# Patient Record
Sex: MALE | Race: BLACK OR AFRICAN AMERICAN | Employment: OTHER | URBAN - METROPOLITAN AREA
[De-identification: names, ages, dates, MRNs, and addresses within clinical notes are randomized per-mention and may not be internally consistent; named-entity substitution may affect disease eponyms.]

---

## 2017-01-23 ENCOUNTER — HOSPITAL ENCOUNTER (OUTPATIENT)
Dept: LAB | Age: 67
Discharge: HOME | End: 2017-01-23
Payer: SELF-PAY

## 2017-01-23 DIAGNOSIS — N18.4 CHRONIC KIDNEY DISEASE, STAGE IV (SEVERE) (HCC): ICD-10-CM

## 2017-01-23 DIAGNOSIS — I10 ESSENTIAL HYPERTENSION, MALIGNANT: ICD-10-CM

## 2017-01-23 DIAGNOSIS — N13.9 URINARY OBSTRUCTION, UNSPECIFIED: ICD-10-CM

## 2017-01-23 DIAGNOSIS — N18.30 CHRONIC KIDNEY DISEASE, STAGE III (MODERATE) (HCC): ICD-10-CM

## 2017-01-23 DIAGNOSIS — N31.9 NEUROGENIC DYSFUNCTION OF THE URINARY BLADDER: ICD-10-CM

## 2017-01-23 DIAGNOSIS — N17.9 ACUTE KIDNEY FAILURE, UNSPECIFIED (HCC): ICD-10-CM

## 2017-01-23 DIAGNOSIS — E21.1 HYPERPARATHYROIDISM DUE TO 1,25(0H)2D3 (HCC): ICD-10-CM

## 2017-01-23 LAB
25(OH)D3 SERPL-MCNC: 35.1 NG/ML (ref 30–100)
ALBUMIN SERPL BCP-MCNC: 3.6 G/DL (ref 3.4–5)
ANION GAP BLD CALC-SCNC: 8 MMOL/L (ref 3–18)
APPEARANCE UR: ABNORMAL
BACTERIA URNS QL MICRO: ABNORMAL /HPF
BILIRUB UR QL: NEGATIVE
BUN SERPL-MCNC: 27 MG/DL (ref 7–18)
BUN/CREAT SERPL: 15 (ref 12–20)
CALCIUM SERPL-MCNC: 8.7 MG/DL (ref 8.5–10.1)
CALCIUM SERPL-MCNC: 9 MG/DL (ref 8.5–10.1)
CHLORIDE SERPL-SCNC: 106 MMOL/L (ref 100–108)
CO2 SERPL-SCNC: 31 MMOL/L (ref 21–32)
COLOR UR: YELLOW
CREAT SERPL-MCNC: 1.81 MG/DL (ref 0.6–1.3)
CREAT UR-MCNC: 107 MG/DL (ref 30–125)
EPITH CASTS URNS QL MICRO: ABNORMAL /LPF (ref 0–5)
GLUCOSE SERPL-MCNC: 135 MG/DL (ref 74–99)
GLUCOSE UR STRIP.AUTO-MCNC: NEGATIVE MG/DL
HGB UR QL STRIP: NEGATIVE
KETONES UR QL STRIP.AUTO: NEGATIVE MG/DL
LEUKOCYTE ESTERASE UR QL STRIP.AUTO: ABNORMAL
MICROALBUMIN UR-MCNC: 148 MG/DL (ref 0–3)
MICROALBUMIN/CREAT UR-RTO: 1383 MG/G (ref 0–30)
MUCOUS THREADS URNS QL MICRO: ABNORMAL /LPF
NITRITE UR QL STRIP.AUTO: POSITIVE
PH UR STRIP: 6.5 [PH] (ref 5–8)
PHOSPHATE SERPL-MCNC: 3.6 MG/DL (ref 2.5–4.9)
POTASSIUM SERPL-SCNC: 3.8 MMOL/L (ref 3.5–5.5)
PROT UR STRIP-MCNC: 300 MG/DL
PTH-INTACT SERPL-MCNC: 99.2 PG/ML (ref 14–72)
RBC #/AREA URNS HPF: ABNORMAL /HPF (ref 0–5)
SODIUM SERPL-SCNC: 145 MMOL/L (ref 136–145)
SP GR UR REFRACTOMETRY: 1.01 (ref 1–1.03)
UROBILINOGEN UR QL STRIP.AUTO: 1 EU/DL (ref 0.2–1)
WBC URNS QL MICRO: ABNORMAL /HPF (ref 0–5)

## 2017-01-23 PROCEDURE — 36415 COLL VENOUS BLD VENIPUNCTURE: CPT | Performed by: INTERNAL MEDICINE

## 2017-01-23 PROCEDURE — 82043 UR ALBUMIN QUANTITATIVE: CPT | Performed by: INTERNAL MEDICINE

## 2017-01-23 PROCEDURE — 82306 VITAMIN D 25 HYDROXY: CPT | Performed by: INTERNAL MEDICINE

## 2017-01-23 PROCEDURE — 80069 RENAL FUNCTION PANEL: CPT | Performed by: INTERNAL MEDICINE

## 2017-01-23 PROCEDURE — 83970 ASSAY OF PARATHORMONE: CPT | Performed by: INTERNAL MEDICINE

## 2017-01-23 PROCEDURE — 81001 URINALYSIS AUTO W/SCOPE: CPT | Performed by: INTERNAL MEDICINE

## 2017-03-14 ENCOUNTER — HOSPITAL ENCOUNTER (OUTPATIENT)
Dept: LAB | Age: 67
Discharge: HOME | End: 2017-03-14
Payer: MEDICARE

## 2017-03-14 LAB
ANION GAP BLD CALC-SCNC: 5 MMOL/L (ref 3–18)
BUN SERPL-MCNC: 27 MG/DL (ref 7–18)
BUN/CREAT SERPL: 14 (ref 12–20)
CALCIUM SERPL-MCNC: 9.2 MG/DL (ref 8.5–10.1)
CHLORIDE SERPL-SCNC: 106 MMOL/L (ref 100–108)
CO2 SERPL-SCNC: 33 MMOL/L (ref 21–32)
CREAT SERPL-MCNC: 1.9 MG/DL (ref 0.6–1.3)
GLUCOSE SERPL-MCNC: 111 MG/DL (ref 74–99)
POTASSIUM SERPL-SCNC: 3.9 MMOL/L (ref 3.5–5.5)
SODIUM SERPL-SCNC: 144 MMOL/L (ref 136–145)

## 2017-03-14 PROCEDURE — 80048 BASIC METABOLIC PNL TOTAL CA: CPT | Performed by: INTERNAL MEDICINE

## 2017-03-14 PROCEDURE — 36415 COLL VENOUS BLD VENIPUNCTURE: CPT | Performed by: INTERNAL MEDICINE

## 2017-04-21 ENCOUNTER — HOSPITAL ENCOUNTER (OUTPATIENT)
Dept: LAB | Age: 67
Discharge: HOME | End: 2017-04-21
Payer: MEDICARE

## 2017-04-21 LAB
25(OH)D3 SERPL-MCNC: 40.9 NG/ML (ref 30–100)
ALBUMIN SERPL BCP-MCNC: 3.5 G/DL (ref 3.4–5)
ANION GAP BLD CALC-SCNC: 8 MMOL/L (ref 3–18)
BUN SERPL-MCNC: 25 MG/DL (ref 7–18)
BUN/CREAT SERPL: 14 (ref 12–20)
CALCIUM SERPL-MCNC: 9.1 MG/DL (ref 8.5–10.1)
CALCIUM SERPL-MCNC: 9.3 MG/DL (ref 8.5–10.1)
CHLORIDE SERPL-SCNC: 105 MMOL/L (ref 100–108)
CO2 SERPL-SCNC: 30 MMOL/L (ref 21–32)
CREAT SERPL-MCNC: 1.82 MG/DL (ref 0.6–1.3)
CREAT UR-MCNC: 41 MG/DL (ref 30–125)
GLUCOSE SERPL-MCNC: 106 MG/DL (ref 74–99)
MICROALBUMIN UR-MCNC: 214 MG/DL (ref 0–3)
MICROALBUMIN/CREAT UR-RTO: 5220 MG/G (ref 0–30)
PHOSPHATE SERPL-MCNC: 3.8 MG/DL (ref 2.5–4.9)
POTASSIUM SERPL-SCNC: 3.8 MMOL/L (ref 3.5–5.5)
PTH-INTACT SERPL-MCNC: 119.9 PG/ML (ref 14–72)
SODIUM SERPL-SCNC: 143 MMOL/L (ref 136–145)

## 2017-04-21 PROCEDURE — 36415 COLL VENOUS BLD VENIPUNCTURE: CPT | Performed by: INTERNAL MEDICINE

## 2017-04-21 PROCEDURE — 83970 ASSAY OF PARATHORMONE: CPT | Performed by: INTERNAL MEDICINE

## 2017-04-21 PROCEDURE — 82043 UR ALBUMIN QUANTITATIVE: CPT | Performed by: INTERNAL MEDICINE

## 2017-04-21 PROCEDURE — 80069 RENAL FUNCTION PANEL: CPT | Performed by: INTERNAL MEDICINE

## 2017-04-21 PROCEDURE — 82306 VITAMIN D 25 HYDROXY: CPT | Performed by: INTERNAL MEDICINE

## 2017-07-27 ENCOUNTER — HOSPITAL ENCOUNTER (OUTPATIENT)
Dept: LAB | Age: 67
Discharge: HOME | End: 2017-07-27
Payer: MEDICARE

## 2017-07-27 LAB
CALCIUM SERPL-MCNC: 9 MG/DL (ref 8.5–10.1)
PTH-INTACT SERPL-MCNC: 75.9 PG/ML (ref 14–72)

## 2017-07-27 PROCEDURE — 36415 COLL VENOUS BLD VENIPUNCTURE: CPT | Performed by: INTERNAL MEDICINE

## 2017-07-27 PROCEDURE — 83970 ASSAY OF PARATHORMONE: CPT | Performed by: INTERNAL MEDICINE

## 2017-08-02 ENCOUNTER — HOSPITAL ENCOUNTER (OUTPATIENT)
Dept: LAB | Age: 67
Discharge: HOME | End: 2017-08-02
Payer: MEDICARE

## 2017-08-02 LAB
ANION GAP BLD CALC-SCNC: 7 MMOL/L (ref 3–18)
BUN SERPL-MCNC: 22 MG/DL (ref 7–18)
BUN/CREAT SERPL: 12 (ref 12–20)
CALCIUM SERPL-MCNC: 8.7 MG/DL (ref 8.5–10.1)
CHLORIDE SERPL-SCNC: 107 MMOL/L (ref 100–108)
CO2 SERPL-SCNC: 30 MMOL/L (ref 21–32)
CREAT SERPL-MCNC: 1.91 MG/DL (ref 0.6–1.3)
GLUCOSE SERPL-MCNC: 118 MG/DL (ref 74–99)
POTASSIUM SERPL-SCNC: 3.8 MMOL/L (ref 3.5–5.5)
SODIUM SERPL-SCNC: 144 MMOL/L (ref 136–145)

## 2017-08-02 PROCEDURE — 36415 COLL VENOUS BLD VENIPUNCTURE: CPT | Performed by: INTERNAL MEDICINE

## 2017-08-02 PROCEDURE — 80048 BASIC METABOLIC PNL TOTAL CA: CPT | Performed by: INTERNAL MEDICINE

## 2017-11-01 ENCOUNTER — HOSPITAL ENCOUNTER (OUTPATIENT)
Dept: LAB | Age: 67
Discharge: HOME | End: 2017-11-01
Payer: MEDICARE

## 2017-11-01 LAB
ALBUMIN SERPL-MCNC: 3.3 G/DL (ref 3.4–5)
ANION GAP SERPL CALC-SCNC: 6 MMOL/L (ref 3–18)
BUN SERPL-MCNC: 25 MG/DL (ref 7–18)
BUN/CREAT SERPL: 13 (ref 12–20)
CALCIUM SERPL-MCNC: 8.8 MG/DL (ref 8.5–10.1)
CHLORIDE SERPL-SCNC: 104 MMOL/L (ref 100–108)
CO2 SERPL-SCNC: 31 MMOL/L (ref 21–32)
CREAT SERPL-MCNC: 1.97 MG/DL (ref 0.6–1.3)
CREAT UR-MCNC: 69.7 MG/DL (ref 30–125)
GLUCOSE SERPL-MCNC: 105 MG/DL (ref 74–99)
HCT VFR BLD AUTO: 43.6 % (ref 36–48)
HGB BLD-MCNC: 13.9 G/DL (ref 13–16)
IRON SATN MFR SERPL: 19 %
IRON SERPL-MCNC: 48 UG/DL (ref 50–175)
MICROALBUMIN UR-MCNC: 220 MG/DL (ref 0–3)
MICROALBUMIN/CREAT UR-RTO: 3156 MG/G (ref 0–30)
PHOSPHATE SERPL-MCNC: 3.5 MG/DL (ref 2.5–4.9)
POTASSIUM SERPL-SCNC: 4.1 MMOL/L (ref 3.5–5.5)
SODIUM SERPL-SCNC: 141 MMOL/L (ref 136–145)
TIBC SERPL-MCNC: 259 UG/DL (ref 250–450)

## 2017-11-01 PROCEDURE — 36415 COLL VENOUS BLD VENIPUNCTURE: CPT | Performed by: INTERNAL MEDICINE

## 2017-11-01 PROCEDURE — 85018 HEMOGLOBIN: CPT | Performed by: INTERNAL MEDICINE

## 2017-11-01 PROCEDURE — 80069 RENAL FUNCTION PANEL: CPT | Performed by: INTERNAL MEDICINE

## 2017-11-01 PROCEDURE — 82043 UR ALBUMIN QUANTITATIVE: CPT | Performed by: INTERNAL MEDICINE

## 2017-11-01 PROCEDURE — 83540 ASSAY OF IRON: CPT | Performed by: INTERNAL MEDICINE

## 2018-04-13 ENCOUNTER — HOSPITAL ENCOUNTER (OUTPATIENT)
Dept: LAB | Age: 68
Discharge: HOME | End: 2018-04-13
Payer: MEDICARE

## 2018-04-13 LAB
25(OH)D3 SERPL-MCNC: 44.7 NG/ML (ref 30–100)
ALBUMIN SERPL-MCNC: 3.5 G/DL (ref 3.4–5)
ANION GAP SERPL CALC-SCNC: 8 MMOL/L (ref 3–18)
BUN SERPL-MCNC: 25 MG/DL (ref 7–18)
BUN/CREAT SERPL: 12 (ref 12–20)
CALCIUM SERPL-MCNC: 9 MG/DL (ref 8.5–10.1)
CALCIUM SERPL-MCNC: 9 MG/DL (ref 8.5–10.1)
CHLORIDE SERPL-SCNC: 109 MMOL/L (ref 100–108)
CO2 SERPL-SCNC: 30 MMOL/L (ref 21–32)
CREAT SERPL-MCNC: 2.03 MG/DL (ref 0.6–1.3)
GLUCOSE SERPL-MCNC: 85 MG/DL (ref 74–99)
PHOSPHATE SERPL-MCNC: 3.6 MG/DL (ref 2.5–4.9)
POTASSIUM SERPL-SCNC: 4 MMOL/L (ref 3.5–5.5)
PTH-INTACT SERPL-MCNC: 96.1 PG/ML (ref 18.4–88)
SODIUM SERPL-SCNC: 147 MMOL/L (ref 136–145)

## 2018-04-13 PROCEDURE — 82306 VITAMIN D 25 HYDROXY: CPT | Performed by: INTERNAL MEDICINE

## 2018-04-13 PROCEDURE — 83970 ASSAY OF PARATHORMONE: CPT | Performed by: INTERNAL MEDICINE

## 2018-04-13 PROCEDURE — 36415 COLL VENOUS BLD VENIPUNCTURE: CPT | Performed by: INTERNAL MEDICINE

## 2018-04-13 PROCEDURE — 80069 RENAL FUNCTION PANEL: CPT | Performed by: INTERNAL MEDICINE

## 2018-05-07 RX ORDER — HYDRALAZINE HYDROCHLORIDE 100 MG/1
TABLET, FILM COATED ORAL
Qty: 270 TAB | Refills: 6 | Status: CANCELLED | OUTPATIENT
Start: 2018-05-07

## 2018-08-14 ENCOUNTER — HOSPITAL ENCOUNTER (OUTPATIENT)
Dept: LAB | Age: 68
Discharge: HOME | End: 2018-08-14

## 2018-08-14 PROCEDURE — 99001 SPECIMEN HANDLING PT-LAB: CPT | Performed by: INTERNAL MEDICINE

## 2018-09-18 ENCOUNTER — HOSPITAL ENCOUNTER (OUTPATIENT)
Dept: VASCULAR SURGERY | Age: 68
Discharge: HOME | End: 2018-09-18
Attending: FAMILY MEDICINE
Payer: MEDICARE

## 2018-09-18 DIAGNOSIS — I73.9 PAD (PERIPHERAL ARTERY DISEASE) (HCC): ICD-10-CM

## 2018-09-18 LAB
LEFT ABI: 1.47
LEFT ANTERIOR TIBIAL: 240 MMHG
LEFT POSTERIOR TIBIAL: 240 MMHG
LEFT TBI: 0.87
LEFT TOE PRESSURE: 142 MMHG
RIGHT ABI: 1.47
RIGHT ANTERIOR TIBIAL: 240 MMHG
RIGHT ARM BP: 163 MMHG
RIGHT POSTERIOR TIBIAL: 240 MMHG
RIGHT TBI: 1.47
RIGHT TOE PRESSURE: 240 MMHG

## 2018-09-18 PROCEDURE — 93923 UPR/LXTR ART STDY 3+ LVLS: CPT

## 2018-11-09 ENCOUNTER — HOSPITAL ENCOUNTER (OUTPATIENT)
Dept: LAB | Age: 68
Discharge: HOME | End: 2018-11-09

## 2018-11-09 LAB — XX-LABCORP SPECIMEN COL,LCBCF: NORMAL

## 2018-11-09 PROCEDURE — 99001 SPECIMEN HANDLING PT-LAB: CPT

## 2019-03-21 ENCOUNTER — HOSPITAL ENCOUNTER (OUTPATIENT)
Dept: LAB | Age: 69
Discharge: HOME | End: 2019-03-21

## 2019-03-21 LAB — XX-LABCORP SPECIMEN COL,LCBCF: NORMAL

## 2019-03-21 PROCEDURE — 99001 SPECIMEN HANDLING PT-LAB: CPT

## 2019-07-26 ENCOUNTER — HOSPITAL ENCOUNTER (OUTPATIENT)
Dept: LAB | Age: 69
Discharge: HOME | End: 2019-07-26

## 2019-07-26 LAB — XX-LABCORP SPECIMEN COL,LCBCF: NORMAL

## 2019-07-26 PROCEDURE — 99001 SPECIMEN HANDLING PT-LAB: CPT

## 2019-12-19 ENCOUNTER — HOSPITAL ENCOUNTER (OUTPATIENT)
Dept: LAB | Age: 69
Discharge: HOME | End: 2019-12-19
Payer: MEDICARE

## 2019-12-19 LAB
ALBUMIN SERPL-MCNC: 3.8 G/DL (ref 3.4–5)
ANION GAP SERPL CALC-SCNC: 7 MMOL/L (ref 3–18)
BASOPHILS # BLD: 0 K/UL (ref 0–0.1)
BASOPHILS NFR BLD: 1 % (ref 0–2)
BUN SERPL-MCNC: 34 MG/DL (ref 7–18)
BUN/CREAT SERPL: 15 (ref 12–20)
CALCIUM SERPL-MCNC: 9.1 MG/DL (ref 8.5–10.1)
CHLORIDE SERPL-SCNC: 109 MMOL/L (ref 100–111)
CO2 SERPL-SCNC: 30 MMOL/L (ref 21–32)
CREAT SERPL-MCNC: 2.26 MG/DL (ref 0.6–1.3)
DIFFERENTIAL METHOD BLD: ABNORMAL
EOSINOPHIL # BLD: 0.2 K/UL (ref 0–0.4)
EOSINOPHIL NFR BLD: 4 % (ref 0–5)
ERYTHROCYTE [DISTWIDTH] IN BLOOD BY AUTOMATED COUNT: 15.2 % (ref 11.6–14.5)
GLUCOSE SERPL-MCNC: 88 MG/DL (ref 74–99)
HCT VFR BLD AUTO: 39.6 % (ref 36–48)
HGB BLD-MCNC: 12.8 G/DL (ref 13–16)
LYMPHOCYTES # BLD: 0.7 K/UL (ref 0.9–3.6)
LYMPHOCYTES NFR BLD: 16 % (ref 21–52)
MCH RBC QN AUTO: 29.2 PG (ref 24–34)
MCHC RBC AUTO-ENTMCNC: 32.3 G/DL (ref 31–37)
MCV RBC AUTO: 90.2 FL (ref 74–97)
MONOCYTES # BLD: 0.6 K/UL (ref 0.05–1.2)
MONOCYTES NFR BLD: 13 % (ref 3–10)
NEUTS SEG # BLD: 2.8 K/UL (ref 1.8–8)
NEUTS SEG NFR BLD: 66 % (ref 40–73)
PHOSPHATE SERPL-MCNC: 3.6 MG/DL (ref 2.5–4.9)
PLATELET # BLD AUTO: 107 K/UL (ref 135–420)
PMV BLD AUTO: 11 FL (ref 9.2–11.8)
POTASSIUM SERPL-SCNC: 4 MMOL/L (ref 3.5–5.5)
RBC # BLD AUTO: 4.39 M/UL (ref 4.7–5.5)
SODIUM SERPL-SCNC: 146 MMOL/L (ref 136–145)
WBC # BLD AUTO: 4.3 K/UL (ref 4.6–13.2)

## 2019-12-19 PROCEDURE — 80069 RENAL FUNCTION PANEL: CPT

## 2019-12-19 PROCEDURE — 85025 COMPLETE CBC W/AUTO DIFF WBC: CPT

## 2019-12-19 PROCEDURE — 36415 COLL VENOUS BLD VENIPUNCTURE: CPT

## 2020-09-04 ENCOUNTER — HOSPITAL ENCOUNTER (OUTPATIENT)
Dept: LAB | Age: 70
Discharge: HOME | End: 2020-09-04
Payer: MEDICARE

## 2020-09-04 LAB
ALBUMIN SERPL-MCNC: 3.9 G/DL (ref 3.4–5)
ANION GAP SERPL CALC-SCNC: 2 MMOL/L (ref 3–18)
BUN SERPL-MCNC: 27 MG/DL (ref 7–18)
BUN/CREAT SERPL: 12 (ref 12–20)
CALCIUM SERPL-MCNC: 9.6 MG/DL (ref 8.5–10.1)
CALCIUM SERPL-MCNC: 9.7 MG/DL (ref 8.5–10.1)
CHLORIDE SERPL-SCNC: 109 MMOL/L (ref 100–111)
CO2 SERPL-SCNC: 32 MMOL/L (ref 21–32)
CREAT SERPL-MCNC: 2.23 MG/DL (ref 0.6–1.3)
CREAT UR-MCNC: 23 MG/DL (ref 30–125)
GLUCOSE SERPL-MCNC: 81 MG/DL (ref 74–99)
MICROALBUMIN UR-MCNC: 44.3 MG/DL (ref 0–3)
MICROALBUMIN/CREAT UR-RTO: 1926 MG/G (ref 0–30)
PHOSPHATE SERPL-MCNC: 3.6 MG/DL (ref 2.5–4.9)
POTASSIUM SERPL-SCNC: 4.1 MMOL/L (ref 3.5–5.5)
PTH-INTACT SERPL-MCNC: 58.4 PG/ML (ref 18.4–88)
SODIUM SERPL-SCNC: 143 MMOL/L (ref 136–145)

## 2020-09-04 PROCEDURE — 36415 COLL VENOUS BLD VENIPUNCTURE: CPT

## 2020-09-04 PROCEDURE — 80069 RENAL FUNCTION PANEL: CPT

## 2020-09-04 PROCEDURE — 83970 ASSAY OF PARATHORMONE: CPT

## 2020-09-04 PROCEDURE — 82043 UR ALBUMIN QUANTITATIVE: CPT

## 2021-03-10 ENCOUNTER — HOSPITAL ENCOUNTER (OUTPATIENT)
Dept: LAB | Age: 71
Discharge: HOME | End: 2021-03-10
Payer: MEDICARE

## 2021-03-10 LAB
25(OH)D3 SERPL-MCNC: 45 NG/ML (ref 30–100)
ALBUMIN SERPL-MCNC: 3.7 G/DL (ref 3.4–5)
ANION GAP SERPL CALC-SCNC: 7 MMOL/L (ref 3–18)
BUN SERPL-MCNC: 36 MG/DL (ref 7–18)
BUN/CREAT SERPL: 15 (ref 12–20)
CALCIUM SERPL-MCNC: 9.3 MG/DL (ref 8.5–10.1)
CALCIUM SERPL-MCNC: 9.4 MG/DL (ref 8.5–10.1)
CHLORIDE SERPL-SCNC: 111 MMOL/L (ref 100–111)
CO2 SERPL-SCNC: 29 MMOL/L (ref 21–32)
CREAT SERPL-MCNC: 2.47 MG/DL (ref 0.6–1.3)
CREAT UR-MCNC: 63 MG/DL (ref 30–125)
ERYTHROCYTE [DISTWIDTH] IN BLOOD BY AUTOMATED COUNT: 15.5 % (ref 11.6–14.5)
FERRITIN SERPL-MCNC: 174 NG/ML (ref 8–388)
GLUCOSE SERPL-MCNC: 86 MG/DL (ref 74–99)
HCT VFR BLD AUTO: 34.6 % (ref 36–48)
HGB BLD-MCNC: 11.2 G/DL (ref 13–16)
IRON SATN MFR SERPL: 17 % (ref 20–50)
IRON SERPL-MCNC: 43 UG/DL (ref 50–175)
MCH RBC QN AUTO: 29.4 PG (ref 24–34)
MCHC RBC AUTO-ENTMCNC: 32.4 G/DL (ref 31–37)
MCV RBC AUTO: 90.8 FL (ref 74–97)
MICROALBUMIN UR-MCNC: 68.1 MG/DL (ref 0–3)
MICROALBUMIN/CREAT UR-RTO: 1081 MG/G (ref 0–30)
PHOSPHATE SERPL-MCNC: 4 MG/DL (ref 2.5–4.9)
PLATELET # BLD AUTO: 108 K/UL (ref 135–420)
PMV BLD AUTO: 11 FL (ref 9.2–11.8)
POTASSIUM SERPL-SCNC: 4.3 MMOL/L (ref 3.5–5.5)
PTH-INTACT SERPL-MCNC: 46.5 PG/ML (ref 18.4–88)
RBC # BLD AUTO: 3.81 M/UL (ref 4.7–5.5)
SODIUM SERPL-SCNC: 147 MMOL/L (ref 136–145)
TIBC SERPL-MCNC: 254 UG/DL (ref 250–450)
WBC # BLD AUTO: 3.8 K/UL (ref 4.6–13.2)

## 2021-03-10 PROCEDURE — 36415 COLL VENOUS BLD VENIPUNCTURE: CPT

## 2021-03-10 PROCEDURE — 83970 ASSAY OF PARATHORMONE: CPT

## 2021-03-10 PROCEDURE — 83550 IRON BINDING TEST: CPT

## 2021-03-10 PROCEDURE — 82728 ASSAY OF FERRITIN: CPT

## 2021-03-10 PROCEDURE — 80069 RENAL FUNCTION PANEL: CPT

## 2021-03-10 PROCEDURE — 82306 VITAMIN D 25 HYDROXY: CPT

## 2021-03-10 PROCEDURE — 85027 COMPLETE CBC AUTOMATED: CPT

## 2021-03-10 PROCEDURE — 82043 UR ALBUMIN QUANTITATIVE: CPT

## 2021-07-20 ENCOUNTER — HOSPITAL ENCOUNTER (OUTPATIENT)
Dept: LAB | Age: 71
Discharge: HOME | End: 2021-07-20
Payer: MEDICARE

## 2021-07-20 LAB
ALBUMIN SERPL-MCNC: 3.8 G/DL (ref 3.4–5)
ANION GAP SERPL CALC-SCNC: 2 MMOL/L (ref 3–18)
BUN SERPL-MCNC: 56 MG/DL (ref 7–18)
BUN/CREAT SERPL: 18 (ref 12–20)
CALCIUM SERPL-MCNC: 9.5 MG/DL (ref 8.5–10.1)
CALCIUM SERPL-MCNC: 9.7 MG/DL (ref 8.5–10.1)
CHLORIDE SERPL-SCNC: 104 MMOL/L (ref 100–111)
CO2 SERPL-SCNC: 34 MMOL/L (ref 21–32)
CREAT SERPL-MCNC: 3.17 MG/DL (ref 0.6–1.3)
CREAT UR-MCNC: 47 MG/DL (ref 30–125)
ERYTHROCYTE [DISTWIDTH] IN BLOOD BY AUTOMATED COUNT: 16 % (ref 11.6–14.5)
FERRITIN SERPL-MCNC: 139 NG/ML (ref 8–388)
GLUCOSE SERPL-MCNC: 84 MG/DL (ref 74–99)
HCT VFR BLD AUTO: 30.3 % (ref 36–48)
HGB BLD-MCNC: 9.5 G/DL (ref 13–16)
IRON SATN MFR SERPL: 9 % (ref 20–50)
IRON SERPL-MCNC: 30 UG/DL (ref 50–175)
MCH RBC QN AUTO: 27.6 PG (ref 24–34)
MCHC RBC AUTO-ENTMCNC: 31.4 G/DL (ref 31–37)
MCV RBC AUTO: 88.1 FL (ref 74–97)
MICROALBUMIN UR-MCNC: 33.5 MG/DL (ref 0–3)
MICROALBUMIN/CREAT UR-RTO: 713 MG/G (ref 0–30)
PHOSPHATE SERPL-MCNC: 3.7 MG/DL (ref 2.5–4.9)
PLATELET # BLD AUTO: 135 K/UL (ref 135–420)
PMV BLD AUTO: 10.1 FL (ref 9.2–11.8)
POTASSIUM SERPL-SCNC: 3.9 MMOL/L (ref 3.5–5.5)
PTH-INTACT SERPL-MCNC: 63.9 PG/ML (ref 18.4–88)
RBC # BLD AUTO: 3.44 M/UL (ref 4.35–5.65)
SODIUM SERPL-SCNC: 140 MMOL/L (ref 136–145)
TIBC SERPL-MCNC: 320 UG/DL (ref 250–450)
WBC # BLD AUTO: 4.1 K/UL (ref 4.6–13.2)

## 2021-07-20 PROCEDURE — 82043 UR ALBUMIN QUANTITATIVE: CPT

## 2021-07-20 PROCEDURE — 83970 ASSAY OF PARATHORMONE: CPT

## 2021-07-20 PROCEDURE — 82728 ASSAY OF FERRITIN: CPT

## 2021-07-20 PROCEDURE — 36415 COLL VENOUS BLD VENIPUNCTURE: CPT

## 2021-07-20 PROCEDURE — 83540 ASSAY OF IRON: CPT

## 2021-07-20 PROCEDURE — 80069 RENAL FUNCTION PANEL: CPT

## 2021-07-20 PROCEDURE — 85027 COMPLETE CBC AUTOMATED: CPT

## 2021-08-15 ENCOUNTER — APPOINTMENT (OUTPATIENT)
Dept: GENERAL RADIOLOGY | Age: 71
DRG: 673 | End: 2021-08-15
Attending: SURGERY
Payer: MEDICARE

## 2021-08-15 ENCOUNTER — HOSPITAL ENCOUNTER (INPATIENT)
Age: 71
LOS: 12 days | Discharge: NURSING, REHAB OR LONG-TERM CARE | DRG: 673 | End: 2021-08-27
Attending: STUDENT IN AN ORGANIZED HEALTH CARE EDUCATION/TRAINING PROGRAM | Admitting: INTERNAL MEDICINE
Payer: MEDICARE

## 2021-08-15 ENCOUNTER — APPOINTMENT (OUTPATIENT)
Dept: VASCULAR SURGERY | Age: 71
DRG: 673 | End: 2021-08-15
Attending: NURSE PRACTITIONER
Payer: MEDICARE

## 2021-08-15 ENCOUNTER — APPOINTMENT (OUTPATIENT)
Dept: GENERAL RADIOLOGY | Age: 71
DRG: 673 | End: 2021-08-15
Attending: STUDENT IN AN ORGANIZED HEALTH CARE EDUCATION/TRAINING PROGRAM
Payer: MEDICARE

## 2021-08-15 ENCOUNTER — APPOINTMENT (OUTPATIENT)
Dept: CT IMAGING | Age: 71
DRG: 673 | End: 2021-08-15
Attending: NURSE PRACTITIONER
Payer: MEDICARE

## 2021-08-15 DIAGNOSIS — N18.6 ESRD (END STAGE RENAL DISEASE) (HCC): ICD-10-CM

## 2021-08-15 DIAGNOSIS — R53.81 DEBILITY: ICD-10-CM

## 2021-08-15 DIAGNOSIS — R00.1 BRADYCARDIA: ICD-10-CM

## 2021-08-15 DIAGNOSIS — I50.32 CHRONIC DIASTOLIC HEART FAILURE (HCC): ICD-10-CM

## 2021-08-15 DIAGNOSIS — Z71.89 GOALS OF CARE, COUNSELING/DISCUSSION: ICD-10-CM

## 2021-08-15 DIAGNOSIS — E87.5 HYPERKALEMIA: ICD-10-CM

## 2021-08-15 DIAGNOSIS — E66.01 MORBID OBESITY (HCC): ICD-10-CM

## 2021-08-15 DIAGNOSIS — N18.9 CHRONIC KIDNEY DISEASE, UNSPECIFIED CKD STAGE: ICD-10-CM

## 2021-08-15 DIAGNOSIS — I48.0 PAROXYSMAL ATRIAL FIBRILLATION (HCC): ICD-10-CM

## 2021-08-15 DIAGNOSIS — N17.9 ACUTE RENAL FAILURE, UNSPECIFIED ACUTE RENAL FAILURE TYPE (HCC): ICD-10-CM

## 2021-08-15 PROBLEM — N19 RENAL FAILURE: Status: ACTIVE | Noted: 2021-08-15

## 2021-08-15 LAB
ABO + RH BLD: NORMAL
ANION GAP SERPL CALC-SCNC: 14 MMOL/L (ref 3–18)
ANION GAP SERPL CALC-SCNC: 16 MMOL/L (ref 3–18)
ANION GAP SERPL CALC-SCNC: 16 MMOL/L (ref 3–18)
APPEARANCE UR: ABNORMAL
ATRIAL RATE: 41 BPM
B PERT DNA SPEC QL NAA+PROBE: NOT DETECTED
BACTERIA URNS QL MICRO: ABNORMAL /HPF
BASOPHILS # BLD: 0 K/UL (ref 0–0.1)
BASOPHILS NFR BLD: 0 % (ref 0–2)
BILIRUB UR QL: NEGATIVE
BLOOD BANK CMNT PATIENT-IMP: NORMAL
BLOOD GROUP ANTIBODIES SERPL: NORMAL
BNP SERPL-MCNC: ABNORMAL PG/ML (ref 0–900)
BORDETELLA PARAPERTUSSIS PCR, BORPAR: NOT DETECTED
BUN SERPL-MCNC: 218 MG/DL (ref 7–18)
BUN SERPL-MCNC: 223 MG/DL (ref 7–18)
BUN SERPL-MCNC: 228 MG/DL (ref 7–18)
BUN/CREAT SERPL: 15 (ref 12–20)
C PNEUM DNA SPEC QL NAA+PROBE: NOT DETECTED
CALCIUM SERPL-MCNC: 8.6 MG/DL (ref 8.5–10.1)
CALCIUM SERPL-MCNC: 8.9 MG/DL (ref 8.5–10.1)
CALCIUM SERPL-MCNC: 8.9 MG/DL (ref 8.5–10.1)
CALCULATED R AXIS, ECG10: -51 DEGREES
CALCULATED T AXIS, ECG11: 63 DEGREES
CHLORIDE SERPL-SCNC: 98 MMOL/L (ref 100–111)
CK SERPL-CCNC: 194 U/L (ref 39–308)
CO2 SERPL-SCNC: 16 MMOL/L (ref 21–32)
CO2 SERPL-SCNC: 17 MMOL/L (ref 21–32)
CO2 SERPL-SCNC: 18 MMOL/L (ref 21–32)
COLOR UR: ABNORMAL
CREAT SERPL-MCNC: 14.6 MG/DL (ref 0.6–1.3)
CREAT SERPL-MCNC: 14.7 MG/DL (ref 0.6–1.3)
CREAT SERPL-MCNC: 14.9 MG/DL (ref 0.6–1.3)
DIAGNOSIS, 93000: NORMAL
DIFFERENTIAL METHOD BLD: ABNORMAL
EOSINOPHIL # BLD: 0 K/UL (ref 0–0.4)
EOSINOPHIL NFR BLD: 0 % (ref 0–5)
ERYTHROCYTE [DISTWIDTH] IN BLOOD BY AUTOMATED COUNT: 16.2 % (ref 11.6–14.5)
EST. AVERAGE GLUCOSE BLD GHB EST-MCNC: 105 MG/DL
FLUAV H1 2009 PAND RNA SPEC QL NAA+PROBE: NOT DETECTED
FLUAV H1 RNA SPEC QL NAA+PROBE: NOT DETECTED
FLUAV H3 RNA SPEC QL NAA+PROBE: NOT DETECTED
FLUAV SUBTYP SPEC NAA+PROBE: NOT DETECTED
FLUBV RNA SPEC QL NAA+PROBE: NOT DETECTED
GLUCOSE BLD STRIP.AUTO-MCNC: 91 MG/DL (ref 70–110)
GLUCOSE SERPL-MCNC: 79 MG/DL (ref 74–99)
GLUCOSE SERPL-MCNC: 82 MG/DL (ref 74–99)
GLUCOSE SERPL-MCNC: 98 MG/DL (ref 74–99)
GLUCOSE UR STRIP.AUTO-MCNC: NEGATIVE MG/DL
HADV DNA SPEC QL NAA+PROBE: NOT DETECTED
HBA1C MFR BLD: 5.3 % (ref 4.2–5.6)
HBV SURFACE AG SER QL: <0.1 INDEX
HBV SURFACE AG SER QL: NEGATIVE
HCOV 229E RNA SPEC QL NAA+PROBE: NOT DETECTED
HCOV HKU1 RNA SPEC QL NAA+PROBE: NOT DETECTED
HCOV NL63 RNA SPEC QL NAA+PROBE: NOT DETECTED
HCOV OC43 RNA SPEC QL NAA+PROBE: NOT DETECTED
HCT VFR BLD AUTO: 26.6 % (ref 36–48)
HGB BLD-MCNC: 9.1 G/DL (ref 13–16)
HGB UR QL STRIP: ABNORMAL
HMPV RNA SPEC QL NAA+PROBE: NOT DETECTED
HPIV1 RNA SPEC QL NAA+PROBE: NOT DETECTED
HPIV2 RNA SPEC QL NAA+PROBE: NOT DETECTED
HPIV3 RNA SPEC QL NAA+PROBE: NOT DETECTED
HPIV4 RNA SPEC QL NAA+PROBE: NOT DETECTED
INR PPP: 1.5 (ref 0.8–1.2)
KETONES UR QL STRIP.AUTO: NEGATIVE MG/DL
LEUKOCYTE ESTERASE UR QL STRIP.AUTO: ABNORMAL
LYMPHOCYTES # BLD: 0.5 K/UL (ref 0.9–3.6)
LYMPHOCYTES NFR BLD: 9 % (ref 21–52)
M PNEUMO DNA SPEC QL NAA+PROBE: NOT DETECTED
MAGNESIUM SERPL-MCNC: 3.1 MG/DL (ref 1.6–2.6)
MAGNESIUM SERPL-MCNC: 3.2 MG/DL (ref 1.6–2.6)
MCH RBC QN AUTO: 26.8 PG (ref 24–34)
MCHC RBC AUTO-ENTMCNC: 34.2 G/DL (ref 31–37)
MCV RBC AUTO: 78.2 FL (ref 74–97)
MONOCYTES # BLD: 0.3 K/UL (ref 0.05–1.2)
MONOCYTES NFR BLD: 5 % (ref 3–10)
NEUTS BAND NFR BLD MANUAL: 4 % (ref 0–5)
NEUTS SEG # BLD: 4.6 K/UL (ref 1.8–8)
NEUTS SEG NFR BLD: 82 % (ref 40–73)
NITRITE UR QL STRIP.AUTO: NEGATIVE
PH UR STRIP: 8.5 [PH] (ref 5–8)
PHOSPHATE SERPL-MCNC: 6.7 MG/DL (ref 2.5–4.9)
PLATELET # BLD AUTO: 179 K/UL (ref 135–420)
PLATELET COMMENTS,PCOM: ABNORMAL
PMV BLD AUTO: 10.1 FL (ref 9.2–11.8)
POTASSIUM SERPL-SCNC: 5.9 MMOL/L (ref 3.5–5.5)
POTASSIUM SERPL-SCNC: 6 MMOL/L (ref 3.5–5.5)
POTASSIUM SERPL-SCNC: 6.2 MMOL/L (ref 3.5–5.5)
PROT UR STRIP-MCNC: 300 MG/DL
PROTHROMBIN TIME: 17.7 SEC (ref 11.5–15.2)
Q-T INTERVAL, ECG07: 576 MS
QRS DURATION, ECG06: 168 MS
QTC CALCULATION (BEZET), ECG08: 475 MS
RBC # BLD AUTO: 3.4 M/UL (ref 4.35–5.65)
RBC #/AREA URNS HPF: ABNORMAL /HPF (ref 0–5)
RBC MORPH BLD: ABNORMAL
RSV RNA SPEC QL NAA+PROBE: NOT DETECTED
RV+EV RNA SPEC QL NAA+PROBE: NOT DETECTED
SARS-COV-2 PCR, COVPCR: NOT DETECTED
SODIUM SERPL-SCNC: 130 MMOL/L (ref 136–145)
SODIUM SERPL-SCNC: 130 MMOL/L (ref 136–145)
SODIUM SERPL-SCNC: 131 MMOL/L (ref 136–145)
SODIUM UR-SCNC: 103 MMOL/L (ref 20–110)
SP GR UR REFRACTOMETRY: 1.01 (ref 1–1.03)
SPECIMEN EXP DATE BLD: NORMAL
T4 FREE SERPL-MCNC: 0.8 NG/DL (ref 0.7–1.5)
TROPONIN I SERPL-MCNC: <0.02 NG/ML (ref 0–0.04)
TSH SERPL DL<=0.05 MIU/L-ACNC: 2.42 UIU/ML (ref 0.36–3.74)
URATE SERPL-MCNC: 13 MG/DL (ref 2.6–7.2)
UROBILINOGEN UR QL STRIP.AUTO: 0.2 EU/DL (ref 0.2–1)
UUN UR-MCNC: 216 MG/DL
VENTRICULAR RATE, ECG03: 41 BPM
WBC # BLD AUTO: 5.4 K/UL (ref 4.6–13.2)
WBC URNS QL MICRO: ABNORMAL /HPF (ref 0–4)

## 2021-08-15 PROCEDURE — 84300 ASSAY OF URINE SODIUM: CPT

## 2021-08-15 PROCEDURE — 80048 BASIC METABOLIC PNL TOTAL CA: CPT

## 2021-08-15 PROCEDURE — 2709999900 HC NON-CHARGEABLE SUPPLY

## 2021-08-15 PROCEDURE — 87186 SC STD MICRODIL/AGAR DIL: CPT

## 2021-08-15 PROCEDURE — 99285 EMERGENCY DEPT VISIT HI MDM: CPT

## 2021-08-15 PROCEDURE — 74011250636 HC RX REV CODE- 250/636: Performed by: INTERNAL MEDICINE

## 2021-08-15 PROCEDURE — 86901 BLOOD TYPING SEROLOGIC RH(D): CPT

## 2021-08-15 PROCEDURE — 84100 ASSAY OF PHOSPHORUS: CPT

## 2021-08-15 PROCEDURE — 71045 X-RAY EXAM CHEST 1 VIEW: CPT

## 2021-08-15 PROCEDURE — 81001 URINALYSIS AUTO W/SCOPE: CPT

## 2021-08-15 PROCEDURE — 83735 ASSAY OF MAGNESIUM: CPT

## 2021-08-15 PROCEDURE — 84443 ASSAY THYROID STIM HORMONE: CPT

## 2021-08-15 PROCEDURE — 84484 ASSAY OF TROPONIN QUANT: CPT

## 2021-08-15 PROCEDURE — 65610000006 HC RM INTENSIVE CARE

## 2021-08-15 PROCEDURE — 93970 EXTREMITY STUDY: CPT

## 2021-08-15 PROCEDURE — 99291 CRITICAL CARE FIRST HOUR: CPT | Performed by: INTERNAL MEDICINE

## 2021-08-15 PROCEDURE — 74011250636 HC RX REV CODE- 250/636: Performed by: NURSE PRACTITIONER

## 2021-08-15 PROCEDURE — 02HV33Z INSERTION OF INFUSION DEVICE INTO SUPERIOR VENA CAVA, PERCUTANEOUS APPROACH: ICD-10-PCS | Performed by: SURGERY

## 2021-08-15 PROCEDURE — 84439 ASSAY OF FREE THYROXINE: CPT

## 2021-08-15 PROCEDURE — 5A1D70Z PERFORMANCE OF URINARY FILTRATION, INTERMITTENT, LESS THAN 6 HOURS PER DAY: ICD-10-PCS | Performed by: INTERNAL MEDICINE

## 2021-08-15 PROCEDURE — 70450 CT HEAD/BRAIN W/O DYE: CPT

## 2021-08-15 PROCEDURE — 87086 URINE CULTURE/COLONY COUNT: CPT

## 2021-08-15 PROCEDURE — 82962 GLUCOSE BLOOD TEST: CPT

## 2021-08-15 PROCEDURE — 84480 ASSAY TRIIODOTHYRONINE (T3): CPT

## 2021-08-15 PROCEDURE — 84540 ASSAY OF URINE/UREA-N: CPT

## 2021-08-15 PROCEDURE — 74011000250 HC RX REV CODE- 250: Performed by: NURSE PRACTITIONER

## 2021-08-15 PROCEDURE — 96375 TX/PRO/DX INJ NEW DRUG ADDON: CPT

## 2021-08-15 PROCEDURE — 96374 THER/PROPH/DIAG INJ IV PUSH: CPT

## 2021-08-15 PROCEDURE — 99223 1ST HOSP IP/OBS HIGH 75: CPT | Performed by: INTERNAL MEDICINE

## 2021-08-15 PROCEDURE — 83880 ASSAY OF NATRIURETIC PEPTIDE: CPT

## 2021-08-15 PROCEDURE — 74011000258 HC RX REV CODE- 258: Performed by: STUDENT IN AN ORGANIZED HEALTH CARE EDUCATION/TRAINING PROGRAM

## 2021-08-15 PROCEDURE — 74011250636 HC RX REV CODE- 250/636: Performed by: STUDENT IN AN ORGANIZED HEALTH CARE EDUCATION/TRAINING PROGRAM

## 2021-08-15 PROCEDURE — 93005 ELECTROCARDIOGRAM TRACING: CPT

## 2021-08-15 PROCEDURE — 85025 COMPLETE CBC W/AUTO DIFF WBC: CPT

## 2021-08-15 PROCEDURE — 74011000250 HC RX REV CODE- 250: Performed by: STUDENT IN AN ORGANIZED HEALTH CARE EDUCATION/TRAINING PROGRAM

## 2021-08-15 PROCEDURE — 87040 BLOOD CULTURE FOR BACTERIA: CPT

## 2021-08-15 PROCEDURE — 0202U NFCT DS 22 TRGT SARS-COV-2: CPT

## 2021-08-15 PROCEDURE — 83036 HEMOGLOBIN GLYCOSYLATED A1C: CPT

## 2021-08-15 PROCEDURE — 87340 HEPATITIS B SURFACE AG IA: CPT

## 2021-08-15 PROCEDURE — 83930 ASSAY OF BLOOD OSMOLALITY: CPT

## 2021-08-15 PROCEDURE — 90935 HEMODIALYSIS ONE EVALUATION: CPT

## 2021-08-15 PROCEDURE — 74011636637 HC RX REV CODE- 636/637: Performed by: STUDENT IN AN ORGANIZED HEALTH CARE EDUCATION/TRAINING PROGRAM

## 2021-08-15 PROCEDURE — 87077 CULTURE AEROBIC IDENTIFY: CPT

## 2021-08-15 PROCEDURE — 85610 PROTHROMBIN TIME: CPT

## 2021-08-15 PROCEDURE — 84550 ASSAY OF BLOOD/URIC ACID: CPT

## 2021-08-15 PROCEDURE — 83935 ASSAY OF URINE OSMOLALITY: CPT

## 2021-08-15 PROCEDURE — 82550 ASSAY OF CK (CPK): CPT

## 2021-08-15 PROCEDURE — 86706 HEP B SURFACE ANTIBODY: CPT

## 2021-08-15 RX ORDER — SODIUM BICARBONATE 1 MEQ/ML
50 SYRINGE (ML) INTRAVENOUS ONCE
Status: COMPLETED | OUTPATIENT
Start: 2021-08-15 | End: 2021-08-15

## 2021-08-15 RX ORDER — HEPARIN SODIUM 5000 [USP'U]/ML
5000 INJECTION, SOLUTION INTRAVENOUS; SUBCUTANEOUS EVERY 8 HOURS
Status: DISCONTINUED | OUTPATIENT
Start: 2021-08-15 | End: 2021-08-16

## 2021-08-15 RX ORDER — SODIUM CHLORIDE 0.9 % (FLUSH) 0.9 %
5-40 SYRINGE (ML) INJECTION AS NEEDED
Status: DISCONTINUED | OUTPATIENT
Start: 2021-08-15 | End: 2021-08-27 | Stop reason: HOSPADM

## 2021-08-15 RX ORDER — MAGNESIUM SULFATE 100 %
4 CRYSTALS MISCELLANEOUS AS NEEDED
Status: DISCONTINUED | OUTPATIENT
Start: 2021-08-15 | End: 2021-08-27 | Stop reason: HOSPADM

## 2021-08-15 RX ORDER — INSULIN LISPRO 100 [IU]/ML
INJECTION, SOLUTION INTRAVENOUS; SUBCUTANEOUS
Status: DISCONTINUED | OUTPATIENT
Start: 2021-08-15 | End: 2021-08-15

## 2021-08-15 RX ORDER — SODIUM CHLORIDE 0.9 % (FLUSH) 0.9 %
5-40 SYRINGE (ML) INJECTION AS NEEDED
Status: DISCONTINUED | OUTPATIENT
Start: 2021-08-15 | End: 2021-08-23

## 2021-08-15 RX ORDER — DEXTROSE 50 % IN WATER (D50W) INTRAVENOUS SYRINGE
25-50 AS NEEDED
Status: DISCONTINUED | OUTPATIENT
Start: 2021-08-15 | End: 2021-08-27 | Stop reason: HOSPADM

## 2021-08-15 RX ORDER — SODIUM CHLORIDE 0.9 % (FLUSH) 0.9 %
5-40 SYRINGE (ML) INJECTION EVERY 8 HOURS
Status: DISCONTINUED | OUTPATIENT
Start: 2021-08-15 | End: 2021-08-27 | Stop reason: HOSPADM

## 2021-08-15 RX ORDER — DEXTROSE 50 % IN WATER (D50W) INTRAVENOUS SYRINGE
25 ONCE
Status: COMPLETED | OUTPATIENT
Start: 2021-08-15 | End: 2021-08-15

## 2021-08-15 RX ORDER — DOCUSATE SODIUM 100 MG/1
100 CAPSULE, LIQUID FILLED ORAL DAILY
Status: DISCONTINUED | OUTPATIENT
Start: 2021-08-16 | End: 2021-08-27 | Stop reason: HOSPADM

## 2021-08-15 RX ORDER — ATROPINE SULFATE 1 MG/ML
0.5 INJECTION, SOLUTION INTRAVENOUS ONCE
Status: COMPLETED | OUTPATIENT
Start: 2021-08-15 | End: 2021-08-15

## 2021-08-15 RX ORDER — ATROPINE SULFATE 1 MG/ML
0.5 INJECTION, SOLUTION INTRAVENOUS AS NEEDED
Status: DISCONTINUED | OUTPATIENT
Start: 2021-08-15 | End: 2021-08-27 | Stop reason: HOSPADM

## 2021-08-15 RX ORDER — SODIUM CHLORIDE 0.9 % (FLUSH) 0.9 %
5-40 SYRINGE (ML) INJECTION EVERY 8 HOURS
Status: DISCONTINUED | OUTPATIENT
Start: 2021-08-15 | End: 2021-08-23

## 2021-08-15 RX ORDER — ACETAMINOPHEN 650 MG/1
650 SUPPOSITORY RECTAL
Status: DISCONTINUED | OUTPATIENT
Start: 2021-08-15 | End: 2021-08-27 | Stop reason: HOSPADM

## 2021-08-15 RX ORDER — FUROSEMIDE 10 MG/ML
80 INJECTION INTRAMUSCULAR; INTRAVENOUS ONCE
Status: COMPLETED | OUTPATIENT
Start: 2021-08-15 | End: 2021-08-15

## 2021-08-15 RX ORDER — SENNOSIDES 8.6 MG/1
1 TABLET ORAL DAILY PRN
Status: DISCONTINUED | OUTPATIENT
Start: 2021-08-15 | End: 2021-08-27 | Stop reason: HOSPADM

## 2021-08-15 RX ORDER — ACETAMINOPHEN 325 MG/1
650 TABLET ORAL
Status: DISCONTINUED | OUTPATIENT
Start: 2021-08-15 | End: 2021-08-27 | Stop reason: HOSPADM

## 2021-08-15 RX ORDER — INSULIN LISPRO 100 [IU]/ML
INJECTION, SOLUTION INTRAVENOUS; SUBCUTANEOUS EVERY 6 HOURS
Status: DISCONTINUED | OUTPATIENT
Start: 2021-08-16 | End: 2021-08-16

## 2021-08-15 RX ADMIN — FUROSEMIDE 80 MG: 10 INJECTION, SOLUTION INTRAMUSCULAR; INTRAVENOUS at 12:20

## 2021-08-15 RX ADMIN — SODIUM CHLORIDE 10 ML: 9 INJECTION, SOLUTION INTRAMUSCULAR; INTRAVENOUS; SUBCUTANEOUS at 21:45

## 2021-08-15 RX ADMIN — CALCIUM GLUCONATE 1 G: 98 INJECTION, SOLUTION INTRAVENOUS at 12:01

## 2021-08-15 RX ADMIN — Medication 10 ML: at 21:47

## 2021-08-15 RX ADMIN — SODIUM BICARBONATE 50 MEQ: 84 INJECTION, SOLUTION INTRAVENOUS at 12:12

## 2021-08-15 RX ADMIN — HUMAN INSULIN 5 UNITS: 100 INJECTION, SOLUTION SUBCUTANEOUS at 12:13

## 2021-08-15 RX ADMIN — HEPARIN SODIUM 5000 UNITS: 5000 INJECTION INTRAVENOUS; SUBCUTANEOUS at 21:43

## 2021-08-15 RX ADMIN — ATROPINE SULFATE 0.5 MG: 1 INJECTION, SOLUTION INTRAMUSCULAR; INTRAVENOUS; SUBCUTANEOUS at 10:15

## 2021-08-15 RX ADMIN — DEXTROSE MONOHYDRATE 25 G: 25 INJECTION, SOLUTION INTRAVENOUS at 12:17

## 2021-08-15 RX ADMIN — CEFTRIAXONE SODIUM 2 G: 2 INJECTION, POWDER, FOR SOLUTION INTRAMUSCULAR; INTRAVENOUS at 15:02

## 2021-08-15 RX ADMIN — SODIUM CHLORIDE 20 ML: 9 INJECTION, SOLUTION INTRAMUSCULAR; INTRAVENOUS; SUBCUTANEOUS at 21:46

## 2021-08-15 RX ADMIN — Medication 10 ML: at 21:00

## 2021-08-15 NOTE — Clinical Note
Right chest and right neck prepped with ChloraPrep and draped. Wet prep elapsed drying time: 3 mins.

## 2021-08-15 NOTE — Clinical Note
TRANSFER - OUT REPORT:     Verbal report given to: Chau Ye RN. Report consisted of patient's Situation, Background, Assessment and   Recommendations(SBAR). Opportunity for questions and clarification was provided. Patient transported with a Cardiac Cath Tech / Patient Care Tech. Patient transported to: holding area.

## 2021-08-15 NOTE — Clinical Note
TRANSFER - IN REPORT:     Verbal report received from: Bryanna Travis RN. Report consisted of patient's Situation, Background, Assessment and   Recommendations(SBAR). Opportunity for questions and clarification was provided. Assessment completed upon patient's arrival to unit and care assumed. Patient transported with a Cardiac Cath Tech / Patient Care Tech.

## 2021-08-15 NOTE — DIALYSIS
Arrived to dialyze patient in ED room 2, LUE AVF non functional no thrill nor bruit noted past area of left antecubital anastomosis. Ultrasonic doppler machine at bedside during vascular studies use to assess the area shows no sign of blood flow. Dr. Brenda Hodgson notified.

## 2021-08-15 NOTE — ED PROVIDER NOTES
HPI   Patient presents by EMS after he was found down on the ground by family. Patient denies any this happening. He states that he just felt weak. He denies any chest pain or palpitations. He states he does feel mildly short of breath but feels like this is more at his baseline and not acutely changed. He denies any fullness or distention in his abdomen. He states that he feels like he has been urinating appropriately although he has not urinated this morning. He denies any headache or head injury. Denies any nausea or vomiting. He denies any lower back pain, trouble holding onto his stool or numbness in his groin. He does not think that the weakness is focal to his lower extremities and is just diffuse throughout. He denies any dizziness or lightheadedness.   Past Medical History:   Diagnosis Date    [facility]rial fibrillation ([facility]     Cerebrovascular accident ([facility]     , - general weakness    Chronic diastolic heart failure (HCC)     Chronic kidney disease     dialysis    Diabetes ([facility]     Heart failure ([facility]     History of cardioversion     Hypercholesterolemia     Hypertension     Morbid obesity ([facility]        Past Surgical History:   Procedure Laterality Date    HX VASCULAR ACCESS Right     right neck         Family History:   Problem Relation Age of Onset    Heart Attack Mother 52    Diabetes Other     Hypertension Other        Social History     Socioeconomic History    Marital status:      Spouse name: Not on file    Number of children: Not on file    Years of education: Not on file    Highest education level: Not on file   Occupational History    Not on file   Tobacco Use    Smoking status: Former Smoker     Years: 8.00     Quit date: 1971     Years since quittin.1    Smokeless tobacco: Never Used    Tobacco comment: last smoked in my late 19's   Substance and Sexual Activity    Alcohol use: No     Alcohol/week: 0.0 standard drinks    Drug use: No    Sexual activity: Never   Other Topics Concern    Not on file   Social History Narrative    Not on file     Social Determinants of Health     Financial Resource Strain:     Difficulty of Paying Living Expenses:    Food Insecurity:     Worried About Running Out of Food in the Last Year:     [address] in the Last Year:    Transportation Needs:     Lack of Transportation (Medical):  Lack of Transportation (Non-Medical):    Physical Activity:     Days of Exercise per Week:     Minutes of Exercise per Session:    Stress:     Feeling of Stress :    Social Connections:     Frequency of Communication with Friends and Family:     Frequency of Social Gatherings with Friends and Family:     Attends [employer] Services:     Active Member of Clubs or Organizations:     Attends Club or Organization Meetings:     Marital Status:    Intimate Partner Violence:     Fear of Current or Ex-Partner:     Emotionally Abused:     Physically Abused:     Sexually Abused: ALLERGIES: Patient has no known allergies.     Review of Systems  Constitutional: No fever  HENT: No ear pain  Eyes: No change in vision  Respiratory: Positive shortness of breath  Cardio: No chest pain  GI: No blood in stool  : No hematuria  MSK: No back pain  Skin: No rashes  Neuro: No headache    Vitals:    08/15/21 1245 08/15/21 1300 08/15/21 1315 08/15/21 1330   BP: (!) 132/52 (!) 120/52 (!) 113/54 116/60   Pulse: (!) 45 (!) 41 (!) 38 (!) 39   Resp: 27 22 23 21   Temp:       SpO2: 98% 97% 97% 96%   Weight:       Height:                Physical Exam   General: No acute distress  Head: Normocephalic, atraumatic  Psych: Cooperative and alert  Eyes: No scleral icterus, normal conjunctiva  ENT: Moist oral mucosa  Neck: Supple  CV: Regular rate and rhythm, no pitting edema, palpable radial pulses  Pulm: Clear breath sounds bilaterally without any wheezing or rhonchi, normal respiratory rate  GI: Normal bowel sounds, soft, non-tender, no significant abdominal fullness  MSK: Moves all four extremities, 5-5 strength bilateral upper and lower extremities  Skin: No rashes  Neuro: Alert and conversive    [facility]  ED Course as of Aug 15 1446   Sun Aug 15, 2021   1238 NT pro-BNP(!): 16,360 [DV]      ED Course User Index  [DV] Agustin Jang DO     Patient is a 61-year-old male who presents after being found down at home. History is difficult as patient denies everything despite the fact that family confirms that he was found down on the ground unable to get up. Patient is noted to be significantly bradycardic but denies any concurrent symptoms. We will do a work-up for syncope. EKG performed at 1001 shows a bradycardic irregular rhythm at 41 bpm.  QRS is wide. There is     ST elevation in V1 and V2 with T wave inversions. There is artifact throughout which makes interpretation difficult. No significant ST elevation or depressions. Overall EKG concerning for bradycardic atrial fibrillation and will require significant monitoring. Because of this we did contact cardiology to discuss a pacemaker being placed emergently. Patient's blood pressure is stable to be not need to pace the patient at this time. CBC is within normal limits. BMP shows an acute kidney injury with a significant elevation his BUN of 218 and elevated creatinine of 14.60 this is associated with some acidosis and hyperkalemia with a potassium of 6.2. The hyperkalemia will be treated medically with Lasix, insulin and glucose, sodium bicarb and calcium. We will call nephrology for emergent dialysis. Patient does have a palpable fistula to his left upper extremity so hopefully will have access. CK is within normal limits. Troponin is undetectable. BNP is significantly elevated at 16,000. With the patient's acute kidney failure and recent urinary obstruction we will place a Elizabeth despite the fact that patient denies any current symptoms.   Elizabeth was placed and put out almost 2 L of fluid concerning for significant retention. UA was sent which does show concerning findings of a urinary tract infection. Patient will be started on ceftriaxone. ICU was contacted for acute renal failure. Nephrology will come and dialyze the patient. Cardiology is on board and recommends no intervention but holding blood pressure medications at this time. Patient is admitted to the ICU service. Patient is in agreement with the plan to be admitted at this time.   All orders moving forward replacement the admitting team.    Critical care time: 40 minutes    Procedures

## 2021-08-15 NOTE — H&P
[facility]  Pulmonary, Critical Care, and Sleep Medicine    Name: Eleni Felty MRN: [id number]   : 1950 Hospital: [address] [place] [address] Dr   Date: 8/15/2021        Critical Care History and Physical      IMPRESSION:   · Hyperkalemia, symptomatic in the setting of TONY/CKD. K+ 6.2  · Bradycardiaappears to be slow atrial fibrillation, HR on previous EKG < 60  · TONY/CKD stage IV w/ obstructive uropathy contributing   · Bladder neck obstruction w/ enlarged prostate leading to obstructive uropathy   · UTI, Large LE, WBC TNTC and 4+ bacteria   · Hyponatremia, hypervolemic   · Afib  · HLD  · T2DM     Patient Active Problem List   Diagnosis Code    Hypertension I10    Diabetes ([facility] E11.9    Atrial fibrillation ([facility] I48.91    Cerebrovascular accident ([facility] I63.9    Hypercholesterolemia E78.00    Chronic diastolic heart failure (HCC) I50.32    Morbid obesity ([facility] E66.01    BPH (benign prostatic hyperplasia) N40.0    Bladder outlet obstruction N32.0    Acute renal failure (ARF) (HCC) N17.9    Hyperkalemia E87.5    CKD (chronic kidney disease) N18.9    Renal failure N19    Bradycardia R00.1        RECOMMENDATIONS:   Resp:   -Titrate FiO2/ supp O2 for SpO2 >90%; CXR with likely pulmonary edema, on RA  I/D:   -Ceftriaxone for UTI  -F/u blood and urine cultures   -Check respiratory biofire   Hem/Onc:   -Daily CBC; H/H, and plts are stable  CVS:   -Appreciate cardiology recs  -leave pacing pads on patient   -Hold coreg and norvasc  -can hold statin for now  -hold lasix and zaroxoyln   -Hold QT prolonging agents   -Lower ext doppler given chronic venous stasis and lymphedema to r/o and DVT   Metabolic:   - V9X BMP  -monitor e-lytes; replace PRN  -Appreciate nephrology assistance with hyperK+. Plan for IHD now  -Hyponatremia noted, overall patient appears hypervolemic. Check urine and serum osm, blood urea, urine sodium and urea.  Also check uric acid   -Patient received calcium gluconate, insulin, and d50, 1 amp bicarb, and 80 mg IV lasix   Renal:   -Trend Renal indices  - Vines   Endocrine:   -Patient is historically bradycardic, will check thyroid function panel   -ssi for glycemic control   GI:   -SUP  -Can start renal diet after IHD  Musc/Skin:   No acute issues, wound care  Neuro:   -CT head after IHD  DVT ppx  -SQ heparin after CT head  Code Status:  -Full code      Best Practices/Safety Bundles:  · Glycemic control; avoid Hypoglycemia  · IHI ICU Bundles:  ·  Vines Bundle Followed    · Stress Ulcer prophylaxis: start after head CT  · DVT prophylaxis: H2 blocker  · Need for Lines, vines assessed. Subjective/History: This patient has been seen and evaluated at the request of Dr. Ector William for ICU managemet. 08/15/21  Patient is a 70 y.o. male pmhx of afib, bradycardia, CKD stage 4, bladder neck obstruction, chronic indwelling vines. Notably patient had vines removed by urology on 8/5/21. Patient present today w/ c/o 2 weeks of progressive weakness and inability to walk. Notes frequent falls w/o syncope. On presentation to ED patient was bradycardic (underlying rhythm afib) with HR in the 40's and hyperkalemic w/ K+ of 6.2.  w/ srCr 14.6. Patient was given  calcium gluconate, insulin, and d50, 1 amp bicarb, and 80 mg IV lasix. A vines catheter was placed w/ > 2L UOP. Nephrology was consulted for emergent IHD. Past Medical History:   Diagnosis Date    Atrial fibrillation ([facility]     Cerebrovascular accident ([address]     1991, 1997- general weakness    Chronic diastolic heart failure (HCC)     Chronic kidney disease     dialysis    Diabetes ([address]     Heart failure ([address]     History of cardioversion     Hypercholesterolemia     Hypertension     Morbid obesity ([address]         Past Surgical History:   Procedure Laterality Date    HX VASCULAR ACCESS Right     right neck        Prior to Admission medications    Medication Sig Start Date End Date Taking?  Authorizing Provider finasteride (PROSCAR) 5 mg tablet TAKE 1 TABLET BY MOUTH DAILY 6/16/21   Provider, Historical   losartan (COZAAR) 50 mg tablet Take 50 mg by mouth daily. 8/4/21 8/4/22  Provider, Historical   metOLazone (ZAROXOLYN) 2.5 mg tablet Take 2.5 mg by mouth. 6/21/21 6/21/22  Provider, Historical   pravastatin (PRAVACHOL) 20 mg tablet TAKE 1 TABLET BY MOUTH DAILY 6/16/21   Provider, Historical   prazosin (MINIPRESS) 5 mg capsule Take 5 mg by mouth At bedtime. 9/9/20 7/29/22  Provider, Historical   simvastatin (ZOCOR) 40 mg tablet Take 40 mg by mouth At bedtime. Provider, Historical   vitamin E, dl,tocopheryl acet, (vitamin E acetate) 45 mg (100 unit) capsule Take 100 Units by mouth daily. Provider, Historical   hydrALAZINE (APRESOLINE) 100 mg tablet TAKE 1 TABLET BY MOUTH THREE TIMES DAILY 12/27/16   Dee Kay MD   hydrALAZINE (APRESOLINE) 100 mg tablet TAKE 1 TABLET BY MOUTH THREE TIMES DAILY 8/3/16   Dee Kay MD   ferrous sulfate 325 mg (65 mg iron) tablet Take  by mouth Daily (before breakfast). Provider, Historical   furosemide (LASIX) 40 mg tablet Take 20 mg by mouth two (2) times a day. Provider, Historical   docusate sodium (COLACE) 100 mg capsule Take 100 mg by mouth daily as needed for Constipation. Provider, Historical   carvedilol (COREG) 12.5 mg tablet Take 1 Tab by mouth two (2) times daily (with meals). Patient taking differently: Take 25 mg by mouth two (2) times daily (with meals). 12/11/15   Aniket Joshua MD   cholecalciferol (VITAMIN D3) 1,000 unit tablet Take 1 Tab by mouth daily. Indications: VITAMIN D DEFICIENCY 12/11/15   Aniket Joshua MD   polyethylene glycol (MIRALAX) 17 gram packet Take 1 Packet by mouth daily. 12/11/15   Aniket Joshua MD   insulin lispro (HUMALOG) 100 unit/mL injection SubCUTAneous route Before breakfast, lunch, dinner and at bedtime.    For Blood Sugar (mg/dL) of:     Less than 150 =   0 units           150 -199 =   2 units  200 -249 =   4 units  250 -299 =   6 units  300 -349 =   8 units  350 and above =  10 units 11/9/15   Omar Yee MD   Blood-Glucose Meter monitoring kit As directed 11/9/15   Omar Yee MD   Insulin Syringe-Needle U-100 (INSULIN SYRINGE) 1 mL 28 x 1/2\" syrg As directed 11/9/15   Omar Yee MD   Lancets (ONETOUCH ULTRASOFT LANCETS) misc As directed 11/9/15   Omar Yee MD   aspirin (ASPIRIN) 325 mg tablet Take 325 mg by mouth daily. Gregorio Caldwell MD   multivitamin (ONE A DAY) tablet Take 1 Tab by mouth daily. Gregorio Caldwell MD   atorvastatin (LIPITOR) 10 mg tablet Take 10 mg by mouth daily. Gregorio Caldwell MD   tamsulosin (FLOMAX) 0.4 mg capsule Take 0.4 mg by mouth daily. Gregorio Caldwell MD   amLODIPine (NORVASC) 10 mg tablet Take 10 mg by mouth daily.     Gregorio Caldwell MD       Current Facility-Administered Medications   Medication Dose Route Frequency    sodium chloride (NS) flush 5-40 mL  5-40 mL IntraVENous Q8H    sodium chloride (NS) flush 5-40 mL  5-40 mL IntraVENous Q8H    [START ON 2021] famotidine (PF) (PEPCID) 20 mg in 0.9% sodium chloride 10 mL injection  20 mg IntraVENous DAILY    [START ON 2021] docusate sodium (COLACE) capsule 100 mg  100 mg Oral DAILY    cefTRIAXone (ROCEPHIN) 2 g in sterile water (preservative free) 20 mL IV syringe  2 g IntraVENous Q24H       No Known Allergies     Social History     Tobacco Use    Smoking status: Former Smoker     Years: 8.00     Quit date: 1971     Years since quittin.1    Smokeless tobacco: Never Used    Tobacco comment: last smoked in my late 19's   Substance Use Topics    Alcohol use: No     Alcohol/week: 0.0 standard drinks        Family History   Problem Relation Age of Onset    Heart Attack Mother 52    Diabetes Other     Hypertension Other           Review of Systems:  Constitutional: positive for fatigue  Eyes: negative  Ears, nose, mouth, throat, and face: negative  Respiratory: negative  Cardiovascular: positive for dyspnea, orthopnea, paroxysmal nocturnal dyspnea, lower extremity edema, dyspnea on exertion  Gastrointestinal: negative  Genitourinary:positive for dysuria and urinary incontinence  Integument/breast: negative  Hematologic/lymphatic: negative  Musculoskeletal:negative  Neurological: negative  Behavioral/Psych: negative  Endocrine: negative    Objective:   Vital Signs:    Visit Vitals  BP (!) 119/53   Pulse (!) 40   Temp 97.4 °F (36.3 °C)   Resp 26   Ht 6' 1\" (1.854 m)   Wt 128.9 kg (284 lb 3.2 oz)   SpO2 96%   BMI 37.50 kg/m²       O2 Device: None (Room air)       Temp (24hrs), Av.4 °F (36.3 °C), Min:97.4 °F (36.3 °C), Max:97.4 °F (36.3 °C)       Intake/Output:   Last shift:      08/15 0701 - 08/15 1900  In: -   Out: 1850 [Urine:1850]  Last 3 shifts: No intake/output data recorded. Intake/Output Summary (Last 24 hours) at 8/15/2021 1335  Last data filed at 8/15/2021 1308  Gross per 24 hour   Intake    Output 1850 ml   Net -1850 ml       Physical Exam:     General:  or Alert, cooperative, NAD, GCS 15   Head:  Normocephalic, without obvious abnormality, atraumatic. Eyes:  Conjunctivae/corneas clear. PERRL,   Nose: Nares normal. Septum midline. Mucosa normal. No drainage or sinus tenderness. Throat: Lips, mucosa, and tongue normal. Teeth and gums normal.   Neck: Supple, symmetrical, trachea midline, no adenopathy, no carotid bruit. + JVD   Lungs:   Symmetrical chest rise; good AE bilat; BBS CTA   Heart:   S1, S2 normal, systolic murmur, afib,    Abdomen:   Soft, non-tender. Bowel sounds normal. No masses,  truncal edema . Extremities: Extremities normal, atraumatic,+ lymphedema w/ stasis dermatitis    Pulses: 2+ and symmetric all extremities. Skin: Skin color, texture, turgor normal. No rashes or lesions   Neurologic: Grossly nonfocal   Devices:  Elizabeth, PIV        Data:     Recent Results (from the past 24 hour(s))   EKG, 12 LEAD, INITIAL    Collection Time: 08/15/21 10:01 AM   Result Value Ref Range    Ventricular Rate 41 BPM    Atrial Rate 41 BPM    QRS Duration 168 ms    Q-T Interval 576 ms    QTC Calculation (Bezet) 475 ms    Calculated R Axis -51 degrees    Calculated T Axis 63 degrees    Diagnosis       Atrial fibrillation with slow ventricular response  Right bundle branch block  Left anterior fascicular block  Bifascicular block  Abnormal ECG  When compared with ECG of 28-NOV-2015 09:35,  Vent. rate has decreased BY  21 BPM  (RBBB and left anterior fascicular block) is now present  Minimal criteria for Anterior infarct are no longer present  Confirmed by Pranav Olson MD, --- ([id number]) on 8/15/2021 12:25:57 PM     CBC WITH AUTOMATED DIFF    Collection Time: 08/15/21 10:05 AM   Result Value Ref Range    WBC 5.4 4.6 - 13.2 K/uL    RBC 3.40 (L) 4.35 - 5.65 M/uL    HGB 9.1 (L) 13.0 - 16.0 g/dL    HCT 26.6 (L) 36.0 - 48.0 %    MCV 78.2 74.0 - 97.0 FL    MCH 26.8 24.0 - 34.0 PG    MCHC 34.2 31.0 - 37.0 g/dL    RDW 16.2 (H) 11.6 - 14.5 %    PLATELET 770 278 - 093 K/uL    MPV 10.1 9.2 - 11.8 FL    NEUTROPHILS 82 (H) 40 - 73 %    BAND NEUTROPHILS 4 0 - 5 %    LYMPHOCYTES 9 (L) 21 - 52 %    MONOCYTES 5 3 - 10 %    EOSINOPHILS 0 0 - 5 %    BASOPHILS 0 0 - 2 %    ABS. NEUTROPHILS 4.6 1.8 - 8.0 K/UL    ABS. LYMPHOCYTES 0.5 (L) 0.9 - 3.6 K/UL    ABS. MONOCYTES 0.3 0.05 - 1.2 K/UL    ABS. EOSINOPHILS 0.0 0.0 - 0.4 K/UL    ABS.  BASOPHILS 0.0 0.0 - 0.1 K/UL    DF MANUAL      PLATELET COMMENTS ADEQUATE PLATELETS      RBC COMMENTS NORMOCYTIC, NORMOCHROMIC     PROTHROMBIN TIME + INR    Collection Time: 08/15/21 10:05 AM   Result Value Ref Range    Prothrombin time 17.7 (H) 11.5 - 15.2 sec    INR 1.5 (H) 0.8 - 1.2     TYPE & SCREEN    Collection Time: 08/15/21 10:09 AM   Result Value Ref Range    Crossmatch Expiration 08/18/2021,2359     ABO/Rh(D) O NEGATIVE     Antibody screen NEG     Comment        PATIENT TESTED RH POS IN GRIFOLS GEL, RETESTED IN TUBE, RESULTS RH NEGATIVE, 1120, 01/73/5275, NJE   METABOLIC PANEL, BASIC    Collection Time: 08/15/21 11:00 AM   Result Value Ref Range    Sodium 130 (L) 136 - 145 mmol/L    Potassium 6.2 (HH) 3.5 - 5.5 mmol/L    Chloride 98 (L) 100 - 111 mmol/L    CO2 18 (L) 21 - 32 mmol/L    Anion gap 14 3.0 - 18 mmol/L    Glucose 98 74 - 99 mg/dL     (H) 7.0 - 18 MG/DL    Creatinine 14.60 (H) 0.6 - 1.3 MG/DL    BUN/Creatinine ratio 15 12 - 20      GFR est AA 4 (L) >60 ml/min/1.73m2    GFR est non-AA 3 (L) >60 ml/min/1.73m2    Calcium 8.9 8.5 - 10.1 MG/DL   CK    Collection Time: 08/15/21 11:00 AM   Result Value Ref Range     39 - 308 U/L   MAGNESIUM    Collection Time: 08/15/21 11:00 AM   Result Value Ref Range    Magnesium 3.2 (H) 1.6 - 2.6 mg/dL   NT-PRO BNP    Collection Time: 08/15/21 11:00 AM   Result Value Ref Range    NT pro-BNP 16,360 (H) 0 - 900 PG/ML   TROPONIN I    Collection Time: 08/15/21 11:00 AM   Result Value Ref Range    Troponin-I, QT <0.02 0.0 - 0.045 NG/ML   URINALYSIS W/ RFLX MICROSCOPIC    Collection Time: 08/15/21 12:50 PM   Result Value Ref Range    Color DARK YELLOW      Appearance TURBID      Specific gravity 1.012 1.005 - 1.030      pH (UA) 8.5 (H) 5.0 - 8.0      Protein 300 (A) NEG mg/dL    Glucose Negative NEG mg/dL    Ketone Negative NEG mg/dL    Bilirubin Negative NEG      Blood LARGE (A) NEG      Urobilinogen 0.2 0.2 - 1.0 EU/dL    Nitrites Negative NEG      Leukocyte Esterase LARGE (A) NEG     URINE MICROSCOPIC ONLY    Collection Time: 08/15/21 12:50 PM   Result Value Ref Range    WBC TOO NUMEROUS TO COUNT 0 - 4 /hpf    RBC  0 - 5 /hpf     UNABLE TO QUANTITATE MICROSCOPIC PARAMETERS DUE TO EXCESSIVE WBCS    Bacteria 4+ (A) NEG /hpf           No results for input(s): FIO2I, IFO2, HCO3I, IHCO3, HCOPOC, PCO2I, PCOPOC, IPHI, PHI, PHPOC, PO2I, PO2POC in the last 72 hours.     No lab exists for component: IPOC2    Telemetry: Afib, bradycardic     Imaging:  CXR Results  (Last 48 hours) 08/15/21 1039  XR CHEST PORT Final result    Impression:      Similar findings to prior exam. Enlarged cardiac silhouette with multifocal   airspace opacities. Differential includes CHF and pneumonia. Correlate   clinically. Recommend follow-up until resolution. Narrative:  EXAM: XR CHEST PORT       INDICATION: sob       COMPARISON: November 28, 2015       FINDINGS: A portable AP radiograph of the chest was obtained at 10:30 hours. The   patient is on a cardiac monitor. Extensive multifocal bilateral airspace   opacities. . Enlarged cardiac silhouette, similar to prior. .  The bones and soft   tissues are grossly within normal limits.                               Natasha Dickey NP  08/15/21      Pulmonary Critical Care Medicine  [facility]

## 2021-08-15 NOTE — Clinical Note
Status[de-identified] INPATIENT [101]   Type of Bed: Intensive Care [6]   Cardiac Monitoring Required?: Yes   Inpatient Hospitalization Certified Necessary for the Following Reasons: 4.  Patient requires ICU level of care interventions (further clarification in H&P documentation)   Admitting Diagnosis: Renal failure [644453]   Admitting Physician: Alex Schaffer [[id number]]   Attending Physician: Alex Schaffer [[id number]]   Estimated Length of Stay: 3-4 Midnights   Discharge Plan[de-identified] Home with Office Follow-up

## 2021-08-15 NOTE — OP NOTES
Preoperative diagnosis: Acute renal failure with hyperkalemia and bradycardia, COVID-19 respiratory rule out, super elevated BUN and creatinine    Postoperative diagnosis: Same    Procedures performed:  #1  Ultrasound of right internal jugular vein with interpretation  #2  Placement of right IJ nontunneled dialysis catheter      Cultures: None    Specimens: None    Drains: None    Estimated blood loss: Less than 50 mL    Assistants: None    Implants: Please see above    Complications: None    Anesthesia: Monitored local    Indications for the procedure:  Rina Meléndez is a 70 y.o. came to the emergency department was seen by ER personnel and nephrology and found to need emergent dialysis. Discussed with nephrologist he feels of bradycardia hyperkalemia or dangerous combination and wants to dialyze the patient this evening. Had a lengthy discussion with the patient he was a COVID-19 rule out he said he understands was agreeable to the catheter placement. Patient was given the appropriate risk and benefits of the procedure including but not limited to bleeding, infection, damage to adjacent structures, MI, stroke, death, loss of lower extremity, need for further surgery. Patient was understanding of all the risks and underwent a procedure. Operative findings:   #1  Right internal jugular vein patent without thrombus. #2  Right IJ triple-lumen none tunneled dialysis catheter placed    Procedure:  Patient was correctly identified in the emergency department in stable condition. Patient had pre-incision timeout prior to any incision. Patient was prepped and draped in the normal sterile fashion according to CDC guidelines aseptic technique. Again patient was agreeable to the catheter it was deemed an emergency by the nephrologist as well. I cannot identify or find a paper consent again but patient again understood clearly. ER staff seems very overwhelmed as well.   Patient was on the monitor was commended given stable to me placed on Trendelenburg position and prepped the right neck and chest wall. Using ultrasound I did easily identify the right internal jugular vein it was dilated and compressible. Localized and punctured the vein and the single stick and placed a wire into the central system. At this point using a Seldinger technique placed a right IJ triple-lumen 16 cm hemodialysis catheter. It flushes and aspirates easily flushed saline and it. Sutured in 2 sutures and applied a sterile dressing. He tolerated this very nicely took him out of Trendelenburg position. We will get a chest x-ray he can use a catheter right away for dialysis. He tolerated this well no complications were encountered.   Full COVID-19 precautions were taken throughout the procedure

## 2021-08-15 NOTE — CONSULTS
Consult Note  Consult requested by: dr Nikki Knox is a 70 y.o. male BLACK/ who is being seen on consult for renal failure,hyperkalemia  Chief Complaint   Patient presents with    Shortness of Breath     Admission diagnosis: <principal problem not specified>     HPI: 70 y o  [place] male admitted with worsening renal failure,hyperkalemia,bradycardia. pt has hx of crf stage 4,followed by dr Tahira leo.has avf placed in left arm several years ago,never used. was dialyzed in the past through a cath,stopped due to improvement in renal function. hx of large prostate,neurogenic bladder,vines cath was removed by his urologist last week. in er ,vines cath placed ,more than 2 liters dark urine obtained. hx of cva,dm,diastolic heart failure,htn  Past Medical History:   Diagnosis Date    Atrial fibrillation ([address]     Cerebrovascular accident ([address]     , - general weakness    Chronic diastolic heart failure (HCC)     Chronic kidney disease     dialysis    Diabetes ([address]     Heart failure ([address]     History of cardioversion     Hypercholesterolemia     Hypertension     Morbid obesity (HCC)       Past Surgical History:   Procedure Laterality Date    HX VASCULAR ACCESS Right     right neck       Social History     Socioeconomic History    Marital status:      Spouse name: Not on file    Number of children: Not on file    Years of education: Not on file    Highest education level: Not on file   Occupational History    Not on file   Tobacco Use    Smoking status: Former Smoker     Years: 8.00     Quit date: 1971     Years since quittin.1    Smokeless tobacco: Never Used    Tobacco comment: last smoked in my late 19's   Substance and Sexual Activity    Alcohol use: No     Alcohol/week: 0.0 standard drinks    Drug use: No    Sexual activity: Never   Other Topics Concern    Not on file   Social History Narrative    Not on file     Social Determinants of Health Financial Resource Strain:     Difficulty of Paying Living Expenses:    Food Insecurity:     Worried About Running Out of Food in the Last Year:     [address] in the Last Year:    Transportation Needs:     Lack of Transportation (Medical):  Lack of Transportation (Non-Medical):    Physical Activity:     Days of Exercise per Week:     Minutes of Exercise per Session:    Stress:     Feeling of Stress :    Social Connections:     Frequency of Communication with Friends and Family:     Frequency of Social Gatherings with Friends and Family:     Attends [employer] Services:     Active Member of Clubs or Organizations:     Attends Club or Organization Meetings:     Marital Status:    Intimate Partner Violence:     Fear of Current or Ex-Partner:     Emotionally Abused:     Physically Abused:     Sexually Abused:        Family History   Problem Relation Age of Onset    Heart Attack Mother 52    Diabetes Other     Hypertension Other      No Known Allergies     Home Medications:     Prior to Admission Medications   Prescriptions Last Dose Informant Patient Reported? Taking? Blood-Glucose Meter monitoring kit   No No   Sig: As directed   Insulin Syringe-Needle U-100 (INSULIN SYRINGE) 1 mL 28 x 1/2\" syrg   No No   Sig: As directed   Lancets (ONETOUCH ULTRASOFT LANCETS) misc   No No   Sig: As directed   amLODIPine (NORVASC) 10 mg tablet   Yes No   Sig: Take 10 mg by mouth daily. aspirin (ASPIRIN) 325 mg tablet   Yes No   Sig: Take 325 mg by mouth daily. atorvastatin (LIPITOR) 10 mg tablet   Yes No   Sig: Take 10 mg by mouth daily. carvedilol (COREG) 12.5 mg tablet   No No   Sig: Take 1 Tab by mouth two (2) times daily (with meals). Patient taking differently: Take 25 mg by mouth two (2) times daily (with meals). cholecalciferol (VITAMIN D3) 1,000 unit tablet   No No   Sig: Take 1 Tab by mouth daily.  Indications: VITAMIN D DEFICIENCY   docusate sodium (COLACE) 100 mg capsule   Yes No Sig: Take 100 mg by mouth daily as needed for Constipation. ferrous sulfate 325 mg (65 mg iron) tablet   Yes No   Sig: Take  by mouth Daily (before breakfast). finasteride (PROSCAR) 5 mg tablet   Yes No   Sig: TAKE 1 TABLET BY MOUTH DAILY   furosemide (LASIX) 40 mg tablet   Yes No   Sig: Take 20 mg by mouth two (2) times a day. hydrALAZINE (APRESOLINE) 100 mg tablet   No No   Sig: TAKE 1 TABLET BY MOUTH THREE TIMES DAILY   hydrALAZINE (APRESOLINE) 100 mg tablet   No No   Sig: TAKE 1 TABLET BY MOUTH THREE TIMES DAILY   insulin lispro (HUMALOG) 100 unit/mL injection   No No   Sig: SubCUTAneous route Before breakfast, lunch, dinner and at bedtime. For Blood Sugar (mg/dL) of:     Less than 150 =   0 units           150 -199 =   2 units  200 -249 =   4 units  250 -299 =   6 units  300 -349 =   8 units  350 and above =  10 units   losartan (COZAAR) 50 mg tablet   Yes No   Sig: Take 50 mg by mouth daily. metOLazone (ZAROXOLYN) 2.5 mg tablet   Yes No   Sig: Take 2.5 mg by mouth.   multivitamin (ONE A DAY) tablet   Yes No   Sig: Take 1 Tab by mouth daily. polyethylene glycol (MIRALAX) 17 gram packet   No No   Sig: Take 1 Packet by mouth daily. pravastatin (PRAVACHOL) 20 mg tablet   Yes No   Sig: TAKE 1 TABLET BY MOUTH DAILY   prazosin (MINIPRESS) 5 mg capsule   Yes No   Sig: Take 5 mg by mouth At bedtime. simvastatin (ZOCOR) 40 mg tablet   Yes No   Sig: Take 40 mg by mouth At bedtime. tamsulosin (FLOMAX) 0.4 mg capsule   Yes No   Sig: Take 0.4 mg by mouth daily. vitamin E, dl,tocopheryl acet, (vitamin E acetate) 45 mg (100 unit) capsule   Yes No   Sig: Take 100 Units by mouth daily.       Facility-Administered Medications: None       Current Facility-Administered Medications   Medication Dose Route Frequency    atropine 1 mg/mL injection 0.5 mg  0.5 mg IntraVENous PRN    sodium chloride (NS) flush 5-40 mL  5-40 mL IntraVENous Q8H    sodium chloride (NS) flush 5-40 mL  5-40 mL IntraVENous PRN    sodium chloride (NS) flush 5-40 mL  5-40 mL IntraVENous Q8H    sodium chloride (NS) flush 5-40 mL  5-40 mL IntraVENous PRN    acetaminophen (TYLENOL) tablet 650 mg  650 mg Oral Q6H PRN    Or    acetaminophen (TYLENOL) suppository 650 mg  650 mg Rectal Q6H PRN    senna (SENOKOT) tablet 8.6 mg  1 Tablet Oral DAILY PRN    [START ON 8/16/2021] famotidine (PF) (PEPCID) 20 mg in 0.9% sodium chloride 10 mL injection  20 mg IntraVENous DAILY    [START ON 8/16/2021] docusate sodium (COLACE) capsule 100 mg  100 mg Oral DAILY    cefTRIAXone (ROCEPHIN) 2 g in sterile water (preservative free) 20 mL IV syringe  2 g IntraVENous Q24H    insulin lispro (HUMALOG) injection   SubCUTAneous AC&HS    glucose chewable tablet 16 g  4 Tablet Oral PRN    glucagon (GLUCAGEN) injection 1 mg  1 mg IntraMUSCular PRN    dextrose (D50W) injection syrg 12.5-25 g  25-50 mL IntraVENous PRN     Current Outpatient Medications   Medication Sig    finasteride (PROSCAR) 5 mg tablet TAKE 1 TABLET BY MOUTH DAILY    losartan (COZAAR) 50 mg tablet Take 50 mg by mouth daily.  metOLazone (ZAROXOLYN) 2.5 mg tablet Take 2.5 mg by mouth.  pravastatin (PRAVACHOL) 20 mg tablet TAKE 1 TABLET BY MOUTH DAILY    prazosin (MINIPRESS) 5 mg capsule Take 5 mg by mouth At bedtime.  simvastatin (ZOCOR) 40 mg tablet Take 40 mg by mouth At bedtime.  vitamin E, dl,tocopheryl acet, (vitamin E acetate) 45 mg (100 unit) capsule Take 100 Units by mouth daily.  hydrALAZINE (APRESOLINE) 100 mg tablet TAKE 1 TABLET BY MOUTH THREE TIMES DAILY    hydrALAZINE (APRESOLINE) 100 mg tablet TAKE 1 TABLET BY MOUTH THREE TIMES DAILY    ferrous sulfate 325 mg (65 mg iron) tablet Take  by mouth Daily (before breakfast).  furosemide (LASIX) 40 mg tablet Take 20 mg by mouth two (2) times a day.  docusate sodium (COLACE) 100 mg capsule Take 100 mg by mouth daily as needed for Constipation.     carvedilol (COREG) 12.5 mg tablet Take 1 Tab by mouth two (2) times daily (with meals). (Patient taking differently: Take 25 mg by mouth two (2) times daily (with meals). )    cholecalciferol (VITAMIN D3) 1,000 unit tablet Take 1 Tab by mouth daily. Indications: VITAMIN D DEFICIENCY    polyethylene glycol (MIRALAX) 17 gram packet Take 1 Packet by mouth daily.  insulin lispro (HUMALOG) 100 unit/mL injection SubCUTAneous route Before breakfast, lunch, dinner and at bedtime. For Blood Sugar (mg/dL) of:     Less than 150 =   0 units           150 -199 =   2 units  200 -249 =   4 units  250 -299 =   6 units  300 -349 =   8 units  350 and above =  10 units    Blood-Glucose Meter monitoring kit As directed    Insulin Syringe-Needle U-100 (INSULIN SYRINGE) 1 mL 28 x 1/2\" syrg As directed    Lancets (ONETOUCH ULTRASOFT LANCETS) misc As directed    aspirin (ASPIRIN) 325 mg tablet Take 325 mg by mouth daily.  multivitamin (ONE A DAY) tablet Take 1 Tab by mouth daily.  atorvastatin (LIPITOR) 10 mg tablet Take 10 mg by mouth daily.  tamsulosin (FLOMAX) 0.4 mg capsule Take 0.4 mg by mouth daily.  amLODIPine (NORVASC) 10 mg tablet Take 10 mg by mouth daily. Review of Systems:   A comprehensive review of systems was negative except for that written in the HPI. Data Review:    Labs: Results:       Chemistry Recent Labs     08/15/21  1100   GLU 98   *   K 6.2*   CL 98*   CO2 18*   *   CREA 14.60*   CA 8.9   AGAP 14   BUCR 15      PTH  Lab Results   Component Value Date/Time    Calcium 8.9 08/15/2021 11:00 AM    Phosphorus 3.7 07/20/2021 11:13 AM    PTH, Intact 63.9 07/20/2021 11:14 AM      CBC w/Diff Recent Labs     08/15/21  1005   WBC 5.4   RBC 3.40*   HGB 9.1*   HCT 26.6*      GRANS 82*   LYMPH 9*   EOS 0      Coagulation Recent Labs     08/15/21  1005   PTP 17.7*   INR 1.5*       Iron/Ferritin No results for input(s): IRON in the last 72 hours. No lab exists for component: TIBCCALC   BNP No results for input(s): BNPP in the last 72 hours. Cardiac Enzymes Recent Labs     08/15/21  1100         Liver Enzymes No results for input(s): TP, ALB, TBIL, AP in the last 72 hours. No lab exists for component: SGOT, GPT, DBIL   Thyroid Studies Lab Results   Component Value Date/Time    TSH 2.42 08/15/2021 11:00 AM        Urinalysis Lab Results   Component Value Date/Time    Color DARK YELLOW 08/15/2021 12:50 PM    Appearance TURBID 08/15/2021 12:50 PM    Specific gravity 1.012 08/15/2021 12:50 PM    pH (UA) 8.5 (H) 08/15/2021 12:50 PM    Protein 300 (A) 08/15/2021 12:50 PM    Glucose Negative 08/15/2021 12:50 PM    Ketone Negative 08/15/2021 12:50 PM    Bilirubin Negative 08/15/2021 12:50 PM    Urobilinogen 0.2 08/15/2021 12:50 PM    Nitrites Negative 08/15/2021 12:50 PM    Leukocyte Esterase LARGE (A) 08/15/2021 12:50 PM    Epithelial cells FEW 01/23/2017 11:20 AM    Bacteria 4+ (A) 08/15/2021 12:50 PM    WBC TOO NUMEROUS TO COUNT 08/15/2021 12:50 PM    RBC  08/15/2021 12:50 PM     UNABLE TO QUANTITATE MICROSCOPIC PARAMETERS DUE TO EXCESSIVE WBCS         IMAGES:   XR Results (maximum last 3): Results from [place] encounter on 08/15/21    XR CHEST PORT    Narrative  EXAM: XR CHEST PORT    INDICATION: sob    COMPARISON: November 28, 2015    FINDINGS: A portable AP radiograph of the chest was obtained at 10:30 hours. The  patient is on a cardiac monitor. Extensive multifocal bilateral airspace  opacities. . Enlarged cardiac silhouette, similar to prior. .  The bones and soft  tissues are grossly within normal limits. Impression  Similar findings to prior exam. Enlarged cardiac silhouette with multifocal  airspace opacities. Differential includes CHF and pneumonia. Correlate  clinically. Recommend follow-up until resolution.       Results from [place] encounter on 11/28/15    XR CHEST PORT    Narrative  INDICATION: Shortness of breath    COMPARISON:  11/4/15    FINDINGS: A portable AP radiograph of the chest was obtained at 1025 hours. The  patient is on a cardiac monitor. Cardiac silhouette remains moderately enlarged. Lungs are hypoinflated with bilateral diffuse haziness. Small left-sided pleural  effusion may also be considered. Pulmonary vascularity is indistinct. No acute  osseous abnormality. Impression  : Cardiac silhouette with bilateral diffuse haziness. Differential  includes but not limited to edema or multifocal pneumonia. Results from Hospital Encounter encounter on 11/04/15    XR CHEST PA LAT    Narrative  CHEST PA AND LATERAL    CPT CODE: 64344    HISTORY: Low energy, hypertension, diabetes, congestive heart failure, diabetes. COMPARISON: None. FINDINGS:    PA and lateral views obtained. The cardiac silhouette is moderately enlarged. There is tortuosity of the aorta with calcified plaque. The lungs are markedly  hypoinflated. Pulmonary vascularity is cephalized. The costophrenic angles are  sharply defined. No bony abnormalities are seen. Impression  :    Marked hypoinflation. Cephalization of pulmonary vascular flow but no overt congestive heart failure. Moderate cardiomegaly. Atherosclerosis. CT Results (maximum last 3): Results from [facility] encounter on 07/22/21    CT ABD PELV WO CONT    Impression  Interface, Rad Results In - 05/24/2021  4:58 PM EDT  Formatting of this note might be different from the original.    CT abdomen pelvis without IV contrast, renal stone protocol. Comparison: No prior study. Clinical Information: Gross hematuria, renal failure, dysuria. History of renal failure, dialysis. CT abdomen: Study was performed without IV contrast. The absence of intravenous contrast lowers the sensitivity for  pathologies other than nephrolithiasis. Mild bilateral hydronephrosis with hydroureters is identified down to the level  of the bladder consistent with bladder outlet obstruction. A Elizabeth catheter is identified within urinary bladder.  There  is no evidence of bladder wall thickening. Heart size is moderately enlarged. A pericardial effusion measuring 11 mm is  identified along the right heart margin. The prostate is enlarged measuring 6.5 cm in diameter. Ill-defined pulmonary opacities consistent with atelectasis versus pneumonia are present within the lung bases. Heart  size is enlarged. An 11 mm pericardial effusion is identified along the right heart margin. Perihepatic and perisplenic free fluid is identified consistent with ascites extending into the pelvis. The liver and  spleen are within normal limits for size, shape and density. Biliary system and pancreas are normal. There is no  evidence of adrenal enlargement demonstrated. Abdominal aorta is normal in course and caliber. There is no evidence of  retroperitoneal adenopathy identified. No free fluid collections are identified within the abdomen. Bowel is  unremarkable. CT pelvis: Study was performed without IV or oral contrast. The bladder demonstrates a smooth contour without evidence  of bladder wall thickening. No free fluid is identified in the pelvis. A moderate amount of stool is identified in an  otherwise unremarkable bowel. There is no evidence of pelvic wall or inguinal adenopathy identified. No blastic or lytic  lesions are identified within the bony pelvis. Impression: Mild bilateral hydronephrosis and hydroureter suggesting bladder outlet obstruction. Elizabeth catheter. Enlarged prostate. Cardiomegaly. Pericardial effusion. Bibasilar pulmonary opacities suggesting atelectasis versus early  pneumonia. Ascites. Final report electronically signed by: Cherie Palomino MD on 5/24/2021 4:54 PM    Date: 05/24/21  Received From: [address]    ~~This report was copied and pasted from the servicing EHR system. ~~      CT ABD PELV WO CONT    Impression  Interface, Rad Results In - 06/03/2021  2:06 PM EDT  Formatting of this note might be different from the original.    CT abdomen pelvis without IV contrast, renal stone protocol. Comparison: 5/24/2021. Clinical Information: Hematuria, rising creatinine. CT abdomen: Study was performed without IV contrast. The absence of intravenous contrast lowers the sensitivity for  pathologies other than nephrolithiasis. The kidneys appear anatomically normal without evidence of urinary calculi or  evidence of obstruction involving urinary collecting systems. Bilateral hydronephrosis and hydroureters are identified  down to the level of the bladder consistent with bladder outlet obstruction. The bladder demonstrates a smooth contour  without evidence of bladder wall thickening. The prostate is enlarged. Elizabeth catheter balloon is inflated within the  prostate. Heart size is within normal limits. Pericardial effusion is identified, measuring 1.1 cm along the right heart margin. A  small left pleural effusion is identified. Perihepatic and perisplenic fluid collections consistent with ascites are  identified. The liver and spleen are within normal limits for size, shape and density. Biliary system and pancreas are  normal. There is no evidence of adrenal enlargement demonstrated. Abdominal aorta is normal in course and caliber. There  is no evidence of retroperitoneal adenopathy identified. No free fluid collections are identified within the abdomen. Bowel is unremarkable. CT pelvis: Study was performed without IV or oral contrast. The bladder demonstrates a smooth contour without evidence  of bladder wall thickening. No free fluid is identified in the pelvis. A moderate amount of stool is identified in an  otherwise unremarkable bowel. There is no evidence of pelvic wall or inguinal adenopathy identified. No blastic or lytic  lesions are identified within the bony pelvis. Impression: Bilateral hydronephrosis consistent with bladder outlet obstruction. Blood products within the bladder. Enlarged prostate. Elizabeth catheter balloon is inflated within the prostate. Pericardial effusion. Small left pleural  effusion. Ascites. Final report electronically signed by: Donnell Cedillo MD on 6/3/2021 2:02 PM    Date: 06/03/21  Received From: [address]      ~~This report was copied and pasted from the servicing EHR system. ~~      Results from Hospital Encounter encounter on 11/28/15    CT ABD PELV WO CONT    Narrative  CT abdomen and pelvis without IV or oral contrast    Comparison November 4, 2015    Developing patchy pneumonia in lung bases but no pleural effusion. No  cardiomegaly or pericardial effusion. Unenhanced liver, spleen, pancreas and  gallbladder unremarkable. No adrenal mass. Kidneys remain edematous, with mild  stranding and mild hydronephrosis. Mild hydroureter. No definite obstructing  stones. Aorta is normal in caliber. Small bowel and colon unremarkable. No  ascites, free air or focal inflammation. Bladder is distended. Balloon of the Elizabeth catheter in the inferior bladder. Prostate is enlarged, indenting bladder as before. Rectosigmoid colon is  nondistended. No pelvic inflammation. Impression: No obstructing renal or ureteral stones. Distended urinary bladder  with back flow to ureters causing bilateral hydronephrosis and  edema. Pyelonephritis possible. Similar appearance to previous study  Prostatic  enlargement with chronic obstruction of the bladder. Elizabeth catheter in place,  functioning? Patchy pneumonia in lower lobes. MRI Results (maximum last 3): No results found for this or any previous visit. Nuclear Medicine Results (maximum last 3): Results from [place] encounter on 02/01/11    TRANSCRIBED Keila Jaime 1159 Results (maximum last 3): Results from [facility] encounter on 07/22/21    [facility] [address] Rad Results In - 05/24/2021  4:11 PM EDT  Formatting of this note might be different from the original.    Renal bladder ultrasound.     COMPARISON: No prior study. CLINICAL INFORMATION: Declining renal function. FINDINGS: The kidneys are anatomically normal without evidence of abnormal masses, obstruction or perinephric fluid  collections. Right kidney measures 13.4 x 6.4 x 5.5 cm. Left kidney measures 11.5 x 6.2 x 5.7 cm. A Elizabeth catheter is identified within the bladder. The prostate is not visualized. IMPRESSION:  No evidence of hydronephrosis. Final report electronically signed by: Errol Becker MD on 5/24/2021 4:07 PM    Date: 05/24/21  Received From: [facility] [address]      ~~This report was copied and pasted from the servicing EHR system. ~~      Results from Hospital Encounter encounter on 11/28/15    US RETROPERITONEUM COMP    Narrative  RENAL ULTRASOUND    CPT CODE: 35890    HISTORY: Hydronephrosis    FINDINGS: Real time sonography of the kidneys demonstrates normal size and  architecture. There is no hydronephrosis or nephrolithiasis. Corticomedullary  echogenicity is normal.  Overall renal length is 13.6 cm on the right and 12.5  cm on the left. The bladder is markedly distended. No Elizabeth catheter is seen  within the bladder. No significant free fluid is seen. The spleen is  unremarkable with a maximum dimension of 10.2 cm. Impression  :    Normal renal ultrasound. There is marked distention of the bladder. A Elizabeth catheter is not seen within  the bladder. DEXA Results (maximum last 3): No results found for this or any previous visit. CASEY Results (maximum last 3): No results found for this or any previous visit. IR Results (maximum last 3): Results from [place] encounter on 11/28/15    IR INSERT TUNL CVC W/O PORT OVER 5 YR    Narrative  DUAL LUMEN right internal jugular 14.5 Greek 23 cm cuff to tip tunneled  hemodialysis catheter. : Dr. Liam Dumont M.D. INDICATION: End-stage renal disease requiring hemodialysis.     Assistant: None    Complications: None    Estimated Blood loss: Minimal.    Anesthesia: 1% lidocaine as well as moderate intravenous sedation with Versed  and fentanyl given and monitored per independently trained interventional  radiology nurse under my direct supervision for 30 minutes. Please see detailed  nursing records for medication dosing . Specimens: None. Implants: Right internal jugular, 14.5 Bangladeshi, double-lumen, 23 cm cuff to tip  Bard HemoStar hemodialysis catheter. Fluoroscopy time: 0.2 minutes. TECHNIQUE: After informed consent was obtained, the right lower neck was prepped  and draped in usual sterile fashion. Maximum sterile barrier technique was  used. Under ultrasound guidance, access into the right internal jugular vein was  achieved using a micropuncture set and 21-gauge needle. Single grayscale static ultrasonographic image of the patent right internal  jugular vein with antegrade flow as well as proper position of the access needle  were obtained and recorded for documentation purposes. Via wire exchange technique a short Amplatz was introduced into inferior vena  cava. Serial dilatations were made. 12 Bangladeshi peel-away was placed over wire. Right chest tunnel was made. Subsequently, the 14.5 Bangladeshi dual-lumen tunneled  23 cm cuff to tip Hemostar catheter was brought through the right chest tunnel  and inserted via the peel away sheath, and manipulated under fluoroscopic  guidance, to the level of the right atrium. The catheter was flushed,  heparinized and secured and is ready for use. Neck and subclavian incisions  were closed with 4-0 Vicryl suture. Dermabond and sterile dressing were  applied. There were no immediate complications. Patient tolerated procedure fairly well. Patient was transferred to recovery in stable condition. A spot radiograph of catheter tip position and US image of access vein were  obtained for documentation purposes.     Impression  :    Successful placement of the right internal jugular tunneled double-lumen 23 cm  cuff to tip 14.5 [facility] hemodialysis catheter. Catheter is ready for immediate  use. VAS/US Results (maximum last 3): No results found for this or any previous visit. PET Results (maximum last 3): No results found for this or any previous visit. No results found for this or any previous visit. @LASTPROCAMB([id number])    CULTURE:   )No results for input(s): SDES, CULT in the last 72 hours. No results for input(s): CULT in the last 72 hours. Physical Assessment:     Visit Vitals  /60   Pulse (!) 39   Temp 97.4 °F (36.3 °C)   Resp 21   Ht 6' 1\" (1.854 m)   Wt 128.9 kg (284 lb 3.2 oz)   SpO2 96%   BMI 37.50 kg/m²     Last 3 Recorded Weights in this Encounter    08/15/21 1006   Weight: 128.9 kg (284 lb 3.2 oz)       Intake/Output Summary (Last 24 hours) at 8/15/2021 1503  Last data filed at 8/15/2021 1308  Gross per 24 hour   Intake    Output 1850 ml   Net -1850 ml       Physial Exam:  General appearance: alert  Skin: normal coloration and turgor, no rashes, no suspicious skin lesions noted. HEENT: Head; normocephalic, atraumatic. JAVON. ENT- ENT exam normal, no neck nodes or sinus tenderness. Lungs: diminished breath sounds lavonne  Heart: bradycardic,irregular  Abdomen: soft, non-tender. Bowel sounds normal. 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 I called nurse to do urgent dialysis for hyperkalemia ,acidosis ,however avf is clotted distally,repeat stat bmp,if no improvement,I  will call dr Jeni Prieto  for dialysis cath  .hold cozaar  Anemia,will need epo     Thank you for the consultation to participate in patient's care. I have personally discussed my plan with the referring physician.      Bakari Negrete MD  August 15, 2021

## 2021-08-15 NOTE — ED TRIAGE NOTES
Per EMS, Patient wife states he has been acting different for awhile. He has a hx of CVA and MI. Patient has a hx of being on dialysis a long time ago. He has been hypotensive.

## 2021-08-15 NOTE — CONSULTS
[facility] - Consult Note    Date of  Admission: 8/15/2021  9:53 AM     Primary Care Physician:  Omar Yee MD     Plan:     1. Bradycardiaappears to be slow atrial fibrillation. No long pauses are noted. Blood pressure is stable continue to monitor. Agree with external transcutaneous pacemaker patch. Likely driven by hyperkalemia  2. Weakness unclear etiology  3. History of falls to reported by family not sure if he had syncope needs close monitoring for bradycardia arrhythmia  4. History of paroxysmal A. fib in past.  Possible sick sinus syndrome  5. Hypertension and hypertensive heart disease  6. Acute on chronic diastolic heart failure with moderate leg edema  7. History of CVA  8. Hyperkalemia  9. acute renal failure with significant worsening of BUN/creatinine likely precipitating his weakness and other etiology. XR Results (most recent):  Results from Hospital Encounter encounter on 11/28/15    XR CHEST PORT    Narrative  INDICATION: Shortness of breath    COMPARISON:  11/4/15    FINDINGS: A portable AP radiograph of the chest was obtained at 1025 hours. The  patient is on a cardiac monitor. Cardiac silhouette remains moderately enlarged. Lungs are hypoinflated with bilateral diffuse haziness. Small left-sided pleural  effusion may also be considered. Pulmonary vascularity is indistinct. No acute  osseous abnormality. Impression  : Cardiac silhouette with bilateral diffuse haziness. Differential  includes but not limited to edema or multifocal pneumonia. Assessment:     Hospital Problems  Date Reviewed: 1/25/2016    None               History of Present Illness: This is a 70 y.o. male admitted for No admission diagnoses are documented for this encounter. .    Patient complains of:   Weakness  Patient was brought in by family after he has been complaining of severe weakness for past 2-week very has been not able to walk.   He was noted to be falling and was on the floor. In emergency room patient was noted to have bradycardia in view of I am asked to evaluate. Patient denies any chest pain, shortness of breath, orthopnea PND. Patient has peripheral edema. Past Medical History:     Past Medical History:   Diagnosis Date    Atrial fibrillation ([facility]     Cerebrovascular accident ([facility]     , 0- general weakness    Chronic diastolic heart failure (HCC)     Chronic kidney disease     dialysis    Diabetes ([facility]     Heart failure ([facility]     History of cardioversion     Hypercholesterolemia     Hypertension     Morbid obesity ([facility]          Social History:     Social History     Socioeconomic History    Marital status:      Spouse name: Not on file    Number of children: Not on file    Years of education: Not on file    Highest education level: Not on file   Tobacco Use    Smoking status: Former Smoker     Years: 8.00     Quit date: 1971     Years since quittin.1    Smokeless tobacco: Never Used    Tobacco comment: last smoked in my late 19's   Substance and Sexual Activity    Alcohol use: No     Alcohol/week: 0.0 standard drinks    Drug use: No    Sexual activity: Never     Social Determinants of Health     Financial Resource Strain:     Difficulty of Paying Living Expenses:    Food Insecurity:     Worried About Running Out of Food in the Last Year:     Ran Out of Food in the Last Year:    Transportation Needs:     Lack of Transportation (Medical):      Lack of Transportation (Non-Medical):    Physical Activity:     Days of Exercise per Week:     Minutes of Exercise per Session:    Stress:     Feeling of Stress :    Social Connections:     Frequency of Communication with Friends and Family:     Frequency of Social Gatherings with Friends and Family:     Attends [employer] Services:     Active Member of Clubs or Organizations:     Attends Club or Organization Meetings:     Marital Status: Family History:     Family History   Problem Relation Age of Onset    Heart Attack Mother 52    Diabetes Other     Hypertension Other         Medications:   No Known Allergies     Current Facility-Administered Medications   Medication Dose Route Frequency    atropine 1 mg/mL injection 0.5 mg  0.5 mg IntraVENous PRN     Current Outpatient Medications   Medication Sig    finasteride (PROSCAR) 5 mg tablet TAKE 1 TABLET BY MOUTH DAILY    losartan (COZAAR) 50 mg tablet Take 50 mg by mouth daily.  metOLazone (ZAROXOLYN) 2.5 mg tablet Take 2.5 mg by mouth.  pravastatin (PRAVACHOL) 20 mg tablet TAKE 1 TABLET BY MOUTH DAILY    prazosin (MINIPRESS) 5 mg capsule Take 5 mg by mouth At bedtime.  simvastatin (ZOCOR) 40 mg tablet Take 40 mg by mouth At bedtime.  vitamin E, dl,tocopheryl acet, (vitamin E acetate) 45 mg (100 unit) capsule Take 100 Units by mouth daily.  hydrALAZINE (APRESOLINE) 100 mg tablet TAKE 1 TABLET BY MOUTH THREE TIMES DAILY    hydrALAZINE (APRESOLINE) 100 mg tablet TAKE 1 TABLET BY MOUTH THREE TIMES DAILY    ferrous sulfate 325 mg (65 mg iron) tablet Take  by mouth Daily (before breakfast).  furosemide (LASIX) 40 mg tablet Take 20 mg by mouth two (2) times a day.  docusate sodium (COLACE) 100 mg capsule Take 100 mg by mouth daily as needed for Constipation.  carvedilol (COREG) 12.5 mg tablet Take 1 Tab by mouth two (2) times daily (with meals). (Patient taking differently: Take 25 mg by mouth two (2) times daily (with meals). )    cholecalciferol (VITAMIN D3) 1,000 unit tablet Take 1 Tab by mouth daily. Indications: VITAMIN D DEFICIENCY    polyethylene glycol (MIRALAX) 17 gram packet Take 1 Packet by mouth daily.  insulin lispro (HUMALOG) 100 unit/mL injection SubCUTAneous route Before breakfast, lunch, dinner and at bedtime.    For Blood Sugar (mg/dL) of:     Less than 150 =   0 units           150 -199 =   2 units  200 -249 =   4 units  250 -299 =   6 units  300 -349 =   8 units  350 and above =  10 units    Blood-Glucose Meter monitoring kit As directed    Insulin Syringe-Needle U-100 (INSULIN SYRINGE) 1 mL 28 x 1/2\" syrg As directed    Lancets (ONETOUCH ULTRASOFT LANCETS) misc As directed    aspirin (ASPIRIN) 325 mg tablet Take 325 mg by mouth daily.  multivitamin (ONE A DAY) tablet Take 1 Tab by mouth daily.  atorvastatin (LIPITOR) 10 mg tablet Take 10 mg by mouth daily.  tamsulosin (FLOMAX) 0.4 mg capsule Take 0.4 mg by mouth daily.  amLODIPine (NORVASC) 10 mg tablet Take 10 mg by mouth daily. Review Of Systems:       A comprehensive review of systems was negative except for that written in the HPI.     Constitutional: No fever, no chills, no weight loss, no night sweats   HEENT: No epistaxis, no nasal drainage, no difficulty in swallowing, no redness in eyes  Respiratory: Respiratory: positive for cough, sputum, hemoptysis, pleurisy/chest pain, asthma or dyspnea on exertion, negative for cough, sputum, hemoptysis, pleurisy/chest pain, wheezing, dyspnea on exertion or emphysema  Cardiovascular: no chest pain, no chest pressure, no dyspnea, no pnd, no claudication no palpitations, no chronic leg edema, no syncope  Gastrointestinal: no abd pain, no vomiting, no diarrhea, no bleeding symptoms  Genitourinary: No urinary symptoms or hematuria  Integument/breast: No ulcers or rashes  Musculoskeletal: no muscle pain, no weakness  Neurological: No focal weakness, no seizures, no headaches  Behvioral/Psych: No anxiety, no depression             Physical Exam:     Visit Vitals  BP (!) 127/46   Pulse (!) 55   Temp 97.4 °F (36.3 °C)   Resp 26   Ht 6' 1\" (1.854 m)   Wt 128.9 kg (284 lb 3.2 oz)   SpO2 96%   BMI 37.50 kg/m²     BP Readings from Last 3 Encounters:   08/15/21 (!) 127/46   07/05/16 (!) 196/102   04/19/16 (!) 205/105     Pulse Readings from Last 3 Encounters:   08/15/21 (!) 55   07/05/16 (!) 59   04/19/16 66     Wt Readings from Last 3 Encounters: 08/15/21 128.9 kg (284 lb 3.2 oz)   08/05/21 122.5 kg (270 lb)   07/05/16 147.4 kg (325 lb)       General:  alert, cooperative, no distress, appears stated age  Skin: Warm and dry, acyanotic, normal color. Head: Normocephalic, atraumatic. Eyes: Sclerae anicteric, conjunctivae without injection. Neck:  nontender, no nuchal rigidity, no masses, no stridor, no carotid bruit, no JVD  Lungs:  clear to auscultation bilaterally, rhonchi , wheezes none,   Heart:  irregularly irregular rhythm, S1, S2 normal, no S3 or S4  Abdomen:  abdomen is soft without significant tenderness, masses, organomegaly or guarding  Extremities: Edema on extremity. Week  Neurological: grossly intact. No focal abnormalities, moves all extremities well. Complaining of weakness  Psychiatric Affect: The patient is awake, alert and oriented x3. Denzel Mixer is interactive and appropriate. Data Review:     Recent Results (from the past 48 hour(s))   CBC WITH AUTOMATED DIFF    Collection Time: 08/15/21 10:05 AM   Result Value Ref Range    WBC 5.4 4.6 - 13.2 K/uL    RBC 3.40 (L) 4.35 - 5.65 M/uL    HGB 9.1 (L) 13.0 - 16.0 g/dL    HCT 26.6 (L) 36.0 - 48.0 %    MCV 78.2 74.0 - 97.0 FL    MCH 26.8 24.0 - 34.0 PG    MCHC 34.2 31.0 - 37.0 g/dL    RDW 16.2 (H) 11.6 - 14.5 %    PLATELET 542 537 - 095 K/uL    MPV 10.1 9.2 - 11.8 FL    NEUTROPHILS 82 (H) 40 - 73 %    BAND NEUTROPHILS 4 0 - 5 %    LYMPHOCYTES 9 (L) 21 - 52 %    MONOCYTES 5 3 - 10 %    EOSINOPHILS 0 0 - 5 %    BASOPHILS 0 0 - 2 %    ABS. NEUTROPHILS 4.6 1.8 - 8.0 K/UL    ABS. LYMPHOCYTES 0.5 (L) 0.9 - 3.6 K/UL    ABS. MONOCYTES 0.3 0.05 - 1.2 K/UL    ABS. EOSINOPHILS 0.0 0.0 - 0.4 K/UL    ABS.  BASOPHILS 0.0 0.0 - 0.1 K/UL    DF MANUAL      PLATELET COMMENTS ADEQUATE PLATELETS      RBC COMMENTS NORMOCYTIC, NORMOCHROMIC     PROTHROMBIN TIME + INR    Collection Time: 08/15/21 10:05 AM   Result Value Ref Range    Prothrombin time 17.7 (H) 11.5 - 15.2 sec    INR 1.5 (H) 0.8 - 1.2     TYPE & SCREEN    Collection Time: 08/15/21 10:09 AM   Result Value Ref Range    Crossmatch Expiration 08/18/2021,2359     ABO/Rh(D) O NEGATIVE     Antibody screen NEG     Comment        PATIENT TESTED RH POS IN [facility]S GEL, RETESTED IN TUBE, RESULTS RH NEGATIVE, 1120, 19/15/8262, NJE   METABOLIC PANEL, BASIC    Collection Time: 08/15/21 11:00 AM   Result Value Ref Range    Sodium 130 (L) 136 - 145 mmol/L    Potassium 6.2 (HH) 3.5 - 5.5 mmol/L    Chloride 98 (L) 100 - 111 mmol/L    CO2 18 (L) 21 - 32 mmol/L    Anion gap 14 3.0 - 18 mmol/L    Glucose 98 74 - 99 mg/dL     (H) 7.0 - 18 MG/DL    Creatinine 14.60 (H) 0.6 - 1.3 MG/DL    BUN/Creatinine ratio 15 12 - 20      GFR est AA 4 (L) >60 ml/min/1.73m2    GFR est non-AA 3 (L) >60 ml/min/1.73m2    Calcium 8.9 8.5 - 10.1 MG/DL   CK    Collection Time: 08/15/21 11:00 AM   Result Value Ref Range     39 - 308 U/L   MAGNESIUM    Collection Time: 08/15/21 11:00 AM   Result Value Ref Range    Magnesium 3.2 (H) 1.6 - 2.6 mg/dL   NT-PRO BNP    Collection Time: 08/15/21 11:00 AM   Result Value Ref Range    NT pro-BNP 16,360 (H) 0 - 900 PG/ML   TROPONIN I    Collection Time: 08/15/21 11:00 AM   Result Value Ref Range    Troponin-I, QT <0.02 0.0 - 0.045 NG/ML       No intake or output data in the 24 hours ending 08/15/21 1151    Cardiographics:       EKG Results     Procedure 720 Value Units Date/Time    EKG, 12 LEAD, INITIAL [[id number]]     Order Status: Sent               Signed By: Jenny Gonzales MD    August 15, 2021

## 2021-08-16 ENCOUNTER — APPOINTMENT (OUTPATIENT)
Dept: GENERAL RADIOLOGY | Age: 71
DRG: 673 | End: 2021-08-16
Attending: INTERNAL MEDICINE
Payer: MEDICARE

## 2021-08-16 ENCOUNTER — APPOINTMENT (OUTPATIENT)
Dept: NON INVASIVE DIAGNOSTICS | Age: 71
DRG: 673 | End: 2021-08-16
Attending: NURSE PRACTITIONER
Payer: MEDICARE

## 2021-08-16 PROBLEM — E43 SEVERE PROTEIN-CALORIE MALNUTRITION (HCC): Chronic | Status: ACTIVE | Noted: 2021-08-16

## 2021-08-16 LAB
ANION GAP SERPL CALC-SCNC: 10 MMOL/L (ref 3–18)
ANION GAP SERPL CALC-SCNC: 13 MMOL/L (ref 3–18)
ANION GAP SERPL CALC-SCNC: 15 MMOL/L (ref 3–18)
BASOPHILS # BLD: 0 K/UL (ref 0–0.1)
BASOPHILS NFR BLD: 0 % (ref 0–2)
BUN SERPL-MCNC: 116 MG/DL (ref 7–18)
BUN SERPL-MCNC: 157 MG/DL (ref 7–18)
BUN SERPL-MCNC: 159 MG/DL (ref 7–18)
BUN/CREAT SERPL: 12 (ref 12–20)
BUN/CREAT SERPL: 14 (ref 12–20)
BUN/CREAT SERPL: 14 (ref 12–20)
CA-I SERPL-SCNC: 1.12 MMOL/L (ref 1.15–1.33)
CALCIUM SERPL-MCNC: 8.1 MG/DL (ref 8.5–10.1)
CALCIUM SERPL-MCNC: 8.3 MG/DL (ref 8.5–10.1)
CALCIUM SERPL-MCNC: 8.4 MG/DL (ref 8.5–10.1)
CALCIUM SERPL-MCNC: 8.6 MG/DL (ref 8.5–10.1)
CHLORIDE SERPL-SCNC: 101 MMOL/L (ref 100–111)
CO2 SERPL-SCNC: 20 MMOL/L (ref 21–32)
CO2 SERPL-SCNC: 22 MMOL/L (ref 21–32)
CO2 SERPL-SCNC: 26 MMOL/L (ref 21–32)
CREAT SERPL-MCNC: 11.1 MG/DL (ref 0.6–1.3)
CREAT SERPL-MCNC: 11.4 MG/DL (ref 0.6–1.3)
CREAT SERPL-MCNC: 9.31 MG/DL (ref 0.6–1.3)
DIFFERENTIAL METHOD BLD: ABNORMAL
EOSINOPHIL # BLD: 0.1 K/UL (ref 0–0.4)
EOSINOPHIL NFR BLD: 1 % (ref 0–5)
ERYTHROCYTE [DISTWIDTH] IN BLOOD BY AUTOMATED COUNT: 16.2 % (ref 11.6–14.5)
FERRITIN SERPL-MCNC: 521 NG/ML (ref 8–388)
GLUCOSE BLD STRIP.AUTO-MCNC: 104 MG/DL (ref 70–110)
GLUCOSE BLD STRIP.AUTO-MCNC: 65 MG/DL (ref 70–110)
GLUCOSE BLD STRIP.AUTO-MCNC: 72 MG/DL (ref 70–110)
GLUCOSE BLD STRIP.AUTO-MCNC: 80 MG/DL (ref 70–110)
GLUCOSE BLD STRIP.AUTO-MCNC: 81 MG/DL (ref 70–110)
GLUCOSE BLD STRIP.AUTO-MCNC: 83 MG/DL (ref 70–110)
GLUCOSE BLD STRIP.AUTO-MCNC: 83 MG/DL (ref 70–110)
GLUCOSE SERPL-MCNC: 64 MG/DL (ref 74–99)
GLUCOSE SERPL-MCNC: 70 MG/DL (ref 74–99)
GLUCOSE SERPL-MCNC: 82 MG/DL (ref 74–99)
HBV SURFACE AB SER QL IA: NEGATIVE
HBV SURFACE AB SERPL IA-ACNC: <3.1 MIU/ML
HCT VFR BLD AUTO: 23.7 % (ref 36–48)
HEP BS AB COMMENT,HBSAC: ABNORMAL
HGB BLD-MCNC: 8.3 G/DL (ref 13–16)
LACTATE SERPL-SCNC: 0.6 MMOL/L (ref 0.4–2)
LYMPHOCYTES # BLD: 0.2 K/UL (ref 0.9–3.6)
LYMPHOCYTES NFR BLD: 4 % (ref 21–52)
MAGNESIUM SERPL-MCNC: 2.9 MG/DL (ref 1.6–2.6)
MCH RBC QN AUTO: 26.9 PG (ref 24–34)
MCHC RBC AUTO-ENTMCNC: 35 G/DL (ref 31–37)
MCV RBC AUTO: 76.9 FL (ref 74–97)
MONOCYTES # BLD: 0.4 K/UL (ref 0.05–1.2)
MONOCYTES NFR BLD: 7 % (ref 3–10)
NEUTS SEG # BLD: 5.1 K/UL (ref 1.8–8)
NEUTS SEG NFR BLD: 87 % (ref 40–73)
PHOSPHATE SERPL-MCNC: 5 MG/DL (ref 2.5–4.9)
PLATELET # BLD AUTO: 179 K/UL (ref 135–420)
PMV BLD AUTO: 10.6 FL (ref 9.2–11.8)
POTASSIUM SERPL-SCNC: 4.3 MMOL/L (ref 3.5–5.5)
POTASSIUM SERPL-SCNC: 4.7 MMOL/L (ref 3.5–5.5)
POTASSIUM SERPL-SCNC: 5.1 MMOL/L (ref 3.5–5.5)
PTH-INTACT SERPL-MCNC: 117 PG/ML (ref 18.4–88)
RBC # BLD AUTO: 3.08 M/UL (ref 4.35–5.65)
SODIUM SERPL-SCNC: 136 MMOL/L (ref 136–145)
SODIUM SERPL-SCNC: 136 MMOL/L (ref 136–145)
SODIUM SERPL-SCNC: 137 MMOL/L (ref 136–145)
WBC # BLD AUTO: 5.9 K/UL (ref 4.6–13.2)

## 2021-08-16 PROCEDURE — 74011000258 HC RX REV CODE- 258: Performed by: PHYSICIAN ASSISTANT

## 2021-08-16 PROCEDURE — 85025 COMPLETE CBC W/AUTO DIFF WBC: CPT

## 2021-08-16 PROCEDURE — 74011250636 HC RX REV CODE- 250/636: Performed by: INTERNAL MEDICINE

## 2021-08-16 PROCEDURE — 82728 ASSAY OF FERRITIN: CPT

## 2021-08-16 PROCEDURE — 74011000250 HC RX REV CODE- 250: Performed by: NURSE PRACTITIONER

## 2021-08-16 PROCEDURE — 74011000250 HC RX REV CODE- 250: Performed by: INTERNAL MEDICINE

## 2021-08-16 PROCEDURE — 74011250637 HC RX REV CODE- 250/637: Performed by: NURSE PRACTITIONER

## 2021-08-16 PROCEDURE — 99232 SBSQ HOSP IP/OBS MODERATE 35: CPT | Performed by: HOSPITALIST

## 2021-08-16 PROCEDURE — 83735 ASSAY OF MAGNESIUM: CPT

## 2021-08-16 PROCEDURE — 84100 ASSAY OF PHOSPHORUS: CPT

## 2021-08-16 PROCEDURE — 74011250636 HC RX REV CODE- 250/636: Performed by: PHYSICIAN ASSISTANT

## 2021-08-16 PROCEDURE — 76450000000

## 2021-08-16 PROCEDURE — 80048 BASIC METABOLIC PNL TOTAL CA: CPT

## 2021-08-16 PROCEDURE — 83605 ASSAY OF LACTIC ACID: CPT

## 2021-08-16 PROCEDURE — 99291 CRITICAL CARE FIRST HOUR: CPT | Performed by: INTERNAL MEDICINE

## 2021-08-16 PROCEDURE — 90935 HEMODIALYSIS ONE EVALUATION: CPT

## 2021-08-16 PROCEDURE — APPNB45 APP NON BILLABLE 31-45 MINUTES: Performed by: NURSE PRACTITIONER

## 2021-08-16 PROCEDURE — 74011250636 HC RX REV CODE- 250/636: Performed by: NURSE PRACTITIONER

## 2021-08-16 PROCEDURE — 92610 EVALUATE SWALLOWING FUNCTION: CPT

## 2021-08-16 PROCEDURE — 99232 SBSQ HOSP IP/OBS MODERATE 35: CPT | Performed by: INTERNAL MEDICINE

## 2021-08-16 PROCEDURE — 82962 GLUCOSE BLOOD TEST: CPT

## 2021-08-16 PROCEDURE — 36591 DRAW BLOOD OFF VENOUS DEVICE: CPT

## 2021-08-16 PROCEDURE — 92526 ORAL FUNCTION THERAPY: CPT

## 2021-08-16 PROCEDURE — 71045 X-RAY EXAM CHEST 1 VIEW: CPT

## 2021-08-16 PROCEDURE — 83970 ASSAY OF PARATHORMONE: CPT

## 2021-08-16 PROCEDURE — APPSS60 APP SPLIT SHARED TIME 46-60 MINUTES: Performed by: NURSE PRACTITIONER

## 2021-08-16 PROCEDURE — 65660000004 HC RM CVT STEPDOWN

## 2021-08-16 PROCEDURE — 82330 ASSAY OF CALCIUM: CPT

## 2021-08-16 PROCEDURE — 2709999900 HC NON-CHARGEABLE SUPPLY

## 2021-08-16 RX ORDER — HEPARIN SODIUM 10000 [USP'U]/100ML
18-36 INJECTION, SOLUTION INTRAVENOUS
Status: DISCONTINUED | OUTPATIENT
Start: 2021-08-16 | End: 2021-08-19

## 2021-08-16 RX ORDER — INSULIN LISPRO 100 [IU]/ML
INJECTION, SOLUTION INTRAVENOUS; SUBCUTANEOUS
Status: DISCONTINUED | OUTPATIENT
Start: 2021-08-17 | End: 2021-08-22

## 2021-08-16 RX ORDER — THERA TABS 400 MCG
1 TAB ORAL DAILY
Status: DISCONTINUED | OUTPATIENT
Start: 2021-08-17 | End: 2021-08-27 | Stop reason: HOSPADM

## 2021-08-16 RX ADMIN — FAMOTIDINE 20 MG: 10 INJECTION INTRAVENOUS at 09:38

## 2021-08-16 RX ADMIN — HEPARIN SODIUM 5000 UNITS: 5000 INJECTION INTRAVENOUS; SUBCUTANEOUS at 06:06

## 2021-08-16 RX ADMIN — HEPARIN SODIUM 5000 UNITS: 5000 INJECTION INTRAVENOUS; SUBCUTANEOUS at 15:50

## 2021-08-16 RX ADMIN — ALTEPLASE 4 MG: 2.2 INJECTION, POWDER, LYOPHILIZED, FOR SOLUTION INTRAVENOUS at 19:53

## 2021-08-16 RX ADMIN — CALCIUM GLUCONATE 2 G: 98 INJECTION, SOLUTION INTRAVENOUS at 09:37

## 2021-08-16 RX ADMIN — DOCUSATE SODIUM 100 MG: 100 CAPSULE, LIQUID FILLED ORAL at 09:38

## 2021-08-16 RX ADMIN — SODIUM CHLORIDE 10 ML: 9 INJECTION, SOLUTION INTRAMUSCULAR; INTRAVENOUS; SUBCUTANEOUS at 03:46

## 2021-08-16 RX ADMIN — CEFTRIAXONE SODIUM 2 G: 2 INJECTION, POWDER, FOR SOLUTION INTRAMUSCULAR; INTRAVENOUS at 15:50

## 2021-08-16 RX ADMIN — HEPARIN SODIUM 5000 UNITS: 5000 INJECTION INTRAVENOUS; SUBCUTANEOUS at 22:00

## 2021-08-16 RX ADMIN — EPOETIN ALFA-EPBX 8000 UNITS: 4000 INJECTION, SOLUTION INTRAVENOUS; SUBCUTANEOUS at 20:45

## 2021-08-16 RX ADMIN — SODIUM CHLORIDE 10 ML: 9 INJECTION, SOLUTION INTRAMUSCULAR; INTRAVENOUS; SUBCUTANEOUS at 15:51

## 2021-08-16 RX ADMIN — SODIUM CHLORIDE 5 ML: 9 INJECTION, SOLUTION INTRAMUSCULAR; INTRAVENOUS; SUBCUTANEOUS at 22:00

## 2021-08-16 RX ADMIN — DEXTROSE MONOHYDRATE 25 G: 25 INJECTION, SOLUTION INTRAVENOUS at 03:45

## 2021-08-16 NOTE — PROGRESS NOTES
Problem: Dysphagia (Adult)  Goal: *Acute Goals and Plan of Care (Insert Text)  Description:   Patient will:  1. Tolerate PO trials with 0 s/s overt distress in 4/5 trials  2. Utilize compensatory swallow strategies/maneuvers (decrease bite/sip, size/rate, alt. liq/sol) with min cues in 4/5 trials    Rec:     Reg solid diet with thin liquids  Chopped meats  Aspiration precautions  HOB >45 during po intake, remain >30 for 30-45 minutes after po   Small bites/sips; alternate liquid/solid with slow feeding rate   Oral care TID  Meds per pt preference  Outcome: Progressing Towards Goal   SPEECH LANGUAGE PATHOLOGY BEDSIDE SWALLOW EVALUATION/TREATMENT    Patient: Kori Velasquez (89 y.o. male)  Date: 8/16/2021  Primary Diagnosis: Renal failure [N19]  Bradycardia [R00.1]        Precautions: aspiration     PLOF: As per H&P    ASSESSMENT :  Based on the objective data described below, the patient presents with mild oral dysphagia. Pt tolerated regular solid, puree, and thin liquids consecutive swallows without any overt s/sx of aspiration. Mastication was labored with positive oral clearance during reg solid trials. Laryngeal elevation was functional/timely to palpation. Pt safe for regular diet with chopped meats and thin liquids. Further rec aspiration precautions, oral care TID, and meds as tolerated. D/w RN. TREATMENT :  Skilled therapy initiated; Educated pt on aspiration precautions and importance of compensatory swallow techniques to decrease aspiration risk (decrease rate of intake & sip/bite size, upright @HOB for all po intake and ~30 minutes after po); verbalized comprehension - suspect limited carryover. ST to follow x 1-2 more visits to ensure safety of PO and tolerance of LRD. Patient will benefit from skilled intervention to address the above impairments.   Patient's rehabilitation potential is considered to be Good  Factors which may influence rehabilitation potential include:   []            None noted  []            Mental ability/status  [x]            Medical condition  []            Home/family situation and support systems  []            Safety awareness  []            Pain tolerance/management  []            Other:      PLAN :  Recommendations and Planned Interventions: See above  Frequency/Duration: Patient will be followed by speech-language pathology x 1-2 more visits to address goals. Discharge Recommendations: To Be Determined     SUBJECTIVE:   Patient stated I haven't had anything to eat yet. You're my best friend. OBJECTIVE:     Past Medical History:   Diagnosis Date    Atrial fibrillation ([address]     Cerebrovascular accident ([facility]     [address], [address]- general weakness    Chronic diastolic heart failure ([address]     Chronic kidney disease     dialysis    Diabetes ([facility]     Heart failure ([facility]     History of cardioversion     Hypercholesterolemia     Hypertension     Morbid obesity ([facility]      Past Surgical History:   Procedure Laterality Date    HX VASCULAR ACCESS Right     right neck     Prior Level of Function/Home Situation: see below  Home Situation  Home Environment: Apartment  # Steps to Enter: 1  One/Two Story Residence: One story  Living Alone: No  Support Systems: Child(michelle), Spouse/Significant Other/Partner  Patient Expects to be Discharged to[de-identified] Apartment  Current DME Used/Available at Home: Cane, straight, Walker, rolling    Diet prior to admission: reg solid with thin liquids  Current Diet:  reg solid, chopped meats, thin liquids     Cognitive and Communication Status:  Neurologic State: Alert  Orientation Level: Oriented to person, Oriented to place, Disoriented to time, Disoriented to situation  Cognition: Follows commands  Oral Assessment:  Oral Assessment  Labial: No impairment  Dentition: Natural;Intact  Oral Hygiene: adequate  Lingual: No impairment  Velum: No impairment  Mandible: No impairment  P.O.  Trials:  Patient Position: [facility] at [facility]  Vocal quality prior to P.O.: No impairment  Consistency Presented: Thin liquid; Solid;Puree  How Presented: Self-fed/presented;Cup/sip;Spoon;Straw;Successive swallows  Bolus Acceptance: No impairment  Bolus Formation/Control: Impaired  Type of Impairment: Delayed;Mastication  Propulsion: Delayed (# of seconds)  Oral Residue: None  Initiation of Swallow: No impairment  Laryngeal Elevation: Functional  Aspiration Signs/Symptoms: None  Pharyngeal Phase Characteristics: No impairment, issues, or problems   Effective Modifications: None  Cues for Modifications: None     Oral Phase Severity: Mild  Pharyngeal Phase Severity : No impairment    PAIN:  Start of Eval: 0  End of Eval: 0     After treatment:   []            Patient left in no apparent distress sitting up in chair  [x]            Patient left in no apparent distress in bed  [x]            Call bell left within reach  [x]            Nursing notified  []            Family present  []            Caregiver present  []            Bed alarm activated    COMMUNICATION/EDUCATION:   [x]            Aspiration precautions; swallow safety; compensatory techniques. [x]            Patient/family have participated as able in goal setting and plan of care. []            Patient/family agree to work toward stated goals and plan of care. []            Patient understands intent and goals of therapy; neutral about participation. []            Patient unable to participate in goal setting/plan of care; educ ongoing with interdisciplinary staff  [x]         Posted safety precautions in patient's room.     Thank you for this referral.    Melva Azevedo M.S. CCC-SLP/L  Speech-Language Pathologist

## 2021-08-16 NOTE — DIALYSIS
ACUTE HEMODIALYSIS FLOW SHEET    HEMODIALYSIS ORDERS: Physician: Dr. Hooks Stagers: Salvador   Duration: 3 hr   BFR: 300   DFR: 600   Dialysate:  Temp 36-37*C   K+  2.0    Ca+ 2.5   Na 138   Bicarb 40   Wt Readings from Last 1 Encounters:   08/15/21 126.1 kg (278 lb)    Patient Chart [x]   Unable to Obtain []  Dry weight/UF Goal: 2000 ml    Heparin []  Bolus    Units    [] Hourly    Units    [x]None       Pre BP: 122/55  Pulse: 48  Respirations: 24 Temp: 98.5  [x] Oral  [] Ax  [] Esoph   Labs: []  Pre  []  Post:   [x] N/A   Additional Orders (medications, blood products, hypotension management): [] Yes   [x] No     [x]  [facility] Consent Verified     CATHETER ACCESS:  []N/A   [x]Right   []Left   [x]IJ   []Fem  []Chest wall  []TransHepatic   [] First use X-ray verified     []Tunnel    [x] Non Tunneled   [x]No S/S infection  []Redness  []Drainage []Cultured []Swelling []Pain   [x]Medical Aseptic Prep Utilized   [x]Dressing Changed  [x] Biopatch  Date: 8/16/2021   []Clotted   [x]Patent   Flows: [x]Good  []Poor  []Reversed   If access problem,  notified: []Yes    [x]N/A          GENERAL ASSESSMENT:    LUNGS:  Resp Rate 24   [] Clear  [x] Coarse  [] Crackles  [] Wheezing  [] Diminished                                                           [x] RLL   [x] LLL  [] RUL   [] RONNIE            Respirations:  [x]Easy  []Labored  []N/A  Cough:  []Productive  []Dry  [x]N/A               Therapy:  []RA   [] Ventilated   [] Intubated   [] Trach            O2 Device:  [x] NC   [] NRB  [] Trach Mask  [] BiPaP  Flow:   l/min                                                    CARDIAC: [] Regular      [x] Irregular   [] Rhythm: Afib wi/ SVR, RBBB          [x] Monitored   [x] Bedside   [] Remotely monitored       EDEMA: [x] None   []Generalized  [] Pitting [] 1+   [] 2 +   [] 3+    [] 4+        SKIN:   [] Hot     [] Cold    [x] Warm   [x] Dry    [] Diaphoretic                 [] Flushed  [] Jaundiced  [] Cyanotic  [] Pale      LOC:    [x] Alert      [x]Oriented:    [x] Person     [x] Place   []Time               [] Confused  [] Lethargic  [] Medicated  [] Non-responsive  [] Non-Verbal     GI / ABDOMEN:                     [] Flat    [] Distended    [x] Soft    [] Firm   []  Obese                   [] Diarrhea   [] FMS [x] Bowel Sounds  [] Nausea  [] Vomiting                   [] NGT  [] OGT  [] PEG  [] Tube Feedings @     mL/hr     / URINE ASSESSMENT:                   [] Voiding    [] Oliguria  [] Anuria                     [x]  Elizabeth   [] Incontinent  []  Incontinent Brief   []  PureWick     PAIN:  [x] 0 []1  []2   []3   []4   []5   []6   []7   []8   []9   []10                MOBILITY:  [x] Bed    [] Stretcher      All Vitals and Treatment Details on [address]: [facility]          Room # 742/17    [x] Routine         [] 1st Time Acute/Chronic   [] Urgent      [] Stat            [] Acute Room   []  Bedside    [x] ICU/CCU     [] ER     Isolation Precautions:  [x] Dialysis    There are currently no Active Isolations     ALLERGIES:     No Known Allergies     Code Status:  Full Code     Hepatitis Status      Lab Results   Component Value Date/Time    Hepatitis B surface Ag <0.10 08/15/2021 09:50 PM    Hepatitis B surface Ab <3.10 (L) 08/15/2021 09:50 PM    Hepatitis C virus Ab 0.28 12/07/2015 04:15 PM        Current Labs:      Lab Results   Component Value Date/Time    WBC 5.9 08/16/2021 03:41 AM    Hemoglobin, POC 10.2 (L) 03/11/2016 07:26 AM    HGB 8.3 (L) 08/16/2021 03:41 AM    Hematocrit, POC 30 (L) 03/11/2016 07:26 AM    HCT 23.7 (L) 08/16/2021 03:41 AM    PLATELET 895 63/23/4459 03:41 AM    MCV 76.9 08/16/2021 03:41 AM     Lab Results   Component Value Date/Time    Sodium 136 08/16/2021 09:45 AM    Potassium 5.1 08/16/2021 09:45 AM    Chloride 101 08/16/2021 09:45 AM    CO2 22 08/16/2021 09:45 AM    Anion gap 13 08/16/2021 09:45 AM    Glucose 70 (L) 08/16/2021 09:45 AM     (H) 08/16/2021 09:45 AM Creatinine 11.40 (H) 08/16/2021 09:45 AM    BUN/Creatinine ratio 14 08/16/2021 09:45 AM    GFR est AA 5 (L) 08/16/2021 09:45 AM    GFR est non-AA 4 (L) 08/16/2021 09:45 AM    Calcium 8.3 (L) 08/16/2021 09:45 AM          DIET:  DIET ADULT     PRIMARY NURSE REPORT:   Pre Dialysis: Anayeli Pinzon RN    Time: 1200      EDUCATION:    [x] Patient           Knowledge Basis: []None [x]Minimal [] Substantial [] Unknown  Barriers to learning  [x]None  [] Intubated/Trached/Ventilated  [] Sedated/Paralyzed   [] Access Care     [] S&S of infection  [] Fluid Management  [] K+   [x] Procedural    [] Medications   [] Tx Options   [] Transplant   [] Diet      Teaching Tools:  [x] Explain  [] Demo  [] Handouts [] Video  Patient response: [x] Verbalized understanding   [] Requires follow up        [x] Time Out/Safety Check    [x] Extracorporeal Circuit Tested for integrity       RO/HEMODIALYSIS MACHINE SAFETY CHECKS  Before each treatment:        [facility]                                    [x] Portable Machine #2/RO serial # [id number]                                                                                                      Alarm Test:  Pass time 1200            [x] RO/Machine Log Complete    Machine Temp    36-37*C             Dialysate: pH  7.4    Conductivity: Meter 14.0    HD Machine  13.8     TCD: 13.7  Dialyzer Lot # [id number]     Blood Tubing Lot # [id number]     Saline Lot # [id number]     CHLORINE TESTING-Before each treatment and every 4 hours    Total Chlorine: [x] less than 0.1 ppm  Initial Time Check: 1215       4 Hr/2nd Check Time: n/a   (if greater than 0.1 ppm from Primary then every 30 minutes from Secondary)     TREATMENT INITIATION  with Dialysis Precautions:   [x] All Connections Secured              [x] Saline Line Double Clamped   [x] Venous Parameters Set               [x] Arterial Parameters Set    [x] Prime Given 250ml NSS              [x]Air Foam Detector Engaged        Treatment Initiation Note:  1200  Arrived to pt room 308 in ICU with pt awake and alert. Family present but left prior to start of dialysis  1230  Rt IJ very positional initially but able to stabilize pressures within 30 minutes with pt positioned on Rt side. Dialysis initiated. Heart rate very erratic and low with A-Fib w/ SVR & RBBB. During Treatment Notes:  0617  Face & Vascular access visible with art and abner line connections intact. Pt tolerating dialysis. 1300  Face & Vascular access visible with art and abner line connections intact. Pt tolerating dialysis. 1315  Face & Vascular access visible with art and abner line connections intact. Pt tolerating dialysis. 1330  Face & Vascular access visible with art and abner line connections intact. Pt tolerating dialysis. 1345  Face & Vascular access visible with art and abner line connections intact. Pt tolerating dialysis. 1400  Pt remains on Rt side but having intermittent arterial pressure problems. Repositioned and continuing to attempt dialysis. Face & Vascular access visible with art and abner line connections intact. Pt tolerating dialysis. 1415  Face & Vascular access visible with art and abner line connections intact. Pt tolerating dialysis. 1430  CVC lines extremely positional and alarming frequently. Dr Graham Erps notified that repositioning pt is not effective. Face & Vascular access visible with art and abner line connections intact. Pt tolerating dialysis. 1445  Face & Vascular access visible with art and abner line connections intact. Pt tolerating dialysis. 1500  Dialysis treatment terminated per Dr Gladys Fernandez for line positional issues. Ordered to call Dr Gail Vasquez to discuss CVC status. Dr Miguelina Howard, intensivist, attempting to reach him without success.        Medication    Dose    Volume Route      Time       [facility] Nurse   none   HD  Andre Pham RN     Post Assessment  Dialyzer Cleared:   [] Good  [x] Fair  [] Poor  Blood processed:  32 L  UF Removed:  670 Ml    Post BP: 116/40  Pulse: 41  Respirations: 23   Temp: 98.3  [x] Oral  [] Ax  [] Esophageal   Lungs: [] Clear                [x] No change from initial assessment   Post Tx Vascular Access: [x] N/A       Cardiac:  [] Regular   [x] Irregular   Rhythm:  [] Monitored   [] Not Monitored    CVC Catheter: [] N/A  Locking solution: Heparin 1:1000 U  Arterial port 1.7 ml   Venous port 1.7 ml   Edema:  [x] None  [] Generalized                     Skin:[x] Warm  [x] Dry [] Diaphoretic               [] Flushed  [] Pale [] Cyanotic Pain:  [x]0  []1-2  []3-4  []5-6   []7-8  []9-10         Post Treatment Note:   1292  Pt tolerated dialysis well but Rt IJ CVC non functioning for effective dialysis treatment. Dialysis catheter intact, remains patent and heplocked as noted above. Dr Millicent Peña notified that Dr Dariana Dubose has not returned call.      POST TREATMENT PRIMARY NURSE HANDOFF REPORT:   Post Dialysis: SYEDA Harris        Time:  8582       Abbreviations: AVG-arterial venous graft, AVF-arterial venous fistula, IJ-Internal Jugular, Subcl-Subclavian, Fem-Femoral, Tx-treatment, AP/HR-apical heart rate, VSS- Vital Signs Stable, CVC- Central Venous Catheter, DFR-dialysate flow rate, BFR-blood flow rate, AP-arterial pressure, -venous pressure, UF-ultrafiltrate, TMP-transmembrane pressure, Mykel-Venous, Art-Arterial, RO-Reverse Osmosis

## 2021-08-16 NOTE — PROGRESS NOTES
Problem: Falls - Risk of  Goal: *Absence of Falls  Description: Document Ira Sanchez Fall Risk and appropriate interventions in the flowsheet.   Note: Fall Risk Interventions:  Mobility Interventions: Assess mobility with egress test, Bed/chair exit alarm, Communicate number of staff needed for ambulation/transfer, Patient to call before getting OOB, Strengthening exercises (ROM-active/passive), PT Consult for mobility concerns, Utilize walker, cane, or other assistive device    Mentation Interventions: Adequate sleep, hydration, pain control, Bed/chair exit alarm, Door open when patient unattended, Evaluate medications/consider consulting pharmacy, Eyeglasses and hearing aids, More frequent rounding, Reorient patient, Toileting rounds, Update white board    Medication Interventions: Bed/chair exit alarm, Evaluate medications/consider consulting pharmacy, Patient to call before getting OOB, Teach patient to arise slowly    Elimination Interventions: Bed/chair exit alarm, Call light in reach, Patient to call for help with toileting needs, Stay With Me (per policy), Toilet paper/wipes in reach, Toileting schedule/hourly rounds    History of Falls Interventions: Bed/chair exit alarm, Consult care management for discharge planning, Door open when patient unattended, Evaluate medications/consider consulting pharmacy, Investigate reason for fall, Room close to nurse's station, Utilize gait belt for transfer/ambulation

## 2021-08-16 NOTE — PROGRESS NOTES
conducted an initial consultation and Spiritual Assessment for Reg Almaraz, who is a 70 y.o.,male. Patients Primary Language is: [place]. According to the patients EMR Caodaism Affiliation is: Anabaptism. The reason the Patient came to the hospital is:   Patient Active Problem List    Diagnosis Date Noted    Severe protein-calorie malnutrition ([address] 08/16/2021    Renal failure 08/15/2021    Bradycardia 08/15/2021    CKD (chronic kidney disease) 12/30/2015    Acute renal failure (ARF) ([address] 11/28/2015    Hyperkalemia 11/28/2015    BPH (benign prostatic hyperplasia) 11/04/2015    Bladder outlet obstruction 11/04/2015    Hypertension     Diabetes ([address]     Atrial fibrillation ([address]     Cerebrovascular accident ([address]     Hypercholesterolemia     Chronic diastolic heart failure ([address]     Morbid obesity ([address]         The  provided the following Interventions:  Initiated a relationship of care and support. Explored issues of riley, spirituality and/or Moravian needs while hospitalized. Listened empathically. Provided chaplaincy education. Provided information about Spiritual Care Services. Offered prayer and assurance of continued prayers on patient's behalf. Chart reviewed. The following outcomes were achieved:  Patient shared some information about their medical narrative and spiritual journey/beliefs. Patient processed feeling about current hospitalization. Patient expressed gratitude for the 's visit. Assessment:  Patient did not indicate any spiritual or Moravian issues which require Spiritual Care Services interventions at this time. Patient does not have any Moravian/cultural needs that will affect patients preferences in health care. Plan:  Chaplains will continue to follow and will provide pastoral care on an as needed or requested basis.    recommends bedside caregivers page  on duty if patient shows signs of acute spiritual or emotional distress. Rev. Demarcus Barnhart.  Desiree Milton, [address] [facility] [place] [phone]  Pager :[phone]

## 2021-08-16 NOTE — DIALYSIS
ACUTE HEMODIALYSIS TREATMENT    HEMODIALYSIS ORDERS: Physician: Dr. Carson Reddy: [place]   Duration: 2.5 hr   BFR: 300   DFR: 600   Dialysate:  Temp 36-37*C   K+  2    Ca+ 2.5   Na 138   Bicarb 35   Wt Readings from Last 1 Encounters:   08/15/21 126.1 kg (278 lb)    Patient Chart [x]   Unable to Obtain []  Dry weight/UF Goal: 1000 ml    Heparin []  Bolus    Units    [] Hourly    Units    [x]None       Pre BP:   118/53   Pulse:  36   Respirations: 18   Temp:  97.8  [] Oral [] Axillary [] Esophageal   Labs: []  Pre  []  Post:   [x] N/A   Additional Orders(medications, blood products, hypotension management): [] Yes   [] No     [x]  [facility] Consent Verified     CATHETER ACCESS:  []N/A   [x]Right   []Left   [x]IJ   []Fem  []Chest wall  []TransHepatic   [] First use X-ray verified     []Tunnel    [] Non Tunneled   [x]No S/S infection  []Redness  []Drainage []Cultured []Swelling []Pain   [x]Medical Aseptic Prep Utilized   []Dressing Changed  [] Biopatch  Date:    []Clotted   [x]Patent   Flows: [x]Good  []Poor  []Reversed   If access problem,  notified: []Yes    [x]N/A          GENERAL ASSESSMENT:    LUNGS:  Resp Rate 18   [] Clear  [x] Coarse  [] Crackles  [] Wheezing  [] Diminished         Respirations:  []Easy  []Labored  []N/A  Cough:  []Productive  []Dry  []N/A      Therapy:  []RA  O2 Type:  []NC  Mask: []  NRB    [] BiPaP  Flow:  l/min                   [] Ventilated  [] Intubated  [] Trach     CARDIAC: [] Regular      [] Irregular   [] Rhythm:          [] Monitored   [] Bedside   [] Remotely monitored     EDEMA: [] None   [x]Generalized  [] Pitting [] 1+   [] 2 +   [] 3+    [] 4+  [] Pedal    SKIN:   [x] Warm  [] Hot     [] Cold   [x] Dry     [] Pale   [] Diaphoretic                  [] Flushed  [] Jaundiced  [] Cyanotic     LOC:    [] Alert      []Oriented:    [] Person     [] Place  []Time               [] Confused  [] Lethargic  [] Medicated  [] Non-responsive  [] Non Verbal   GI / ABDOMEN:                     [] Flat    [] Distended    [x] Soft    [] Firm   []  Obese                   [] Diarrhea   [] FMS [x] Bowel Sounds  [] Nausea  [] Vomiting                   [] NGT  [] OGT    [] PEG  [] Tube Feedings @     / URINE ASSESSMENT:                   [] Voiding  []  Elizabeth  [] Oliguria  [] Anuria                     [] Incontinent  []  Incontinent Brief   []  PureWick     PAIN:  [x] 0 []1  []2   []3   []4   []5   []6   []7   []8   []9   []10                MOBILITY:  [x] Bed    [] Stretcher      All Vitals and Treatment Details on Attached [address]: [facility]          Room # 729/51    [x] Routine         [] 1st Time Acute/Chronic   [] Urgent      [] Stat            [] Acute Room   []  Bedside    [] ICU/CCU     [] ER   Isolation Precautions:  [x] Dialysis    There are currently no Active Isolations     ALLERGIES:     No Known Allergies   Code Status:  Full Code     Hepatitis Status      Lab Results   Component Value Date/Time    Hepatitis B surface Ag <0.10 11/28/2015 09:50 AM    Hepatitis C virus Ab 0.28 12/07/2015 04:15 PM        Current Labs:      Lab Results   Component Value Date/Time    WBC 5.4 08/15/2021 10:05 AM    Hemoglobin, POC 10.2 (L) 03/11/2016 07:26 AM    HGB 9.1 (L) 08/15/2021 10:05 AM    Hematocrit, POC 30 (L) 03/11/2016 07:26 AM    HCT 26.6 (L) 08/15/2021 10:05 AM    PLATELET 606 10/49/1582 10:05 AM    MCV 78.2 08/15/2021 10:05 AM     Lab Results   Component Value Date/Time    Sodium 130 (L) 08/15/2021 03:55 PM    Potassium 5.9 (H) 08/15/2021 03:55 PM    Chloride 98 (L) 08/15/2021 03:55 PM    CO2 16 (L) 08/15/2021 03:55 PM    Anion gap 16 08/15/2021 03:55 PM    Glucose 82 08/15/2021 03:55 PM     (H) 08/15/2021 03:55 PM    Creatinine 14.90 (H) 08/15/2021 03:55 PM    BUN/Creatinine ratio 15 08/15/2021 03:55 PM    GFR est AA 4 (L) 08/15/2021 03:55 PM    GFR est non-AA 3 (L) 08/15/2021 03:55 PM    Calcium 8.9 08/15/2021 03:55 PM          DIET:  DIET NPO     PRIMARY NURSE REPORT:   Pre Dialysis: Tamie Joy RN    Time: 2140      EDUCATION:    [x] Patient           Knowledge Basis: [x]None []Minimal [] Substantial [] Unknown  Barriers to learning  []N/A  [] Intubated/Trached/Ventilated  [] Sedated/Paralyzed   [] Access Care     [] S&S of infection  [] Fluid Management  [] K+   [x] Procedural    [] Medications   [] Tx Options   [] Transplant   [] Diet      Teaching Tools:  [x] Explain  [] Demo  [] Handouts [] Video  Patient response: [] Verbalized understanding   [x] Requires follow up        [x] Time Out/Safety Check    [x] Extracorporeal Circuit Tested for integrity       RO/HEMODIALYSIS MACHINE SAFETY CHECKS  Before each treatment:        [address] [place] [address]                                     [] Unit Machine #  with centralized RO                                    [] Portable Machine #1/RO serial # [id number]                                  [] Portable Machine #2/RO serial # [id number]                                  [] Portable Machine #4/RO serial # [id number]                                  [x] Portable Machine #10/RO serial #  [id number]                                                                                                         Alarm Test:  Pass time 2049            [x] RO/Machine Log Complete    Machine Temp    36-37*C             Dialysate: pH 7.4    Conductivity: Meter 14.2   HD Machine  14.1     TCD: 14.0  Dialyzer Lot # [id number]     Blood Tubing Lot # [id number]     Saline Lot # [id number]     CHLORINE TESTING-Before each treatment and every 4 hours    Total Chlorine: [x] less than 0.1 ppm  Initial Time Check: 2120       4 Hr/2nd Check Time:    (if greater than 0.1 ppm from Primary then every 30 minutes from Secondary)     TREATMENT INITIATION  with Dialysis Precautions:   [x] All Connections Secured              [x] Saline Line Double Clamped   [x] Venous Parameters Set               [x] Arterial Parameters Set    [x] Prime Given 250ml NSS              [x]Air Foam Detector Engaged      Treatment Initiation Note:  Received Patient in the ICU room awake and drowsy verbally responsive. Assessed, hear rate in the upper 30's. bp stable. R IJ udal Accessed with no signs or symptoms of complication. Treatment initiated      During Treatment Notes:  9819  Vascular access visible with arterial and venous line connections intact. Pt resting comfortably. 2230  Vascular access visible with arterial and venous line connections intact. Pt resting comfortably. 2245  Vascular access visible with arterial and venous line connections intact. Pt resting comfortably. 2300  Vascular access visible with arterial and venous line connections intact. Pt resting comfortably. 2315  Vascular access visible with arterial and venous line connections intact. Pt resting comfortably. 2330  Vascular access visible with arterial and venous line connections intact. Pt resting comfortably. 2345  Vascular access visible with arterial and venous line connections intact. Pt resting comfortably. 0000  Vascular access visible with arterial and venous line connections intact. Pt resting comfortably. [id number]  Vascular access visible with arterial and venous line connections intact. Pt resting comfortably. 0030  Dialysis treatment complete.        Medication Dose Volume Route Time Axel Nurse, Title      DOMO Corey, RN      DOMO Corey, RN      DOMO Corey, RN     Post Assessment  Dialyzer Cleared:   [] Good  [x] Fair  [] Poor  Blood processed:  42.2 L  UF Removed:  1000 Ml    Post /69   Pulse  45 Resp  22  Temp 98.1 Lungs: [x] Clear [] Course  [] Crackles                 []  Wheezing   [] Diminished   Post Tx Vascular Access: [] N/A  AVF/AVG: Bleeding stopped with  Arterial Pressure for  min   Venous Pressure for  min      Cardiac :[] Regular   [x] Irregular   Rhythm:  [x] Monitored   [] Not Monitored    CVC Catheter: [] N/A  Locking solution: Heparin 1:1000 U  Arterial port 1.1 ml Venous port 1.1 ml   Edema:  [] None  [x] Generalized                     Skin:[x] Warm  [x] Dry [] Diaphoretic               [] Flushed  [] Pale [] Cyanotic Pain:  [x]0  []1 []2  []3 []4  []5  []6  []7 []8    []9  []10     Post Treatment Note:   Patient completed and  tolerated 2.5 hrs hemo dialysis. 1000 ml of fluid removed. Arterial and venous needles removed and pressure applied until bleeding stopped.           POST TREATMENT PRIMARY NURSE HANDOFF REPORT:   Post Dialysis: Chad COLEMAN RN               Time:  0100       Abbreviations: AVG-arterial venous graft, AVF-arterial venous fistula, IJ-Internal Jugular, Subcl-Subclavian, Fem-Femoral, Tx-treatment, AP/HR-apical heart rate, VSS- Vital Signs Stable, CVC- Central Venous Catheter, DFR-dialysate flow rate, BFR-blood flow rate, AP-arterial pressure, -venous pressure, UF-ultrafiltrate, TMP-transmembrane pressure, Mykel-Venous, Art-Arterial, RO-Reverse Osmosis

## 2021-08-16 NOTE — PROGRESS NOTES
RENAL DAILY PROGRESS NOTE    Patient: Cate Yo               Sex: male          DOA: 8/15/2021  9:53 AM        YOB: 1950      Age:  70 y.o.        LOS:  LOS: 1 day     Subjective:     Cate Yo is a 70 y.o.  who presents with Renal failure [N19]  Bradycardia [R00.1]. Asked to evaluate for renal failure,hyperkalemia,acidosis. presented with severe bradycardia  Chief complains: Patient denies nausea, vomiting, chest pain, dizziness, shortness of breath or headache.  - Reviewed last 24 hrs events     Current Facility-Administered Medications   Medication Dose Route Frequency    calcium gluconate 2 g in 0.9% sodium chloride 100 mL IVPB  2 g IntraVENous ONCE    atropine 1 mg/mL injection 0.5 mg  0.5 mg IntraVENous PRN    sodium chloride (NS) flush 5-40 mL  5-40 mL IntraVENous Q8H    sodium chloride (NS) flush 5-40 mL  5-40 mL IntraVENous PRN    sodium chloride (NS) flush 5-40 mL  5-40 mL IntraVENous Q8H    sodium chloride (NS) flush 5-40 mL  5-40 mL IntraVENous PRN    acetaminophen (TYLENOL) tablet 650 mg  650 mg Oral Q6H PRN    Or    acetaminophen (TYLENOL) suppository 650 mg  650 mg Rectal Q6H PRN    senna (SENOKOT) tablet 8.6 mg  1 Tablet Oral DAILY PRN    famotidine (PF) (PEPCID) 20 mg in 0.9% sodium chloride 10 mL injection  20 mg IntraVENous DAILY    docusate sodium (COLACE) capsule 100 mg  100 mg Oral DAILY    cefTRIAXone (ROCEPHIN) 2 g in sterile water (preservative free) 20 mL IV syringe  2 g IntraVENous Q24H    glucose chewable tablet 16 g  4 Tablet Oral PRN    glucagon (GLUCAGEN) injection 1 mg  1 mg IntraMUSCular PRN    dextrose (D50W) injection syrg 12.5-25 g  25-50 mL IntraVENous PRN    heparin (porcine) injection 5,000 Units  5,000 Units SubCUTAneous Q8H    insulin lispro (HUMALOG) injection   SubCUTAneous Q6H       Objective:     Visit Vitals  BP (!) 132/53   Pulse (!) 47   Temp 98.2 °F (36.8 °C)   Resp 24   Ht 6' 1\" (1.854 m)   Wt 126.1 kg (278 lb) SpO2 96%   BMI 36.68 kg/m²       Intake/Output Summary (Last 24 hours) at 8/16/2021 1059  Last data filed at 8/16/2021 7391  Gross per 24 hour   Intake 105 ml   Output 5187 ml   Net -5082 ml       Physical Examination:     GEN: AAO X 3, NAD  RS: Chest is bilateral equal,   CVS: S1-S2 heard irregular  Abdomen: Soft, Non tender, Not distended, Positive bowel sounds, no organomegaly, no CVA / supra pubic tenderness  Extremities: No edema, no cyanosis, skin is warm on touch  CNS: Awake & follows commands,  HEENT: Head is atraumatic, PERRLA, conjunctiva pink & non icteric. No JVD or carotid bruit        Data Review:      Labs:     Hematology:   Recent Labs     08/16/21  0341 08/15/21  1005   WBC 5.9 5.4   HGB 8.3* 9.1*   HCT 23.7* 26.6*     Chemistry:   Recent Labs     08/16/21  0945 08/16/21  0341 08/15/21  2150 08/15/21  1555 08/15/21  1100   * 157* 223* 228* 218*   CREA 11.40* 11.10* 14.70* 14.90* 14.60*   CA 8.3* 8.4* 8.6 8.9 8.9   K 5.1 4.7 6.0* 5.9* 6.2*    136 131* 130* 130*    101 98* 98* 98*   CO2 22 20* 17* 16* 18*   PHOS  --  5.0*  --  6.7*  --    GLU 70* 64* 79 82 98        Images:    XR (Most Recent). CXR reviewed by me and compared with previous CXR Results from Hospital Encounter encounter on 08/15/21    XR CHEST PORT    Narrative  HISTORY: Right lung base opacity. EXAM: Chest.    TECHNIQUE: Single view AP portable semiupright chest.    COMPARISON: 8/15/2021    FINDINGS: Worsened aeration of bilateral hemithoraces is demonstrated with  moderate bilateral diffuse interstitial prominence and associated worsened  perihilar and bibasilar opacities. No pneumothorax or pleural effusions. Unchanged position of tubes and catheters. Heart and mediastinal structures are  unchanged. Heart is enlarged. . Visualized bony thorax and soft tissues are  within normal limits. Impression  IMPRESSION:    1. Worsened aeration of bilateral hemithoraces. Follow-up with plain imaging.        CT (Most Recent) Results from Hospital Encounter encounter on 08/15/21    CT HEAD WO CONT    Narrative  EXAM: CT HEAD WITHOUT CONTRAST. CLINICAL HISTORY/INDICATION:  weakness and falls    COMPARISON: CT head 1/22/2011. TECHNIQUE: Routine axial images have been obtained from skull base to vertex at  5 mm thick slices. All CT scans at this facility are performed using dose  optimization technique as appropriate to a performed exam, to include automated  exposure control, adjustment of the mA and/or kV according to patient's size  (including appropriate matching for site-specific examinations), or use of  iterative reconstruction technique. FINDINGS:    There are no intra nor extra axial fluid collections. There is no hemorrhage. The gray white differentiation is normal. Small focal region of hypodensity is  adjacent to the right suprasellar cistern consistent with either an enlarged  perivascular space or a stable chronic lacunar infarct. The ventricular system is midline without mass effect or shift. The paranasal sinuses which are included on this exam are well aerated and  unremarkable. Impression  No acute finding. There is no hemorrhage, mass, nor acute infarct. EKG No results found for this or any previous visit. I have personally reviewed the old medical records and patient's labs    Plan / Recommendation:      1. Acute/crf stage 4,obstruction,vines cath placed. dialyzed last night,hyperkalemia corrected  Plan dialysis today,pt is agreable  2.anemia,start epo  3.hypocalcemia,check pth  4.persistent bradycardia despite correction of hyperkalemia    D/w Dr. Clarissa Meyers MD  Nephrology  8/16/2021

## 2021-08-16 NOTE — PROGRESS NOTES
[facility]  Pulmonary, Critical Care, and Sleep Medicine    Name: Elijah Rogers MRN: [id number]   : 1950 Hospital: [address] [place] [address] Dr   Date: 2021        IMPRESSION:   · Hyperkalemia, symptomatic in the setting of TONY/CKD. Initial K+ 6.2, improved  · Bradycardiaappears to be slow atrial fibrillation. BP stable  · TONY/CKD stage IV w/ obstructive uropathy  · Bladder neck obstruction w/ enlarged prostate leading to obstructive uropathy   · UTI -  Large LE, WBC  and 4+ bacteria   · Hyponatremia - improved  · Afib  · HLD  · T2DM  · History of HTN  · Diastolic heart failure  · History of CVA  · Obesity - BMI 36.68     Patient Active Problem List   Diagnosis Code    Hypertension I10    Diabetes ([facility] E11.9    Atrial fibrillation ([facility] I48.91    Cerebrovascular accident ([facility] I63.9    Hypercholesterolemia E78.00    Chronic diastolic heart failure (HCC) I50.32    Morbid obesity ([facility]) E66.01    BPH (benign prostatic hyperplasia) N40.0    Bladder outlet obstruction N32.0    Acute renal failure (ARF) (HCC) N17.9    Hyperkalemia E87.5    CKD (chronic kidney disease) N18.9    Renal failure N19    Bradycardia R00.1    Severe protein-calorie malnutrition (HCC) E43      PLAN:   Resp:   -Titrate FiO2/ supp O2 for SpO2 >90%; CXR with likely pulmonary edema.     - currently maintaining good sats on RA  I/D:   -continue Ceftriaxone for UTI  -F/u blood and urine cultures   Hem/Onc:   -Daily CBC; H/H, and plts are stable  -SQ heparin for DVT prophylaxis  CVS:   -cardiology following  - external transcutaneous pacing if needed  -Hold coreg and norvasc  - hold statin for now  -hold lasix and zaroxoyln   -Hold QT prolonging agents   -BLE dopplers negative for DVT  -echo pending  Metabolic:   - q 12 hours BMP  -monitor e-lytes; replace PRN  Renal:   -Trend Renal indices, strict I/Os, vines  -HD cath placed on 8/15, HD today - nephrology following  Endocrine:   -Patient is historically bradycardic, TSH within normal limits  - continue ssi for glycemic control, avoid hypoglycemia  GI:   -SUP (pepcid)  -Continue renal diet  Musc/Skin:   No acute issues, wound care as needed  Neuro:   -CT head with no acute abnormalities  -monitor for acute changes in neuro status  DVT ppx  -SQ heparin   Code Status:  -Full code    Best Practices/Safety Bundles:  · Glycemic control; avoid Hypoglycemia  · IHI ICU Bundles:  ·      Vines Bundle Followed    · Stress Ulcer prophylaxis: pepcid  · DVT prophylaxis: SQ heparin  · Need for Lines, vines assessed.  Restraint Reevaluation   o No need for restraints at this time    Subjective/Interval History:      This patient has been seen and evaluated at the request of Dr. Kostas Luna for ICU managemet.       08/15/21  Patient is a 70 y.o. male with  pmhx of afib, bradycardia, CKD stage 4, bladder neck obstruction, chronic indwelling vines ( vines removed by urology on 21) who presented to the ED on 8/15/21 with  2 weeks of progressive weakness, falls and syncope. On initial presentation patient was bradycardic (underlying rhythm afib) with HR in the 40's and hyperkalemic w/ K+ of 6.2,   and creatinine of  14.6. Patient was given  calcium gluconate, insulin, d50, 1 amp bicarb, and 80 mg IV lasix. A vines catheter was placed w/ > 2L UOP. Nephrology was consulted for emergent IHD. Patient  was transferred to the ICU for further management. 21    - patient alert and oriented x 3  - maintaining SpO2 > 98% on RA  -HD today  -echo pending       ROS:A comprehensive review of systems was negative except for that written in the HPI.     Objective:   Vital Signs:    Visit Vitals  BP (!) 132/53   Pulse (!) 56   Temp 98.2 °F (36.8 °C)   Resp 21   Ht 6' 1\" (1.854 m)   Wt 126.1 kg (278 lb)   SpO2 100%   BMI 36.68 kg/m²       O2 Device: Nasal cannula   O2 Flow Rate (L/min): 4 l/min   Temp (24hrs), Av °F (36.7 °C), Min:97.8 °F (36.6 °C), Max:98.5 °F (36.9 °C) Intake/Output:   Last shift:      No intake/output data recorded. Last 3 shifts: 08/14 1901 - 08/16 0700  In: 105 [I.V.:105]  Out: 5187 [Urine:4187]    Intake/Output Summary (Last 24 hours) at 8/16/2021 1435  Last data filed at 8/16/2021 0610  Gross per 24 hour   Intake 105 ml   Output 3337 ml   Net -3232 ml      Physical Exam:      General:  or Alert, cooperative, NAD, GCS 15   Head:  Normocephalic, without obvious abnormality, atraumatic. Eyes:  Conjunctivae/corneas clear. PERRL,   Nose: Nares normal. Septum midline. Mucosa normal. No drainage or sinus tenderness. Throat: Lips, mucosa, and tongue normal. Teeth and gums normal.   Neck: Supple, symmetrical, trachea midline, no adenopathy, no carotid bruit. + JVD   Lungs:   Symmetrical chest rise; good AE bilat; BBS CTA   Heart:   S1, S2 normal, systolic murmur, afib,    Abdomen:   Soft, non-tender. Bowel sounds normal. No masses,  truncal edema . Extremities: Extremities normal, atraumatic,+ lymphedema w/ stasis dermatitis    Pulses: 2+ and symmetric all extremities. Skin: Skin color, texture, turgor normal. No rashes or lesions   Neurologic: Grossly nonfocal   Devices:  Elizabeth, PIV                   DATA:  Labs:  Recent Labs     08/16/21  0341 08/15/21  1005   WBC 5.9 5.4   HGB 8.3* 9.1*   HCT 23.7* 26.6*    179     Recent Labs     08/16/21  1145 08/16/21  0945 08/16/21  0341 08/15/21  2150 08/15/21  2150 08/15/21  1555 08/15/21  1555 08/15/21  1100 08/15/21  1100 08/15/21  1005   NA  --  136 136  --  131*   < > 130*   < > 130*  --    K  --  5.1 4.7  --  6.0*   < > 5.9*   < > 6.2*  --    CL  --  101 101  --  98*   < > 98*   < > 98*  --    CO2  --  22 20*  --  17*   < > 16*   < > 18*  --    GLU  --  70* 64*  --  79   < > 82   < > 98  --    BUN  --  159* 157*  --  223*   < > 228*   < > 218*  --    CREA  --  11.40* 11.10*  --  14.70*   < > 14.90*   < > 14.60*  --    CA PENDING 8.3* 8.4*   < > 8.6   < > 8.9   < > 8.9  --    MG  --   --  2.9*  --   -- --  3.1*  --  3.2*  --    PHOS  --   --  5.0*  --   --   --  6.7*  --   --   --    INR  --   --   --   --   --   --   --   --   --  1.5*    < > = values in this interval not displayed. No results for input(s): PH, PCO2, PO2, HCO3, FIO2 in the last 72 hours. PFT:                                                     Echo:    Imaging:  [x]I have personally reviewed the patients radiographs  []Radiographs reviewed with radiologist   [x]No change from prior, tubes and lines in adequate position  []Improved   []Worsening      RICH Hammer  08/16/2021  Pulmonary, Critical Care Medicine  [facility]         [employer] [facility] Staff Note     I have independently evaluated the patient at the bedside and I am the primary provider of record. I agree with the above evaluation, assessment and recommendations along with the following comments and observations. I have made editions to above evaluation which was performed under my direction. Please also review my orders. Chart and labs reviewed    Patient resting comfortably in ICU bed. He is alert. Vascula Surgery placed right IJ-THD cath last night and patient underwent HD. Attempted HD again today but poor flow and limited HD. Attempted to reposition patient multiple times. HD tech reversed flow. Nephrology notified. Rechecking labs    Patient has left AVF- very strong pulse distally but less mature more proximal. Site looks clean    Continues with bradycardia. Pacing pads remain in place. CXR from yesterday notes linear opacity over RLL. Not on today's CXR. Calcium replaced this morning     Patient had BM smear overnight     I&Os reviewed suspect typo with output. Will attempt to clarify. Complex decision making was made in the evaluation and management plans during this consultation.   More than 50% of time was spent in counseling and coordination of care including review of data and discussion with other team members. Further recommendations and therapies pending clinical course.     Critical Care and time spent coordinating care: physical exam, chart review, documentation, discussion with care team,  minus procedure time: 40 min    Cathy Giraldo DO, [facility]  Pulmonary, Sleep and Critical Care Medicine  5:23 PM

## 2021-08-16 NOTE — DIALYSIS
2000  Cathflo 2mg instilled into arterial limb and 2mg into venous limb of dialysis Rt IJ CVC. Cathflo will dwell overnight and be removed by dialysis RN in am prior to dialysis treatment.     Nolan Bell RN

## 2021-08-16 NOTE — ROUTINE PROCESS
2008 verbal report received from [email or web address]   2110 received patient from emergency room, slide over onto icu bed, connected to cardiac ekg leads, explained to patient that his safety is very important to use here at [facility] and to please call for any needs. Call light within reach. assessment completed. 0000 reassessment completed. 0100 hemodialysis ended. 0345 patient will open eyes, but nonverbal, falls asleep when talking to him , blood sugar check 65, 12.5 gm dextrose given, afterward awake , able to talk, repeat sugar 105.  0400 reassessment completed, during assessment noted oxygen sat down to 90, encouraged to cough and deep breath, sat increased 91, placed on 4 liter nasal cannula, sat increased to 96%, makeda bergeron aware blood sugar 65, 12.5 gm dextrose given, repeat blood sugar 105. Notified of few minute drop oxygen sat an placed patient on oxygen, with end tidal co2 reading. pateint talking, no change in mental status at current time. No new orders received. Patient easliy awaken for assessment. 8596 aura bergeron notiifed last blood sugar 72, states will get slp so he can eat. 0730 Bedside and Verbal shift change report given to fide glover (oncoming nurse) by sahara glover (offgoing nurse). Report included the following information SBAR, Kardex and Intake/Output. Patient resting in bed, denies c/o distress. resp unlabored. Remains on 4 liter nasal canula.

## 2021-08-16 NOTE — PROGRESS NOTES
Problem: Cardiac Output -  Decreased  Goal: *Vital signs within specified parameters  Outcome: Progressing Towards Goal  Goal: *Optimal cardiac output  Outcome: Progressing Towards Goal  Goal: *Absence of hypoxia  Outcome: Progressing Towards Goal     Problem: Patient Education: Go to Patient Education Activity  Goal: Patient/Family Education  Outcome: Not Progressing Towards Goal     Problem: Injury - Risk of, Adverse Drug Event  Goal: *Absence of adverse drug events  Outcome: Progressing Towards Goal     Problem: Acute Renal Failure: Day 2  Goal: Activity/Safety  Outcome: Progressing Towards Goal  Goal: Consults, if ordered  Outcome: Progressing Towards Goal  Goal: Diagnostic Test/Procedures  Outcome: Progressing Towards Goal  Goal: Respiratory  Outcome: Progressing Towards Goal  Goal: Treatments/Interventions/Procedures  Outcome: Progressing Towards Goal  Goal: Psychosocial  Outcome: Progressing Towards Goal  Goal: *Optimal pain control at patient's stated goal  Outcome: Progressing Towards Goal

## 2021-08-16 NOTE — PROGRESS NOTES
Comprehensive Nutrition Assessment    Type and Reason for Visit: Initial, Positive nutrition screen    Nutrition Recommendations/Plan:   - Monitor po intake and diet tolerance. Add daily multivitamin and oral nutrition supplements: Nepro BID, Magic Cup BID. Nutrition Assessment:  Patient started on oral diet per SLP s/p swallow evaluation this morning, poor dentition noted with decreased appetite. Treated for hyperkalemia on admission s/p emergent dialysis yesterday with removal of 1 L and plan for dialysis again today. Treated for hypoglycemia yesterday (glucose 65 mg/dL improved and up to 83 mg/dL at recent check). Malnutrition Assessment:  Malnutrition Status:  Severe malnutrition    Context:  Chronic illness     Findings of the 6 clinical characteristics of malnutrition:   Energy Intake:  7 - 75% or less est energy requirements for 1 month or longer  Weight Loss:  7.0 - Greater than 7.5% over 3 months       Nutrition History and Allergies: Past medical history of atrial fibrillation, CVA, HTN, HLD, DM, CHF, CKD stage IV with obstruction uropathy and chronic indwelling vines catheter admitted with c/o progressive weakness and inability to walk over several weeks with frequent falls and syncope. Patient with decreased appetite and poor po intake x several weeks PTA, minimal intake of the past week with significant weight loss of 40 lb (12.6%)  over the past 4 months PTA per report from patient and spouse. NKFA. Weight history per chart review: 325 lb (7/5/16), 304 lb (6/4/21), 286 lb (7/16/21), 275 lb (7/27/21), 270 lb (8/5/21) with 34 lb, 11% weight loss x 2 months PTA.      Estimated Daily Nutrient Needs:  Energy (kcal): 9243-3934; Weight Used for Energy Requirements: Ideal (81 kg)  Protein (g): 101-151; Weight Used for Protein Requirements: Current (0.8-1.2)  Fluid (ml/day): 750-1500; Method Used for Fluid Requirements: Standard renal    Nutrition Related Findings:  Abdomen distended, nontender with smear today (8/16) receiving daily colace, senna prn and replaced with 2 gm Ca      Wounds:  None       Current Nutrition Therapies:  ADULT DIET Regular    Anthropometric Measures:  · Height:  6' (182.9 cm)  · Current Body Wt:  126.1 kg (278 lb)   · Admission Body Wt:  284 lb 2.8 oz    · Usual Body Wt:  144.2 kg (318 lb)     · Ideal Body Wt:  178 lbs:  156.2 %   · BMI Category:  Obese class 2 (BMI 35.0-39. 9)       Nutrition Diagnosis:   · Severe malnutrition, In context of chronic illness related to inadequate protein-energy intake, increased demand for energy/nutrients, renal dysfunction, partial or complete edentulism, early satiety as evidenced by weight loss, poor intake prior to admission, poor dentition      Nutrition Interventions:   Food and/or Nutrient Delivery: Continue current diet, Vitamin supplement, Start oral nutrition supplement  Nutrition Education and Counseling: Education not indicated  Coordination of Nutrition Care: Continue to monitor while inpatient, Interdisciplinary rounds, Swallow evaluation, Speech therapy    Goals:  PO nutrition intake will meet >75% of patient estimated nutritional needs within the next 7 days       Nutrition Monitoring and Evaluation:   Behavioral-Environmental Outcomes: None identified  Food/Nutrient Intake Outcomes: Diet advancement/tolerance, Food and nutrient intake, Supplement intake, Vitamin/mineral intake  Physical Signs/Symptoms Outcomes: Biochemical data, Chewing or swallowing, GI status, Meal time behavior, Nutrition focused physical findings    Discharge Planning:    Continue oral nutrition supplement, Continue current diet     Electronically signed by Sebas Rogers RD, [facility] on 8/16/2021 at 1:52 PM    Contact: [phone]

## 2021-08-17 ENCOUNTER — APPOINTMENT (OUTPATIENT)
Dept: NON INVASIVE DIAGNOSTICS | Age: 71
DRG: 673 | End: 2021-08-17
Attending: NURSE PRACTITIONER
Payer: MEDICARE

## 2021-08-17 ENCOUNTER — APPOINTMENT (OUTPATIENT)
Dept: CT IMAGING | Age: 71
DRG: 673 | End: 2021-08-17
Attending: INTERNAL MEDICINE
Payer: MEDICARE

## 2021-08-17 LAB
ANION GAP SERPL CALC-SCNC: 10 MMOL/L (ref 3–18)
ANION GAP SERPL CALC-SCNC: 8 MMOL/L (ref 3–18)
APTT PPP: 35.4 SEC (ref 23–36.4)
APTT PPP: 37.6 SEC (ref 23–36.4)
BASOPHILS # BLD: 0 K/UL (ref 0–0.1)
BASOPHILS NFR BLD: 0 % (ref 0–2)
BUN SERPL-MCNC: 119 MG/DL (ref 7–18)
BUN SERPL-MCNC: 96 MG/DL (ref 7–18)
BUN/CREAT SERPL: 12 (ref 12–20)
BUN/CREAT SERPL: 12 (ref 12–20)
CALCIUM SERPL-MCNC: 8.3 MG/DL (ref 8.5–10.1)
CALCIUM SERPL-MCNC: 8.4 MG/DL (ref 8.5–10.1)
CHLORIDE SERPL-SCNC: 101 MMOL/L (ref 100–111)
CHLORIDE SERPL-SCNC: 99 MMOL/L (ref 100–111)
CO2 SERPL-SCNC: 25 MMOL/L (ref 21–32)
CO2 SERPL-SCNC: 28 MMOL/L (ref 21–32)
CREAT SERPL-MCNC: 8.1 MG/DL (ref 0.6–1.3)
CREAT SERPL-MCNC: 9.68 MG/DL (ref 0.6–1.3)
DIFFERENTIAL METHOD BLD: ABNORMAL
ECHO IVC PROX: 3.41 CM
ECHO LV INTERNAL DIMENSION DIASTOLIC: 5.24 CM (ref 4.2–5.9)
ECHO LV INTERNAL DIMENSION SYSTOLIC: 2.97 CM
ECHO LV IVSD: 1.13 CM (ref 0.6–1)
ECHO LV MASS 2D: 277.2 G (ref 88–224)
ECHO LV MASS INDEX 2D: 112.2 G/M2 (ref 49–115)
ECHO LV POSTERIOR WALL DIASTOLIC: 1.44 CM (ref 0.6–1)
ECHO TV REGURGITANT MAX VELOCITY: 340.17 CM/S
ECHO TV REGURGITANT PEAK GRADIENT: 46.29 MMHG
EOSINOPHIL # BLD: 0.1 K/UL (ref 0–0.4)
EOSINOPHIL NFR BLD: 1 % (ref 0–5)
ERYTHROCYTE [DISTWIDTH] IN BLOOD BY AUTOMATED COUNT: 16.5 % (ref 11.6–14.5)
GLUCOSE BLD STRIP.AUTO-MCNC: 76 MG/DL (ref 70–110)
GLUCOSE BLD STRIP.AUTO-MCNC: 79 MG/DL (ref 70–110)
GLUCOSE BLD STRIP.AUTO-MCNC: 88 MG/DL (ref 70–110)
GLUCOSE BLD STRIP.AUTO-MCNC: 91 MG/DL (ref 70–110)
GLUCOSE SERPL-MCNC: 77 MG/DL (ref 74–99)
GLUCOSE SERPL-MCNC: 83 MG/DL (ref 74–99)
HCT VFR BLD AUTO: 23.7 % (ref 36–48)
HGB BLD-MCNC: 8.2 G/DL (ref 13–16)
LYMPHOCYTES # BLD: 0.4 K/UL (ref 0.9–3.6)
LYMPHOCYTES NFR BLD: 6 % (ref 21–52)
MAGNESIUM SERPL-MCNC: 2.7 MG/DL (ref 1.6–2.6)
MCH RBC QN AUTO: 26.6 PG (ref 24–34)
MCHC RBC AUTO-ENTMCNC: 34.6 G/DL (ref 31–37)
MCV RBC AUTO: 76.9 FL (ref 74–97)
MONOCYTES # BLD: 0.5 K/UL (ref 0.05–1.2)
MONOCYTES NFR BLD: 7 % (ref 3–10)
NEUTS SEG # BLD: 6.2 K/UL (ref 1.8–8)
NEUTS SEG NFR BLD: 84 % (ref 40–73)
OSMOLALITY SERPL: 350 MOSMOL/KG (ref 280–301)
OSMOLALITY UR: 388 MOSMOL/KG
PHOSPHATE SERPL-MCNC: 5 MG/DL (ref 2.5–4.9)
PLATELET # BLD AUTO: 182 K/UL (ref 135–420)
PMV BLD AUTO: 10.9 FL (ref 9.2–11.8)
POTASSIUM SERPL-SCNC: 3.9 MMOL/L (ref 3.5–5.5)
POTASSIUM SERPL-SCNC: 4.4 MMOL/L (ref 3.5–5.5)
RBC # BLD AUTO: 3.08 M/UL (ref 4.35–5.65)
SODIUM SERPL-SCNC: 135 MMOL/L (ref 136–145)
SODIUM SERPL-SCNC: 136 MMOL/L (ref 136–145)
T3 SERPL-MCNC: <20 NG/DL (ref 71–180)
WBC # BLD AUTO: 7.3 K/UL (ref 4.6–13.2)

## 2021-08-17 PROCEDURE — 93325 DOPPLER ECHO COLOR FLOW MAPG: CPT | Performed by: INTERNAL MEDICINE

## 2021-08-17 PROCEDURE — 99232 SBSQ HOSP IP/OBS MODERATE 35: CPT | Performed by: HOSPITALIST

## 2021-08-17 PROCEDURE — 76450000000

## 2021-08-17 PROCEDURE — 74011250636 HC RX REV CODE- 250/636: Performed by: NURSE PRACTITIONER

## 2021-08-17 PROCEDURE — 99222 1ST HOSP IP/OBS MODERATE 55: CPT | Performed by: NURSE PRACTITIONER

## 2021-08-17 PROCEDURE — 90935 HEMODIALYSIS ONE EVALUATION: CPT

## 2021-08-17 PROCEDURE — 82962 GLUCOSE BLOOD TEST: CPT

## 2021-08-17 PROCEDURE — 74011250637 HC RX REV CODE- 250/637: Performed by: NURSE PRACTITIONER

## 2021-08-17 PROCEDURE — 74176 CT ABD & PELVIS W/O CONTRAST: CPT

## 2021-08-17 PROCEDURE — 93308 TTE F-UP OR LMTD: CPT | Performed by: INTERNAL MEDICINE

## 2021-08-17 PROCEDURE — 83735 ASSAY OF MAGNESIUM: CPT

## 2021-08-17 PROCEDURE — 80048 BASIC METABOLIC PNL TOTAL CA: CPT

## 2021-08-17 PROCEDURE — 36415 COLL VENOUS BLD VENIPUNCTURE: CPT

## 2021-08-17 PROCEDURE — 85025 COMPLETE CBC W/AUTO DIFF WBC: CPT

## 2021-08-17 PROCEDURE — 93321 DOPPLER ECHO F-UP/LMTD STD: CPT

## 2021-08-17 PROCEDURE — 77010033678 HC OXYGEN DAILY

## 2021-08-17 PROCEDURE — 87040 BLOOD CULTURE FOR BACTERIA: CPT

## 2021-08-17 PROCEDURE — 65660000004 HC RM CVT STEPDOWN

## 2021-08-17 PROCEDURE — 85730 THROMBOPLASTIN TIME PARTIAL: CPT

## 2021-08-17 PROCEDURE — 84100 ASSAY OF PHOSPHORUS: CPT

## 2021-08-17 PROCEDURE — 74011000250 HC RX REV CODE- 250: Performed by: NURSE PRACTITIONER

## 2021-08-17 PROCEDURE — 93321 DOPPLER ECHO F-UP/LMTD STD: CPT | Performed by: INTERNAL MEDICINE

## 2021-08-17 PROCEDURE — 74011250637 HC RX REV CODE- 250/637: Performed by: INTERNAL MEDICINE

## 2021-08-17 RX ADMIN — SODIUM CHLORIDE 10 ML: 9 INJECTION, SOLUTION INTRAMUSCULAR; INTRAVENOUS; SUBCUTANEOUS at 15:21

## 2021-08-17 RX ADMIN — THERA TABS 1 TABLET: TAB at 10:29

## 2021-08-17 RX ADMIN — SODIUM CHLORIDE 10 ML: 9 INJECTION, SOLUTION INTRAMUSCULAR; INTRAVENOUS; SUBCUTANEOUS at 22:00

## 2021-08-17 RX ADMIN — SODIUM CHLORIDE 10 ML: 9 INJECTION, SOLUTION INTRAMUSCULAR; INTRAVENOUS; SUBCUTANEOUS at 10:29

## 2021-08-17 RX ADMIN — CEFTRIAXONE SODIUM 2 G: 2 INJECTION, POWDER, FOR SOLUTION INTRAMUSCULAR; INTRAVENOUS at 15:20

## 2021-08-17 RX ADMIN — FAMOTIDINE 20 MG: 10 INJECTION INTRAVENOUS at 10:28

## 2021-08-17 RX ADMIN — SODIUM CHLORIDE 10 ML: 9 INJECTION, SOLUTION INTRAMUSCULAR; INTRAVENOUS; SUBCUTANEOUS at 08:51

## 2021-08-17 RX ADMIN — DOCUSATE SODIUM 100 MG: 100 CAPSULE, LIQUID FILLED ORAL at 10:29

## 2021-08-17 NOTE — PROGRESS NOTES
Bedside and Verbal shift change report given to Arely GRADY RN (oncoming nurse) by undersigned (offgoing nurse). Report included the following information Kardex, MAR, I/O, variety of cardiac rhythms.

## 2021-08-17 NOTE — PROGRESS NOTES
RENAL DAILY PROGRESS NOTE    Patient: Cate Yo               Sex: male          DOA: 8/15/2021  9:53 AM        YOB: 1950      Age:  70 y.o.        LOS:  LOS: 2 days     Subjective:     Cate Yo is a 70 y.o.  who presents with Renal failure [N19]  Bradycardia [R00.1]. Asked to evaluate for renal failure,hyperkalemia,acidosis. presented with severe bradycardia  Chief complains: Patient denies nausea, vomiting, chest pain, dizziness, shortness of breath or headache.  - Reviewed last 24 hrs events     Current Facility-Administered Medications   Medication Dose Route Frequency    perflutren lipid microspheres (DEFINITY) contrast injection 2 mL  2 mL IntraVENous CARD ONCE    epoetin negrita-epbx (RETACRIT) injection 8,000 Units  8,000 Units SubCUTAneous Q MON, WED & FRI    therapeutic multivitamin (THERAGRAN) tablet 1 Tablet  1 Tablet Oral DAILY    alteplase (CATHFLO) 4 mg in sterile water (preservative free) 4 mL injection  4 mg InterCATHeter DIALYSIS ONCE    [Held by provider] heparin 25,000 units in D5W 250 ml infusion  18-36 Units/kg/hr IntraVENous TITRATE    insulin lispro (HUMALOG) injection   SubCUTAneous AC&HS    atropine 1 mg/mL injection 0.5 mg  0.5 mg IntraVENous PRN    sodium chloride (NS) flush 5-40 mL  5-40 mL IntraVENous Q8H    sodium chloride (NS) flush 5-40 mL  5-40 mL IntraVENous PRN    sodium chloride (NS) flush 5-40 mL  5-40 mL IntraVENous Q8H    sodium chloride (NS) flush 5-40 mL  5-40 mL IntraVENous PRN    acetaminophen (TYLENOL) tablet 650 mg  650 mg Oral Q6H PRN    Or    acetaminophen (TYLENOL) suppository 650 mg  650 mg Rectal Q6H PRN    senna (SENOKOT) tablet 8.6 mg  1 Tablet Oral DAILY PRN    famotidine (PF) (PEPCID) 20 mg in 0.9% sodium chloride 10 mL injection  20 mg IntraVENous DAILY    docusate sodium (COLACE) capsule 100 mg  100 mg Oral DAILY    cefTRIAXone (ROCEPHIN) 2 g in sterile water (preservative free) 20 mL IV syringe  2 g IntraVENous Q24H    glucose chewable tablet 16 g  4 Tablet Oral PRN    glucagon (GLUCAGEN) injection 1 mg  1 mg IntraMUSCular PRN    dextrose (D50W) injection syrg 12.5-25 g  25-50 mL IntraVENous PRN       Objective:     Visit Vitals  /73   Pulse 61   Temp 98.2 °F (36.8 °C) (Oral)   Resp 20   Ht 6' 1\" (1.854 m)   Wt 125.6 kg (277 lb)   SpO2 90%   BMI 36.55 kg/m²       Intake/Output Summary (Last 24 hours) at 8/17/2021 1405  Last data filed at 8/17/2021 1330  Gross per 24 hour   Intake 30 ml   Output 3350 ml   Net -3320 ml       Physical Examination:     GEN: AAO X 3, NAD  RS: Chest is bilateral equal,   CVS: S1-S2 heard irregular  Abdomen: Soft, Non tender, Not distended, Positive bowel sounds, no organomegaly, no CVA / supra pubic tenderness  Extremities: No edema, no cyanosis, skin is warm on touch  CNS: Awake & follows commands,  HEENT: Head is atraumatic, PERRLA, conjunctiva pink & non icteric. No JVD or carotid bruit        Data Review:      Labs:     Hematology:   Recent Labs     08/17/21  0236 08/16/21  0341 08/15/21  1005   WBC 7.3 5.9 5.4   HGB 8.2* 8.3* 9.1*   HCT 23.7* 23.7* 26.6*     Chemistry:   Recent Labs     08/17/21  1112 08/17/21  0236 08/16/21  1800 08/16/21  1145 08/16/21  0945 08/16/21  0341 08/15/21  2150 08/15/21  1555 08/15/21  1100   BUN 96* 119* 116*  --  159* 157* 223* 228* 218*   CREA 8.10* 9.68* 9.31*  --  11.40* 11.10* 14.70* 14.90* 14.60*   CA 8.3* 8.4* 8.1* 8.6 8.3* 8.4* 8.6 8.9 8.9   K 3.9 4.4 4.3  --  5.1 4.7 6.0* 5.9* 6.2*   * 136 137  --  136 136 131* 130* 130*   CL 99* 101 101  --  101 101 98* 98* 98*   CO2 28 25 26  --  22 20* 17* 16* 18*   PHOS  --  5.0*  --   --   --  5.0*  --  6.7*  --    GLU 77 83 82  --  70* 64* 79 82 98        Images:    XR (Most Recent). CXR reviewed by me and compared with previous CXR Results from Hospital Encounter encounter on 08/15/21    XR CHEST PORT    Narrative  HISTORY: Right lung base opacity.     EXAM: Chest.    TECHNIQUE: Single view AP portable semiupright chest.    COMPARISON: 8/15/2021    FINDINGS: Worsened aeration of bilateral hemithoraces is demonstrated with  moderate bilateral diffuse interstitial prominence and associated worsened  perihilar and bibasilar opacities. No pneumothorax or pleural effusions. Unchanged position of tubes and catheters. Heart and mediastinal structures are  unchanged. Heart is enlarged. . Visualized bony thorax and soft tissues are  within normal limits. Impression  IMPRESSION:    1. Worsened aeration of bilateral hemithoraces. Follow-up with plain imaging. CT (Most Recent) Results from Hospital Encounter encounter on 08/15/21    CT HEAD WO CONT    Narrative  EXAM: CT HEAD WITHOUT CONTRAST. CLINICAL HISTORY/INDICATION:  weakness and falls    COMPARISON: CT head 1/22/2011. TECHNIQUE: Routine axial images have been obtained from skull base to vertex at  5 mm thick slices. All CT scans at this facility are performed using dose  optimization technique as appropriate to a performed exam, to include automated  exposure control, adjustment of the mA and/or kV according to patient's size  (including appropriate matching for site-specific examinations), or use of  iterative reconstruction technique. FINDINGS:    There are no intra nor extra axial fluid collections. There is no hemorrhage. The gray white differentiation is normal. Small focal region of hypodensity is  adjacent to the right suprasellar cistern consistent with either an enlarged  perivascular space or a stable chronic lacunar infarct. The ventricular system is midline without mass effect or shift. The paranasal sinuses which are included on this exam are well aerated and  unremarkable. Impression  No acute finding. There is no hemorrhage, mass, nor acute infarct. EKG No results found for this or any previous visit.      I have personally reviewed the old medical records and patient's labs    Plan / Recommendation: 1. Acute/crf stage 4,obstruction,vines cath placed. seen during dialysis at 1 pm,temp cath is working well today. hold on permcath due to pos blood cultures  2.anemia,start epo  3.hypocalcemia,sec  hyperparathyroidism  4.bacteremia,gram neg rods ,probably urine source    D/w Dr. Isabelle Miranda MD  Nephrology  8/17/2021

## 2021-08-17 NOTE — CONSULTS
[facility] [place] [facility]: [phone] ([phone])  [facility]: [phone]     Patient Name: Jacobo Jones  YOB: 1950    Date of consult : 8/17/2021  Reason for Consult: Establish goals of care  Requesting Provider: Toshia MURPHY  Primary Care Physician: Karan Hawkins MD      SUMMARY:   Jacobo Jones is a 70 y.o. male with a past history of chronic kidney disease stage IV on hemodialysis 3 times a week, DM type II, acute on chronic diastolic heart failure, CVA, myocardial infarction, who was admitted on 8/15/2021 from home with a diagnosis of sepsis, altered mental status. Current medical issues leading to Palliative Medicine involvement include: Establish goals of care    CHIEF COMPLAINT: Sepsis, AMS    HPI/SUBJECTIVE:    Patient is a 79-year-old -American male that has multiple complex comorbid conditions. He was admitted 2 days ago from home with sepsis. Patient's wife reported altered mental status and that patient had been acting different for \"a while\". He was found down on the ground by family, but patient had no memory of what happened stating that he just felt weak. The patient is:   [x] Verbal and participatory   [] Non-participatory due to:     GOALS OF CARE:  Patient/Health Care Proxy Stated Goals: Prolong life      TREATMENT PREFERENCES:   Code Status: Full Code         PALLIATIVE DIAGNOSES:   1. Goals of care/ACP  2. Acute on chronic heart failure  3. Chronic kidney disease  4. Debility       PLAN:   1. Goals of care/ACP  This NP in to see patient at the bedside he is a alert and interactive but only moderately oriented. Is able to state who he is and where he is but when asked where he lives patient states \"here \". When asked to explain patient states \"well now I live here\" when asked where his wife is living he stated that she lives in [place].   I then asked if he lives in [place] and he again stated I believe at \"[facility]\". Patient is also somewhat evasive regarding questions about his children and overall health background including his baseline functional status. He states that he has been on dialysis for approximately 1 year. When addressing goals of care and patient's wishes he stated \"we all cannot live forever, when Vazquez Saunders comes from a I am ready to go. \"  I then clarified asking does this mean that he would not want to be placed on life support at end-of-life and he stated \"of course that would be okay, I want to live\". This appears in direct contradiction to the prior statement. Plan will be to see if patient clears further to continue this conversation when asked further clarifying questions he stated \"now you have just gone too far\". Have briefly met with his wife and introduced our team, will reach back out to set up a meeting later this week. At this time patient remains a full code with full interventions  2. Acute on chronic heart failure  Moderate leg edema. Bradycardia and atrial fibrillation present. Patient is being monitored and medically managed by cardiology. 3.  Chronic kidney disease  Patient on HD 3 times a week. Renal failure stage IV being medically managed by nephrology  4. Debility  Patient has a palliative performance score of around 40%, is mainly in the bed unable to do any extensive work due to disease progression, mainly assisted for all functional ADLs and self-care, and some compromise in nutritional intake. Patient has multiple complex comorbidities giving him an overall poor long-term prognosis and high risk for complications. 5.   Initial consult note routed to primary continuity provider  6.    Communicated plan of care with: Palliative IDT      Advance Care Planning:  [] [facility] Interdisciplinary Team has updated the ACP Navigator with [address] and Patient Capacity    Primary [facility] ([address]): Spouse Jose Salomon    Medical Interventions: Full interventions   Other Instructions:         As far as possible, the palliative care team has discussed with patient / health care proxy about goals of care / treatment preferences for patient. HISTORY:     History obtained from: chart review   Active Problems:    Renal failure (8/15/2021)      Bradycardia (8/15/2021)      Severe protein-calorie malnutrition ([address] (2021)      Past Medical History:   Diagnosis Date    Atrial fibrillation ([address]     Cerebrovascular accident ([address]     , - general weakness    Chronic diastolic heart failure (HCC)     Chronic kidney disease     dialysis    Diabetes ([address]     Heart failure ([address]     History of cardioversion     Hypercholesterolemia     Hypertension     Morbid obesity ([address]       Past Surgical History:   Procedure Laterality Date    HX VASCULAR ACCESS Right     right neck      Family History   Problem Relation Age of Onset    Heart Attack Mother 52    Diabetes Other     Hypertension Other      History reviewed, no pertinent family history.   Social History     Tobacco Use    Smoking status: Former Smoker     Years: 8.00     Quit date: 1971     Years since quittin.1    Smokeless tobacco: Never Used    Tobacco comment: last smoked in my late 19's   Substance Use Topics    Alcohol use: No     Alcohol/week: 0.0 standard drinks     No Known Allergies   Current Facility-Administered Medications   Medication Dose Route Frequency    perflutren lipid microspheres (DEFINITY) contrast injection 2 mL  2 mL IntraVENous CARD ONCE    epoetin negrita-epbx (RETACRIT) injection 8,000 Units  8,000 Units SubCUTAneous Q MON, WED & FRI    therapeutic multivitamin (THERAGRAN) tablet 1 Tablet  1 Tablet Oral DAILY    alteplase (CATHFLO) 4 mg in sterile water (preservative free) 4 mL injection  4 mg InterCATHeter DIALYSIS ONCE    [Held by provider] heparin 25,000 units in D5W 250 ml infusion  18-36 Units/kg/hr IntraVENous TITRATE    insulin lispro (HUMALOG) injection   SubCUTAneous AC&HS    atropine 1 mg/mL injection 0.5 mg  0.5 mg IntraVENous PRN    sodium chloride (NS) flush 5-40 mL  5-40 mL IntraVENous Q8H    sodium chloride (NS) flush 5-40 mL  5-40 mL IntraVENous PRN    sodium chloride (NS) flush 5-40 mL  5-40 mL IntraVENous Q8H    sodium chloride (NS) flush 5-40 mL  5-40 mL IntraVENous PRN    acetaminophen (TYLENOL) tablet 650 mg  650 mg Oral Q6H PRN    Or    acetaminophen (TYLENOL) suppository 650 mg  650 mg Rectal Q6H PRN    senna (SENOKOT) tablet 8.6 mg  1 Tablet Oral DAILY PRN    famotidine (PF) (PEPCID) 20 mg in 0.9% sodium chloride 10 mL injection  20 mg IntraVENous DAILY    docusate sodium (COLACE) capsule 100 mg  100 mg Oral DAILY    cefTRIAXone (ROCEPHIN) 2 g in sterile water (preservative free) 20 mL IV syringe  2 g IntraVENous Q24H    glucose chewable tablet 16 g  4 Tablet Oral PRN    glucagon (GLUCAGEN) injection 1 mg  1 mg IntraMUSCular PRN    dextrose (D50W) injection syrg 12.5-25 g  25-50 mL IntraVENous PRN          Clinical Pain Assessment (nonverbal scale for nonverbal patients): Clinical Pain Assessment  Severity: 0          Duration: for how long has pt been experiencing pain (e.g., 2 days, 1 month, years)  Frequency: how often pain is an issue (e.g., several times per day, once every few days, constant)     FUNCTIONAL ASSESSMENT:     Palliative Performance Scale (PPS):  PPS: 40    ECOG  ECOG Status : Limited self-care     PSYCHOSOCIAL/SPIRITUAL SCREENING:      Any spiritual / Jew concerns:  [] Yes /  [x] No    Caregiver Burnout:  [] Yes /  [x] No /  [] No Caregiver Present      Anticipatory grief assessment:   [x] Normal  / [] Maladaptive        REVIEW OF SYSTEMS:     Systems: constitutional, ears/nose/mouth/throat, respiratory, gastrointestinal, genitourinary, musculoskeletal, integumentary, neurologic, psychiatric, endocrine. Positive findings noted below.   Modified ESAS Completed by: provider Pain: 0   Anxiety: 0     Anorexia: 0 Dyspnea: 3           Stool Occurrence(s): 0   Positive and pertinent negative findings in ROS are noted above in HPI. The following systems were [x] reviewed / [] unable to be reviewed as noted in HPI  Other findings are noted below. PHYSICAL EXAM:     Constitutional: Alert and interactive, does not appear in distress  Eyes: pupils equal, anicteric  ENMT: no nasal discharge, moist mucous membranes  Cardiovascular: regular rhythm, distal pulses intact  Respiratory: breathing not labored, symmetric  Gastrointestinal: soft non-tender, +bowel sounds  Musculoskeletal: no deformity, no tenderness to palpation  Skin: warm, dry  Neurologic: following commands, moving all extremities  Psychiatric: full affect, no hallucinations    Other: Wt Readings from Last 3 Encounters:   08/17/21 125.6 kg (277 lb)   08/05/21 122.5 kg (270 lb)   07/05/16 147.4 kg (325 lb)     Blood pressure 123/60, pulse 60, temperature 98.2 °F (36.8 °C), temperature source Oral, resp. rate 20, height 6' 1\" (1.854 m), weight 125.6 kg (277 lb), SpO2 90 %. Pain:  Pain Scale 1: Numeric (0 - 10)  Pain Intensity 1: 0                      LAB AND IMAGING FINDINGS:     Lab Results   Component Value Date/Time    WBC 7.3 08/17/2021 02:36 AM    HGB 8.2 (L) 08/17/2021 02:36 AM    PLATELET 696 02/46/2095 02:36 AM     Lab Results   Component Value Date/Time    Sodium 135 (L) 08/17/2021 11:12 AM    Potassium 3.9 08/17/2021 11:12 AM    Chloride 99 (L) 08/17/2021 11:12 AM    CO2 28 08/17/2021 11:12 AM    BUN 96 (H) 08/17/2021 11:12 AM    Creatinine 8.10 (H) 08/17/2021 11:12 AM    Calcium 8.3 (L) 08/17/2021 11:12 AM    Magnesium 2.7 (H) 08/17/2021 02:36 AM    Phosphorus 5.0 (H) 08/17/2021 02:36 AM      Lab Results   Component Value Date/Time    Alk.  phosphatase 92 03/22/2010 12:00 PM    Protein, total 7.8 03/22/2010 12:00 PM    Albumin 3.8 07/20/2021 11:13 AM    Globulin 3.5 03/22/2010 12:00 PM     Lab Results   Component Value Date/Time    INR 1.5 (H) 08/15/2021 10:05 AM    Prothrombin time 17.7 (H) 08/15/2021 10:05 AM    aPTT 35.4 08/17/2021 11:12 AM      Lab Results   Component Value Date/Time    Iron 30 (L) 07/20/2021 11:13 AM    TIBC 320 07/20/2021 11:13 AM    Iron % saturation 9 (L) 07/20/2021 11:13 AM    Ferritin 521 (H) 08/16/2021 11:45 AM      No results found for: PH, PCO2, PO2  No components found for: [place]   Lab Results   Component Value Date/Time     08/15/2021 11:00 AM    CK - MB 6.5 (H) 11/04/2015 12:05 PM              Total time: 50 minutes  Counseling / coordination time, spent as noted above:   > 50% counseling / coordination:  Time spent in direct consultation with the patient, medical team, and family     Prolonged service was provided for  []30 min   []75 min in face to face time in the presence of the patient, spent as noted above. Time Start:   Time End:     Disclaimer: Sections of this note are dictated using utilizing voice recognition software, which may have resulted in some phonetic based errors in grammar and contents. Even though attempts were made to correct all the mistakes, some may have been missed, and remained in the body of the document. If questions arise, please contact our department.

## 2021-08-17 NOTE — PROGRESS NOTES
Patient stable to downgrade to stepdown. Discussed with Dr. Elida Rosales, Dr. Sobeida Del Valle, and Dr. Michel Smith. Patient does have persistent bradycardia but it is much improved from yesterday evening- 20's to 35s yesterday, now 40's to 46s. Notes reviewed during handoff with Dr. Michel Smith, cardiology note discussing heparin gtt, added without bolus- will hold until tomorrow however as vascular placed consult to IR regarding slow jak HD catheter, possible exchange tomorrow. Marisela Vides PA-C  08/16/21  Pulmonary, Critical Care Medicine  [facility]       ICU Staff  Above reviewed and noted.  Agree with above    Unknown DO N[facility], [facility]    [facility]  Pulmonary, Critical Care, and Sleep Medicine

## 2021-08-17 NOTE — PROGRESS NOTES
Cardiology Progress Note    Admit Date: 8/15/2021  Attending Cardiologist: Dr. Brooke Parrish:     Hospital Problems  Date Reviewed: 1/25/2016        Codes Class Noted POA    Severe protein-calorie malnutrition ([facility] [address] (Chronic) ICD-10-CM: U07  ICD-9-CM: 262  8/16/2021 Yes        Renal failure ICD-10-CM: N19  ICD-9-CM: 592  8/15/2021 Unknown        Bradycardia ICD-10-CM: R00.1  ICD-9-CM: 427.89  8/15/2021 Unknown            -Asymptomatic Bradycardia, slow atrial fibrillation, likely 2/2 hyperkalemia. No long pauses are noted. Blood pressure is stable continue to monitor.    -History of paroxysmal A. Fib, s/p DC cardioversion (2011)  Possible sick sinus syndrome. Not anticoagulated as outpatient d/t frequent falls and hx of hematuria. On  mg only.   -Acute on chronic diastolic heart failure with chronic LE edema. ECHO this admission normal LV Function. Mild concentric hypertrophy.  (05/2021) EF 50%. Grade 3 LV DD.    -Hypertension and hypertensive heart disease. On Hydralazine, coreg and lasix as outpatient.   -Moderate pulmonary hypertension by ECHO this admission (08/2021) PASP   61 mmhg.   -History of falls reported by family, unclear if syncopal event, needs close monitoring for bradycardia arrhythmia.   -History of CVA  -Hyperkalemia, resolved. -DM2  -Acute on chronic renal failure with significant worsening of BUN/creatinine likely precipitating his weakness and other etiology.     Atrial fibrillation CHADSVASC2 score stroke risk:   70 y.o.                                        72 to 76  +1  male                                         Male     +0  CHF hx                                           Yes    +1  HTN hx                                           Yes    +1  Stroke/TIA/Thromboembolism       Yes   +2  Vascular disease hx                       No    + 0  Diabetes Mellitus                            Yes    +1   CHADSVASC2 score                    6      Annual Stroke Risk 9.7%- moderate-high Primary Cardiologist: Dr. Danie Shirley, last seen 2016     Plan:     Fluid mgmt via HD per renal team.   Resume Heparin gtt if no further vascular procedures planned for thromboembolic coverage while in house. Patient does not appear to be anticoagulated as outpatient d/t falls, On  mg only as outpatient. Tele reviewed, currently in Afib with rates in the 60s. Rates dropping in the 40s/50s while patient is asleep. Pressures remain stable. Continue to monitor and replace electrolytes as needed. Recommend PT/OT as tolerated. Subjective:     No new complaints. Dialysis at bedside.      Objective:      Patient Vitals for the past 8 hrs:   Temp Pulse Resp BP SpO2   08/17/21 1130  (!) 52  124/66    08/17/21 1115  (!) 47  104/61    08/17/21 1100  61  131/69    08/17/21 1045  (!) 50  (!) 122/50    08/17/21 1030 98.2 °F (36.8 °C) (!) 50 20 (!) 137/57    08/17/21 1015  (!) 50  (!) 137/57    08/17/21 0910    (!) 128/45    08/17/21 0800  (!) 53      08/17/21 0742 98.2 °F (36.8 °C) (!) 54 20 (!) 128/45 90 %   08/17/21 0459 98.5 °F (36.9 °C) (!) 52 20 130/64 94 %   08/17/21 0400  (!) 48            Patient Vitals for the past 96 hrs:   Weight   08/17/21 0910 125.6 kg (277 lb)   08/16/21 2355 125.7 kg (277 lb 1.6 oz)   08/16/21 1413 126.1 kg (278 lb)   08/15/21 2158 126.1 kg (278 lb)   08/15/21 1006 128.9 kg (284 lb 3.2 oz)       TELE: AFIB               Current Facility-Administered Medications   Medication Dose Route Frequency Last Admin    perflutren lipid microspheres (DEFINITY) contrast injection 2 mL  2 mL IntraVENous CARD ONCE      epoetin negrita-epbx (RETACRIT) injection 8,000 Units  8,000 Units SubCUTAneous Q MON, WED & FRI 8,000 Units at 08/16/21 2045    therapeutic multivitamin (THERAGRAN) tablet 1 Tablet  1 Tablet Oral DAILY 1 Tablet at 08/17/21 1029    alteplase (CATHFLO) 4 mg in sterile water (preservative free) 4 mL injection  4 mg InterCATHeter DIALYSIS ONCE 4 mg at 08/16/21 1953    [Held by provider] heparin 25,000 units in D5W 250 ml infusion  18-36 Units/kg/hr IntraVENous TITRATE      insulin lispro (HUMALOG) injection   SubCUTAneous AC&HS      atropine 1 mg/mL injection 0.5 mg  0.5 mg IntraVENous PRN      sodium chloride (NS) flush 5-40 mL  5-40 mL IntraVENous Q8H 10 mL at 08/17/21 1029    sodium chloride (NS) flush 5-40 mL  5-40 mL IntraVENous PRN 10 mL at 08/15/21 2147    sodium chloride (NS) flush 5-40 mL  5-40 mL IntraVENous Q8H 10 mL at 08/17/21 0851    sodium chloride (NS) flush 5-40 mL  5-40 mL IntraVENous PRN      acetaminophen (TYLENOL) tablet 650 mg  650 mg Oral Q6H PRN      Or    acetaminophen (TYLENOL) suppository 650 mg  650 mg Rectal Q6H PRN      senna (SENOKOT) tablet 8.6 mg  1 Tablet Oral DAILY PRN      famotidine (PF) (PEPCID) 20 mg in 0.9% sodium chloride 10 mL injection  20 mg IntraVENous DAILY 20 mg at 08/17/21 1028    docusate sodium (COLACE) capsule 100 mg  100 mg Oral DAILY 100 mg at 08/17/21 1029    cefTRIAXone (ROCEPHIN) 2 g in sterile water (preservative free) 20 mL IV syringe  2 g IntraVENous Q24H 2 g at 08/16/21 1550    glucose chewable tablet 16 g  4 Tablet Oral PRN      glucagon (GLUCAGEN) injection 1 mg  1 mg IntraMUSCular PRN      dextrose (D50W) injection syrg 12.5-25 g  25-50 mL IntraVENous PRN 25 g at 08/16/21 0345         Intake/Output Summary (Last 24 hours) at 8/17/2021 1141  Last data filed at 8/17/2021 0415  Gross per 24 hour   Intake 150 ml   Output 1450 ml   Net -1300 ml       Physical Exam:  General:  alert, cooperative, no distress, appears stated age  Neck:  no JVD  Lungs:  clear to auscultation bilaterally  Heart:  irregularly irregular rhythm, systolic murmur: holosystolic 2/6, blowing at apex  Abdomen:  abdomen is soft without significant tenderness, masses, organomegaly or guarding  Extremities:  extremities normal, atraumatic, no cyanosis; chronic appearing LE edema B/L 1+     Visit Vitals  /66   Pulse (!) 52   Temp 98.2 °F (36.8 °C) (Oral)   Resp 20   Ht 6' 1\" (1.854 m)   Wt 125.6 kg (277 lb)   SpO2 90%   BMI 36.55 kg/m²       Data Review:     Labs: Results:       Chemistry Recent Labs     08/17/21  0236 08/16/21  1800 08/16/21  1145 08/16/21  0945 08/16/21  0945 08/16/21  0341 08/16/21  0341 08/15/21  2150 08/15/21  1555   GLU 83 82  --   --  70*   < > 64*   < > 82    137  --   --  136   < > 136   < > 130*   K 4.4 4.3  --   --  5.1   < > 4.7   < > 5.9*    101  --   --  101   < > 101   < > 98*   CO2 25 26  --   --  22   < > 20*   < > 16*   * 116*  --   --  159*   < > 157*   < > 228*   CREA 9.68* 9.31*  --   --  11.40*   < > 11.10*   < > 14.90*   CA 8.4* 8.1* 8.6   < > 8.3*   < > 8.4*   < > 8.9   MG 2.7*  --   --   --   --   --  2.9*  --  3.1*   PHOS 5.0*  --   --   --   --   --  5.0*  --  6.7*   AGAP 10 10  --   --  13   < > 15   < > 16   BUCR 12 12  --   --  14   < > 14   < > 15    < > = values in this interval not displayed. CBC w/Diff Recent Labs     08/17/21  0236 08/16/21  0341 08/15/21  1005   WBC 7.3 5.9 5.4   RBC 3.08* 3.08* 3.40*   HGB 8.2* 8.3* 9.1*   HCT 23.7* 23.7* 26.6*    179 179   GRANS 84* 87* 82*   LYMPH 6* 4* 9*   EOS 1 1 0      Cardiac Enzymes No results found for: CPK, CK, CKMMB, CKMB, RCK3, CKMBT, CKNDX, CKND1, MAXIMILIAN, TROPT, TROIQ, CALEB, TROPT, TNIPOC, BNP, BNPP   Coagulation Recent Labs     08/17/21  0236 08/15/21  1005   PTP  --  17.7*   INR  --  1.5*   APTT 37.6*  --        Lipid Panel Lab Results   Component Value Date/Time    Cholesterol, total 208 (H) 03/22/2010 12:00 PM    HDL Cholesterol 55 03/22/2010 12:00 PM    LDL, calculated 138.4 (H) 03/22/2010 12:00 PM    VLDL, calculated 14.6 03/22/2010 12:00 PM    Triglyceride 73 03/22/2010 12:00 PM    CHOL/HDL Ratio 3.8 03/22/2010 12:00 PM      BNP No results found for: BNP, BNPP, XBNPT   Liver Enzymes No results for input(s): TP, ALB, TBIL, AP in the last 72 hours.     No lab exists for component: SGOT, GPT, DBIL Thyroid Studies Lab Results   Component Value Date/Time    TSH 2.42 08/15/2021 11:00 AM          Signed By: Roz Segal PA-C     August 17, 2021

## 2021-08-17 NOTE — DIALYSIS
ACUTE HEMODIALYSIS FLOW SHEET    HEMODIALYSIS ORDERS: Physician: Dr. Bridgette Eaton P     Dialyzer: Revaclear   Duration: 3.0 hr   BFR: 300   DFR: 600   Dialysate:  Temp 36-37*C   K+  2    Ca+ 2.5   Na 138  Bicarb 40   Wt Readings from Last 1 Encounters:   08/17/21 125.6 kg (277 lb)    Patient Chart [x]   Unable to Obtain []  Dry weight/UF Goal: 2000 ml    Heparin []  Bolus    Units    [] Hourly    Units    [x]None       Pre BP:   137/57   Pulse:  50   Respirations: 20   Temperature:  98.2  [x] oral  [] ax  [] esophageal   Labs: []  Pre  []  Post:   [x] N/A   Additional Orders(medications, blood products, hypotension management): [] Yes   [x] No     [x]  [facility] Consent Verified     CATHETER ACCESS:  []N/A   [x]Right   []Left   [x]IJ   []Fem  []Chest wall  []TransHepatic   [] First use X-ray verified     []Tunnel    [x] Non Tunneled   [x]No S/S infection  []Redness  []Drainage []Cultured []Swelling []Pain   [x]Medical Aseptic Prep Utilized   []Dressing Changed  [] Biopatch  Date:    []Clotted   [x]Patent   Flows: [x]Good  []Poor  [x]Reversed   If access problem,  notified: []Yes    [x]N/A            GENERAL ASSESSMENT:    LUNGS:  Resp Rate 20   [x] Clear  [] Coarse  [] Crackles  [] Wheezing  [] Diminished                                                            [] RLL  [] LLL [] RUL  [] RONNIE       Respirations:  [x]Easy  []Labored  []N/A  Cough:  []Productive  []Dry  []N/A      Therapy:  [x]RA  O2 Type:  []NC   []  NRB    [] BiPaP  Flow:  l/min                   [] Ventilated  [] Intubated  [] Trach     CARDIAC: [x] Regular      [] Irregular   [] Rhythm:          [] Monitored   [] Bedside   [x] Remotely monitored     EDEMA: [] None   [x]Generalized  [] Pitting [] 1+   [] 2 +   [] 3+    [] 4+     SKIN:   [x] Warm  [] Hot     [] Cold   [x] Dry     [] Pale   [] Diaphoretic                  [] Flushed  [] Jaundiced  [] Cyanotic     LOC:    [x] Alert      [x]Oriented:    [x] Person     [x] Place  [x]Time [] Confused  [] Lethargic  [] Medicated  [] Non-responsive  [] Non-verbal   GI / ABDOMEN:                     [] Flat    [] Distended    [x] Soft    [] Firm   []  Obese                   [] Diarrhea   [] FMS [x] Bowel Sounds  [] Nausea  [] Vomiting                   [] NGT  [] OGT    [] PEG  [] Tube Feedings @     / URINE ASSESSMENT:                   [x] Voiding    [] Oliguria  [] Anuria                    [x]  Elizabeth  [] Incontinent  []  Incontinent Brief   []  PureWick     PAIN:  [x] 0 []1  []2   []3   []4   []5   []6   []7   []8   []9   []10                MOBILITY:  [x] Bed    [] Stretcher      All Vitals and Treatment Details on Attached [address]: [facility]          Room # 352/01    [x] Routine         [] 1st Time Acute/Chronic   [] Urgent      [] Stat            [] Acute Room   []  Bedside    [] ICU/CCU     [] ER   Isolation Precautions:  [x] Dialysis    There are currently no Active Isolations     ALLERGIES:     No Known Allergies   Code Status:  Full Code     Hepatitis Status      Lab Results   Component Value Date/Time    Hepatitis B surface Ag <0.10 08/15/2021 09:50 PM    Hepatitis B surface Ab <3.10 (L) 08/15/2021 09:50 PM    Hepatitis C virus Ab 0.28 12/07/2015 04:15 PM        Current Labs:      Lab Results   Component Value Date/Time    WBC 7.3 08/17/2021 02:36 AM    Hemoglobin, POC 10.2 (L) 03/11/2016 07:26 AM    HGB 8.2 (L) 08/17/2021 02:36 AM    Hematocrit, POC 30 (L) 03/11/2016 07:26 AM    HCT 23.7 (L) 08/17/2021 02:36 AM    PLATELET 387 68/04/7347 02:36 AM    MCV 76.9 08/17/2021 02:36 AM     Lab Results   Component Value Date/Time    Sodium 136 08/17/2021 02:36 AM    Potassium 4.4 08/17/2021 02:36 AM    Chloride 101 08/17/2021 02:36 AM    CO2 25 08/17/2021 02:36 AM    Anion gap 10 08/17/2021 02:36 AM    Glucose 83 08/17/2021 02:36 AM     (H) 08/17/2021 02:36 AM    Creatinine 9.68 (H) 08/17/2021 02:36 AM    BUN/Creatinine ratio 12 08/17/2021 02:36 AM    GFR est AA 7 (L) 08/17/2021 02:36 AM    GFR est non-AA 5 (L) 08/17/2021 02:36 AM    Calcium 8.4 (L) 08/17/2021 02:36 AM          DIET:  DIET ADULT ORAL NUTRITION SUPPLEMENT  DIET ADULT ORAL NUTRITION SUPPLEMENT  DIET NPO     PRIMARY NURSE REPORT:   Pre Dialysis: Blossom Galeazzi, RN    Time: 1000      EDUCATION:    [x] Patient           Knowledge Basis: []None [x]Minimal [] Substantial [] Unknown  Barriers to learning  []N/A  [] Intubated/Trached/Ventilated  [] Sedated/Paralyzed   [] Access Care     [] S&S of infection  [] Fluid Management  [] K+   [x] Procedural    [] Medications   [] Tx Options   [] Transplant   [] Diet      Teaching Tools:  [x] Explain  [] Demo  [] Handouts [] Video  Patient response: [x] Verbalized understanding   [x] Requires follow up        [x] Time Out/Safety Check    [x] Extracorporeal Circuit Tested for integrity       RO/HEMODIALYSIS MACHINE SAFETY CHECKS  Before each treatment:        [address] [place] [address]                                     [] Unit Machine #  with centralized RO                                    [] Portable Machine #1/RO serial # [id number]                                  [] Portable Machine #2/RO serial # [id number]                                  [x] Portable Machine #4/RO serial # [id number]                                  [] Portable Machine #10/RO serial # [id number]                                                                                                       Alarm Test:  Pass time 1000            [x] RO/Machine Log Complete    Machine Temp    36-37*C             Dialysate: pH  7.4    Conductivity: Meter 14.0    HD Machine  13.8     TCD: 14.0  Dialyzer Lot # [id number]     Blood Tubing Lot # [id number]     Saline Lot # [id number]     CHLORINE TESTING-Before each treatment and every 4 hours    Total Chlorine: [x] less than 0.1 ppm  Initial Time Check: 1000       4 Hr/2nd Check Time: N/A   (if greater than 0.1 ppm from Primary then every 30 minutes from Secondary)     TREATMENT INITIATION  with Dialysis Precautions:   [x] All Connections Secured              [x] Saline Line Double Clamped   [x] Venous Parameters Set               [x] Arterial Parameters Set    [x] Prime Given 250ml NSS              [x]Air Foam Detector Engaged      Treatment Initiation Note:  1000 arrived to patient's room #352, Patient in bd A&O x 4. No signs of distress  1030 Dragoon's right IJ CVC intact and flushing well, Dialysis  Initiated without issue     During Treatment Notes:  7411  Face & Vascular access visible with arterial and venous line connections intact. Pt tolerating HD.  1100  Face & Vascular access visible with arterial and venous line connections intact. Pt tolerating HD.  1115  Face & Vascular access visible with arterial and venous line connections intact. Pt tolerating HD.  1130  Face & Vascular access visible with arterial and venous line connections intact. Pt tolerating HD.  1145  Face & Vascular access visible with arterial and venous line connections intact. Pt tolerating HD.  1200  Face & Vascular access visible with arterial and venous line connections intact. Pt tolerating HD.  1215  Face & Vascular access visible with arterial and venous line connections intact. Pt tolerating HD.  1230  Face & Vascular access visible with arterial and venous line connections intact. Pt tolerating HD.  1245  Face & Vascular access visible with arterial and venous line connections intact. Pt tolerating HD.  1300  Face & Vascular access visible with arterial and venous line connections intact. Pt tolerating HD.  1315  Face & Vascular access visible with arterial and venous line connections intact. Pt tolerating HD.  1330  Face & Vascular access visible with arterial and venous line connections intact. Pt tolerating HD.    1330  Dialysis treatment complete.        Medication Dose Volume Route Time Axel Nurse, Title      HD  Brett Pump, RN      DOMO West Pump, RN      HD  Brett Pump, RN     Post Assessment  Dialyzer Cleared:   [] Good  [x] Fair  [] Poor  Blood processed:  52 L  UF Removed:  2000 Ml    Post /70   Pulse  61 Resp  20  Temp 98.1 Lungs: [x] Lung sounds same as pre dialysis assessment    Cardiac :[x] Regular   [] Irregular   Rhythm:  [x] Monitored   [] Not Monitored    CVC Catheter: [] N/A  Locking solution: Heparin 1:1000 U  Arterial port 1.9 ml   Venous port 1.9 ml   Edema:  [] None  [x] Generalized                     Skin:[x] Warm  [x] Dry [] Diaphoretic               [] Flushed  [] Pale [] Cyanotic Pain:  [x]0  []1 []2  []3 []4  []5  []6  []7 []8    []9  []10     Post Treatment Note:   1399 Pt tolerated dialysis well. Dialysis catheter intact, patent and heplocked as noted above.      POST TREATMENT PRIMARY NURSE HANDOFF REPORT:   Post Dialysis: Eleonora Vila RN               Time:  8690       Abbreviations: AVG-arterial venous graft, AVF-arterial venous fistula, IJ-Internal Jugular, Subcl-Subclavian, Fem-Femoral, Tx-treatment, AP/HR-apical heart rate, VSS- Vital Signs Stable, CVC- Central Venous Catheter, DFR-dialysate flow rate, BFR-blood flow rate, AP-arterial pressure, -venous pressure, UF-ultrafiltrate, TMP-transmembrane pressure, Mykel-Venous, Art-Arterial, RO-Reverse Osmosis

## 2021-08-17 NOTE — PROGRESS NOTES
[facility] Hospitalist Group  Progress Note    Patient: Gloria Welch Age: 70 y.o. : 1950 MR#: [id number] SSN: [id number]  Date: 2021     Subjective:     Patient states he is feeling pretty good today -denies dizziness, chest pain, shortness of breath, states he has seen a small amount of blood in his urine recently and endorses that he has been urinating but \"small amounts\"    Assessment/Plan:   1. Hyperkalemia, in setting of TONY -6.2 on admission, now improved  2. Acute on chronic stage IV kidney disease with obstructive uropathy -continue hemodialysis per nephrology; n.p.o. after midnight per vascular surgery Dr. Gene Damon ? Tunneled dialysis catheter placement by IR -Dr. Yoni Martin added to treatment team ? Timing of catheter with + blood cultures  3. Urinary tract infection -continue antibiotics, follow urine culture  4. Bacteremia -blood cultures from 8/15/2021 2/2 + for GNR; recheck blood cultures in a.m. Consider ID consult in AM  5. Bradycardia, appears to be A. Fib, possible SSS / in setting of hyperkalemia - management per cardiology, heparin drip initiated by cardiology however in setting of ? Procedure, hold heparin drip for now. 6. Acute normocytic anemia ? Impact of worsening renal function - no evidence of acute bleeding  7. Acute weakness -improved, ? in setting of worsening renal function; head CT normal on admission. 8. Acute on chronic diastolic heart failure with moderate leg edema -duplex negative for DVT on admission; fluid management with dialysis  9. Severe protein calorie malnutrition - nutritionist following, cont supplement, MVI  10.  History of type 2 diabetes with hyperglycemia    Additional Notes:      Case discussed with:  [x]Patient  []Family  [x]Nursing  []Case Management  DVT Prophylaxis:  []Lovenox  []Hep SQ  []SCDs  []Coumadin   []On Heparin gtt    Objective:     VS:   Visit Vitals  BP (!) 137/55   Pulse (!) 44   Temp 98.4 °F (36.9 °C) Resp 20   Ht 6' 1\" (1.854 m)   Wt 126.1 kg (278 lb)   SpO2 99%   BMI 36.68 kg/m²      Tmax/24hrs: Temp (24hrs), Av.3 °F (36.8 °C), Min:98.1 °F (36.7 °C), Max:98.5 °F (36.9 °C)      Intake/Output Summary (Last 24 hours) at 2021 2241  Last data filed at 2021 1808  Gross per 24 hour   Intake 245 ml   Output 2087 ml   Net -1842 ml     General:  Alert, NAD  HEENT: Oral mucosa moist; PERRLA  Cardiovascular:  irreg rhythm, joel HR upper 40's-50's, Nl S1/S2  Pulmonary:  LSC throughout. Normal resp effort  GI:  +BS in all four quadrants, soft, non-tender  Extremities:  2+ BLE edema  Neuro: alert and oriented x 4    Lines / vines / HD sites / Lori Fisher - Right internal jugular IV; vines    Labs / micro / imaging :    Recent Results (from the past 24 hour(s))   GLUCOSE, POC    Collection Time: 21 12:20 AM   Result Value Ref Range    Glucose (POC) 83 70 - 110 mg/dL   CBC WITH AUTOMATED DIFF    Collection Time: 21  3:41 AM   Result Value Ref Range    WBC 5.9 4.6 - 13.2 K/uL    RBC 3.08 (L) 4.35 - 5.65 M/uL    HGB 8.3 (L) 13.0 - 16.0 g/dL    HCT 23.7 (L) 36.0 - 48.0 %    MCV 76.9 74.0 - 97.0 FL    MCH 26.9 24.0 - 34.0 PG    MCHC 35.0 31.0 - 37.0 g/dL    RDW 16.2 (H) 11.6 - 14.5 %    PLATELET 202 881 - 125 K/uL    MPV 10.6 9.2 - 11.8 FL    NEUTROPHILS 87 (H) 40 - 73 %    LYMPHOCYTES 4 (L) 21 - 52 %    MONOCYTES 7 3 - 10 %    EOSINOPHILS 1 0 - 5 %    BASOPHILS 0 0 - 2 %    ABS. NEUTROPHILS 5.1 1.8 - 8.0 K/UL    ABS. LYMPHOCYTES 0.2 (L) 0.9 - 3.6 K/UL    ABS. MONOCYTES 0.4 0.05 - 1.2 K/UL    ABS. EOSINOPHILS 0.1 0.0 - 0.4 K/UL    ABS.  BASOPHILS 0.0 0.0 - 0.1 K/UL    DF AUTOMATED     MAGNESIUM    Collection Time: 21  3:41 AM   Result Value Ref Range    Magnesium 2.9 (H) 1.6 - 2.6 mg/dL   PHOSPHORUS    Collection Time: 21  3:41 AM   Result Value Ref Range    Phosphorus 5.0 (H) 2.5 - 4.9 MG/DL   GLUCOSE, POC    Collection Time: 21  3:41 AM   Result Value Ref Range    Glucose (POC) 65 (L) 70 - 110 mg/dL   CALCIUM, IONIZED    Collection Time: 08/16/21  3:41 AM   Result Value Ref Range    Ionized Calcium 1.12 (L) 1.15 - 1.33 MMOL/L   LACTIC ACID    Collection Time: 08/16/21  3:41 AM   Result Value Ref Range    Lactic acid 0.6 0.4 - 2.0 MMOL/L   METABOLIC PANEL, BASIC    Collection Time: 08/16/21  3:41 AM   Result Value Ref Range    Sodium 136 136 - 145 mmol/L    Potassium 4.7 3.5 - 5.5 mmol/L    Chloride 101 100 - 111 mmol/L    CO2 20 (L) 21 - 32 mmol/L    Anion gap 15 3.0 - 18 mmol/L    Glucose 64 (L) 74 - 99 mg/dL     (H) 7.0 - 18 MG/DL    Creatinine 11.10 (H) 0.6 - 1.3 MG/DL    BUN/Creatinine ratio 14 12 - 20      GFR est AA 6 (L) >60 ml/min/1.73m2    GFR est non-AA 5 (L) >60 ml/min/1.73m2    Calcium 8.4 (L) 8.5 - 10.1 MG/DL   GLUCOSE, POC    Collection Time: 08/16/21  4:02 AM   Result Value Ref Range    Glucose (POC) 104 70 - 110 mg/dL   GLUCOSE, POC    Collection Time: 08/16/21  6:06 AM   Result Value Ref Range    Glucose (POC) 72 70 - 236 mg/dL   METABOLIC PANEL, BASIC    Collection Time: 08/16/21  9:45 AM   Result Value Ref Range    Sodium 136 136 - 145 mmol/L    Potassium 5.1 3.5 - 5.5 mmol/L    Chloride 101 100 - 111 mmol/L    CO2 22 21 - 32 mmol/L    Anion gap 13 3.0 - 18 mmol/L    Glucose 70 (L) 74 - 99 mg/dL     (H) 7.0 - 18 MG/DL    Creatinine 11.40 (H) 0.6 - 1.3 MG/DL    BUN/Creatinine ratio 14 12 - 20      GFR est AA 5 (L) >60 ml/min/1.73m2    GFR est non-AA 4 (L) >60 ml/min/1.73m2    Calcium 8.3 (L) 8.5 - 10.1 MG/DL   GLUCOSE, POC    Collection Time: 08/16/21 11:40 AM   Result Value Ref Range    Glucose (POC) 83 70 - 110 mg/dL   FERRITIN    Collection Time: 08/16/21 11:45 AM   Result Value Ref Range    Ferritin 521 (H) 8 - 388 NG/ML   PTH INTACT    Collection Time: 08/16/21 11:45 AM   Result Value Ref Range    Calcium 8.6 8.5 - 10.1 MG/DL    PTH, Intact 117.0 (H) 18.4 - 52.1 pg/mL   METABOLIC PANEL, BASIC    Collection Time: 08/16/21  6:00 PM   Result Value Ref Range Sodium 137 136 - 145 mmol/L    Potassium 4.3 3.5 - 5.5 mmol/L    Chloride 101 100 - 111 mmol/L    CO2 26 21 - 32 mmol/L    Anion gap 10 3.0 - 18 mmol/L    Glucose 82 74 - 99 mg/dL     (H) 7.0 - 18 MG/DL    Creatinine 9.31 (H) 0.6 - 1.3 MG/DL    BUN/Creatinine ratio 12 12 - 20      GFR est AA 7 (L) >60 ml/min/1.73m2    GFR est non-AA 6 (L) >60 ml/min/1.73m2    Calcium 8.1 (L) 8.5 - 10.1 MG/DL   GLUCOSE, POC    Collection Time: 08/16/21  6:00 PM   Result Value Ref Range    Glucose (POC) 81 70 - 110 mg/dL       Results     Procedure Component Value Units Date/Time    RESPIRATORY VIRUS PANEL W/COVID-19, PCR [[id number]] Collected: 08/15/21 1608    Order Status: Completed Specimen: Nasopharyngeal Updated: 08/15/21 1802     Adenovirus Not detected        Coronavirus 229E Not detected        Coronavirus HKU1 Not detected        Coronavirus CVNL63 Not detected        Coronavirus OC43 Not detected        SARS-CoV-2, PCR Not detected        Metapneumovirus Not detected        Rhinovirus and Enterovirus Not detected        Influenza A Not detected        Influenza A, subtype H1 Not detected        Influenza A, subtype H3 Not detected        INFLUENZA A H1N1 PCR Not detected        Influenza B Not detected        Parainfluenza 1 Not detected        Parainfluenza 2 Not detected        Parainfluenza 3 Not detected        Parainfluenza virus 4 Not detected        RSV by PCR Not detected        B. parapertussis, PCR Not detected        Bordetella pertussis - PCR Not detected        Chlamydophila pneumoniae DNA, QL, PCR Not detected        Mycoplasma pneumoniae DNA, QL, PCR Not detected       CULTURE, BLOOD [[id number]]  (Abnormal) Collected: 08/15/21 1450    Order Status: Completed Specimen: Blood Updated: 08/16/21 1358     Special Requests: NO SPECIAL REQUESTS        GRAM STAIN       AEROBIC AND ANAEROBIC BOTTLES GRAM NEGATIVE RODS                  SMEAR CALLED TO AND CORRECTLY REPEATED BY: GEORGI, RN, ICU 0800 8/16/21 TO AKILA           Culture result:       GRAM NEGATIVE RODS GROWING IN THE AEROBIC AND ANAEROBIC BOTTLES          CULTURE, BLOOD [[id number]]  (Abnormal) Collected: 08/15/21 1435    Order Status: Completed Specimen: Blood Updated: 08/16/21 1343     Special Requests: NO SPECIAL REQUESTS        GRAM STAIN       AEROBIC AND ANAEROBIC BOTTLES GRAM NEGATIVE RODS                  SMEAR CALLED TO AND CORRECTLY REPEATED BY: Allyn Cancino RN, ICU 0800 8/16/21 TO AKILA. Culture result:       GRAM NEGATIVE RODS GROWING IN BOTH BOTTLES (SITE NOT INDICATED)          CULTURE, URINE [[id number]] Collected: 08/15/21 1250    Order Status: Completed Specimen: Cath Urine Updated: 08/16/21 0817          CT HEAD WO CONT    Result Date: 8/15/2021  No acute finding. There is no hemorrhage, mass, nor acute infarct. XR CHEST PORT    Result Date: 8/16/2021   IMPRESSION: 1. Worsened aeration of bilateral hemithoraces. Follow-up with plain imaging. XR CHEST PORT    Result Date: 8/16/2021   IMPRESSION: 1. Unchanged aeration of bilateral hemithoraces. Follow-up with plain imaging. Tubes and catheters as above. No pneumothorax. XR CHEST PORT    Result Date: 8/15/2021  Similar findings to prior exam. Enlarged cardiac silhouette with multifocal airspace opacities. Differential includes CHF and pneumonia. Correlate clinically. Recommend follow-up until resolution.        Signed By: Bella Cantu NP     August 16, 2021

## 2021-08-17 NOTE — CONSULTS
Infectious Disease Consultation Note        Reason: Gram-negative bloodstream infection    Current abx Prior abx   Ceftriaxone since 8/15/2021      Lines:       Assessment :    70 y.o. male pmhx of afib, bradycardia, CKD stage 4, bladder neck obstruction, chronic indwelling vines admitted to [facility] on 8/15/2021 with generalized weakness. Now with gram-negative bacteremia, significant pyuria, multiple organisms in urine culture    Clinical presentation consistent with gram-negative bloodstream infection, probable cystitis    Exact source of gram-negative bloodstream infection not entirely clear at this time. Recent history of hydronephrosis/bladder outlet obstruction/indwelling Vines concerning for cystitis as a source of bloodstream infection. D/w micro lab- lactose  gnr. Multiple organisms seen in urine culture 8/15 (enterococcus, pseudomonas, gnr) likely represents contamination. Monitor for catheter associated cystitis. Recommendations:    1. Continue ceftriaxone  2. Perform susceptibility testing of gram-negative duarte, Pseudomonas, Enterococcus in urine culture -we will need to treat this if plans for urological intervention based on CT scan  3.  obtain CT abdomen/pelvis to evaluate for hydronephrosis, complicated UTI  4. F/u ID and susceptibility of gram-negative duarte in blood culture 8/15, follow-up repeat blood culture 8/17    Thank you for consultation request. Above plan was discussed in details with patient, primary team. Please call me if any further questions or concerns. Will continue to participate in the care of this patient. HPI:    70 y.o. male pmhx of afib, bradycardia, CKD stage 4, bladder neck obstruction, chronic indwelling vines admitted to [facility] on 8/15/2021 with generalized weakness. Patient recently had indwelling Vines catheter. Notably patient had vines removed by urology on 8/5/21. Patient present today w/ c/o 2 weeks of progressive weakness and inability to walk. Notes frequent falls w/o syncope. On presentation to ED patient was bradycardic (underlying rhythm afib) with HR in the 40's and hyperkalemic w/ K+ of 6.2.  w/ srCr 14.6. Patient was given  calcium gluconate, insulin, and d50, 1 amp bicarb, and 80 mg IV lasix. A vines catheter was placed w/ > 2L UOP. Nephrology was consulted for emergent HD. Patient had dialysis catheter placed on admission. Blood culture now positive for gram-negative rods. I have been consulted for further recommendations. Patient is a very poor historian. Unable to give me the exact details which led to his hospitalization. Denies significant abdominal pain, flank pain. States that he had Vines catheter at home. Past Medical History:   Diagnosis Date    Atrial fibrillation ([address]     [facility] [address]     1991, 1997- general weakness    Chronic diastolic heart failure (HCC)     Chronic kidney disease     dialysis    Diabetes [facility] [facility]     Heart failure [facility] [address]     History of cardioversion     Hypercholesterolemia     Hypertension     Morbid obesity ([facility] [address]        Past Surgical History:   Procedure Laterality Date    HX VASCULAR ACCESS Right     right neck       Current Discharge Medication List      CONTINUE these medications which have NOT CHANGED    Details   finasteride (PROSCAR) 5 mg tablet TAKE 1 TABLET BY MOUTH DAILY      losartan (COZAAR) 50 mg tablet Take 50 mg by mouth daily. metOLazone (ZAROXOLYN) 2.5 mg tablet Take 2.5 mg by mouth.      pravastatin (PRAVACHOL) 20 mg tablet TAKE 1 TABLET BY MOUTH DAILY      prazosin (MINIPRESS) 5 mg capsule Take 5 mg by mouth At bedtime. simvastatin (ZOCOR) 40 mg tablet Take 40 mg by mouth At bedtime. vitamin E, dl,tocopheryl acet, (vitamin E acetate) 45 mg (100 unit) capsule Take 100 Units by mouth daily.       !! hydrALAZINE (APRESOLINE) 100 mg tablet TAKE 1 TABLET BY MOUTH THREE TIMES DAILY  Qty: 270 Tab, Refills: 6    Comments: **Patient requests 90 days supply**      !! hydrALAZINE (APRESOLINE) 100 mg tablet TAKE 1 TABLET BY MOUTH THREE TIMES DAILY  Qty: 270 Tab, Refills: 6    Comments: **Patient requests 90 days supply**      ferrous sulfate 325 mg (65 mg iron) tablet Take  by mouth Daily (before breakfast). furosemide (LASIX) 40 mg tablet Take 20 mg by mouth two (2) times a day. docusate sodium (COLACE) 100 mg capsule Take 100 mg by mouth daily as needed for Constipation. carvedilol (COREG) 12.5 mg tablet Take 1 Tab by mouth two (2) times daily (with meals). Qty: 60 Tab, Refills: 0      cholecalciferol (VITAMIN D3) 1,000 unit tablet Take 1 Tab by mouth daily. Indications: VITAMIN D DEFICIENCY  Qty: 30 Tab, Refills: 0      polyethylene glycol (MIRALAX) 17 gram packet Take 1 Packet by mouth daily. Qty: 30 Packet, Refills: 0      insulin lispro (HUMALOG) 100 unit/mL injection SubCUTAneous route Before breakfast, lunch, dinner and at bedtime. For Blood Sugar (mg/dL) of:     Less than 150 =   0 units           150 -199 =   2 units  200 -249 =   4 units  250 -299 =   6 units  300 -349 =   8 units  350 and above =  10 units  Qty: 1 Vial, Refills: 0      Blood-Glucose Meter monitoring kit As directed  Qty: 1 Kit, Refills: 0      Insulin Syringe-Needle U-100 (INSULIN SYRINGE) 1 mL 28 x 1/2\" syrg As directed  Qty: 30 Syringe, Refills: 0      Lancets (ONETOUCH ULTRASOFT LANCETS) misc As directed  Qty: 1 Each, Refills: 11      aspirin (ASPIRIN) 325 mg tablet Take 325 mg by mouth daily. multivitamin (ONE A DAY) tablet Take 1 Tab by mouth daily. atorvastatin (LIPITOR) 10 mg tablet Take 10 mg by mouth daily. tamsulosin (FLOMAX) 0.4 mg capsule Take 0.4 mg by mouth daily. amLODIPine (NORVASC) 10 mg tablet Take 10 mg by mouth daily. !! - Potential duplicate medications found. Please discuss with provider.           Current Facility-Administered Medications   Medication Dose Route Frequency    perflutren lipid microspheres (DEFINITY) contrast injection 2 mL  2 mL IntraVENous CARD ONCE    epoetin negrita-epbx (RETACRIT) injection 8,000 Units  8,000 Units SubCUTAneous Q MON, WED & FRI    therapeutic multivitamin (THERAGRAN) tablet 1 Tablet  1 Tablet Oral DAILY    alteplase (CATHFLO) 4 mg in sterile water (preservative free) 4 mL injection  4 mg InterCATHeter DIALYSIS ONCE    [Held by provider] heparin 25,000 units in D5W 250 ml infusion  18-36 Units/kg/hr IntraVENous TITRATE    insulin lispro (HUMALOG) injection   SubCUTAneous AC&HS    atropine 1 mg/mL injection 0.5 mg  0.5 mg IntraVENous PRN    sodium chloride (NS) flush 5-40 mL  5-40 mL IntraVENous Q8H    sodium chloride (NS) flush 5-40 mL  5-40 mL IntraVENous PRN    sodium chloride (NS) flush 5-40 mL  5-40 mL IntraVENous Q8H    sodium chloride (NS) flush 5-40 mL  5-40 mL IntraVENous PRN    acetaminophen (TYLENOL) tablet 650 mg  650 mg Oral Q6H PRN    Or    acetaminophen (TYLENOL) suppository 650 mg  650 mg Rectal Q6H PRN    senna (SENOKOT) tablet 8.6 mg  1 Tablet Oral DAILY PRN    famotidine (PF) (PEPCID) 20 mg in 0.9% sodium chloride 10 mL injection  20 mg IntraVENous DAILY    docusate sodium (COLACE) capsule 100 mg  100 mg Oral DAILY    cefTRIAXone (ROCEPHIN) 2 g in sterile water (preservative free) 20 mL IV syringe  2 g IntraVENous Q24H    glucose chewable tablet 16 g  4 Tablet Oral PRN    glucagon (GLUCAGEN) injection 1 mg  1 mg IntraMUSCular PRN    dextrose (D50W) injection syrg 12.5-25 g  25-50 mL IntraVENous PRN       Allergies: Patient has no known allergies.     Family History   Problem Relation Age of Onset    Heart Attack Mother 52    Diabetes Other     Hypertension Other      Social History     Socioeconomic History    Marital status:      Spouse name: Not on file    Number of children: Not on file    Years of education: Not on file    Highest education level: Not on file   Occupational History    Not on file   Tobacco Use    Smoking status: Former Smoker     Years: 8.00     Quit date: 1971     Years since quittin.1    Smokeless tobacco: Never Used    Tobacco comment: last smoked in my late 's   Substance and Sexual Activity    Alcohol use: No     Alcohol/week: 0.0 standard drinks    Drug use: No    Sexual activity: Never   Other Topics Concern    Not on file   Social History Narrative    Not on file     Social Determinants of Health     Financial Resource Strain:     Difficulty of Paying Living Expenses:    Food Insecurity:     Worried About Running Out of Food in the Last Year:     [address] in the Last Year:    Transportation Needs:     Lack of Transportation (Medical):  Lack of Transportation (Non-Medical):    Physical Activity:     Days of Exercise per Week:     Minutes of Exercise per Session:    Stress:     Feeling of Stress :    Social Connections:     Frequency of Communication with Friends and Family:     Frequency of Social Gatherings with Friends and Family:     Attends Che[employer] Services:     Active Member of Clubs or Organizations:     Attends Club or Organization Meetings:     Marital Status:    Intimate Partner Violence:     Fear of Current or Ex-Partner:     Emotionally Abused:     Physically Abused:     Sexually Abused:      Social History     Tobacco Use   Smoking Status Former Smoker    Years: 8.00    Quit date: 1971    Years since quittin.1   Smokeless Tobacco Never Used   Tobacco Comment    last smoked in my late 's        Temp (24hrs), Av.3 °F (36.8 °C), Min:98.1 °F (36.7 °C), Max:98.5 °F (36.9 °C)    Visit Vitals  BP (!) 157/65 (BP 1 Location: Right upper arm, BP Patient Position: At rest)   Pulse 62   Temp 98.3 °F (36.8 °C)   Resp 20   Ht 6' 1\" (1.854 m)   Wt 125.6 kg (277 lb)   SpO2 91%   BMI 36.55 kg/m²       ROS: 12 point ROS obtained in details.  Pertinent positives as mentioned in HPI,   otherwise negative    Physical Exam:    General: Well developed, well nourished male sitting on the  bed AAOx3 in no acute distress. General:   awake alert    HEENT:  Normocephalic, atraumatic,  no scleral icterus or pallor; no conjunctival hemmohage;  nasal and oral mucous are moist and without evidence of lesions. Neck supple, no bruits. Lymph Nodes:   no cervical, axillary or inguinal adenopathy   Lungs:   non-labored, bilateral chest movements equal   Heart:  RRR, s1 and s2   Abdomen:  soft, non-distended, active bowel sounds, no hepatomegaly, no splenomegaly. Minimal suprapubic tenderness   Genitourinary:  vines in place   Extremities:   no clubbing, cyanosis; no joint effusions or swelling; Full ROM of all large joints to the upper and lower extremities; muscle mass appropriate for age   Neurologic:  No gross focal sensory abnormalities; 5/5 muscle strength to upper and lower extremities. Speech appropriate. Cranial nerves intact                        Skin:  No rash or ulcers noted   Back:  not examined     Psychiatric:  No suicidal or homicidal ideations, appropriate mood and affect         Labs: Results:   Chemistry Recent Labs     08/17/21  1112 08/17/21  0236 08/16/21  1800   GLU 77 83 82   * 136 137   K 3.9 4.4 4.3   CL 99* 101 101   CO2 28 25 26   BUN 96* 119* 116*   CREA 8.10* 9.68* 9.31*   CA 8.3* 8.4* 8.1*   AGAP 8 10 10   BUCR 12 12 12      CBC w/Diff Recent Labs     08/17/21  0236 08/16/21  0341 08/15/21  1005   WBC 7.3 5.9 5.4   RBC 3.08* 3.08* 3.40*   HGB 8.2* 8.3* 9.1*   HCT 23.7* 23.7* 26.6*    179 179   GRANS 84* 87* 82*   LYMPH 6* 4* 9*   EOS 1 1 0      Microbiology Recent Labs     08/17/21  0236 08/15/21  1450 08/15/21  1435 08/15/21  1250   CULT NO GROWTH AFTER 2 HOURS  NO GROWTH AFTER 2 HOURS GRAM NEGATIVE RODS GROWING IN THE AEROBIC AND ANAEROBIC BOTTLES NO SITE INDICATED* GRAM NEGATIVE RODS GROWING IN BOTH BOTTLES (SITE NOT INDICATED)* >2 ORGANISMS - CONTAMINATED SPECIMEN.  SUGGEST RECOLLECTION INCLUDING TWO GRAM NEGATIVE RODS, PSEUDOMONAS, AND ENTEROCOCCUS          RADIOLOGY:    All available imaging studies/reports in [facility] for this admission were reviewed      Disclaimer: Sections of this note are dictated utilizing voice recognition software, which may have resulted in some phonetic based errors in grammar and contents. Even though attempts were made to correct all the mistakes, some may have been missed, and remained in the body of the document. If questions arise, please contact our department.     Dr. Carol Hernandez, Infectious Disease Specialist  [phone]  August 17, 2021  4:49 PM

## 2021-08-17 NOTE — ROUTINE PROCESS
Patient transferred from ICU using unit bed. Four eyed skin assessment completed with Socorro LANDA. Patient resting comfortably in bed stated a desire for rest and wanting to go home soon.

## 2021-08-17 NOTE — PROGRESS NOTES
Problem: Falls - Risk of  Goal: *Absence of Falls  Description: Document Angie Mcgee Fall Risk and appropriate interventions in the flowsheet.   Outcome: Progressing Towards Goal  Note: Fall Risk Interventions:  Mobility Interventions: Assess mobility with egress test, Bed/chair exit alarm, Patient to call before getting OOB, PT Consult for mobility concerns, PT Consult for assist device competence    Mentation Interventions: Adequate sleep, hydration, pain control, Bed/chair exit alarm, Door open when patient unattended, More frequent rounding, Reorient patient    Medication Interventions: Bed/chair exit alarm, Patient to call before getting OOB    Elimination Interventions: Bed/chair exit alarm, Call light in reach, Patient to call for help with toileting needs    History of Falls Interventions: Bed/chair exit alarm, Door open when patient unattended         Problem: Patient Education: Go to Patient Education Activity  Goal: Patient/Family Education  Outcome: Progressing Towards Goal     Problem: Cardiac Output -  Decreased  Goal: *Vital signs within specified parameters  Outcome: Progressing Towards Goal  Goal: *Optimal cardiac output  Outcome: Progressing Towards Goal  Goal: *Absence of hypoxia  Outcome: Progressing Towards Goal  Goal: *Absence of peripheral edema  Outcome: Progressing Towards Goal  Goal: *Intravascular fluid volume and electrolyte balance  Outcome: Progressing Towards Goal     Problem: Patient Education: Go to Patient Education Activity  Goal: Patient/Family Education  Outcome: Progressing Towards Goal     Problem: Injury - Risk of, Adverse Drug Event  Goal: *Absence of adverse drug events  Outcome: Progressing Towards Goal  Goal: *Absence of medication errors  Outcome: Progressing Towards Goal  Goal: *Knowledge of prescribed medications  Outcome: Progressing Towards Goal     Problem: Patient Education: Go to Patient Education Activity  Goal: Patient/Family Education  Outcome: Progressing Towards Goal     Problem: Acute Renal Failure: Discharge Outcomes  Goal: *Optimal pain control at patient's stated goal  Outcome: Progressing Towards Goal  Goal: *Urinary output within identified parameters  Outcome: Progressing Towards Goal  Goal: *Hemodynamically stable  Outcome: Progressing Towards Goal  Goal: *Tolerating diet  Outcome: Progressing Towards Goal  Goal: *Lab values stabilized  Outcome: Progressing Towards Goal  Goal: *Verbalizes understanding and describes medication purposes and frequencies  Outcome: Progressing Towards Goal  Goal: *Medication reconciliation  Outcome: Progressing Towards Goal

## 2021-08-17 NOTE — PROGRESS NOTES
Hospitalist Progress Note    Patient: Elijah Rogers MRN: [id number]  CSN: [id number]    YOB: 1950  Age: 70 y.o. Sex: male    DOA: 8/15/2021 LOS:  LOS: 2 days          Patient seen and examined at bedside, he is oriented x1. Currently on dialysis, no further issues with access at this time. Dialysis nurse states that Cathflo was used overnight in line with good result. patient denies chest pain, shortness of breath, nausea vomiting. Assessment/Plan     1. Hyperkalemia resolved  2. TONY on CKD 4 with obstructive uropathy, continue dialysis per nephrology. 3.  Bloodstream infection, gram-negative rods. Repeat blood cultures (8/17/2021) no growth to date. Follow culture, sensitivity. Will not place permacath at this time. ID to consult. 4.  Dialysis access issues. Permacath when cleared by ID services. 5.  Anemia of chronic disease, with iron deficiency component. On Retacrit. 6.  A. fib with slow RVR. Possible sick sinus syndrome. Rates controlled. Continue full dose aspirin. Cardiology follows. 7.  Severe protein calorie malnutrition  --> Nutritionist follows. On supplement. Multivitamin. 8.  Diabetes type 2 with hypoglycemia. Sliding scale insulin. Hypoglycemia protocol  9. Complicated urinary tract infection with obstructive uropathy. ID to follow. 10.  Secondary hyperparathyroidism of chronic kidney disease. 11.  Hypertensive heart disease  12. Hypertension  13. Moderate pulmonary hypertension  14. Falls at home. PT OT. He has not been on anticoagulation in the outpatient setting for this reason. 15.  Acute metabolic encephalopathy in the setting of infection. TSH within normal limits. Head CT nonacute. Will check for other causes. 12. Full code    Called spouses home number 6:30 PM, no response.     Additional Notes:      Case discussed with:  [x]Patient  []Family  [x]Nursing  []Case Management  DVT Prophylaxis:  []Lovenox  []Hep SQ  []SCDs  []Coumadin   []On Heparin gtt    Vital signs/Intake and Output:  Visit Vitals  /66   Pulse (!) 52   Temp 98.2 °F (36.8 °C) (Oral)   Resp 20   Ht 6' 1\" (1.854 m)   Wt 125.6 kg (277 lb)   SpO2 90%   BMI 36.55 kg/m²     Current Shift:  No intake/output data recorded. Last three shifts:  08/15 1901 - 08/17 0700  In: 255 [I.V.:255]  Out: 4887 [Urine:3137]    Awake alert and oriented x1. Seen on dialysis  Normocephalic atraumatic pupils equally round reactive to light. Wears glasses  Regular rate and rhythm  Clear to auscultation bilateral anterior axillary fields  Abdomen soft nontender nondistended normoactive bowel sounds  No edema. DP 2+ bilaterally  Exam limited as he is on dialysis, answering some questions, following some commands  No rash to visible skin    Medications Reviewed      Labs: Results:       Chemistry Recent Labs     08/17/21  0236 08/16/21  1800 08/16/21  1145 08/16/21  0945 08/16/21  0945   GLU 83 82  --   --  70*    137  --   --  136   K 4.4 4.3  --   --  5.1    101  --   --  101   CO2 25 26  --   --  22   * 116*  --   --  159*   CREA 9.68* 9.31*  --   --  11.40*   CA 8.4* 8.1* 8.6   < > 8.3*   AGAP 10 10  --   --  13   BUCR 12 12  --   --  14    < > = values in this interval not displayed.       CBC w/Diff Recent Labs     08/17/21  0236 08/16/21  0341 08/15/21  1005   WBC 7.3 5.9 5.4   RBC 3.08* 3.08* 3.40*   HGB 8.2* 8.3* 9.1*   HCT 23.7* 23.7* 26.6*    179 179   GRANS 84* 87* 82*   LYMPH 6* 4* 9*   EOS 1 1 0      Cardiac Enzymes Recent Labs     08/15/21  1100         Coagulation Recent Labs     08/17/21  0236 08/15/21  1005   PTP  --  17.7*   INR  --  1.5*   APTT 37.6*  --        Lipid Panel Lab Results   Component Value Date/Time    Cholesterol, total 208 (H) 03/22/2010 12:00 PM    HDL Cholesterol 55 03/22/2010 12:00 PM    LDL, calculated 138.4 (H) 03/22/2010 12:00 PM    VLDL, calculated 14.6 03/22/2010 12:00 PM    Triglyceride 73 03/22/2010 12:00 PM    CHOL/HDL Ratio 3.8 03/22/2010 12:00 PM      BNP No results for input(s): BNPP in the last 72 hours. Liver Enzymes No results for input(s): TP, ALB, TBIL, AP in the last 72 hours.     No lab exists for component: SGOT, GPT, DBIL   Thyroid Studies Lab Results   Component Value Date/Time    TSH 2.42 08/15/2021 11:00 AM        Procedures/imaging: see electronic medical records for all procedures/Xrays and details which were not copied into this note but were reviewed prior to creation of Plan

## 2021-08-17 NOTE — PROGRESS NOTES
Reason for Admission:  Renal failure [N19]  Bradycardia [R00.1]                 RUR Score:    26           Plan for utilizing home health: To be determined                      Likelihood of Readmission:   Moderate                         Do you (patient/family) have any concerns for transition/discharge? Yes. Per pt's wife, pt will need snf. Transition of Care Plan:       Initial assessment completed with patient's wife Jenifer Zepeda. Cognitive status of patient:  Unable to assess. Pt was being dialyzed. Face sheet information confirmed:  yes. The patient 's wife  participates in his discharge plan and to receive any needed information. This patient lives in a apartment with wife. Patient is not able to navigate steps as needed. Prior to hospitalization, patient was considered to be independent with ADLs/IADLS : No.   If not independent,  patient needs assist with : food preparation, cooking and toileting    Patient has a current ACP document on file: no      Healthcare Decision Maker:     Click here to complete [address] including selection of the Healthcare Decision Maker Relationship (ie \"Primary\")    The daughter will be available to transport patient home upon discharge. The patient already has [employer] equipment available in the home. Patient is not currently active with home health. Patient has not stayed in a skilled nursing facility or rehab. Was  stay within last 60 days : no. This patient is on dialysis :no.  Per pt's wife, pt stopped dialysis about 5 years ago until now. Currently, the discharge plan is to be determined  Per pt's wife, he will need snf placement so he can get stronger. Pt will need ptot eval and recommendations    The patient states that he can obtain his medications from the pharmacy, and take his medications as directed.     Patient's current insurance is [facility]      Care Management Interventions  PCP Verified by CM: Yes  Palliative Care Criteria Met (RRAT>21 & CHF Dx)?: No  Mode of Transport at Discharge:  Other (see comment)  Transition of Care Consult (CM Consult): Discharge Planning  Current Support Network: Lives with Spouse  Confirm Follow Up Transport: Family  The Patient and/or Patient Representative was Provided with a Choice of Provider and Agrees with the Discharge Plan?: Yes  Name of the Patient Representative Who was Provided with a Choice of Provider and Agrees with the Discharge Plan: pt's wife Stefano Moran  Freedom of Choice List was Provided with Basic Dialogue that Supports the Patient's Individualized Plan of Care/Goals, Treatment Preferences and Shares the Quality Data Associated with the Providers?: Yes  Discharge Location  Discharge Placement: Unable to determine at this time        DENIS Barajas RN  Care Management  Pager: [phone]

## 2021-08-17 NOTE — PROGRESS NOTES
SLP Note:    Pt NPO per vascular surgery; will hold dysphagia management accordingly and continue to follow per POC.      Luan Henning M.S., [address] [facility]  Speech-Language Pathologist

## 2021-08-17 NOTE — CONSULTS
Interventional Radiology     Consult received from Dr. Bill Burt for evaluation of ESRD needing HD. Patient has GNR bacteremia from blood cultures 8/15. Has functional temporary dialysis catheter. Case and images reviewed by Dr. Derald Lanes. Will plan for image-guided tunneled dialysis catheter placement with moderate sedation as IR schedule allows after negative blood cultures x 48 hours. Patient to remain NPO after midnight 8/19 with any blood thinning medications held 8/19. Orders placed. Consent to be obtained prior to procedure. Full consult note to follow.       Natacha Perez, [address] [place] [address] Drive

## 2021-08-17 NOTE — PROGRESS NOTES
Cardiology Progress Note    Admit Date: 8/15/2021  Attending Cardiologist: Dr. Mirna Alonso:     Hospital Problems  Date Reviewed: 1/25/2016        Codes Class Noted POA    Severe protein-calorie malnutrition ([facility] [address] (Chronic) ICD-10-CM: M61  ICD-9-CM: 262  8/16/2021 Yes        Renal failure ICD-10-CM: N19  ICD-9-CM: 450  8/15/2021 Unknown        Bradycardia ICD-10-CM: R00.1  ICD-9-CM: 427.89  8/15/2021 Unknown              1.  Bradycardiaappears to be slow atrial fibrillation. No long pauses are noted. Blood pressure is stable continue to monitor. Agree with external transcutaneous pacemaker patch. Likely driven by hyperkalemia  2. Weakness unclear etiology  3. History of falls to reported by family not sure if he had syncope needs close monitoring for bradycardia arrhythmia  4. History of paroxysmal A. fib in past.  Possible sick sinus syndrome  5. Hypertension and hypertensive heart disease  6. Acute on chronic diastolic heart failure with moderate leg edema  7. History of CVA  8. Hyperkalemia  9. acute renal failure with significant worsening of BUN/creatinine likely precipitating his weakness and other etiology. Plan:     Awaiting HD-- plan discussed with ICU team.  Will continue to monitor bradycardia-- no pause or hypotension noted. Start heparin drip for persistent atrial fibrillation. Will f/u    Subjective:     No new complaints.     Objective:      Patient Vitals for the past 8 hrs:   Temp Pulse Resp BP SpO2   08/16/21 1830  (!) 51 23 120/63 97 %   08/16/21 1800  (!) 46 14 (!) 117/50    08/16/21 1730  (!) 49 25 (!) 98/51 100 %   08/16/21 1700  (!) 51 20 109/60    08/16/21 1630  (!) 47 24 (!) 115/58 96 %   08/16/21 1600 98.5 °F (36.9 °C) (!) 57 22 (!) 125/57 95 %   08/16/21 1530  (!) 47 25 122/65 99 %   08/16/21 1515  (!) 49 30  99 %   08/16/21 1500  (!) 41 23 (!) 116/40 93 %   08/16/21 1445  (!) 52 26 (!) 100/40 99 %   08/16/21 1430  60 25 (!) 95/41 97 %   08/16/21 1415  (!) 53 14 135/74 99 %   08/16/21 1413    (!) 132/53    08/16/21 1400  (!) 51 17 106/87 100 %         Patient Vitals for the past 96 hrs:   Weight   08/16/21 1413 126.1 kg (278 lb)   08/15/21 2158 126.1 kg (278 lb)   08/15/21 1006 128.9 kg (284 lb 3.2 oz)       TELE: AFIB               Current Facility-Administered Medications   Medication Dose Route Frequency Last Admin    epoetin negrita-epbx (RETACRIT) injection 8,000 Units  8,000 Units SubCUTAneous Q MON, WED & FRI 8,000 Units at 08/16/21 2045    [START ON 8/17/2021] therapeutic multivitamin (THERAGRAN) tablet 1 Tablet  1 Tablet Oral DAILY      perflutren lipid microspheres (DEFINITY) contrast injection 2 mL  2 mL IntraVENous CARD ONCE      alteplase (CATHFLO) 4 mg in sterile water (preservative free) 4 mL injection  4 mg InterCATHeter DIALYSIS ONCE 4 mg at 08/16/21 1953    atropine 1 mg/mL injection 0.5 mg  0.5 mg IntraVENous PRN      sodium chloride (NS) flush 5-40 mL  5-40 mL IntraVENous Q8H 10 mL at 08/16/21 1551    sodium chloride (NS) flush 5-40 mL  5-40 mL IntraVENous PRN 10 mL at 08/15/21 2147    sodium chloride (NS) flush 5-40 mL  5-40 mL IntraVENous Q8H 10 mL at 08/16/21 1551    sodium chloride (NS) flush 5-40 mL  5-40 mL IntraVENous PRN      acetaminophen (TYLENOL) tablet 650 mg  650 mg Oral Q6H PRN      Or    acetaminophen (TYLENOL) suppository 650 mg  650 mg Rectal Q6H PRN      senna (SENOKOT) tablet 8.6 mg  1 Tablet Oral DAILY PRN      famotidine (PF) (PEPCID) 20 mg in 0.9% sodium chloride 10 mL injection  20 mg IntraVENous DAILY 20 mg at 08/16/21 5401    docusate sodium (COLACE) capsule 100 mg  100 mg Oral DAILY 100 mg at 08/16/21 9746    cefTRIAXone (ROCEPHIN) 2 g in sterile water (preservative free) 20 mL IV syringe  2 g IntraVENous Q24H 2 g at 08/16/21 1550    glucose chewable tablet 16 g  4 Tablet Oral PRN      glucagon (GLUCAGEN) injection 1 mg  1 mg IntraMUSCular PRN      dextrose (D50W) injection syrg 12.5-25 g 25-50 mL IntraVENous PRN 25 g at 08/16/21 0345    heparin (porcine) injection 5,000 Units  5,000 Units SubCUTAneous Q8H 5,000 Units at 08/16/21 1550    insulin lispro (HUMALOG) injection   SubCUTAneous Q6H           Intake/Output Summary (Last 24 hours) at 8/16/2021 2147  Last data filed at 8/16/2021 1808  Gross per 24 hour   Intake 245 ml   Output 2187 ml   Net -1942 ml       Physical Exam:  General:  alert, cooperative, no distress, appears stated age  Neck:  no JVD  Lungs:  clear to auscultation bilaterally  Heart:  irregularly irregular rhythm, systolic murmur: holosystolic 2/6, blowing at apex  Abdomen:  abdomen is soft without significant tenderness, masses, organomegaly or guarding  Extremities:  extremities normal, atraumatic, no cyanosis or edema    Visit Vitals  /63   Pulse (!) 51   Temp 98.5 °F (36.9 °C)   Resp 23   Ht 6' 1\" (1.854 m)   Wt 126.1 kg (278 lb)   SpO2 97%   BMI 36.68 kg/m²       Data Review:     Labs: Results:       Chemistry Recent Labs     08/16/21  1800 08/16/21  1145 08/16/21  0945 08/16/21  0341 08/16/21  0341 08/15/21  2150 08/15/21  1555 08/15/21  1100 08/15/21  1100   GLU 82  --  70*  --  64*   < > 82   < > 98     --  136  --  136   < > 130*   < > 130*   K 4.3  --  5.1  --  4.7   < > 5.9*   < > 6.2*     --  101  --  101   < > 98*   < > 98*   CO2 26  --  22  --  20*   < > 16*   < > 18*   *  --  159*  --  157*   < > 228*   < > 218*   CREA 9.31*  --  11.40*  --  11.10*   < > 14.90*   < > 14.60*   CA 8.1* 8.6 8.3*   < > 8.4*   < > 8.9   < > 8.9   MG  --   --   --   --  2.9*  --  3.1*  --  3.2*   PHOS  --   --   --   --  5.0*  --  6.7*  --   --    AGAP 10  --  13  --  15   < > 16   < > 14   BUCR 12  --  14  --  14   < > 15   < > 15    < > = values in this interval not displayed.       CBC w/Diff Recent Labs     08/16/21  0341 08/15/21  1005   WBC 5.9 5.4   RBC 3.08* 3.40*   HGB 8.3* 9.1*   HCT 23.7* 26.6*    179   GRANS 87* 82*   LYMPH 4* 9*   EOS 1 0 Cardiac Enzymes No results found for: CPK, CK, CKMMB, CKMB, RCK3, CKMBT, CKNDX, CKND1, MAXIMILIAN, TROPT, TROIQ, CALEB, TROPT, TNIPOC, BNP, BNPP   Coagulation Recent Labs     08/15/21  1005   PTP 17.7*   INR 1.5*       Lipid Panel Lab Results   Component Value Date/Time    Cholesterol, total 208 (H) 03/22/2010 12:00 PM    HDL Cholesterol 55 03/22/2010 12:00 PM    LDL, calculated 138.4 (H) 03/22/2010 12:00 PM    VLDL, calculated 14.6 03/22/2010 12:00 PM    Triglyceride 73 03/22/2010 12:00 PM    CHOL/HDL Ratio 3.8 03/22/2010 12:00 PM      BNP No results found for: BNP, BNPP, XBNPT   Liver Enzymes No results for input(s): TP, ALB, TBIL, AP in the last 72 hours.     No lab exists for component: SGOT, GPT, DBIL   Thyroid Studies Lab Results   Component Value Date/Time    TSH 2.42 08/15/2021 11:00 AM          Signed By: Loli Brown MD     August 16, 2021

## 2021-08-17 NOTE — PROGRESS NOTES
TRANSFER - OUT REPORT:    Verbal report given to Haroon Rodriguez RN(name) on Allison Maldonado  being transferred to SDU Rm 352(unit) for routine progression of care(downgraded). Report consisted of patients Situation, Background, Assessment and   Recommendations(SBAR). Information from the following report(s) SBAR, Kardex, Intake/Output, MAR, Recent Results, Cardiac Rhythm AFib but varies(R/O Sick Sinus Syndrome), with occassional PVCs. and Alarm Parameters  was reviewed with the receiving nurse. Lines:   Peripheral IV 08/15/21 Right Antecubital (Active)   Site Assessment Clean, dry, & intact 08/16/21 2000   Phlebitis Assessment 0 08/16/21 2000   Infiltration Assessment 0 08/16/21 2000   Dressing Status Clean, dry, & intact 08/16/21 2000   Dressing Type Transparent;Tape 08/16/21 2000   Hub Color/Line Status Pink;Flushed;Patent;Capped 08/16/21 2000   Action Taken Open ports on tubing capped 08/16/21 2000   Alcohol Cap Used Yes 08/16/21 2000       Peripheral IV 08/15/21 Right Forearm (Active)   Site Assessment Clean, dry, & intact 08/16/21 2000   Phlebitis Assessment 0 08/16/21 2000   Infiltration Assessment 0 08/16/21 2000   Dressing Status Clean, dry, & intact 08/16/21 2000   Dressing Type Transparent;Tape 08/16/21 2000   Hub Color/Line Status Pink;Flushed;Patent;Capped 08/16/21 2000   Action Taken Open ports on tubing capped 08/16/21 2000   Alcohol Cap Used Yes 08/16/21 2000        Opportunity for questions and clarification was provided. Patient transported with:  Monitor   On Pacer pads  O2 at 2LPM/NC  RN and CNA    Attempted to call spouse but no answer, notified daughters, Pina Matt and Vilma Rodríguez via phone of the transfer and will communicate it with their mom.

## 2021-08-17 NOTE — PROGRESS NOTES
Problem: Falls - Risk of  Goal: *Absence of Falls  Description: Document Humza Cortez Fall Risk and appropriate interventions in the flowsheet.   Outcome: Progressing Towards Goal  Note: Fall Risk Interventions:  Mobility Interventions: Assess mobility with egress test, Bed/chair exit alarm, Patient to call before getting OOB, PT Consult for mobility concerns, PT Consult for assist device competence    Mentation Interventions: Adequate sleep, hydration, pain control, Bed/chair exit alarm, Door open when patient unattended, More frequent rounding, Reorient patient    Medication Interventions: Bed/chair exit alarm, Patient to call before getting OOB    Elimination Interventions: Bed/chair exit alarm, Call light in reach, Patient to call for help with toileting needs    History of Falls Interventions: Bed/chair exit alarm, Door open when patient unattended         Problem: Patient Education: Go to Patient Education Activity  Goal: Patient/Family Education  Outcome: Progressing Towards Goal     Problem: Cardiac Output -  Decreased  Goal: *Vital signs within specified parameters  Outcome: Progressing Towards Goal  Goal: *Optimal cardiac output  Outcome: Progressing Towards Goal  Goal: *Absence of hypoxia  Outcome: Progressing Towards Goal  Goal: *Absence of peripheral edema  Outcome: Progressing Towards Goal  Goal: *Intravascular fluid volume and electrolyte balance  Outcome: Progressing Towards Goal     Problem: Patient Education: Go to Patient Education Activity  Goal: Patient/Family Education  Outcome: Progressing Towards Goal     Problem: Injury - Risk of, Adverse Drug Event  Goal: *Absence of adverse drug events  Outcome: Progressing Towards Goal  Goal: *Absence of medication errors  Outcome: Progressing Towards Goal  Goal: *Knowledge of prescribed medications  Outcome: Progressing Towards Goal     Problem: Patient Education: Go to Patient Education Activity  Goal: Patient/Family Education  Outcome: Progressing Towards Goal     Problem: Patient Education: Go to Patient Education Activity  Goal: Patient/Family Education  Outcome: Progressing Towards Goal     Problem: Acute Renal Failure: Day 2  Goal: Activity/Safety  Outcome: Progressing Towards Goal  Goal: Consults, if ordered  Outcome: Progressing Towards Goal  Goal: Diagnostic Test/Procedures  Outcome: Progressing Towards Goal  Goal: Nutrition/Diet  Outcome: Progressing Towards Goal  Goal: Discharge Planning  Outcome: Progressing Towards Goal  Goal: Medications  Outcome: Progressing Towards Goal  Goal: Respiratory  Outcome: Progressing Towards Goal  Goal: Treatments/Interventions/Procedures  Outcome: Progressing Towards Goal  Goal: Psychosocial  Outcome: Progressing Towards Goal  Goal: *Optimal pain control at patient's stated goal  Outcome: Progressing Towards Goal  Goal: *Urinary output within identified parameters  Outcome: Progressing Towards Goal  Goal: *Hemodynamically stable  Outcome: Progressing Towards Goal  Goal: *Tolerating diet  Outcome: Progressing Towards Goal  Goal: *Lab values improving  Outcome: Progressing Towards Goal     Problem: Acute Renal Failure: Day 3  Goal: Off Pathway (Use only if patient is Off Pathway)  Outcome: Progressing Towards Goal  Goal: Activity/Safety  Outcome: Progressing Towards Goal  Goal: Consults, if ordered  Outcome: Progressing Towards Goal  Goal: Diagnostic Test/Procedures  Outcome: Progressing Towards Goal  Goal: Nutrition/Diet  Outcome: Progressing Towards Goal  Goal: Discharge Planning  Outcome: Progressing Towards Goal  Goal: Medications  Outcome: Progressing Towards Goal  Goal: Respiratory  Outcome: Progressing Towards Goal  Goal: Treatments/Interventions/Procedures  Outcome: Progressing Towards Goal  Goal: Psychosocial  Outcome: Progressing Towards Goal  Goal: *Optimal pain control at patient's stated goal  Outcome: Progressing Towards Goal  Goal: *Urinary output within identified parameters  Outcome: Progressing Towards Goal  Goal: *Hemodynamically stable  Outcome: Progressing Towards Goal  Goal: *Tolerating diet  Outcome: Progressing Towards Goal  Goal: *Lab values improving  Outcome: Progressing Towards Goal     Problem: Acute Renal Failure: Day 4  Goal: Off Pathway (Use only if patient is Off Pathway)  Outcome: Progressing Towards Goal  Goal: Activity/Safety  Outcome: Progressing Towards Goal  Goal: Consults, if ordered  Outcome: Progressing Towards Goal  Goal: Diagnostic Test/Procedures  Outcome: Progressing Towards Goal  Goal: Nutrition/Diet  Outcome: Progressing Towards Goal  Goal: Discharge Planning  Outcome: Progressing Towards Goal  Goal: Medications  Outcome: Progressing Towards Goal  Goal: Respiratory  Outcome: Progressing Towards Goal  Goal: Treatments/Interventions/Procedures  Outcome: Progressing Towards Goal  Goal: Psychosocial  Outcome: Progressing Towards Goal  Goal: *Optimal pain control at patient's stated goal  Outcome: Progressing Towards Goal  Goal: *Urinary output within identified parameters  Outcome: Progressing Towards Goal  Goal: *Hemodynamically stable  Outcome: Progressing Towards Goal  Goal: *Tolerating diet  Outcome: Progressing Towards Goal  Goal: *Lab values improving  Outcome: Progressing Towards Goal     Problem: Acute Renal Failure: Day 5  Goal: Off Pathway (Use only if patient is Off Pathway)  Outcome: Progressing Towards Goal  Goal: Activity/Safety  Outcome: Progressing Towards Goal  Goal: Diagnostic Test/Procedures  Outcome: Progressing Towards Goal  Goal: Nutrition/Diet  Outcome: Progressing Towards Goal  Goal: Discharge Planning  Outcome: Progressing Towards Goal  Goal: Medications  Outcome: Progressing Towards Goal  Goal: Respiratory  Outcome: Progressing Towards Goal  Goal: Treatments/Interventions/Procedures  Outcome: Progressing Towards Goal  Goal: Psychosocial  Outcome: Progressing Towards Goal  Goal: *Optimal pain control at patient's stated goal  Outcome: Progressing Towards Goal  Goal: *Urinary output within identified parameters  Outcome: Progressing Towards Goal  Goal: *Hemodynamically stable  Outcome: Progressing Towards Goal  Goal: *Tolerating diet  Outcome: Progressing Towards Goal  Goal: *Lab values improving  Outcome: Progressing Towards Goal     Problem: Acute Renal Failure: Day 6  Goal: Off Pathway (Use only if patient is Off Pathway)  Outcome: Progressing Towards Goal  Goal: Activity/Safety  Outcome: Progressing Towards Goal  Goal: Diagnostic Test/Procedures  Outcome: Progressing Towards Goal  Goal: Nutrition/Diet  Outcome: Progressing Towards Goal  Goal: Discharge Planning  Outcome: Progressing Towards Goal  Goal: Medications  Outcome: Progressing Towards Goal  Goal: Respiratory  Outcome: Progressing Towards Goal  Goal: Treatments/Interventions/Procedures  Outcome: Progressing Towards Goal  Goal: Psychosocial  Outcome: Progressing Towards Goal     Problem: Acute Renal Failure: Discharge Outcomes  Goal: *Optimal pain control at patient's stated goal  Outcome: Progressing Towards Goal  Goal: *Urinary output within identified parameters  Outcome: Progressing Towards Goal  Goal: *Hemodynamically stable  Outcome: Progressing Towards Goal  Goal: *Tolerating diet  Outcome: Progressing Towards Goal  Goal: *Lab values stabilized  Outcome: Progressing Towards Goal  Goal: *Verbalizes understanding and describes medication purposes and frequencies  Outcome: Progressing Towards Goal  Goal: *Medication reconciliation  Outcome: Progressing Towards Goal

## 2021-08-18 LAB
ANION GAP SERPL CALC-SCNC: 10 MMOL/L (ref 3–18)
APTT PPP: 38.2 SEC (ref 23–36.4)
BACTERIA SPEC CULT: ABNORMAL
BACTERIA SPEC CULT: ABNORMAL
BASOPHILS # BLD: 0 K/UL (ref 0–0.1)
BASOPHILS NFR BLD: 0 % (ref 0–2)
BUN SERPL-MCNC: 79 MG/DL (ref 7–18)
BUN/CREAT SERPL: 11 (ref 12–20)
CALCIUM SERPL-MCNC: 8.6 MG/DL (ref 8.5–10.1)
CHLORIDE SERPL-SCNC: 99 MMOL/L (ref 100–111)
CO2 SERPL-SCNC: 27 MMOL/L (ref 21–32)
CREAT SERPL-MCNC: 7.46 MG/DL (ref 0.6–1.3)
DIFFERENTIAL METHOD BLD: ABNORMAL
EOSINOPHIL # BLD: 0.1 K/UL (ref 0–0.4)
EOSINOPHIL NFR BLD: 1 % (ref 0–5)
ERYTHROCYTE [DISTWIDTH] IN BLOOD BY AUTOMATED COUNT: 16.2 % (ref 11.6–14.5)
GLUCOSE BLD STRIP.AUTO-MCNC: 110 MG/DL (ref 70–110)
GLUCOSE BLD STRIP.AUTO-MCNC: 84 MG/DL (ref 70–110)
GLUCOSE BLD STRIP.AUTO-MCNC: 86 MG/DL (ref 70–110)
GLUCOSE BLD STRIP.AUTO-MCNC: 90 MG/DL (ref 70–110)
GLUCOSE BLD STRIP.AUTO-MCNC: 93 MG/DL (ref 70–110)
GLUCOSE SERPL-MCNC: 80 MG/DL (ref 74–99)
GRAM STN SPEC: ABNORMAL
HCT VFR BLD AUTO: 26.5 % (ref 36–48)
HGB BLD-MCNC: 9 G/DL (ref 13–16)
LYMPHOCYTES # BLD: 0.8 K/UL (ref 0.9–3.6)
LYMPHOCYTES NFR BLD: 8 % (ref 21–52)
MCH RBC QN AUTO: 27 PG (ref 24–34)
MCHC RBC AUTO-ENTMCNC: 34 G/DL (ref 31–37)
MCV RBC AUTO: 79.6 FL (ref 74–97)
MONOCYTES # BLD: 0.8 K/UL (ref 0.05–1.2)
MONOCYTES NFR BLD: 9 % (ref 3–10)
NEUTS SEG # BLD: 8 K/UL (ref 1.8–8)
NEUTS SEG NFR BLD: 81 % (ref 40–73)
PLATELET # BLD AUTO: 204 K/UL (ref 135–420)
PMV BLD AUTO: 10.8 FL (ref 9.2–11.8)
POTASSIUM SERPL-SCNC: 3.8 MMOL/L (ref 3.5–5.5)
RBC # BLD AUTO: 3.33 M/UL (ref 4.35–5.65)
SERVICE CMNT-IMP: ABNORMAL
SERVICE CMNT-IMP: ABNORMAL
SODIUM SERPL-SCNC: 136 MMOL/L (ref 136–145)
WBC # BLD AUTO: 9.9 K/UL (ref 4.6–13.2)

## 2021-08-18 PROCEDURE — 74011250637 HC RX REV CODE- 250/637: Performed by: NURSE PRACTITIONER

## 2021-08-18 PROCEDURE — 74011250637 HC RX REV CODE- 250/637: Performed by: INTERNAL MEDICINE

## 2021-08-18 PROCEDURE — 65660000000 HC RM CCU STEPDOWN

## 2021-08-18 PROCEDURE — 99232 SBSQ HOSP IP/OBS MODERATE 35: CPT | Performed by: INTERNAL MEDICINE

## 2021-08-18 PROCEDURE — 74011000250 HC RX REV CODE- 250: Performed by: NURSE PRACTITIONER

## 2021-08-18 PROCEDURE — 74011250636 HC RX REV CODE- 250/636: Performed by: INTERNAL MEDICINE

## 2021-08-18 PROCEDURE — 85025 COMPLETE CBC W/AUTO DIFF WBC: CPT

## 2021-08-18 PROCEDURE — 99232 SBSQ HOSP IP/OBS MODERATE 35: CPT | Performed by: HOSPITALIST

## 2021-08-18 PROCEDURE — 2709999900 HC NON-CHARGEABLE SUPPLY

## 2021-08-18 PROCEDURE — 74011000250 HC RX REV CODE- 250: Performed by: INTERNAL MEDICINE

## 2021-08-18 PROCEDURE — 82962 GLUCOSE BLOOD TEST: CPT

## 2021-08-18 PROCEDURE — 74011250636 HC RX REV CODE- 250/636: Performed by: NURSE PRACTITIONER

## 2021-08-18 PROCEDURE — 80048 BASIC METABOLIC PNL TOTAL CA: CPT

## 2021-08-18 PROCEDURE — 36415 COLL VENOUS BLD VENIPUNCTURE: CPT

## 2021-08-18 PROCEDURE — 85730 THROMBOPLASTIN TIME PARTIAL: CPT

## 2021-08-18 RX ORDER — LEVOFLOXACIN 5 MG/ML
500 INJECTION, SOLUTION INTRAVENOUS
Status: DISCONTINUED | OUTPATIENT
Start: 2021-08-19 | End: 2021-08-26

## 2021-08-18 RX ADMIN — DOCUSATE SODIUM 100 MG: 100 CAPSULE, LIQUID FILLED ORAL at 10:00

## 2021-08-18 RX ADMIN — CEFTRIAXONE SODIUM 2 G: 2 INJECTION, POWDER, FOR SOLUTION INTRAMUSCULAR; INTRAVENOUS at 15:50

## 2021-08-18 RX ADMIN — SODIUM CHLORIDE 10 ML: 9 INJECTION, SOLUTION INTRAMUSCULAR; INTRAVENOUS; SUBCUTANEOUS at 06:00

## 2021-08-18 RX ADMIN — EPOETIN ALFA-EPBX 8000 UNITS: 4000 INJECTION, SOLUTION INTRAVENOUS; SUBCUTANEOUS at 21:37

## 2021-08-18 RX ADMIN — FAMOTIDINE 20 MG: 10 INJECTION INTRAVENOUS at 10:00

## 2021-08-18 RX ADMIN — SODIUM CHLORIDE 10 ML: 9 INJECTION, SOLUTION INTRAMUSCULAR; INTRAVENOUS; SUBCUTANEOUS at 21:39

## 2021-08-18 RX ADMIN — THERA TABS 1 TABLET: TAB at 10:00

## 2021-08-18 NOTE — ROUTINE PROCESS
Received report from [place], report included SBAR, MAR, and Kardex. Patient was resting in bed, HOB elevated, bed in lowest position, and call button within reach of patient. 1952 - Patient had blood tinge urine in vines bag. Gave verbal and bedside report to [employer], report included SBAR, MAR, and Kardex.

## 2021-08-18 NOTE — PROGRESS NOTES
Infectious Disease progress Note        Reason: Gram-negative bloodstream infection    Current abx Prior abx   Ceftriaxone since 8/15/2021      Lines:       Assessment :    70 y.o. male pmhx of afib, bradycardia, CKD stage 4, bladder neck obstruction, chronic indwelling vines admitted to [facility] on 8/15/2021 with generalized weakness. Now with gram-negative bacteremia, significant pyuria, multiple organisms in urine culture    Clinical presentation consistent with enterobacter bloodstream infection, probable cystitis, RLL aspiration pneumonia    Exact source of gram-negative bloodstream infection not entirely clear at this time. Recent history of hydronephrosis/bladder outlet obstruction/indwelling Vines concerning for cystitis as a source of bloodstream infection. Multiple organisms seen in urine culture 8/15 (enterococcus, pseudomonas, gnr) likely represents contamination. Ct findings concerning for RLL aspiration pneumonia-no shortness of breath, increased cough noted on today's exam    Recommendations:    1. Discontinue ceftriaxone. Start levofloxacin every 48 hours starting tomorrow   2. Await susceptibility testing of gram-negative duarte, Pseudomonas, Enterococcus in urine culture  3.   follow-up repeat blood culture 8/17  4. Okay to proceed with hemodialysis catheter in a.m. if blood culture 8/17 remain negative at 48 hours    Above plan was discussed in details with patient, primary team. Please call me if any further questions or concerns. Will continue to participate in the care of this patient. HPI:    Patient is a very poor historian. States that he feels better. Denies abdominal pain. Current Discharge Medication List      CONTINUE these medications which have NOT CHANGED    Details   finasteride (PROSCAR) 5 mg tablet TAKE 1 TABLET BY MOUTH DAILY      losartan (COZAAR) 50 mg tablet Take 50 mg by mouth daily.       metOLazone (ZAROXOLYN) 2.5 mg tablet Take 2.5 mg by mouth.      pravastatin (PRAVACHOL) 20 mg tablet TAKE 1 TABLET BY MOUTH DAILY      prazosin (MINIPRESS) 5 mg capsule Take 5 mg by mouth At bedtime. simvastatin (ZOCOR) 40 mg tablet Take 40 mg by mouth At bedtime. vitamin E, dl,tocopheryl acet, (vitamin E acetate) 45 mg (100 unit) capsule Take 100 Units by mouth daily. !! hydrALAZINE (APRESOLINE) 100 mg tablet TAKE 1 TABLET BY MOUTH THREE TIMES DAILY  Qty: 270 Tab, Refills: 6    Comments: **Patient requests 90 days supply**      !! hydrALAZINE (APRESOLINE) 100 mg tablet TAKE 1 TABLET BY MOUTH THREE TIMES DAILY  Qty: 270 Tab, Refills: 6    Comments: **Patient requests 90 days supply**      ferrous sulfate 325 mg (65 mg iron) tablet Take  by mouth Daily (before breakfast). furosemide (LASIX) 40 mg tablet Take 20 mg by mouth two (2) times a day. docusate sodium (COLACE) 100 mg capsule Take 100 mg by mouth daily as needed for Constipation. carvedilol (COREG) 12.5 mg tablet Take 1 Tab by mouth two (2) times daily (with meals). Qty: 60 Tab, Refills: 0      cholecalciferol (VITAMIN D3) 1,000 unit tablet Take 1 Tab by mouth daily. Indications: VITAMIN D DEFICIENCY  Qty: 30 Tab, Refills: 0      polyethylene glycol (MIRALAX) 17 gram packet Take 1 Packet by mouth daily. Qty: 30 Packet, Refills: 0      insulin lispro (HUMALOG) 100 unit/mL injection SubCUTAneous route Before breakfast, lunch, dinner and at bedtime. For Blood Sugar (mg/dL) of:     Less than 150 =   0 units           150 -199 =   2 units  200 -249 =   4 units  250 -299 =   6 units  300 -349 =   8 units  350 and above =  10 units  Qty: 1 Vial, Refills: 0      Blood-Glucose Meter monitoring kit As directed  Qty: 1 Kit, Refills: 0      Insulin Syringe-Needle U-100 (INSULIN SYRINGE) 1 mL 28 x 1/2\" syrg As directed  Qty: 30 Syringe, Refills: 0      Lancets (ONETOUCH ULTRASOFT LANCETS) misc As directed  Qty: 1 Each, Refills: 11      aspirin (ASPIRIN) 325 mg tablet Take 325 mg by mouth daily.       multivitamin (ONE A DAY) tablet Take 1 Tab by mouth daily. atorvastatin (LIPITOR) 10 mg tablet Take 10 mg by mouth daily. tamsulosin (FLOMAX) 0.4 mg capsule Take 0.4 mg by mouth daily. amLODIPine (NORVASC) 10 mg tablet Take 10 mg by mouth daily. !! - Potential duplicate medications found. Please discuss with provider. Current Facility-Administered Medications   Medication Dose Route Frequency    epoetin negrita-epbx (RETACRIT) injection 8,000 Units  8,000 Units SubCUTAneous Q MON, WED & FRI    therapeutic multivitamin (THERAGRAN) tablet 1 Tablet  1 Tablet Oral DAILY    alteplase (CATHFLO) 4 mg in sterile water (preservative free) 4 mL injection  4 mg InterCATHeter DIALYSIS ONCE    [Held by provider] heparin 25,000 units in D5W 250 ml infusion  18-36 Units/kg/hr IntraVENous TITRATE    insulin lispro (HUMALOG) injection   SubCUTAneous AC&HS    atropine 1 mg/mL injection 0.5 mg  0.5 mg IntraVENous PRN    sodium chloride (NS) flush 5-40 mL  5-40 mL IntraVENous Q8H    sodium chloride (NS) flush 5-40 mL  5-40 mL IntraVENous PRN    sodium chloride (NS) flush 5-40 mL  5-40 mL IntraVENous Q8H    sodium chloride (NS) flush 5-40 mL  5-40 mL IntraVENous PRN    acetaminophen (TYLENOL) tablet 650 mg  650 mg Oral Q6H PRN    Or    acetaminophen (TYLENOL) suppository 650 mg  650 mg Rectal Q6H PRN    senna (SENOKOT) tablet 8.6 mg  1 Tablet Oral DAILY PRN    famotidine (PF) (PEPCID) 20 mg in 0.9% sodium chloride 10 mL injection  20 mg IntraVENous DAILY    docusate sodium (COLACE) capsule 100 mg  100 mg Oral DAILY    cefTRIAXone (ROCEPHIN) 2 g in sterile water (preservative free) 20 mL IV syringe  2 g IntraVENous Q24H    glucose chewable tablet 16 g  4 Tablet Oral PRN    glucagon (GLUCAGEN) injection 1 mg  1 mg IntraMUSCular PRN    dextrose (D50W) injection syrg 12.5-25 g  25-50 mL IntraVENous PRN       Allergies: Patient has no known allergies.     Temp (24hrs), Av.6 °F (37 °C), Min:98.1 °F (36.7 °C), Max:99.5 °F (37.5 °C)    Visit Vitals  BP (!) 156/42 (BP 1 Location: Right upper arm, BP Patient Position: At rest)   Pulse 62   Temp 99.5 °F (37.5 °C)   Resp 18   Ht 6' 1\" (1.854 m)   Wt 125.6 kg (277 lb)   SpO2 90%   BMI 36.55 kg/m²       ROS: 12 point ROS obtained in details. Pertinent positives as mentioned in HPI,   otherwise negative    Physical Exam:    General: Well developed, well nourished male sitting on the  bed AAOx3 in no acute distress. General:   awake alert    HEENT:  Normocephalic, atraumatic,  no scleral icterus or pallor; no conjunctival hemmohage;  nasal and oral mucous are moist and without evidence of lesions. Neck supple, no bruits. Lymph Nodes:   no cervical, axillary or inguinal adenopathy   Lungs:   non-labored, bilateral chest movements equal   Heart:  RRR, s1 and s2   Abdomen:  soft, non-distended, active bowel sounds, no hepatomegaly, no splenomegaly. Minimal suprapubic tenderness   Genitourinary:  vines in place   Extremities:   no clubbing, cyanosis; no joint effusions or swelling; Full ROM of all large joints to the upper and lower extremities; muscle mass appropriate for age   Neurologic:  No gross focal sensory abnormalities; 5/5 muscle strength to upper and lower extremities. Speech appropriate.  Cranial nerves intact                        Skin:  No rash or ulcers noted   Back:  not examined     Psychiatric:  No suicidal or homicidal ideations, appropriate mood and affect         Labs: Results:   Chemistry Recent Labs     08/18/21 0239 08/17/21  1112 08/17/21  0236   GLU 80 77 83    135* 136   K 3.8 3.9 4.4   CL 99* 99* 101   CO2 27 28 25   BUN 79* 96* 119*   CREA 7.46* 8.10* 9.68*   CA 8.6 8.3* 8.4*   AGAP 10 8 10   BUCR 11* 12 12      CBC w/Diff Recent Labs     08/18/21 0239 08/17/21  0236 08/16/21  0341   WBC 9.9 7.3 5.9   RBC 3.33* 3.08* 3.08*   HGB 9.0* 8.2* 8.3*   HCT 26.5* 23.7* 23.7*    182 179   GRANS 81* 84* 87*   LYMPH 8* 6* 4*   EOS 1 1 1 Microbiology Recent Labs     08/17/21  0236 08/15/21  1450 08/15/21  1435 08/15/21  1250   CULT NO GROWTH 1 DAY  NO GROWTH 1 DAY GRAM NEGATIVE RODS GROWING IN THE AEROBIC AND ANAEROBIC BOTTLES NO SITE INDICATED REFER TO [id number] FOR ID AND SENSITIVITIES * ENTEROBACTER CLOACAE COMPLEX GROWING IN BOTH BOTTLES DRAWN NO SITE INDICATED* >2 ORGANISMS - CONTAMINATED SPECIMEN. SUGGEST RECOLLECTION INCLUDING TWO GRAM NEGATIVE RODS, PSEUDOMONAS, AND ENTEROCOCCUS  *DR GILL REQUESTING ID AND SENSI ON ALL          RADIOLOGY:    All available imaging studies/reports in [facility] for this admission were reviewed    High complexity decision making was performed during the evaluation of this patient at high risk for decompensation        Disclaimer: Sections of this note are dictated utilizing voice recognition software, which may have resulted in some phonetic based errors in grammar and contents. Even though attempts were made to correct all the mistakes, some may have been missed, and remained in the body of the document. If questions arise, please contact our department.     Dr. Zenon Maldonado, Infectious Disease Specialist  [phone]  August 18, 2021  4:49 PM

## 2021-08-18 NOTE — PROGRESS NOTES
Cardiology Progress Note    Admit Date: 8/15/2021  Attending Cardiologist: Dr. Rich Green:     Hospital Problems  Date Reviewed: 1/25/2016        Codes Class Noted POA    Severe protein-calorie malnutrition ([facility] [address] (Chronic) ICD-10-CM: W48  ICD-9-CM: 262  8/16/2021 Yes        Renal failure ICD-10-CM: N19  ICD-9-CM: 714  8/15/2021 Unknown        Bradycardia ICD-10-CM: R00.1  ICD-9-CM: 427.89  8/15/2021 Unknown            -Asymptomatic Bradycardia, slow atrial fibrillation, likely 2/2 hyperkalemia. No long pauses are noted. Pressures remain stable.   -History of paroxysmal A. Fib, s/p DC cardioversion (2011)  Possible sick sinus syndrome. Not anticoagulated as outpatient d/t frequent falls and hx of hematuria. On  mg only.   -Acute on chronic diastolic heart failure with chronic LE edema. ECHO this admission normal LV Function. Mild concentric hypertrophy.  (05/2021) EF 50%. Grade 3 LV DD.    -Hypertension and hypertensive heart disease. On Hydralazine, coreg and lasix as outpatient.   -Moderate pulmonary hypertension by ECHO this admission (08/2021) PASP   61 mmhg.   -History of falls reported by family.   -History of CVA  -Hyperkalemia, resolved. -DM2  -Acute on chronic renal failure with significant worsening of BUN/creatinine likely precipitating his weakness and other etiology.     Atrial fibrillation CHADSVASC2 score stroke risk:   70 y.o.                                        72 to 76  +1  male                                         Male     +0  CHF hx                                           Yes    +1  HTN hx                                           Yes    +1  Stroke/TIA/Thromboembolism       Yes   +2  Vascular disease hx                       No    + 0  Diabetes Mellitus                            Yes    +1   CHADSVASC2 score                    6      Annual Stroke Risk 9.7%- moderate-high       Primary Cardiologist: Dr. Melanie Gimenez, last seen 2016     Plan:     Fluid mgmt via HD per renal team.   Not anticoagulated as outpatient d/t frequent falls. Continue full dose ASA. Plans for tunnel dialysis catheter placement tomorrow noted. Afib Rates stable in the 50s/60s on tele with occasional ectopy. Continue to monitor and replace electrolytes as needed. Labile pressures noted. Recommend PT/OT as tolerated. No further recommendations from a CV standpoint. Would continue supportive care. Can arrange for outpatient follow up once discharged with primary cardiologist.     Subjective:     No new complaints. Fatigued this morning.      Objective:      Patient Vitals for the past 8 hrs:   Temp Pulse Resp BP SpO2   08/18/21 1128 99.5 °F (37.5 °C) 60 18 131/60 92 %   08/18/21 0745 99.5 °F (37.5 °C) 62 18 (!) 156/42 90 %   08/18/21 0408 98.8 °F (37.1 °C) 60 15 139/61 94 %         Patient Vitals for the past 96 hrs:   Weight   08/17/21 0910 125.6 kg (277 lb)   08/16/21 2355 125.7 kg (277 lb 1.6 oz)   08/16/21 1413 126.1 kg (278 lb)   08/15/21 2158 126.1 kg (278 lb)   08/15/21 1006 128.9 kg (284 lb 3.2 oz)       TELE: AFIB               Current Facility-Administered Medications   Medication Dose Route Frequency Last Admin    [START ON 8/19/2021] levoFLOXacin (LEVAQUIN) 500 mg in D5W IVPB  500 mg IntraVENous Q48H      epoetin negrita-epbx (RETACRIT) injection 8,000 Units  8,000 Units SubCUTAneous Q MON, WED & FRI 8,000 Units at 08/16/21 2045    therapeutic multivitamin (THERAGRAN) tablet 1 Tablet  1 Tablet Oral DAILY 1 Tablet at 08/18/21 1000    alteplase (CATHFLO) 4 mg in sterile water (preservative free) 4 mL injection  4 mg InterCATHeter DIALYSIS ONCE 4 mg at 08/16/21 1953    [Held by provider] heparin 25,000 units in D5W 250 ml infusion  18-36 Units/kg/hr IntraVENous TITRATE      insulin lispro (HUMALOG) injection   SubCUTAneous AC&HS      atropine 1 mg/mL injection 0.5 mg  0.5 mg IntraVENous PRN      sodium chloride (NS) flush 5-40 mL  5-40 mL IntraVENous Q8H 10 mL at 08/18/21 0600    sodium chloride (NS) flush 5-40 mL  5-40 mL IntraVENous PRN 10 mL at 08/15/21 2147    sodium chloride (NS) flush 5-40 mL  5-40 mL IntraVENous Q8H 10 mL at 08/18/21 0600    sodium chloride (NS) flush 5-40 mL  5-40 mL IntraVENous PRN      acetaminophen (TYLENOL) tablet 650 mg  650 mg Oral Q6H PRN      Or    acetaminophen (TYLENOL) suppository 650 mg  650 mg Rectal Q6H PRN      senna (SENOKOT) tablet 8.6 mg  1 Tablet Oral DAILY PRN      famotidine (PF) (PEPCID) 20 mg in 0.9% sodium chloride 10 mL injection  20 mg IntraVENous DAILY 20 mg at 08/18/21 1000    docusate sodium (COLACE) capsule 100 mg  100 mg Oral DAILY 100 mg at 08/18/21 1000    cefTRIAXone (ROCEPHIN) 2 g in sterile water (preservative free) 20 mL IV syringe  2 g IntraVENous Q24H 2 g at 08/17/21 1520    glucose chewable tablet 16 g  4 Tablet Oral PRN      glucagon (GLUCAGEN) injection 1 mg  1 mg IntraMUSCular PRN      dextrose (D50W) injection syrg 12.5-25 g  25-50 mL IntraVENous PRN 25 g at 08/16/21 0345         Intake/Output Summary (Last 24 hours) at 8/18/2021 1152  Last data filed at 8/18/2021 0600  Gross per 24 hour   Intake 0 ml   Output 3100 ml   Net -3100 ml       Physical Exam:  General:  alert, cooperative, no distress, appears stated age  Neck:  no JVD  Lungs:  clear to auscultation bilaterally  Heart:  irregularly irregular rhythm, systolic murmur: holosystolic 2/6, blowing at apex  Abdomen:  abdomen is soft without significant tenderness, masses, organomegaly or guarding  Extremities:  extremities normal, atraumatic, no cyanosis; chronic appearing LE edema B/L 1+     Visit Vitals  /60 (BP 1 Location: Right upper arm, BP Patient Position: At rest)   Pulse 60   Temp 99.5 °F (37.5 °C)   Resp 18   Ht 6' 1\" (1.854 m)   Wt 125.6 kg (277 lb)   SpO2 92%   BMI 36.55 kg/m²       Data Review:     Labs: Results:       Chemistry Recent Labs     08/18/21  0239 08/17/21  1112 08/17/21  0236 08/16/21  0945 08/16/21  0341 08/15/21  2157 08/15/21  1555   GLU 80 77 83   < > 64*   < > 82    135* 136   < > 136   < > 130*   K 3.8 3.9 4.4   < > 4.7   < > 5.9*   CL 99* 99* 101   < > 101   < > 98*   CO2 27 28 25   < > 20*   < > 16*   BUN 79* 96* 119*   < > 157*   < > 228*   CREA 7.46* 8.10* 9.68*   < > 11.10*   < > 14.90*   CA 8.6 8.3* 8.4*   < > 8.4*   < > 8.9   MG  --   --  2.7*  --  2.9*  --  3.1*   PHOS  --   --  5.0*  --  5.0*  --  6.7*   AGAP 10 8 10   < > 15   < > 16   BUCR 11* 12 12   < > 14   < > 15    < > = values in this interval not displayed. CBC w/Diff Recent Labs     08/18/21 0239 08/17/21 0236 08/16/21  0341   WBC 9.9 7.3 5.9   RBC 3.33* 3.08* 3.08*   HGB 9.0* 8.2* 8.3*   HCT 26.5* 23.7* 23.7*    182 179   GRANS 81* 84* 87*   LYMPH 8* 6* 4*   EOS 1 1 1      Cardiac Enzymes No results found for: CPK, CK, CKMMB, CKMB, RCK3, CKMBT, CKNDX, CKND1, MAXIMILIAN, TROPT, TROIQ, CALEB, TROPT, TNIPOC, BNP, BNPP   Coagulation Recent Labs     08/18/21 0239 08/17/21  1112   APTT 38.2* 35.4       Lipid Panel Lab Results   Component Value Date/Time    Cholesterol, total 208 (H) 03/22/2010 12:00 PM    HDL Cholesterol 55 03/22/2010 12:00 PM    LDL, calculated 138.4 (H) 03/22/2010 12:00 PM    VLDL, calculated 14.6 03/22/2010 12:00 PM    Triglyceride 73 03/22/2010 12:00 PM    CHOL/HDL Ratio 3.8 03/22/2010 12:00 PM      BNP No results found for: BNP, BNPP, XBNPT   Liver Enzymes No results for input(s): TP, ALB, TBIL, AP in the last 72 hours.     No lab exists for component: SGOT, GPT, DBIL   Thyroid Studies Lab Results   Component Value Date/Time    TSH 2.42 08/15/2021 11:00 AM          Signed By: Brook Jauregui PA-C     August 18, 2021

## 2021-08-18 NOTE — PROGRESS NOTES
Hospitalist Progress Note    Patient: Lázaro Cruz MRN: [id number]  CSN: [id number]    YOB: 1950  Age: 70 y.o. Sex: male    DOA: 8/15/2021 LOS:  LOS: 3 days            Afib Rates stable in the 50s/60s on tele with occasional ectopy. Labile pressures noted. Patient seen and examined at bedside, he states he needs to use the restroom. Reminded that he has a Elizabeth in place. Patient provides very little history. Assessment/Plan     1. Hyperkalemia resolved  2. TONY on CKD 4 with obstructive uropathy, continue dialysis per nephrology. 3.  Bloodstream infection, gram-negative rods. Repeat blood cultures (8/17/2021) no growth to date. Follow culture, sensitivity. Will not place permacath at this time. ID to consult. 4.  Dialysis access issues. Permacath when cleared by ID services. 5.  Anemia of chronic disease, with iron deficiency component. On Retacrit. 6.  A. fib with slow RVR. Possible sick sinus syndrome. Rates controlled. Continue full dose aspirin. Cardiology follows. 7.  Severe protein calorie malnutrition  --> Nutritionist follows. On supplement. Multivitamin. 8.  Diabetes type 2 with hypoglycemia. Sliding scale insulin. Hypoglycemia protocol  9. Complicated urinary tract infection with obstructive uropathy. ID to follow. Urology consulted. 10.  Secondary hyperparathyroidism of chronic kidney disease. 11.  Hypertensive heart disease  12. Hypertension  13. Moderate pulmonary hypertension  14. Falls at home. PT OT. He has not been on anticoagulation in the outpatient setting for this reason. 15.  Acute metabolic encephalopathy in the setting of infection. TSH within normal limits. Head CT nonacute. Will check for other causes. Attempting to reach family to determine baseline mental status. 12. Full code    Called spouses home number 6:39 PM, informed she will be back in about 45 minutes, provided unit # to patient provides family member. Karishma Payment Additional Notes:      Case discussed with:  [x]Patient  []Family  [x]Nursing  []Case Management  DVT Prophylaxis:  []Lovenox  []Hep SQ  []SCDs  []Coumadin   []On Heparin gtt    Vital signs/Intake and Output:  Visit Vitals  BP (!) 156/42 (BP 1 Location: Right upper arm, BP Patient Position: At rest)   Pulse 62   Temp 99.5 °F (37.5 °C)   Resp 18   Ht 6' 1\" (1.854 m)   Wt 125.6 kg (277 lb)   SpO2 90%   BMI 36.55 kg/m²     Current Shift:  No intake/output data recorded. Last three shifts:  08/16 1901 - 08/18 0700  In: 10 [I.V.:10]  Out: 3675 [Urine:1675]    Awake alert and oriented x1. Sitting up on the edge of the bed, answering some questions, following some commands. Normocephalic atraumatic pupils equally round reactive to light. Wears glasses  Regular rate and rhythm  Clear to auscultation bilateral anterior axillary fields  Abdomen soft nontender nondistended normoactive bowel sounds  No edema. DP 2+ bilaterally  Moving all 4s spontaneously. No rash to visible skin    Medications Reviewed      Labs: Results:       Chemistry Recent Labs     08/18/21 0239 08/17/21  1112 08/17/21  0236   GLU 80 77 83    135* 136   K 3.8 3.9 4.4   CL 99* 99* 101   CO2 27 28 25   BUN 79* 96* 119*   CREA 7.46* 8.10* 9.68*   CA 8.6 8.3* 8.4*   AGAP 10 8 10   BUCR 11* 12 12      CBC w/Diff Recent Labs     08/18/21 0239 08/17/21  0236 08/16/21  0341   WBC 9.9 7.3 5.9   RBC 3.33* 3.08* 3.08*   HGB 9.0* 8.2* 8.3*   HCT 26.5* 23.7* 23.7*    182 179   GRANS 81* 84* 87*   LYMPH 8* 6* 4*   EOS 1 1 1      Cardiac Enzymes Recent Labs     08/15/21  1100         Coagulation Recent Labs     08/18/21  0239 08/17/21  1112 08/17/21  0236 08/15/21  1005   PTP  --   --   --  17.7*   INR  --   --   --  1.5*   APTT 38.2* 35.4   < >  --     < > = values in this interval not displayed.        Lipid Panel Lab Results   Component Value Date/Time    Cholesterol, total 208 (H) 03/22/2010 12:00 PM    HDL Cholesterol 55 03/22/2010 12:00 PM    LDL, calculated 138.4 (H) 03/22/2010 12:00 PM    VLDL, calculated 14.6 03/22/2010 12:00 PM    Triglyceride 73 03/22/2010 12:00 PM    CHOL/HDL Ratio 3.8 03/22/2010 12:00 PM      BNP No results for input(s): BNPP in the last 72 hours. Liver Enzymes No results for input(s): TP, ALB, TBIL, AP in the last 72 hours.     No lab exists for component: SGOT, GPT, DBIL   Thyroid Studies Lab Results   Component Value Date/Time    TSH 2.42 08/15/2021 11:00 AM        Procedures/imaging: see electronic medical records for all procedures/Xrays and details which were not copied into this note but were reviewed prior to creation of Plan

## 2021-08-18 NOTE — PROGRESS NOTES
RENAL DAILY PROGRESS NOTE    Patient: Chasity Malcolm               Sex: male          DOA: 8/15/2021  9:53 AM        YOB: 1950      Age:  70 y.o.        LOS:  LOS: 3 days     Subjective:     Chasity Malcolm is a 70 y.o.  who presents with Renal failure [N19]  Bradycardia [R00.1]. Asked to evaluate for renal failure,hyperkalemia,acidosis. presented with severe bradycardia  Chief complains: Patient denies nausea, vomiting, chest pain, dizziness, shortness of breath or headache.  - Reviewed last 24 hrs events     Current Facility-Administered Medications   Medication Dose Route Frequency    [START ON 8/19/2021] levoFLOXacin (LEVAQUIN) 500 mg in D5W IVPB  500 mg IntraVENous Q48H    epoetin negrita-epbx (RETACRIT) injection 8,000 Units  8,000 Units SubCUTAneous Q MON, WED & FRI    therapeutic multivitamin (THERAGRAN) tablet 1 Tablet  1 Tablet Oral DAILY    alteplase (CATHFLO) 4 mg in sterile water (preservative free) 4 mL injection  4 mg InterCATHeter DIALYSIS ONCE    [Held by provider] heparin 25,000 units in D5W 250 ml infusion  18-36 Units/kg/hr IntraVENous TITRATE    insulin lispro (HUMALOG) injection   SubCUTAneous AC&HS    atropine 1 mg/mL injection 0.5 mg  0.5 mg IntraVENous PRN    sodium chloride (NS) flush 5-40 mL  5-40 mL IntraVENous Q8H    sodium chloride (NS) flush 5-40 mL  5-40 mL IntraVENous PRN    sodium chloride (NS) flush 5-40 mL  5-40 mL IntraVENous Q8H    sodium chloride (NS) flush 5-40 mL  5-40 mL IntraVENous PRN    acetaminophen (TYLENOL) tablet 650 mg  650 mg Oral Q6H PRN    Or    acetaminophen (TYLENOL) suppository 650 mg  650 mg Rectal Q6H PRN    senna (SENOKOT) tablet 8.6 mg  1 Tablet Oral DAILY PRN    famotidine (PF) (PEPCID) 20 mg in 0.9% sodium chloride 10 mL injection  20 mg IntraVENous DAILY    docusate sodium (COLACE) capsule 100 mg  100 mg Oral DAILY    cefTRIAXone (ROCEPHIN) 2 g in sterile water (preservative free) 20 mL IV syringe  2 g IntraVENous Q24H    glucose chewable tablet 16 g  4 Tablet Oral PRN    glucagon (GLUCAGEN) injection 1 mg  1 mg IntraMUSCular PRN    dextrose (D50W) injection syrg 12.5-25 g  25-50 mL IntraVENous PRN       Objective:     Visit Vitals  BP (!) 127/51 (BP 1 Location: Right upper arm, BP Patient Position: Lying left side; At rest)   Pulse (!) 56   Temp 99.5 °F (37.5 °C)   Resp 18   Ht 6' 1\" (1.854 m)   Wt 125.6 kg (277 lb)   SpO2 96%   BMI 36.55 kg/m²       Intake/Output Summary (Last 24 hours) at 8/18/2021 1542  Last data filed at 8/18/2021 0600  Gross per 24 hour   Intake 0 ml   Output 1100 ml   Net -1100 ml       Physical Examination:     GEN: AAO X 3, NAD  RS: Chest is bilateral equal,   CVS: S1-S2 heard irregular  Abdomen: Soft, Non tender, Not distended, Positive bowel sounds, no organomegaly, no CVA / supra pubic tenderness  Extremities: No edema, no cyanosis, skin is warm on touch  CNS: Awake & follows commands,  HEENT: Head is atraumatic, PERRLA, conjunctiva pink & non icteric. No JVD or carotid bruit        Data Review:      Labs:     Hematology:   Recent Labs     08/18/21  0239 08/17/21  0236 08/16/21  0341   WBC 9.9 7.3 5.9   HGB 9.0* 8.2* 8.3*   HCT 26.5* 23.7* 23.7*     Chemistry:   Recent Labs     08/18/21  0239 08/17/21  1112 08/17/21  0236 08/16/21  1800 08/16/21  1145 08/16/21  0945 08/16/21  0341 08/15/21  2150 08/15/21  1555   BUN 79* 96* 119* 116*  --  159* 157* 223* 228*   CREA 7.46* 8.10* 9.68* 9.31*  --  11.40* 11.10* 14.70* 14.90*   CA 8.6 8.3* 8.4* 8.1* 8.6 8.3* 8.4* 8.6 8.9   K 3.8 3.9 4.4 4.3  --  5.1 4.7 6.0* 5.9*    135* 136 137  --  136 136 131* 130*   CL 99* 99* 101 101  --  101 101 98* 98*   CO2 27 28 25 26  --  22 20* 17* 16*   PHOS  --   --  5.0*  --   --   --  5.0*  --  6.7*   GLU 80 77 83 82  --  70* 64* 79 82        Images:    XR (Most Recent).  CXR reviewed by me and compared with previous CXR Results from Hospital Encounter encounter on 08/15/21    XR CHEST PORT    Narrative  HISTORY: Right lung base opacity. EXAM: Chest.    TECHNIQUE: Single view AP portable semiupright chest.    COMPARISON: 8/15/2021    FINDINGS: Worsened aeration of bilateral hemithoraces is demonstrated with  moderate bilateral diffuse interstitial prominence and associated worsened  perihilar and bibasilar opacities. No pneumothorax or pleural effusions. Unchanged position of tubes and catheters. Heart and mediastinal structures are  unchanged. Heart is enlarged. . Visualized bony thorax and soft tissues are  within normal limits. Impression  IMPRESSION:    1. Worsened aeration of bilateral hemithoraces. Follow-up with plain imaging. CT (Most Recent) Results from Hospital Encounter encounter on 08/15/21    CT ABD PELV WO CONT    Narrative  CT ABDOMEN AND PELVIS WITHOUT CONTRAST    COMPARISON: 2015    INDICATIONS: Pyuria, uremia, bacteremia, sepsis. TECHNIQUE: Volumetric data acquisition was performed of the abdomen and pelvis  on a multislice scanner and reconstructed in axial coronal and sagittal planes    CT ABDOMEN FINDINGS:    Lung Bases: There are groundglass infiltrates throughout the included portions  of the right middle and lower lobes. These are dependent as patient would only  tolerate right side down decubitus position. There is multichamber cardiac  enlargement. There is a minimal pericardial effusion. .    Liver/Gallbladder/Biliary: Normal.    Spleen: Normal.    Adrenal Glands: Normal.    Kidneys: Small exophytic hypodensity on the right probably represents a small  cyst. There is no obstruction or calculus. Pancreas: Normal.    Stomach, Small Bowel, and Colon: There is mild increased gas in nonobstructed  small bowel loops. The appendix is not confidently identified. .    Lymph Nodes: Normal in size and number. The abdominal aorta is unremarkable. The IVC is unremarkable. Peritoneal Spaces: No free fluid or free air is present. Abdominal wall: No hernia or mass is evident.  There is extensive edema of the  soft tissues. Bladder: The bladder is collapsed about an indwelling Vines catheter. The  prostate is enlarged measuring approximately 7 x 8 cm transaxially. Osseous Structures Of Abdomen And Pelvis: No acute or aggressive abnormalities  evident. Impression  The chamber cardiac enlargement. Right lower and middle lobe infiltrate; these are dependent lobes. Differential  includes primarily pulmonary edema, aspiration, pneumonia. There is extensive soft tissue edema    See above      . All CT scans at this facility are performed using dose optimization technique as  appropriate to the performed exam, to include automated exposure control,  adjustment of the mA and/or kV according to patient's size (Including  appropriate matching for site-specific examinations), or use of iterative  reconstruction technique. EKG No results found for this or any previous visit. I have personally reviewed the old medical records and patient's labs    Plan / Recommendation:      1. Acute/crf stage 4,obstruction,vines cath placed.    ? For permcath tomorrow   2.anemia,start epo  3.hypocalcemia,sec  hyperparathyroidism  4.bacteremia,gram neg rods ,probably urine source    D/w Dr. Vickey Nichole MD  Nephrology  8/18/2021

## 2021-08-19 LAB
ANION GAP SERPL CALC-SCNC: 10 MMOL/L (ref 3–18)
BACTERIA SPEC CULT: ABNORMAL
BASOPHILS # BLD: 0 K/UL (ref 0–0.1)
BASOPHILS NFR BLD: 0 % (ref 0–2)
BUN SERPL-MCNC: 83 MG/DL (ref 7–18)
BUN/CREAT SERPL: 10 (ref 12–20)
CALCIUM SERPL-MCNC: 8.2 MG/DL (ref 8.5–10.1)
CC UR VC: ABNORMAL
CHLORIDE SERPL-SCNC: 100 MMOL/L (ref 100–111)
CO2 SERPL-SCNC: 27 MMOL/L (ref 21–32)
CREAT SERPL-MCNC: 8.17 MG/DL (ref 0.6–1.3)
DIFFERENTIAL METHOD BLD: ABNORMAL
EOSINOPHIL # BLD: 0.1 K/UL (ref 0–0.4)
EOSINOPHIL NFR BLD: 1 % (ref 0–5)
ERYTHROCYTE [DISTWIDTH] IN BLOOD BY AUTOMATED COUNT: 16.6 % (ref 11.6–14.5)
GLUCOSE BLD STRIP.AUTO-MCNC: 81 MG/DL (ref 70–110)
GLUCOSE BLD STRIP.AUTO-MCNC: 90 MG/DL (ref 70–110)
GLUCOSE BLD STRIP.AUTO-MCNC: 94 MG/DL (ref 70–110)
GLUCOSE SERPL-MCNC: 80 MG/DL (ref 74–99)
HCT VFR BLD AUTO: 26.3 % (ref 36–48)
HGB BLD-MCNC: 8.7 G/DL (ref 13–16)
LYMPHOCYTES # BLD: 0.8 K/UL (ref 0.9–3.6)
LYMPHOCYTES NFR BLD: 6 % (ref 21–52)
MAGNESIUM SERPL-MCNC: 2.4 MG/DL (ref 1.6–2.6)
MCH RBC QN AUTO: 26.4 PG (ref 24–34)
MCHC RBC AUTO-ENTMCNC: 33.1 G/DL (ref 31–37)
MCV RBC AUTO: 79.7 FL (ref 74–97)
MONOCYTES # BLD: 0.7 K/UL (ref 0.05–1.2)
MONOCYTES NFR BLD: 6 % (ref 3–10)
NEUTS SEG # BLD: 10.7 K/UL (ref 1.8–8)
NEUTS SEG NFR BLD: 85 % (ref 40–73)
PHOSPHATE SERPL-MCNC: 4.6 MG/DL (ref 2.5–4.9)
PLATELET # BLD AUTO: 205 K/UL (ref 135–420)
PMV BLD AUTO: 10.7 FL (ref 9.2–11.8)
POTASSIUM SERPL-SCNC: 3.7 MMOL/L (ref 3.5–5.5)
RBC # BLD AUTO: 3.3 M/UL (ref 4.35–5.65)
SERVICE CMNT-IMP: ABNORMAL
SODIUM SERPL-SCNC: 137 MMOL/L (ref 136–145)
WBC # BLD AUTO: 12.5 K/UL (ref 4.6–13.2)

## 2021-08-19 PROCEDURE — 97162 PT EVAL MOD COMPLEX 30 MIN: CPT

## 2021-08-19 PROCEDURE — 80048 BASIC METABOLIC PNL TOTAL CA: CPT

## 2021-08-19 PROCEDURE — 99232 SBSQ HOSP IP/OBS MODERATE 35: CPT | Performed by: HOSPITALIST

## 2021-08-19 PROCEDURE — 77010033678 HC OXYGEN DAILY

## 2021-08-19 PROCEDURE — 74011250636 HC RX REV CODE- 250/636: Performed by: NURSE PRACTITIONER

## 2021-08-19 PROCEDURE — 74011250637 HC RX REV CODE- 250/637: Performed by: NURSE PRACTITIONER

## 2021-08-19 PROCEDURE — 2709999900 HC NON-CHARGEABLE SUPPLY

## 2021-08-19 PROCEDURE — 97530 THERAPEUTIC ACTIVITIES: CPT

## 2021-08-19 PROCEDURE — 74011250636 HC RX REV CODE- 250/636: Performed by: HOSPITALIST

## 2021-08-19 PROCEDURE — 74011250636 HC RX REV CODE- 250/636: Performed by: INTERNAL MEDICINE

## 2021-08-19 PROCEDURE — 90935 HEMODIALYSIS ONE EVALUATION: CPT

## 2021-08-19 PROCEDURE — 36415 COLL VENOUS BLD VENIPUNCTURE: CPT

## 2021-08-19 PROCEDURE — 74011250637 HC RX REV CODE- 250/637: Performed by: HOSPITALIST

## 2021-08-19 PROCEDURE — 65660000000 HC RM CCU STEPDOWN

## 2021-08-19 PROCEDURE — 82962 GLUCOSE BLOOD TEST: CPT

## 2021-08-19 PROCEDURE — 74011250637 HC RX REV CODE- 250/637: Performed by: INTERNAL MEDICINE

## 2021-08-19 PROCEDURE — 74011000250 HC RX REV CODE- 250: Performed by: NURSE PRACTITIONER

## 2021-08-19 PROCEDURE — 84100 ASSAY OF PHOSPHORUS: CPT

## 2021-08-19 PROCEDURE — 83735 ASSAY OF MAGNESIUM: CPT

## 2021-08-19 PROCEDURE — 85025 COMPLETE CBC W/AUTO DIFF WBC: CPT

## 2021-08-19 RX ORDER — TAMSULOSIN HYDROCHLORIDE 0.4 MG/1
0.4 CAPSULE ORAL DAILY
Status: DISCONTINUED | OUTPATIENT
Start: 2021-08-19 | End: 2021-08-27 | Stop reason: HOSPADM

## 2021-08-19 RX ORDER — HEPARIN SODIUM 5000 [USP'U]/ML
5000 INJECTION, SOLUTION INTRAVENOUS; SUBCUTANEOUS EVERY 8 HOURS
Status: DISCONTINUED | OUTPATIENT
Start: 2021-08-19 | End: 2021-08-22

## 2021-08-19 RX ADMIN — SODIUM CHLORIDE 5 ML: 9 INJECTION, SOLUTION INTRAMUSCULAR; INTRAVENOUS; SUBCUTANEOUS at 22:00

## 2021-08-19 RX ADMIN — FAMOTIDINE 20 MG: 10 INJECTION INTRAVENOUS at 08:26

## 2021-08-19 RX ADMIN — LEVOFLOXACIN 500 MG: 5 INJECTION, SOLUTION INTRAVENOUS at 18:42

## 2021-08-19 RX ADMIN — SODIUM CHLORIDE 10 ML: 9 INJECTION, SOLUTION INTRAMUSCULAR; INTRAVENOUS; SUBCUTANEOUS at 22:00

## 2021-08-19 RX ADMIN — TAMSULOSIN HYDROCHLORIDE 0.4 MG: 0.4 CAPSULE ORAL at 18:48

## 2021-08-19 RX ADMIN — THERA TABS 1 TABLET: TAB at 08:26

## 2021-08-19 RX ADMIN — DOCUSATE SODIUM 100 MG: 100 CAPSULE, LIQUID FILLED ORAL at 08:26

## 2021-08-19 RX ADMIN — HEPARIN SODIUM 5000 UNITS: 5000 INJECTION INTRAVENOUS; SUBCUTANEOUS at 18:48

## 2021-08-19 NOTE — PROGRESS NOTES
Problem: Falls - Risk of  Goal: *Absence of Falls  Description: Document Judy Ny Fall Risk and appropriate interventions in the flowsheet.   Outcome: Progressing Towards Goal  Note: Fall Risk Interventions:  Mobility Interventions: Bed/chair exit alarm    Mentation Interventions: Adequate sleep, hydration, pain control    Medication Interventions: Bed/chair exit alarm, Patient to call before getting OOB    Elimination Interventions: Bed/chair exit alarm, Call light in reach    History of Falls Interventions: Bed/chair exit alarm

## 2021-08-19 NOTE — ROUTINE PROCESS
1910 - Bedside and Verbal shift change report given to April RN (oncoming nurse) by Dl Mobley and Rodney Cosme RN (offgoing nurse). Report included the following information SBAR, Kardex, Intake/Output, MAR and Recent Results.

## 2021-08-19 NOTE — PROGRESS NOTES
Hospitalist Progress Note    Patient: Monty Kumar MRN: [id number]  [facility]: [id number]    YOB: 1950  Age: 70 y.o. Sex: male    DOA: 8/15/2021 LOS:  LOS: 4 days            Patient feels lot better today, less confused. Able to answer questions more appropriately. He denies any new complaints. Daughter and son at the bedside, they both agree that patient mental status improving is almost at the baseline. Assessment/Plan     1. Acute metabolic encephalopathy due to underlying infection, improving   2. Gram-negative bacteremia possibly due to UTI: Continue antibiotics per ID, repeat cultures negative. 3. hyperkalemia resolved  4. TONY on CKD 4 with obstructive uropathy, continue dialysis per nephrology. Permacath placement per nephrology. 5.  Anemia of chronic disease, with iron deficiency component. On Retacrit. 6.  A. fib with slow RVR. Possible sick sinus syndrome. Rates controlled. Continue full dose aspirin. Cardiology input noted, recommend to hold off AV blocking agents. 7.  Severe protein calorie malnutrition: Continue nutritionist follows. On supplement. Multivitamin. 8.  Diabetes type 2 with hypoglycemia. Sliding scale insulin. Hypoglycemia protocol  9. Complicated urinary tract infection with obstructive uropathy. ID to follow. Urology consulted. 10. Hypertension  11. Moderate pulmonary hypertension  12. Falls at home. PT OT. He has not been on anticoagulation in the outpatient setting for this reason. Full code    Discussed with the patient and also with her daughter and son at the bedside and explained detail about my above plan of care. All of them understood and agreed with my plan. Case discussed with:  [x]Patient  [x]Family  [x]Nursing  []Case Management  DVT Prophylaxis:  []Lovenox  [x]Hep SQ  []SCDs  []Coumadin   []On Heparin gtt    Vital signs/Intake and Output:  Visit Vitals  /65 (BP 1 Location: Right upper arm, BP Patient Position:  At rest;Supine)   Pulse (!) 53   Temp 98.3 °F (36.8 °C)   Resp 18   Ht 6' 1\" (1.854 m)   Wt 114.3 kg (252 lb)   SpO2 100%   BMI 33.25 kg/m²       Exam  General:  Alert, cooperative, no acute distress    Pulmonary:  CTA Bilaterally. No Wheezing/Rales. Cardiovascular: Regular rate and Rhythm. GI:  Soft, Non distended, Non tender. + Bowel sounds. Extremities:  No edema. No calf tenderness. Neurologic: Alert and oriented X 2. No acute neuro deficits. Medications Reviewed      Labs: Results:       Chemistry Recent Labs     08/19/21  0553 08/18/21  0239 08/17/21  1112   GLU 80 80 77    136 135*   K 3.7 3.8 3.9    99* 99*   CO2 27 27 28   BUN 83* 79* 96*   CREA 8.17* 7.46* 8.10*   CA 8.2* 8.6 8.3*   AGAP 10 10 8   BUCR 10* 11* 12      CBC w/Diff Recent Labs     08/19/21  0553 08/18/21  0239 08/17/21  0236   WBC 12.5 9.9 7.3   RBC 3.30* 3.33* 3.08*   HGB 8.7* 9.0* 8.2*   HCT 26.3* 26.5* 23.7*    204 182   GRANS 85* 81* 84*   LYMPH 6* 8* 6*   EOS 1 1 1      Cardiac Enzymes No results for input(s): CPK, CKND1, MAXIMILIAN in the last 72 hours. No lab exists for component: CKRMB, TROIP   Coagulation Recent Labs     08/18/21  0239 08/17/21  1112   APTT 38.2* 35.4       Lipid Panel Lab Results   Component Value Date/Time    Cholesterol, total 208 (H) 03/22/2010 12:00 PM    HDL Cholesterol 55 03/22/2010 12:00 PM    LDL, calculated 138.4 (H) 03/22/2010 12:00 PM    VLDL, calculated 14.6 03/22/2010 12:00 PM    Triglyceride 73 03/22/2010 12:00 PM    CHOL/HDL Ratio 3.8 03/22/2010 12:00 PM      BNP No results for input(s): BNPP in the last 72 hours. Liver Enzymes No results for input(s): TP, ALB, TBIL, AP in the last 72 hours.     No lab exists for component: SGOT, GPT, DBIL   Thyroid Studies Lab Results   Component Value Date/Time    TSH 2.42 08/15/2021 11:00 AM        Procedures/imaging: see electronic medical records for all procedures/Xrays and details which were not copied into this note but were reviewed prior to creation of Plan

## 2021-08-19 NOTE — DIALYSIS
ACUTE HEMODIALYSIS FLOW SHEET    HEMODIALYSIS ORDERS: Physician: CATHI García     Dialyzer: Revaclear   Duration: 3.5 hr   BFR: 350   DFR: 600   Dialysate:  Temp 36-37*C   K+  2.0    Ca+ 2.5   Na 138   Bicarb 30   Wt Readings from Last 1 Encounters:   08/18/21 114.3 kg (252 lb)    Patient Chart [x]   Unable to Obtain []  Dry weight/UF Goal: 3000 ml    Heparin []  Bolus    Units    [] Hourly    Units    [x]None       Pre BP:   113/62   Pulse:  48   Respirations: 20   Temperature:  99.5  [x] oral  [] ax  [] esophageal   Labs: []  Pre  []  Post:   [x] N/A   Additional Orders(medications, blood products, hypotension management): [] Yes   [x] No     [x]  [facility] Consent Verified     CATHETER ACCESS:  []N/A   [x]Right   []Left   [x]IJ   []Fem  []Chest wall  []TransHepatic   [] First use X-ray verified     []Tunnel    [x] Non Tunneled   [x]No S/S infection  []Redness  []Drainage []Cultured []Swelling []Pain   [x]Medical Aseptic Prep Utilized   []Dressing Changed  [x] Biopatch  Date: 08/16/2021   []Clotted   [x]Patent   Flows: [x]Good  []Poor  [x]Reversed   If access problem,  notified: []Yes    [x]N/A            GENERAL ASSESSMENT:    LUNGS:  Resp Rate 20   [x] Clear  [] Coarse  [] Crackles  [] Wheezing  [] Diminished                                                            [] RLL  [] LLL [] RUL  [] RONNIE       Respirations:  [x]Easy  []Labored  []N/A  Cough:  []Productive  []Dry  []N/A      Therapy:  []RA  O2 Type:  [x]NC   []  NRB    [] BiPaP  Flow:4  l/min                   [] Ventilated  [] Intubated  [] Trach     CARDIAC: [x] Regular      [] Irregular   [] Rhythm:          [] Monitored   [] Bedside   [] Remotely monitored     EDEMA: [] None   [x]Generalized  [] Pitting [] 1+   [] 2 +   [] 3+    [] 4+     SKIN:   [x] Warm  [] Hot     [] Cold   [x] Dry     [] Pale   [] Diaphoretic                  [] Flushed  [] Jaundiced  [] Cyanotic     LOC:    [x] Alert      [x]Oriented:    [x] Person     [x] Place [x]Time               [] Confused  [] Lethargic  [] Medicated  [] Non-responsive  [] Non-verbal   GI / ABDOMEN:                     [] Flat    [] Distended    [x] Soft    [] Firm   []  Obese                   [] Diarrhea   [] FMS [x] Bowel Sounds  [] Nausea  [] Vomiting                   [] NGT  [] OGT    [] PEG  [] Tube Feedings @     / URINE ASSESSMENT:                   [] Voiding    [x] Oliguria  [] Anuria                    []  Elizabeth  [] Incontinent  []  Incontinent Brief   []  PureWick     PAIN:  [x] 0 []1  []2   []3   []4   []5   []6   []7   []8   []9   []10                MOBILITY:  [x] Bed    [] Stretcher      All Vitals and Treatment Details on Attached [address]: [facility]          Room # 360/01    [x] Routine         [] 1st Time Acute/Chronic   [] Urgent      [] Stat            [x] Acute Room   []  Bedside    [] ICU/CCU     [] ER   Isolation Precautions:  [x] Dialysis    There are currently no Active Isolations     ALLERGIES:     No Known Allergies   Code Status:  Full Code     Hepatitis Status      Lab Results   Component Value Date/Time    Hepatitis B surface Ag <0.10 08/15/2021 09:50 PM    Hepatitis B surface Ab <3.10 (L) 08/15/2021 09:50 PM    Hepatitis C virus Ab 0.28 12/07/2015 04:15 PM        Current Labs:      Lab Results   Component Value Date/Time    WBC 12.5 08/19/2021 05:53 AM    Hemoglobin, POC 10.2 (L) 03/11/2016 07:26 AM    HGB 8.7 (L) 08/19/2021 05:53 AM    Hematocrit, POC 30 (L) 03/11/2016 07:26 AM    HCT 26.3 (L) 08/19/2021 05:53 AM    PLATELET 302 35/67/5425 05:53 AM    MCV 79.7 08/19/2021 05:53 AM     Lab Results   Component Value Date/Time    Sodium 137 08/19/2021 05:53 AM    Potassium 3.7 08/19/2021 05:53 AM    Chloride 100 08/19/2021 05:53 AM    CO2 27 08/19/2021 05:53 AM    Anion gap 10 08/19/2021 05:53 AM    Glucose 80 08/19/2021 05:53 AM    BUN 83 (H) 08/19/2021 05:53 AM    Creatinine 8.17 (H) 08/19/2021 05:53 AM    BUN/Creatinine ratio 10 (L) 08/19/2021 05:53 AM    GFR est AA 8 (L) 08/19/2021 05:53 AM    GFR est non-AA 7 (L) 08/19/2021 05:53 AM    Calcium 8.2 (L) 08/19/2021 05:53 AM          DIET:  DIET ADULT ORAL NUTRITION SUPPLEMENT  DIET ADULT ORAL NUTRITION SUPPLEMENT  DIET ADULT     PRIMARY NURSE REPORT:   Pre Dialysis: Reji Carrion RN    Time: 1000      EDUCATION:    [x] Patient           Knowledge Basis: []None [x]Minimal [] Substantial [] Unknown  Barriers to learning  []N/A  [] Intubated/Trached/Ventilated  [] Sedated/Paralyzed   [] Access Care     [] S&S of infection  [] Fluid Management  [] K+   [x] Procedural    [] Medications   [] Tx Options   [] Transplant   [] Diet      Teaching Tools:  [x] Explain  [] Demo  [] Handouts [] Video  Patient response: [x] Verbalized understanding   [x] Requires follow up        [x] Time Out/Safety Check    [x] Extracorporeal Circuit Tested for integrity       RO/HEMODIALYSIS MACHINE SAFETY CHECKS  Before each treatment:        [address] [place] [address]                                     [x] Unit Machine # 5 with centralized RO                                                                                                                                          Alarm Test:  Pass time 1000            [x] RO/Machine Log Complete    Machine Temp    36-37*C             Dialysate: pH  7.4    Conductivity: Meter 14.0    HD Machine  13.9     TCD: 13.8  Dialyzer Lot # [id number]     Blood Tubing Lot # [id number]     Saline Lot # [id number]     CHLORINE TESTING-Before each treatment and every 4 hours    Total Chlorine: [x] less than 0.1 ppm  Initial Time Check: 0900       4 Hr/2nd Check Time: 1300   (if greater than 0.1 ppm from Primary then every 30 minutes from Secondary)     TREATMENT INITIATION  with Dialysis Precautions:   [x] All Connections Secured              [x] Saline Line Double Clamped   [x] Venous Parameters Set               [x] Arterial Parameters Set    [x] Prime Given 250ml NSS              [x]Air Foam Detector Engaged Treatment Initiation Note:  1030 Patient arrived to HD treatment room A&O x 4, no signs of distress. Patient's right IJ CVC intact and flushing well. 1040 Dialysis initiated without issue     During Treatment Notes:  1100  Face & Vascular access visible with arterial and venous line connections intact. Pt tolerating HD.  1115  Face & Vascular access visible with arterial and venous line connections intact. Pt tolerating HD.  1130  Face & Vascular access visible with arterial and venous line connections intact. Pt tolerating HD.  1145  Face & Vascular access visible with arterial and venous line connections intact. Pt tolerating HD.  1200  Face & Vascular access visible with arterial and venous line connections intact. Pt tolerating HD.  1215  Face & Vascular access visible with arterial and venous line connections intact. Pt tolerating HD.  1230  Face & Vascular access visible with arterial and venous line connections intact. Pt tolerating HD.  1245  Face & Vascular access visible with arterial and venous line connections intact. Pt tolerating HD.  1300  Face & Vascular access visible with arterial and venous line connections intact. Pt tolerating HD.  1315  Face & Vascular access visible with arterial and venous line connections intact. Pt tolerating HD.  1330  Face & Vascular access visible with arterial and venous line connections intact. Pt tolerating HD.  1345  Face & Vascular access visible with arterial and venous line connections intact. Pt tolerating HD.  1400  1410  1410  Dialysis treatment complete.        Medication Dose Volume Route Time Axel Nurse, Title   N/A   HD  Josh Liu RN      HD  Josh Liu RN      HD  Josh Liu RN     Post Assessment  Dialyzer Cleared:   [] Good  [x] Fair  [] Poor  Blood processed:  70.2 L  UF Removed:  3000 Ml    Post /72   Pulse  64 Resp  18  Temp 99.2 Lungs: [x] Lung sounds same as pre dialysis assessment    Cardiac :[x] Regular   [] Irregular   Rhythm:  [] Monitored   [] Not Monitored    CVC Catheter: [] N/A  Locking solution: Heparin 1:1000 U  Arterial port 1.9 ml   Venous port 1.9 ml   Edema:  [] None  [x] Generalized                     Skin:[x] Warm  [x] Dry [] Diaphoretic               [] Flushed  [] Pale [] Cyanotic Pain:  [x]0  []1 []2  []3 []4  []5  []6  []7 []8    []9  []10     Post Treatment Note:   1430 Pt tolerated dialysis well. Dialysis catheter intact, patent and heplocked as noted above.      POST TREATMENT PRIMARY NURSE HANDOFF REPORT:   Post Dialysis: Keely Ford RN               Time:  1553       Abbreviations: AVG-arterial venous graft, AVF-arterial venous fistula, IJ-Internal Jugular, Subcl-Subclavian, Fem-Femoral, Tx-treatment, AP/HR-apical heart rate, VSS- Vital Signs Stable, CVC- Central Venous Catheter, DFR-dialysate flow rate, BFR-blood flow rate, AP-arterial pressure, -venous pressure, UF-ultrafiltrate, TMP-transmembrane pressure, Mykel-Venous, Art-Arterial, RO-Reverse Osmosis

## 2021-08-19 NOTE — PROGRESS NOTES
RENAL DAILY PROGRESS NOTE    Patient: Jacobo Jones               Sex: male          DOA: 8/15/2021  9:53 AM        YOB: 1950      Age:  70 y.o.        LOS:  LOS: 4 days     Subjective:     Jacobo Jones is a 70 y.o.  who presents with Renal failure [N19]  Bradycardia [R00.1]. Asked to evaluate for renal failure,hyperkalemia,acidosis. presented with severe bradycardia  Chief complains: Patient denies nausea, vomiting, chest pain, dizziness, shortness of breath or headache.  - Reviewed last 24 hrs events     Current Facility-Administered Medications   Medication Dose Route Frequency    levoFLOXacin (LEVAQUIN) 500 mg in D5W IVPB  500 mg IntraVENous Q48H    epoetin negrita-epbx (RETACRIT) injection 8,000 Units  8,000 Units SubCUTAneous Q MON, WED & FRI    therapeutic multivitamin (THERAGRAN) tablet 1 Tablet  1 Tablet Oral DAILY    alteplase (CATHFLO) 4 mg in sterile water (preservative free) 4 mL injection  4 mg InterCATHeter DIALYSIS ONCE    [Held by provider] heparin 25,000 units in D5W 250 ml infusion  18-36 Units/kg/hr IntraVENous TITRATE    insulin lispro (HUMALOG) injection   SubCUTAneous AC&HS    atropine 1 mg/mL injection 0.5 mg  0.5 mg IntraVENous PRN    sodium chloride (NS) flush 5-40 mL  5-40 mL IntraVENous Q8H    sodium chloride (NS) flush 5-40 mL  5-40 mL IntraVENous PRN    sodium chloride (NS) flush 5-40 mL  5-40 mL IntraVENous Q8H    sodium chloride (NS) flush 5-40 mL  5-40 mL IntraVENous PRN    acetaminophen (TYLENOL) tablet 650 mg  650 mg Oral Q6H PRN    Or    acetaminophen (TYLENOL) suppository 650 mg  650 mg Rectal Q6H PRN    senna (SENOKOT) tablet 8.6 mg  1 Tablet Oral DAILY PRN    famotidine (PF) (PEPCID) 20 mg in 0.9% sodium chloride 10 mL injection  20 mg IntraVENous DAILY    docusate sodium (COLACE) capsule 100 mg  100 mg Oral DAILY    glucose chewable tablet 16 g  4 Tablet Oral PRN    glucagon (GLUCAGEN) injection 1 mg  1 mg IntraMUSCular PRN    dextrose (D50W) injection syrg 12.5-25 g  25-50 mL IntraVENous PRN       Objective:     Visit Vitals  BP (!) 152/72   Pulse 60   Temp 99.5 °F (37.5 °C) (Oral)   Resp 20   Ht 6' 1\" (1.854 m)   Wt 114.3 kg (252 lb)   SpO2 93%   BMI 33.25 kg/m²       Intake/Output Summary (Last 24 hours) at 8/19/2021 1142  Last data filed at 8/18/2021 2356  Gross per 24 hour   Intake 0 ml   Output    Net 0 ml       Physical Examination:     GEN: AAO X 3, NAD  RS: Chest is bilateral equal,   CVS: S1-S2 heard irregular  Abdomen: Soft, Non tender, Not distended, Positive bowel sounds, no organomegaly, no CVA / supra pubic tenderness  Extremities: No edema, no cyanosis, skin is warm on touch  CNS: Awake & follows commands,  HEENT: Head is atraumatic, PERRLA, conjunctiva pink & non icteric. No JVD or carotid bruit        Data Review:      Labs:     Hematology:   Recent Labs     08/19/21  0553 08/18/21  0239 08/17/21  0236   WBC 12.5 9.9 7.3   HGB 8.7* 9.0* 8.2*   HCT 26.3* 26.5* 23.7*     Chemistry:   Recent Labs     08/19/21  0553 08/18/21  0239 08/17/21  1112 08/17/21  0236 08/16/21  1800 08/16/21  1145   BUN 83* 79* 96* 119* 116*  --    CREA 8.17* 7.46* 8.10* 9.68* 9.31*  --    CA 8.2* 8.6 8.3* 8.4* 8.1* 8.6   K 3.7 3.8 3.9 4.4 4.3  --     136 135* 136 137  --     99* 99* 101 101  --    CO2 27 27 28 25 26  --    PHOS 4.6  --   --  5.0*  --   --    GLU 80 80 77 83 82  --         Images:    XR (Most Recent). CXR reviewed by me and compared with previous CXR Results from Hospital Encounter encounter on 08/15/21    XR CHEST PORT    Narrative  HISTORY: Right lung base opacity. EXAM: Chest.    TECHNIQUE: Single view AP portable semiupright chest.    COMPARISON: 8/15/2021    FINDINGS: Worsened aeration of bilateral hemithoraces is demonstrated with  moderate bilateral diffuse interstitial prominence and associated worsened  perihilar and bibasilar opacities. No pneumothorax or pleural effusions.   Unchanged position of tubes and catheters. Heart and mediastinal structures are  unchanged. Heart is enlarged. . Visualized bony thorax and soft tissues are  within normal limits. Impression  IMPRESSION:    1. Worsened aeration of bilateral hemithoraces. Follow-up with plain imaging. CT (Most Recent) Results from Hospital Encounter encounter on 08/15/21    CT ABD PELV WO CONT    Narrative  CT ABDOMEN AND PELVIS WITHOUT CONTRAST    COMPARISON: 2015    INDICATIONS: Pyuria, uremia, bacteremia, sepsis. TECHNIQUE: Volumetric data acquisition was performed of the abdomen and pelvis  on a multislice scanner and reconstructed in axial coronal and sagittal planes    CT ABDOMEN FINDINGS:    Lung Bases: There are groundglass infiltrates throughout the included portions  of the right middle and lower lobes. These are dependent as patient would only  tolerate right side down decubitus position. There is multichamber cardiac  enlargement. There is a minimal pericardial effusion. .    Liver/Gallbladder/Biliary: Normal.    Spleen: Normal.    Adrenal Glands: Normal.    Kidneys: Small exophytic hypodensity on the right probably represents a small  cyst. There is no obstruction or calculus. Pancreas: Normal.    Stomach, Small Bowel, and Colon: There is mild increased gas in nonobstructed  small bowel loops. The appendix is not confidently identified. .    Lymph Nodes: Normal in size and number. The abdominal aorta is unremarkable. The IVC is unremarkable. Peritoneal Spaces: No free fluid or free air is present. Abdominal wall: No hernia or mass is evident. There is extensive edema of the  soft tissues. Bladder: The bladder is collapsed about an indwelling Elizabeth catheter. The  prostate is enlarged measuring approximately 7 x 8 cm transaxially. Osseous Structures Of Abdomen And Pelvis: No acute or aggressive abnormalities  evident. Impression  The chamber cardiac enlargement.   Right lower and middle lobe infiltrate; these are dependent lobes. Differential  includes primarily pulmonary edema, aspiration, pneumonia. There is extensive soft tissue edema    See above      . All CT scans at this facility are performed using dose optimization technique as  appropriate to the performed exam, to include automated exposure control,  adjustment of the mA and/or kV according to patient's size (Including  appropriate matching for site-specific examinations), or use of iterative  reconstruction technique. EKG No results found for this or any previous visit. I have personally reviewed the old medical records and patient's labs    Plan / Recommendation:      1. Acute/crf stage 4,obstruction,seen during dialysis ,tolerating procedure well   ? For permcath tomorrow . planning to place at [facility] unit  2.anemia, epo  3.hypocalcemia,sec  hyperparathyroidism  4.bacteremia,gram neg rods ,on levaquin    D/w Dr Olena Gonzalez MD  Nephrology  8/19/2021

## 2021-08-19 NOTE — PROGRESS NOTES
Infectious Disease progress Note  Chart reviewed      Reason: Gram-negative bloodstream infection    Current abx Prior abx   Ceftriaxone  8/15/2021-8/18  Levofloxacin since 8/19      Lines:       Assessment :    70 y.o. male pmhx of afib, bradycardia, CKD stage 4, bladder neck obstruction, chronic indwelling vines [facility] with generalized weakness. Now with gram-negative bacteremia, significant pyuria, multiple organisms in urine culture    Clinical presentation consistent with enterobacter bloodstream infection-positive blood culture 8/15, negative blood culture 8/17, probable cystitis, RLL aspiration pneumonia    Exact source of gram-negative bloodstream infection not entirely clear at this time. Recent history of hydronephrosis/bladder outlet obstruction/indwelling Vines concerning for cystitis as a source of bloodstream infection. Multiple organisms seen in urine culture 8/15 enterococcus, pseudomonas, Enterobacter-susceptibilities reviewed    Ct findings concerning for RLL aspiration pneumonia-no shortness of breath, increased cough noted on today's exam    Recommendations:    1. Continue levofloxacin every 48 hours till 8/29/21  2. Okay to proceed with hemodialysis catheter from infectious disease standpoint    Time spent greater than 10 minutes please call me if any further questions or concerns. Will continue to participate in the care of this patient. HPI:    .    Current Discharge Medication List      CONTINUE these medications which have NOT CHANGED    Details   finasteride (PROSCAR) 5 mg tablet TAKE 1 TABLET BY MOUTH DAILY      losartan (COZAAR) 50 mg tablet Take 50 mg by mouth daily. metOLazone (ZAROXOLYN) 2.5 mg tablet Take 2.5 mg by mouth.      pravastatin (PRAVACHOL) 20 mg tablet TAKE 1 TABLET BY MOUTH DAILY      prazosin (MINIPRESS) 5 mg capsule Take 5 mg by mouth At bedtime. simvastatin (ZOCOR) 40 mg tablet Take 40 mg by mouth At bedtime.       vitamin E, dl,tocopheryl acet, (vitamin E acetate) 45 mg (100 unit) capsule Take 100 Units by mouth daily. !! hydrALAZINE (APRESOLINE) 100 mg tablet TAKE 1 TABLET BY MOUTH THREE TIMES DAILY  Qty: 270 Tab, Refills: 6    Comments: **Patient requests 90 days supply**      !! hydrALAZINE (APRESOLINE) 100 mg tablet TAKE 1 TABLET BY MOUTH THREE TIMES DAILY  Qty: 270 Tab, Refills: 6    Comments: **Patient requests 90 days supply**      ferrous sulfate 325 mg (65 mg iron) tablet Take  by mouth Daily (before breakfast). furosemide (LASIX) 40 mg tablet Take 20 mg by mouth two (2) times a day. docusate sodium (COLACE) 100 mg capsule Take 100 mg by mouth daily as needed for Constipation. carvedilol (COREG) 12.5 mg tablet Take 1 Tab by mouth two (2) times daily (with meals). Qty: 60 Tab, Refills: 0      cholecalciferol (VITAMIN D3) 1,000 unit tablet Take 1 Tab by mouth daily. Indications: VITAMIN D DEFICIENCY  Qty: 30 Tab, Refills: 0      polyethylene glycol (MIRALAX) 17 gram packet Take 1 Packet by mouth daily. Qty: 30 Packet, Refills: 0      insulin lispro (HUMALOG) 100 unit/mL injection SubCUTAneous route Before breakfast, lunch, dinner and at bedtime. For Blood Sugar (mg/dL) of:     Less than 150 =   0 units           150 -199 =   2 units  200 -249 =   4 units  250 -299 =   6 units  300 -349 =   8 units  350 and above =  10 units  Qty: 1 Vial, Refills: 0      Blood-Glucose Meter monitoring kit As directed  Qty: 1 Kit, Refills: 0      Insulin Syringe-Needle U-100 (INSULIN SYRINGE) 1 mL 28 x 1/2\" syrg As directed  Qty: 30 Syringe, Refills: 0      Lancets (ONETOUCH ULTRASOFT LANCETS) misc As directed  Qty: 1 Each, Refills: 11      aspirin (ASPIRIN) 325 mg tablet Take 325 mg by mouth daily. multivitamin (ONE A DAY) tablet Take 1 Tab by mouth daily. atorvastatin (LIPITOR) 10 mg tablet Take 10 mg by mouth daily. tamsulosin (FLOMAX) 0.4 mg capsule Take 0.4 mg by mouth daily.       amLODIPine (NORVASC) 10 mg tablet Take 10 mg by mouth daily. !! - Potential duplicate medications found. Please discuss with provider. Current Facility-Administered Medications   Medication Dose Route Frequency    levoFLOXacin (LEVAQUIN) 500 mg in D5W IVPB  500 mg IntraVENous Q48H    epoetin negrita-epbx (RETACRIT) injection 8,000 Units  8,000 Units SubCUTAneous Q MON, WED & FRI    therapeutic multivitamin (THERAGRAN) tablet 1 Tablet  1 Tablet Oral DAILY    alteplase (CATHFLO) 4 mg in sterile water (preservative free) 4 mL injection  4 mg InterCATHeter DIALYSIS ONCE    [Held by provider] heparin 25,000 units in D5W 250 ml infusion  18-36 Units/kg/hr IntraVENous TITRATE    insulin lispro (HUMALOG) injection   SubCUTAneous AC&HS    atropine 1 mg/mL injection 0.5 mg  0.5 mg IntraVENous PRN    sodium chloride (NS) flush 5-40 mL  5-40 mL IntraVENous Q8H    sodium chloride (NS) flush 5-40 mL  5-40 mL IntraVENous PRN    sodium chloride (NS) flush 5-40 mL  5-40 mL IntraVENous Q8H    sodium chloride (NS) flush 5-40 mL  5-40 mL IntraVENous PRN    acetaminophen (TYLENOL) tablet 650 mg  650 mg Oral Q6H PRN    Or    acetaminophen (TYLENOL) suppository 650 mg  650 mg Rectal Q6H PRN    senna (SENOKOT) tablet 8.6 mg  1 Tablet Oral DAILY PRN    famotidine (PF) (PEPCID) 20 mg in 0.9% sodium chloride 10 mL injection  20 mg IntraVENous DAILY    docusate sodium (COLACE) capsule 100 mg  100 mg Oral DAILY    glucose chewable tablet 16 g  4 Tablet Oral PRN    glucagon (GLUCAGEN) injection 1 mg  1 mg IntraMUSCular PRN    dextrose (D50W) injection syrg 12.5-25 g  25-50 mL IntraVENous PRN       Allergies: Patient has no known allergies.     Temp (24hrs), Av.6 °F (37.6 °C), Min:99 °F (37.2 °C), Max:100 °F (37.8 °C)    Visit Vitals  BP (!) 144/57 (BP 1 Location: Right upper arm, BP Patient Position: Lying right side)   Pulse 61   Temp 100 °F (37.8 °C)   Resp 18   Ht 6' 1\" (1.854 m)   Wt 114.3 kg (252 lb)   SpO2 93% Comment: placed on NC3.5L   BMI 33.25 kg/m²       ROS: 12 point ROS obtained in details. Pertinent positives as mentioned in HPI,   otherwise negative    Physical Exam:        Labs: Results:   Chemistry Recent Labs     08/19/21  0553 08/18/21  0239 08/17/21  1112   GLU 80 80 77    136 135*   K 3.7 3.8 3.9    99* 99*   CO2 27 27 28   BUN 83* 79* 96*   CREA 8.17* 7.46* 8.10*   CA 8.2* 8.6 8.3*   AGAP 10 10 8   BUCR 10* 11* 12      CBC w/Diff Recent Labs     08/19/21  0553 08/18/21  0239 08/17/21  0236   WBC 12.5 9.9 7.3   RBC 3.30* 3.33* 3.08*   HGB 8.7* 9.0* 8.2*   HCT 26.3* 26.5* 23.7*    204 182   GRANS 85* 81* 84*   LYMPH 6* 8* 6*   EOS 1 1 1      Microbiology Recent Labs     08/17/21  0236   CULT NO GROWTH 2 DAYS  NO GROWTH 2 DAYS          RADIOLOGY:    All available imaging studies/reports in [facility] for this admission were reviewed      Disclaimer: Sections of this note are dictated utilizing voice recognition software, which may have resulted in some phonetic based errors in grammar and contents. Even though attempts were made to correct all the mistakes, some may have been missed, and remained in the body of the document. If questions arise, please contact our department.     Dr. Bruce Greene, Infectious Disease Specialist  [phone]  August 19, 2021  4:49 PM

## 2021-08-19 NOTE — PROGRESS NOTES
Problem: Falls - Risk of  Goal: *Absence of Falls  Description: Document Sherice Linton Fall Risk and appropriate interventions in the flowsheet.   Outcome: Progressing Towards Goal  Note: Fall Risk Interventions:  Mobility Interventions: Bed/chair exit alarm, Communicate number of staff needed for ambulation/transfer, Patient to call before getting OOB, Strengthening exercises (ROM-active/passive), PT Consult for mobility concerns    Mentation Interventions: Adequate sleep, hydration, pain control, Bed/chair exit alarm, Door open when patient unattended, Increase mobility, More frequent rounding, Reorient patient, Room close to nurse's station, Toileting rounds, Update white board    Medication Interventions: Bed/chair exit alarm, Evaluate medications/consider consulting pharmacy, Patient to call before getting OOB, Teach patient to arise slowly    Elimination Interventions: Bed/chair exit alarm, Call light in reach, Patient to call for help with toileting needs, Stay With Me (per policy), Toilet paper/wipes in reach, Toileting schedule/hourly rounds, Urinal in reach    History of Falls Interventions: Bed/chair exit alarm, Door open when patient unattended, Consult care management for discharge planning, Assess for delayed presentation/identification of injury for 48 hrs (comment for end date), Vital signs minimum Q4HRs X 24 hrs (comment for end date)         Problem: Patient Education: Go to Patient Education Activity  Goal: Patient/Family Education  Outcome: Progressing Towards Goal     Problem: Cardiac Output -  Decreased  Goal: *Vital signs within specified parameters  Outcome: Progressing Towards Goal  Goal: *Optimal cardiac output  Outcome: Progressing Towards Goal  Goal: *Absence of hypoxia  Outcome: Progressing Towards Goal  Goal: *Absence of peripheral edema  Outcome: Progressing Towards Goal  Goal: *Intravascular fluid volume and electrolyte balance  Outcome: Progressing Towards Goal     Problem: Patient Education: Go to Patient Education Activity  Goal: Patient/Family Education  Outcome: Progressing Towards Goal     Problem: Injury - Risk of, Adverse Drug Event  Goal: *Absence of adverse drug events  Outcome: Progressing Towards Goal  Goal: *Absence of medication errors  Outcome: Progressing Towards Goal  Goal: *Knowledge of prescribed medications  Outcome: Progressing Towards Goal     Problem: Patient Education: Go to Patient Education Activity  Goal: Patient/Family Education  Outcome: Progressing Towards Goal     Problem: Patient Education: Go to Patient Education Activity  Goal: Patient/Family Education  Outcome: Progressing Towards Goal     Problem: Acute Renal Failure: Day 2  Goal: Activity/Safety  Outcome: Progressing Towards Goal  Goal: Consults, if ordered  Outcome: Progressing Towards Goal  Goal: Diagnostic Test/Procedures  Outcome: Progressing Towards Goal  Goal: Nutrition/Diet  Outcome: Progressing Towards Goal  Goal: Discharge Planning  Outcome: Progressing Towards Goal  Goal: Medications  Outcome: Progressing Towards Goal  Goal: Respiratory  Outcome: Progressing Towards Goal  Goal: Treatments/Interventions/Procedures  Outcome: Progressing Towards Goal  Goal: Psychosocial  Outcome: Progressing Towards Goal  Goal: *Optimal pain control at patient's stated goal  Outcome: Progressing Towards Goal  Goal: *Urinary output within identified parameters  Outcome: Progressing Towards Goal  Goal: *Hemodynamically stable  Outcome: Progressing Towards Goal  Goal: *Tolerating diet  Outcome: Progressing Towards Goal  Goal: *Lab values improving  Outcome: Progressing Towards Goal     Problem: Acute Renal Failure: Day 3  Goal: Off Pathway (Use only if patient is Off Pathway)  Outcome: Progressing Towards Goal  Goal: Activity/Safety  Outcome: Progressing Towards Goal  Goal: Consults, if ordered  Outcome: Progressing Towards Goal  Goal: Diagnostic Test/Procedures  Outcome: Progressing Towards Goal  Goal: Nutrition/Diet  Outcome: Progressing Towards Goal  Goal: Discharge Planning  Outcome: Progressing Towards Goal  Goal: Medications  Outcome: Progressing Towards Goal  Goal: Respiratory  Outcome: Progressing Towards Goal  Goal: Treatments/Interventions/Procedures  Outcome: Progressing Towards Goal  Goal: Psychosocial  Outcome: Progressing Towards Goal  Goal: *Optimal pain control at patient's stated goal  Outcome: Progressing Towards Goal  Goal: *Urinary output within identified parameters  Outcome: Progressing Towards Goal  Goal: *Hemodynamically stable  Outcome: Progressing Towards Goal  Goal: *Tolerating diet  Outcome: Progressing Towards Goal  Goal: *Lab values improving  Outcome: Progressing Towards Goal     Problem: Acute Renal Failure: Day 4  Goal: Off Pathway (Use only if patient is Off Pathway)  Outcome: Progressing Towards Goal  Goal: Activity/Safety  Outcome: Progressing Towards Goal  Goal: Consults, if ordered  Outcome: Progressing Towards Goal  Goal: Diagnostic Test/Procedures  Outcome: Progressing Towards Goal  Goal: Nutrition/Diet  Outcome: Progressing Towards Goal  Goal: Discharge Planning  Outcome: Progressing Towards Goal  Goal: Medications  Outcome: Progressing Towards Goal  Goal: Respiratory  Outcome: Progressing Towards Goal  Goal: Treatments/Interventions/Procedures  Outcome: Progressing Towards Goal  Goal: Psychosocial  Outcome: Progressing Towards Goal  Goal: *Optimal pain control at patient's stated goal  Outcome: Progressing Towards Goal  Goal: *Urinary output within identified parameters  Outcome: Progressing Towards Goal  Goal: *Hemodynamically stable  Outcome: Progressing Towards Goal  Goal: *Tolerating diet  Outcome: Progressing Towards Goal  Goal: *Lab values improving  Outcome: Progressing Towards Goal     Problem: Acute Renal Failure: Day 5  Goal: Off Pathway (Use only if patient is Off Pathway)  Outcome: Progressing Towards Goal  Goal: Activity/Safety  Outcome: Progressing Towards Goal  Goal: Diagnostic Test/Procedures  Outcome: Progressing Towards Goal  Goal: Nutrition/Diet  Outcome: Progressing Towards Goal  Goal: Discharge Planning  Outcome: Progressing Towards Goal  Goal: Medications  Outcome: Progressing Towards Goal  Goal: Respiratory  Outcome: Progressing Towards Goal  Goal: Treatments/Interventions/Procedures  Outcome: Progressing Towards Goal  Goal: Psychosocial  Outcome: Progressing Towards Goal  Goal: *Optimal pain control at patient's stated goal  Outcome: Progressing Towards Goal  Goal: *Urinary output within identified parameters  Outcome: Progressing Towards Goal  Goal: *Hemodynamically stable  Outcome: Progressing Towards Goal  Goal: *Tolerating diet  Outcome: Progressing Towards Goal  Goal: *Lab values improving  Outcome: Progressing Towards Goal     Problem: Acute Renal Failure: Day 6  Goal: Off Pathway (Use only if patient is Off Pathway)  Outcome: Progressing Towards Goal  Goal: Activity/Safety  Outcome: Progressing Towards Goal  Goal: Diagnostic Test/Procedures  Outcome: Progressing Towards Goal  Goal: Nutrition/Diet  Outcome: Progressing Towards Goal  Goal: Discharge Planning  Outcome: Progressing Towards Goal  Goal: Medications  Outcome: Progressing Towards Goal  Goal: Respiratory  Outcome: Progressing Towards Goal  Goal: Treatments/Interventions/Procedures  Outcome: Progressing Towards Goal  Goal: Psychosocial  Outcome: Progressing Towards Goal     Problem: Acute Renal Failure: Discharge Outcomes  Goal: *Optimal pain control at patient's stated goal  Outcome: Progressing Towards Goal  Goal: *Urinary output within identified parameters  Outcome: Progressing Towards Goal  Goal: *Hemodynamically stable  Outcome: Progressing Towards Goal  Goal: *Tolerating diet  Outcome: Progressing Towards Goal  Goal: *Lab values stabilized  Outcome: Progressing Towards Goal  Goal: *Verbalizes understanding and describes medication purposes and frequencies  Outcome: Progressing Towards Goal  Goal: *Medication reconciliation  Outcome: Progressing Towards Goal

## 2021-08-19 NOTE — CONSULTS
Consult    Patient: Diandra Bolton MRN: [id number]  SSN: [id number]    YOB: 1950  Age: 70 y.o. Sex: male      Subjective:      Diandra Bolton is a 70 y.o. male who was referred to me for placement of tunneled HD catheter.      Past Medical History:   Diagnosis Date    Atrial fibrillation ([address]     Cerebrovascular accident ([address]     , 0- general weakness    Chronic diastolic heart failure (HCC)     Chronic kidney disease     dialysis    Diabetes ([address]     Heart failure ([address]     History of cardioversion     Hypercholesterolemia     Hypertension     Morbid obesity ([address]      Past Surgical History:   Procedure Laterality Date    HX VASCULAR ACCESS Right     right neck      Family History   Problem Relation Age of Onset    Heart Attack Mother 52    Diabetes Other     Hypertension Other      Social History     Tobacco Use    Smoking status: Former Smoker     Years: 8.00     Quit date: 1971     Years since quittin.1    Smokeless tobacco: Never Used    Tobacco comment: last smoked in my late 's   Substance Use Topics    Alcohol use: No     Alcohol/week: 0.0 standard drinks      Current Facility-Administered Medications   Medication Dose Route Frequency Provider Last Rate Last Admin    levoFLOXacin (LEVAQUIN) 500 mg in D5W IVPB  500 mg IntraVENous Q48H Kirk Lennox, MD        epoetin negrita-epbx (RETACRIT) injection 8,000 Units  8,000 Units SubCUTAneous Q  & Shivam Tsang MD   8,000 Units at 21    therapeutic multivitamin [facility]) tablet 1 Tablet  1 Tablet Oral DAILY Kaylen Lewis DO   1 Tablet at 21 08    alteplase (CATHFLO) 4 mg in sterile water (preservative free) 4 mL injection  4 mg InterCATHeter DIALYSIS Jude Allen MD   4 mg at 21    [Held by provider] heparin 25,000 units in D5W 250 ml infusion  18-36 Units/kg/hr IntraVENous TITRATE Arianna Neal PA-C        insulin lispro (HUMALOG) injection SubCUTAneous AC&HS Eric Barton NP        atropine 1 mg/mL injection 0.5 mg  0.5 mg IntraVENous PRN Drew Boland MD        sodium chloride (NS) flush 5-40 mL  5-40 mL IntraVENous Q8H Sandra Joy MD   10 mL at 08/18/21 2139    sodium chloride (NS) flush 5-40 mL  5-40 mL IntraVENous PRN Sandra Boland MD   10 mL at 08/15/21 2147    sodium chloride (NS) flush 5-40 mL  5-40 mL IntraVENous Q8H Eliseo Mt, NP   10 mL at 08/18/21 2139    sodium chloride (NS) flush 5-40 mL  5-40 mL IntraVENous PRN [facility], NP        acetaminophen (TYLENOL) tablet 650 mg  650 mg Oral Q6H PRN [facility], NP        Or   [facility] acetaminophen (TYLENOL) suppository 650 mg  650 mg Rectal Q6H PRN [facility], NP        senna (SENOKOT) tablet 8.6 mg  1 Tablet Oral DAILY PRN [facility], NP        famotidine (PF) (PEPCID) 20 mg in 0.9% sodium chloride 10 mL injection  20 mg IntraVENous DAILY Alma Ip B, NP   20 mg at 08/19/21 9496    docusate sodium (COLACE) capsule 100 mg  100 mg Oral DAILY Jay Ip B, NP   100 mg at 08/19/21 7730    glucose chewable tablet 16 g  4 Tablet Oral PRN [facility], NP        glucagon (GLUCAGEN) injection 1 mg  1 mg IntraMUSCular PRN [facility], RICH        dextrose (D50W) injection syrg 12.5-25 g  25-50 mL IntraVENous PRN [facility], NP   25 g at 08/16/21 0345        No Known Allergies    Review of Systems:  A comprehensive review of systems was negative except for that written in the History of Present Illness.     Objective:     Vitals:    08/19/21 1145 08/19/21 1200 08/19/21 1215 08/19/21 1230   BP: (!) 143/68 (!) 146/48 122/68 123/65   Pulse: (!) 53 (!) 45 (!) 43 66   Resp:       Temp:       TempSrc:       SpO2:       Weight:       Height:              Assessment:     Hospital Problems  Date Reviewed: 1/25/2016        Codes Class Noted POA    Severe protein-calorie malnutrition ([facility] [address] (Chronic) ICD-10-CM: D87  ICD-9-CM: 262  8/16/2021 Yes Renal failure ICD-10-CM: N19  ICD-9-CM: 364  8/15/2021 Unknown        Bradycardia ICD-10-CM: R00.1  ICD-9-CM: 427.89  8/15/2021 Unknown              Plan:     77yo M planned for placement of tunneled HD catheter placement today     Signed By: Joyce Farrar MD     August 19, 2021

## 2021-08-19 NOTE — ROUTINE PROCESS
0710: Bedside and Verbal shift change report given to [address] and Gabrielle Michael RN (oncoming nurse) by Vinayak Horton RN (offgoing nurse). Report included the following information SBAR, Kardex, Intake/Output, MAR and Recent Results. 1030: Pt off unit for dialysis.

## 2021-08-19 NOTE — PROGRESS NOTES
Problem: Mobility Impaired (Adult and Pediatric)  Goal: *Acute Goals and Plan of Care (Insert Text)  Description: Physical Therapy Goals  Initiated 8/19/2021 and to be accomplished within 7 day(s)  1. Patient will move from supine to sit and sit to supine  in bed with minimal assistance/contact guard assist.    2.  Patient will transfer from bed to chair and chair to bed with moderate assistance  using the least restrictive device. 3.  Patient will perform sit to stand with moderate assistance . 4. Patient will ambulate with maximal assistance for 5 feet with the least restrictive device. PLOF: Pt is an unreliable historian and confused on eval, stating he lives with his wife. He was able to amb short distances with a RW and his wife helped him around. Outcome: Progressing Towards Goal     PHYSICAL THERAPY EVALUATION    Patient: Javeir Mabry (59 y.o. male)  Date: 8/19/2021  Primary Diagnosis: Renal failure [N19]  Bradycardia [R00.1]  Procedure(s) (LRB):  INSERTION TUNNELED CENTRAL VENOUS CATHETER (N/A) Day of Surgery   Precautions:  Fall, Skin, Aspiration    ASSESSMENT :  Based on the objective data described below, the patient presents with generalized weakness, confusion, poor functional mobility tolerance, and decreased endurence. RN cleared for mobility. Pt found semi-reclined in bed, 3L O2 out of his nose, in NAD, willing to work with PT. Assisted putting NC in nose properly. Pt is only alert to his name, stating it is 0 and he is in [place]. Sup-sit with additional time and cueing, maxAx1. Pt required increased time to push up to sitting. Once sitting, fair seated balance. Sitting EOB for ~10 minutes. Pt was able to scoot laterally along EOB with CGA over a span of 5 minutes. Sit-supine with modA for B Le's. Pt was left sidelying on his L with call bell nearby, all needs met, bed alarm on. Recommend rehab at this time.      Patient will benefit from skilled intervention to address the above impairments. Patient's rehabilitation potential is considered to be Fair  Factors which may influence rehabilitation potential include:   []         None noted  [x]         Mental ability/status  [x]         Medical condition  []         Home/family situation and support systems  [x]         Safety awareness  []         Pain tolerance/management  []         Other:      PLAN :  Recommendations and Planned Interventions:   [x]           Bed Mobility Training             [x]    Neuromuscular Re-Education  [x]           Transfer Training                   []    Orthotic/Prosthetic Training  [x]           Gait Training                          []    Modalities  [x]           Therapeutic Exercises           []    Edema Management/Control  [x]           Therapeutic Activities            [x]    Family Training/Education  [x]           Patient Education  []           Other (comment):    Frequency/Duration: Patient will be followed by physical therapy 1-2 times per day/4-7 days per week to address goals. Discharge Recommendations: Rehab  Further Equipment Recommendations for Discharge: N/A     SUBJECTIVE:   Patient stated [facility] aren't you doing your job? I need my medications.     OBJECTIVE DATA SUMMARY:     Past Medical History:   Diagnosis Date    Atrial fibrillation ([address]     Cerebrovascular accident ([facility] [address]     [address]- general weakness    Chronic diastolic heart failure ([address]     Chronic kidney disease     dialysis    Diabetes ([address]     Heart failure ([address]     History of cardioversion     Hypercholesterolemia     Hypertension     Morbid obesity ([address]      Past Surgical History:   Procedure Laterality Date    HX VASCULAR ACCESS Right     right neck     Barriers to Learning/Limitations: yes;  altered mental status (i.e.Sedation, Confusion)  Compensate with: N/A  Home Situation:  Home Situation  Home Environment: Apartment  # Steps to Enter: 1  One/Two Story Residence: One story  Living Alone: No  Support Systems: Child(michelle), Spouse/Significant Other/Partner  Patient Expects to be Discharged to[de-identified] Apartment  Current DME Used/Available at Home: Cane, straight, Walker, rolling  Critical Behavior:  Neurologic State: Alert;Confused  Orientation Level: Oriented to person;Disoriented to place; Disoriented to situation;Disoriented to time  Cognition: Impaired decision making  Safety/Judgement: Fall prevention; Insight into deficits  Psychosocial  Patient Behaviors: Calm; Cooperative    Strength:    Strength: Generally decreased, functional    Tone & Sensation:   Tone: Normal    Sensation: Intact    Range Of Motion:  AROM: Generally decreased, functional    Functional Mobility:  Bed Mobility:     Supine to Sit: Maximum assistance; Additional time;Assist x1  Sit to Supine: Moderate assistance;Assist x1  Scooting: Contact guard assistance    Balance:   Sitting: Impaired; With support  Sitting - Static: Fair (occasional)  Sitting - Dynamic: Fair (occasional)  Standing:  (pt declined)    Pain:  Pain level pre-treatment: 0/10   Pain level post-treatment: 0/10   Pain Intervention(s) : Medication (see MAR); Rest, Repositioning  Response to intervention: Nurse notified, See doc flow    Activity Tolerance:   Pt tolerated mobility fair, limited by weakness/confusion  Please refer to the flowsheet for vital signs taken during this treatment. After treatment:   []         Patient left in no apparent distress sitting up in chair  [x]         Patient left in no apparent distress in bed  [x]         Call bell left within reach  [x]         Nursing notified  []         Caregiver present  [x]         Bed alarm activated  []         SCDs applied    COMMUNICATION/EDUCATION:   [x]         Role of Physical Therapy in the acute care setting. [x]         Fall prevention education was provided and the patient/caregiver indicated understanding. [x]         Patient/family have participated as able in goal setting and plan of care.   []         Patient/family agree to work toward stated goals and plan of care. []         Patient understands intent and goals of therapy, but is neutral about his/her participation. []         Patient is unable to participate in goal setting/plan of care: ongoing with therapy staff.  []         Other:     Thank you for this referral.  Grey Irby   Time Calculation: 25 mins      Eval Complexity: History: MEDIUM  Complexity : 1-2 comorbidities / personal factors will impact the outcome/ POC Exam:MEDIUM Complexity : 3 Standardized tests and measures addressing body structure, function, activity limitation and / or participation in recreation  Presentation: MEDIUM Complexity : Evolving with changing characteristics  Clinical Decision Making:Medium Complexity    Overall Complexity:MEDIUM

## 2021-08-19 NOTE — ADT AUTH CERT NOTES
Renal Failure, Chronic - Care Day 3 (8/17/2021) by Gabino Mejía RN       Review Status Review Entered   Completed 8/18/2021 17:00      Criteria Review      Care Day: 3 Care Date: 8/17/2021 Level of Care: Step Down    Guideline Day 3    Level Of Care    ( ) Floor    8/18/2021 16:59:16 EDT by Anant Ferrer      stepdown  T 99.1  P 53, 47, 55, 61  /64, 157/65  R 20  91%, 95% RA  Consult Infectious diseases    Clinical Status    ( ) * Mental status at baseline    8/18/2021 16:59:16 EDT by Anant Ferrer      Awake alert and oriented x1.  Seen on dialysis    (X) * Pulmonary edema absent or improved    8/18/2021 16:59:16 EDT by Anant Ferrer      CT A&P  The chamber cardiac enlargement. Right lower and middle lobe infiltrate; these are dependent lobes. Differential includes primarily pulmonary edema, aspiration, pneumonia. There is extensive soft tissue edema    Activity    ( ) * Ambulatory    8/18/2021 16:59:16 EDT by Mónica Wesley w/assist    Routes    (X) * Oral hydration    (X) * Oral medications    8/18/2021 17:00:28 EDT by Anant Ferrer      Rocephin 2 g IV q24h  Pepcid 20 mg IV daily  colace 100 mg po daily    (X) Oral diet as tolerated    8/18/2021 16:59:16 EDT by Anant Ferrer      Reg Low K    Interventions    (X) Possible dialysis    8/18/2021 16:59:16 EDT by Anant Ferrer      hemodialysis    ( ) Possibly establish permanent access for dialysis    8/18/2021 16:59:16 EDT by Maylin Roland seen during dialysis at 1 pm,temp cath is working well today. hold on permcath due to pos blood cultures    (X) Monitor electrolytes, glucose, acid-base, and volume status    8/18/2021 16:59:16 EDT by Anant Ferrer      WBC 7.3  Hgb 8.2    CL 99  BUN 96  Crea 8.10  CA 8.3  Blood Cx: prelim Gram rods    Medications    (X) Possible erythropoiesis-stimulating agent, calcium supplement, phosphate binder, bicarbonate, vitamin D, antihypertensive medication    * Milestone   Additional Notes Infectious Disease Consultation Note   Reason: Gram-negative bloodstream infection      Assessment :       70 y. o. male pmhx of afib, bradycardia, CKD stage 4, bladder neck obstruction, chronic indwelling vines admitted to [facility] on 8/15/2021 with generalized weakness.        Now with gram-negative bacteremia, significant pyuria, multiple organisms in urine culture       Clinical presentation consistent with gram-negative bloodstream infection, probable cystitis       Exact source of gram-negative bloodstream infection not entirely clear at this time.  Recent history of hydronephrosis/bladder outlet obstruction/indwelling Vines concerning for cystitis as a source of bloodstream infection. D/w micro lab- lactose  gnr.        Multiple organisms seen in urine culture 8/15 (enterococcus, pseudomonas, gnr) likely represents contamination. Monitor for catheter associated cystitis.        Recommendations:       1.  Continue ceftriaxone   2.  Perform susceptibility testing of gram-negative duarte, Pseudomonas, Enterococcus in urine culture -we will need to treat this if plans for urological intervention based on CT scan   3.  obtain CT abdomen/pelvis to evaluate for hydronephrosis, complicated UTI   4. F/u ID and susceptibility of gram-negative duarte in blood culture 8/15, follow-up repeat blood culture 8/17       Thank you for consultation request. Above plan was discussed in details with patient, primary team. Please call me if any further questions or concerns. Will continue to participate in the care of this patient. HPI:       70 y. o. male pmhx of afib, bradycardia, CKD stage 4, bladder neck obstruction, chronic indwelling vines admitted to [facility] on 8/15/2021 with generalized weakness.        Patient recently had indwelling Vines catheter.  Notably patient had vines removed by urology on 8/5/21. Patient present today w/ c/o 2 weeks of progressive weakness and inability to walk. Notes frequent falls w/o syncope.  On presentation to ED patient was bradycardic (underlying rhythm afib) with HR in the 40's and hyperkalemic w/ K+ of 6.2.  w/ srCr 14.6. Patient was given  calcium gluconate, insulin, and d50, 1 amp bicarb, and 80 mg IV lasix. A vines catheter was placed w/ > 2L UOP. Nephrology was consulted for emergent HD.   Patient had dialysis catheter placed on admission.  Blood culture now positive for gram-negative rods.  I have been consulted for further recommendations.       Patient is a very poor historian.  Unable to give me the exact details which led to his hospitalization.  Denies significant abdominal pain, flank pain.  States that he had Vines catheter at home. RENAL DAILY PROGRESS NOTE       Plan / Recommendation:        1. Acute/crf stage 4,obstruction,vines cath placed.    seen during dialysis at 1 pm,temp cath is working well today. hold on permcath due to pos blood cultures   2.anemia,start epo   3.hypocalcemia,sec  hyperparathyroidism   4.bacteremia,gram neg rods ,probably urine source            Cardiology  Attestation       Plan:    1 From a cardiac standpoint, continue with supportive care.  On telemetry he remains in atrial fibrillation with rates well controlled.  Given history of recurrent falls and prior hematuria, plan per his outpatient cardiologist has been to not fully anticoagulate.  Continue full dose aspirin.  episodes of bradycardia are mostly overnight while patient is asleep.  Low blood pressures.  Continue to monitor on telemetry and check electrolytes with overnight labs. 2.  ID evaluating patient given concern for bacteremia and need for a permacath. Hospitalist Progress Note       Patient seen and examined at bedside, he is oriented x1.  Currently on dialysis, no further issues with access at this time. Dialysis nurse states that Cathflo was used overnight in line with good result.  patient denies chest pain, shortness of breath, nausea vomiting.       Assessment/Plan     1.  Hyperkalemia resolved   2. Luis Antonio Man on CKD 4 with obstructive uropathy, continue dialysis per nephrology. 3.  Bloodstream infection, gram-negative rods.  Repeat blood cultures (8/17/2021) no growth to date.  Follow culture, sensitivity.  Will not place permacath at this time.  ID to consult. 4.  Dialysis access issues.  Permacath when cleared by ID services. 5.  Anemia of chronic disease, with iron deficiency component.  On Retacrit. 6.  A. fib with slow RVR.  Possible sick sinus syndrome.  Rates controlled.  Continue full dose aspirin.  Cardiology follows. 7.  Severe protein calorie malnutrition   --> Nutritionist follows.  On supplement.  Multivitamin. 8.  Diabetes type 2 with hypoglycemia.  Sliding scale insulin.  Hypoglycemia protocol   9.  Complicated urinary tract infection with obstructive uropathy.  ID to follow. 10.  Secondary hyperparathyroidism of chronic kidney disease. 11.  Hypertensive heart disease   12.  Hypertension   15.  Moderate pulmonary hypertension   14.  Falls at home.  PT Jacob Vázquez has not been on anticoagulation in the outpatient setting for this reason. 50.  TJNMR metabolic encephalopathy in the setting of infection.  TSH within normal limits.  Head CT nonacute.  Will check for other causes. 16. Full code      SLP Note:       Pt NPO per vascular surgery; will hold dysphagia management accordingly and continue to follow per POC.             CT ABD PELV WO CONT    The chamber cardiac enlargement. Right lower and middle lobe infiltrate; these are dependent lobes. Differential   includes primarily pulmonary edema, aspiration, pneumonia. There is extensive soft tissue edema      ECHO ADULT LIMITED W DOPPLER & COLOR    Result status: Final result    LV: Estimated LVEF is 55 - 60%. Visually measured ejection fraction. Normal cavity size and systolic function (ejection fraction normal). Mild concentric hypertrophy. TV: Mild tricuspid valve regurgitation is present. PA: Moderate pulmonary hypertension. Pulmonary arterial systolic pressure is 61 mmHg. IVC: Severely elevated central venous pressure (15 mmHg); IVC diameter is larger than 21 mm and collapses less than 50% with respiration.         Renal Failure, Chronic - Care Day 2 (8/16/2021) by Christianne Rojas RN       Review Status Review Entered   Completed 8/18/2021 16:48      Criteria Review      Care Day: 2 Care Date: 8/16/2021 Level of Care: ICU    Guideline Day 2    Level Of Care    ( ) Floor    8/18/2021 16:48:16 EDT by Yuliana Corbin      8/16 tx to stepdown  Cardiac monitoring  Aspiration precautions    CXR:1. Worsened aeration of bilateral hemithoraces.  Follow-up with plain imaging    Clinical Status    ( ) * Hemodynamic stability    8/18/2021 16:48:16 EDT by Yuliana Corbin      T 98.5  P sustained low 40s to 50s  /48, 140/59  R 22, 19, 25, 24, 22, 24, 26  93, 91% RA, 96, 99% NC 4L  100% NC 2L    (X) * Nausea and vomiting absent or improved    (X) * Electrolytes at baseline or improved    (X) * Acid-base status at baseline or improved    Routes    ( ) Parenteral or oral medications    8/18/2021 16:48:16 EDT by Yuliana Corbin      dextrose 25 g IV once  Retacrit 8000 units sq 3x/wk  pepcid 20 mg IV daily  CA gluconate IV once  Rocephin 2 g IV q24h  Heparin ggt ordered/held    Interventions    (X) Possible dialysis    8/18/2021 16:48:16 EDT by Jamila Fuenteser verify consent for Hemodialysis    ( ) Monitor electrolytes, glucose, acid-base, and volume status    8/18/2021 16:48:16 EDT by Yuliana Corbin      Hgb 8.3  , 159, 116  Crea 11.1, 11.4, 9.31  CA 8.4, 8.3, 8.1  Phos 5.9  Ferritin 521    Blood cx: Culture result:  Preliminary  GRAM NEGATIVE RODS GROWING IN THE AEROBIC AND ANAEROBIC BOTTLES    Medications    (X) Possible erythropoiesis-stimulating agent, calcium supplement, phosphate binder, bicarbonate, vitamin D, antihypertensive medication    8/18/2021 16:48:16 EDT by Yuliana Corbin   retacrit sq    * Milestone   Additional Notes   Hospitalist Group Progress Note   Subjective:       Patient states he is feeling pretty good today -denies dizziness, chest pain, shortness of breath, states he has seen a small amount of blood in his urine recently and endorses that he has been urinating but \"small amounts\"       Assessment/Plan:   1. Hyperkalemia, in setting of TONY -6.2 on admission, now improved   2. Acute on chronic stage IV kidney disease with obstructive uropathy -continue hemodialysis per nephrology; n.p.o. after midnight per vascular surgery Dr. Barry Gonzalez dialysis catheter placement by IR -Dr. Arely Fay to treatment team ? Timing of catheter with + blood cultures   3. Urinary tract infection -continue antibiotics, follow urine culture   4. Bacteremia -blood cultures from 8/15/2021 2/2 + for GNR; recheck blood cultures in a.m.  Consider ID consult in AM   5. Bradycardia, appears to be A. Fib, possible SSS / in setting of hyperkalemia - management per cardiology, heparin drip initiated by cardiology however in setting of ? Procedure, hold heparin drip for now.     6. Acute normocytic anemia ?  Impact of worsening renal function - no evidence of acute bleeding   7. Acute weakness -improved, ? in setting of worsening renal function; head CT normal on admission.    8. Acute on chronic diastolic heart failure with moderate leg edema -duplex negative for DVT on admission; fluid management with dialysis   9. Severe protein calorie malnutrition - nutritionist following, cont supplement, MVI   10. History of type 2 diabetes with hyperglycemia   11. General:  Alert, NAD   HEENT: Oral mucosa moist; PERRLA   Cardiovascular:  irreg rhythm, joel HR upper 40's-50's, Nl S1/S2   Pulmonary: [address] throughout.  Normal resp effort   GI:  +BS in all four quadrants, soft, non-tender   Extremities:  2+ BLE edema   Neuro: alert and oriented x 4       Lines / vines / HD sites / Dafne Phillips - Right internal jugular IV; vines         Pulmonary Disease Progress Notes   Patient stable to downgrade to stepdown. Discussed with Dr. Pavan Verdin, Dr. Jyotsna Castro, and Dr. Miguelangel Dillard. Patient does have persistent bradycardia but it is much improved from yesterday evening- 20's to 35s yesterday, now 40's to 46s. Notes reviewed during handoff with Dr. Miguelangel Dillard, cardiology note discussing heparin gtt, added without bolus- will hold until tomorrow however as vascular placed consult to IR regarding slow jak HD catheter, possible exchange tomorrow.              Cardiology Progress Note    1.  Bradycardia-appears to be slow atrial fibrillation.  No long pauses are noted.  Blood pressure is stable continue to monitor.  Agree with external transcutaneous pacemaker patch.  Likely driven by hyperkalemia   2. Santana Endo unclear etiology   3.  History of falls to reported by family not sure if he had syncope needs close monitoring for bradycardia arrhythmia   4.  History of paroxysmal A. fib in past.  Possible sick sinus syndrome   5.  Hypertension and hypertensive heart disease   6.  Acute on chronic diastolic heart failure with moderate leg edema   7.  History of CVA   8.  Hyperkalemia   9. acute renal failure with significant worsening of BUN/creatinine likely precipitating his weakness and other etiology.       Plan:       Awaiting HD-- plan discussed with ICU team.   Will continue to monitor bradycardia-- no pause or hypotension noted. Start heparin drip for persistent atrial fibrillation. Will f/u       Subjective:       No new complaints.       Heart:  irregularly irregular rhythm, systolic UEQCAA: IQMVUKOVSHHB 9/1, MFHXATI [facility]    PLAN:   Resp:    -Titrate FiO2/ supp O2 for SpO2 >90%; CXR with likely pulmonary edema.     - currently maintaining good sats on RA   I/D:    -continue Ceftriaxone for UTI   -F/u blood and urine cultures    Hem/Onc:    -Daily CBC; H/H, and plts are stable   -SQ heparin for DVT prophylaxis   CVS:    -cardiology following   - external transcutaneous pacing if needed   -Hold coreg and norvasc   - hold statin for now   -hold lasix and zaroxoyln    -Hold QT prolonging agents    -BLE dopplers negative for DVT   -echo pending   Metabolic:    - q 12 hours BMP   -monitor e-lytes; replace PRN   Renal:    -Trend Renal indices, strict I/Os, vines   -HD cath placed on 8/15, HD today - nephrology following   Endocrine:    -Patient is historically bradycardic, TSH within normal limits   - continue ssi for glycemic control, avoid hypoglycemia   GI:    -SUP (pepcid)   -Continue renal diet   Musc/Skin:    No acute issues, wound care as needed   Neuro:    -CT head with no acute abnormalities   -monitor for acute changes in neuro status   DVT ppx   -SQ heparin    Code Status:   -Full code       8/16/21       - patient alert and oriented x 3   - maintaining SpO2 > 98% on RA   -HD today   -echo pending         RENAL DAILY PROGRESS NOTE    Subjective:       Ama Heart a 70 y.o.  who presents with Renal failure [N19]   Bradycardia [R00.1].     Asked to evaluate for renal failure,hyperkalemia,acidosis. presented with severe bradycardia   Chief complains: Patient denies nausea, vomiting, chest pain, dizziness, shortness of breath or headache.   - Reviewed last 24 hrs events       Plan / Recommendation:        1. Acute/crf stage 4,obstruction,vines cath placed. dialyzed last night,hyperkalemia corrected   Plan dialysis today,pt is agreable   2.anemia,start epo   3.hypocalcemia,check pth   4.persistent bradycardia despite correction of hyperkalemia      alteplase (CATHFLO) 4 mg in sterile water (preservative free) 4 mL injection    Dose: 4 mg   Freq: ONCE (DIALYSIS) Route: IK

## 2021-08-19 NOTE — PROGRESS NOTES
OT orders received and medical chart review completed. 1240: Per nursing, pt TIKI for HD. OT to follow up as schedule permits.    1440: Pt remains TIKI at HD  Thank you for this referral,  Anh Pena MS OTR/L

## 2021-08-20 LAB
ANION GAP SERPL CALC-SCNC: 6 MMOL/L (ref 3–18)
BASOPHILS # BLD: 0 K/UL (ref 0–0.1)
BASOPHILS NFR BLD: 0 % (ref 0–2)
BUN SERPL-MCNC: 54 MG/DL (ref 7–18)
BUN/CREAT SERPL: 8 (ref 12–20)
CALCIUM SERPL-MCNC: 8.2 MG/DL (ref 8.5–10.1)
CHLORIDE SERPL-SCNC: 101 MMOL/L (ref 100–111)
CO2 SERPL-SCNC: 28 MMOL/L (ref 21–32)
CREAT SERPL-MCNC: 6.45 MG/DL (ref 0.6–1.3)
DIFFERENTIAL METHOD BLD: ABNORMAL
EOSINOPHIL # BLD: 0.1 K/UL (ref 0–0.4)
EOSINOPHIL NFR BLD: 1 % (ref 0–5)
ERYTHROCYTE [DISTWIDTH] IN BLOOD BY AUTOMATED COUNT: 16.7 % (ref 11.6–14.5)
GLUCOSE BLD STRIP.AUTO-MCNC: 106 MG/DL (ref 70–110)
GLUCOSE BLD STRIP.AUTO-MCNC: 82 MG/DL (ref 70–110)
GLUCOSE BLD STRIP.AUTO-MCNC: 99 MG/DL (ref 70–110)
GLUCOSE SERPL-MCNC: 83 MG/DL (ref 74–99)
HCT VFR BLD AUTO: 25.1 % (ref 36–48)
HGB BLD-MCNC: 8.1 G/DL (ref 13–16)
LYMPHOCYTES # BLD: 0.7 K/UL (ref 0.9–3.6)
LYMPHOCYTES NFR BLD: 7 % (ref 21–52)
MCH RBC QN AUTO: 26.1 PG (ref 24–34)
MCHC RBC AUTO-ENTMCNC: 32.3 G/DL (ref 31–37)
MCV RBC AUTO: 81 FL (ref 74–97)
MONOCYTES # BLD: 0.7 K/UL (ref 0.05–1.2)
MONOCYTES NFR BLD: 7 % (ref 3–10)
NEUTS SEG # BLD: 8.7 K/UL (ref 1.8–8)
NEUTS SEG NFR BLD: 84 % (ref 40–73)
PLATELET # BLD AUTO: 206 K/UL (ref 135–420)
PMV BLD AUTO: 10.9 FL (ref 9.2–11.8)
POTASSIUM SERPL-SCNC: 3.6 MMOL/L (ref 3.5–5.5)
RBC # BLD AUTO: 3.1 M/UL (ref 4.35–5.65)
SODIUM SERPL-SCNC: 135 MMOL/L (ref 136–145)
WBC # BLD AUTO: 10.5 K/UL (ref 4.6–13.2)

## 2021-08-20 PROCEDURE — 80048 BASIC METABOLIC PNL TOTAL CA: CPT

## 2021-08-20 PROCEDURE — 74011250637 HC RX REV CODE- 250/637: Performed by: INTERNAL MEDICINE

## 2021-08-20 PROCEDURE — 77030002916 HC SUT ETHLN J&J -A: Performed by: STUDENT IN AN ORGANIZED HEALTH CARE EDUCATION/TRAINING PROGRAM

## 2021-08-20 PROCEDURE — 85025 COMPLETE CBC W/AUTO DIFF WBC: CPT

## 2021-08-20 PROCEDURE — 02HV33Z INSERTION OF INFUSION DEVICE INTO SUPERIOR VENA CAVA, PERCUTANEOUS APPROACH: ICD-10-PCS | Performed by: STUDENT IN AN ORGANIZED HEALTH CARE EDUCATION/TRAINING PROGRAM

## 2021-08-20 PROCEDURE — 97530 THERAPEUTIC ACTIVITIES: CPT

## 2021-08-20 PROCEDURE — C1750 CATH, HEMODIALYSIS,LONG-TERM: HCPCS | Performed by: STUDENT IN AN ORGANIZED HEALTH CARE EDUCATION/TRAINING PROGRAM

## 2021-08-20 PROCEDURE — 99232 SBSQ HOSP IP/OBS MODERATE 35: CPT | Performed by: HOSPITALIST

## 2021-08-20 PROCEDURE — 65660000000 HC RM CCU STEPDOWN

## 2021-08-20 PROCEDURE — 82962 GLUCOSE BLOOD TEST: CPT

## 2021-08-20 PROCEDURE — 74011000250 HC RX REV CODE- 250: Performed by: STUDENT IN AN ORGANIZED HEALTH CARE EDUCATION/TRAINING PROGRAM

## 2021-08-20 PROCEDURE — 74011000250 HC RX REV CODE- 250: Performed by: NURSE PRACTITIONER

## 2021-08-20 PROCEDURE — 99232 SBSQ HOSP IP/OBS MODERATE 35: CPT | Performed by: NURSE PRACTITIONER

## 2021-08-20 PROCEDURE — 74011250636 HC RX REV CODE- 250/636: Performed by: NURSE PRACTITIONER

## 2021-08-20 PROCEDURE — 0JH63XZ INSERTION OF TUNNELED VASCULAR ACCESS DEVICE INTO CHEST SUBCUTANEOUS TISSUE AND FASCIA, PERCUTANEOUS APPROACH: ICD-10-PCS | Performed by: STUDENT IN AN ORGANIZED HEALTH CARE EDUCATION/TRAINING PROGRAM

## 2021-08-20 PROCEDURE — 74011250637 HC RX REV CODE- 250/637: Performed by: NURSE PRACTITIONER

## 2021-08-20 PROCEDURE — 36558 INSERT TUNNELED CV CATH: CPT | Performed by: STUDENT IN AN ORGANIZED HEALTH CARE EDUCATION/TRAINING PROGRAM

## 2021-08-20 PROCEDURE — 74011250637 HC RX REV CODE- 250/637: Performed by: HOSPITALIST

## 2021-08-20 PROCEDURE — 74011250636 HC RX REV CODE- 250/636: Performed by: HOSPITALIST

## 2021-08-20 PROCEDURE — 77010033678 HC OXYGEN DAILY

## 2021-08-20 PROCEDURE — 74011250636 HC RX REV CODE- 250/636: Performed by: STUDENT IN AN ORGANIZED HEALTH CARE EDUCATION/TRAINING PROGRAM

## 2021-08-20 PROCEDURE — C1769 GUIDE WIRE: HCPCS | Performed by: STUDENT IN AN ORGANIZED HEALTH CARE EDUCATION/TRAINING PROGRAM

## 2021-08-20 PROCEDURE — 77030002986 HC SUT PROL J&J -A: Performed by: STUDENT IN AN ORGANIZED HEALTH CARE EDUCATION/TRAINING PROGRAM

## 2021-08-20 PROCEDURE — 97166 OT EVAL MOD COMPLEX 45 MIN: CPT

## 2021-08-20 PROCEDURE — 36415 COLL VENOUS BLD VENIPUNCTURE: CPT

## 2021-08-20 RX ORDER — HEPARIN SODIUM 1000 [USP'U]/ML
INJECTION, SOLUTION INTRAVENOUS; SUBCUTANEOUS AS NEEDED
Status: DISCONTINUED | OUTPATIENT
Start: 2021-08-20 | End: 2021-08-20 | Stop reason: HOSPADM

## 2021-08-20 RX ORDER — POTASSIUM CHLORIDE 20 MEQ/1
20 TABLET, EXTENDED RELEASE ORAL
Status: COMPLETED | OUTPATIENT
Start: 2021-08-20 | End: 2021-08-20

## 2021-08-20 RX ORDER — LIDOCAINE HYDROCHLORIDE 10 MG/ML
INJECTION, SOLUTION EPIDURAL; INFILTRATION; INTRACAUDAL; PERINEURAL AS NEEDED
Status: DISCONTINUED | OUTPATIENT
Start: 2021-08-20 | End: 2021-08-20 | Stop reason: HOSPADM

## 2021-08-20 RX ADMIN — SODIUM CHLORIDE 10 ML: 9 INJECTION, SOLUTION INTRAMUSCULAR; INTRAVENOUS; SUBCUTANEOUS at 17:20

## 2021-08-20 RX ADMIN — POTASSIUM CHLORIDE 20 MEQ: 1500 TABLET, EXTENDED RELEASE ORAL at 10:51

## 2021-08-20 RX ADMIN — SODIUM CHLORIDE 5 ML: 9 INJECTION, SOLUTION INTRAMUSCULAR; INTRAVENOUS; SUBCUTANEOUS at 06:00

## 2021-08-20 RX ADMIN — HEPARIN SODIUM 5000 UNITS: 5000 INJECTION INTRAVENOUS; SUBCUTANEOUS at 17:19

## 2021-08-20 RX ADMIN — FAMOTIDINE 20 MG: 10 INJECTION INTRAVENOUS at 08:51

## 2021-08-20 RX ADMIN — THERA TABS 1 TABLET: TAB at 08:51

## 2021-08-20 RX ADMIN — TAMSULOSIN HYDROCHLORIDE 0.4 MG: 0.4 CAPSULE ORAL at 08:52

## 2021-08-20 RX ADMIN — HEPARIN SODIUM 5000 UNITS: 5000 INJECTION INTRAVENOUS; SUBCUTANEOUS at 09:01

## 2021-08-20 RX ADMIN — HEPARIN SODIUM 5000 UNITS: 5000 INJECTION INTRAVENOUS; SUBCUTANEOUS at 02:41

## 2021-08-20 RX ADMIN — DOCUSATE SODIUM 100 MG: 100 CAPSULE, LIQUID FILLED ORAL at 08:51

## 2021-08-20 NOTE — OP NOTES
OPERATIVE NOTE    DATE OF PROCEDURE: 08/20/21    SURGEON: Dr. Sabina Grant MD    ASSISTANT(S): None    PREOPERATIVE DIAGNOSIS: End-stage renal disease  POSTOPERATIVE DIAGNOSIS: same    PROCEDURE PERFORMED: Placement tunneled hemodialysis catheter    ANESTHESIA: Local/No Anesthesia    EBL: Minimal    FLUIDS: Minimal    BLOOD ADMINISTERED: None    DRAINS/TUBES: None    IMPLANTS: None    COMPLICATIONS: None    FINDINGS: Temporary dialysis catheter for exchange for tunneled hemodialysis catheter    OPERATIVE INDICATIONS: Long-term dialysis    DESCRIPTION OF PROCEDURE:      The patient was brought to the procedure room, he was placed in supine position. Timeout was performed and patient was draped in a sterile manner. I began the procedure by first rewiring and lumen port of the temporary dialysis catheter. I used the Amplatz wire and remove the catheter. I confirmed my Amplatz wire placement in the inferior vena cava. I used palindrome tunneled dialysis catheter peel-away stylette sheath. I then removed the wire and the dilator. Then proceeded to make a 2 cm incision lateral to the clavicle, I injected 15 cc of lidocaine prior to making my incision. I then proceeded to create the tunnel using hemostat. I used dilator to navigate the catheter through the tunnel. I then proceeded to place the catheter into the stylette sheath and peel-away the sheath. Confirmed location of the catheter under fluoroscopy. There were no kinks in the catheter and catheter was at the atriocaval junction. I secured the catheter with prolene. I closed the neck access site with 4-0 Monocryl.   Sterile dressings were applied and patient tolerated the procedure well under local.      Dr. Sabina Grant MD

## 2021-08-20 NOTE — PROGRESS NOTES
Speech Pathology Note:    ST attempted to see this pt for dysphagia management/tx follow up. However, pt is currently off the floor. ST will re-attempt as schedule permits. ST will continue to follow.     Thank you for this referral,   Tay Blancas, [id number] [facility]  Speech Language Pathologist

## 2021-08-20 NOTE — PROGRESS NOTES
1230-Received report from [place]. Patient consentable. With PIV on the right FA. Currently have [address] placed as emergency. With Elizabeth cath in-lace. Last time patient eat was 9am.    Made aware and he is ok with it. 1240-Patient transferred to cath holding by bed. Patient A&O x 3. Placed on monitor. 1315-consent signed by patient & Surgeon. 1320-TRANSFER - OUT REPORT:  Verbal report given to Emma(name) on Eleni Felty  being transferred to Cath lab(unit) for ordered procedure       Report consisted of patients Situation, Background, Assessment and   Recommendations(SBAR). Information from the following report(s) SBAR, Kardex, Intake/Output and MAR was reviewed with the receiving nurse. Lines:   Peripheral IV 08/15/21 Right Forearm (Active)   Site Assessment Clean, dry, & intact 08/20/21 1143   Phlebitis Assessment 0 08/20/21 1143   Infiltration Assessment 0 08/20/21 1143   Dressing Status Clean, dry, & intact 08/20/21 1143   Dressing Type Transparent 08/20/21 1143   Hub Color/Line Status Pink 08/20/21 1143   Action Taken Open ports on tubing capped 08/20/21 1143   Alcohol Cap Used Yes 08/20/21 1143        Opportunity for questions and clarification was provided. Patient transported with:   Tech     1503-TRANSFER - OUT REPORT:  Verbal report given to SYEDA Villarreal(name) on Eleni Felty  being transferred to [facility](unit) for routine progression of care       Report consisted of patients Situation, Background, Assessment and   Recommendations(SBAR). Report includes site on the right chest,no bleeding no hematoma dressing dry & intact. HR went down to 40's but now back to 50's. Information from the following report(s) SBAR, Kardex, Procedure Summary and MAR was reviewed with the receiving nurse.     Lines:   Peripheral IV 08/15/21 Right Forearm (Active)   Site Assessment Clean, dry, & intact 08/20/21 1143   Phlebitis Assessment 0 08/20/21 1143   Infiltration Assessment 0 08/20/21 1143 Dressing Status Clean, dry, & intact 08/20/21 1143   Dressing Type Transparent 08/20/21 1143   Hub Color/Line Status Pink 08/20/21 1143   Action Taken Open ports on tubing capped 08/20/21 1143   Alcohol Cap Used Yes 08/20/21 1143        Opportunity for questions and clarification was provided.       Patient transported with:   [employer]

## 2021-08-20 NOTE — PROGRESS NOTES
Nutrition Assessment     Type and Reason for Visit: Reassess    Nutrition Recommendations/Plan:   -Continue Regular diet (low potassium and phos) as tolerated and encourage PO intake >/=75% of meals  -Continue Nepro and Magic cup supplements  -Continue MVI per MD     Nutrition Assessment:  Pt having good tolerance to meals and good appetite. Pt states consuming Nepro and Magic cup supplements. Per MD note pts mental status improving. PO intake much improved from last nutrition assessment. Malnutrition Assessment:  Malnutrition Status: Severe malnutrition     Estimated Daily Nutrient Needs:  Energy (kcal):  1690-4786  Protein (g):  101-151       Fluid (ml/day):  750-1500    Nutrition Related Findings:  Last BM 8/18/21; BGs past 24hrs ( mg/dL); Colace, Pepcid, and theragran      Current Nutrition Therapies:  ADULT ORAL NUTRITION SUPPLEMENT Breakfast, Dinner; Renal Supplement  ADULT ORAL NUTRITION SUPPLEMENT Lunch, Dinner; Frozen Supplement  ADULT DIET Regular; Low Potassium (Less than 3000 mg/day); Low Phosphorus (Less than 1000 mg); No Concentrated Sweets    Anthropometric Measures:  · Height:  6' 1\" (185.4 cm)  · Current Body Wt:  114 kg (251 lb 5.2 oz)  · BMI: 33.2    Nutrition Diagnosis:   · Severe malnutrition, In context of chronic illness related to inadequate protein-energy intake, increased demand for energy/nutrients, renal dysfunction, partial or complete edentulism, early satiety as evidenced by weight loss, poor intake prior to admission, poor dentition      Nutrition Intervention:  Food and/or Nutrient Delivery: Continue current diet, Continue oral nutrition supplement, Vitamin supplement  Nutrition Education and Counseling: No recommendations at this time  Coordination of Nutrition Care: Continue to monitor while inpatient    Goals:  PO nutrition intake will continue to meet >75% of patients estimated nutritional needs over the next 7 days.        Nutrition Monitoring and Evaluation: Behavioral-Environmental Outcomes: None identified  Food/Nutrient Intake Outcomes: Food and nutrient intake, Supplement intake, Vitamin/mineral intake  Physical Signs/Symptoms Outcomes: None identified    Discharge Planning:    Continue current diet     Electronically signed by Leta Heimlich, RD on 8/20/2021 at 12:35 PM    Contact Number: [phone]

## 2021-08-20 NOTE — PROGRESS NOTES
Problem: Self Care Deficits Care Plan (Adult)  Goal: *Acute Goals and Plan of Care (Insert Text)  Description: Occupational Therapy Goals  Initiated 8/20/2021 within 7 day(s). 1.  Patient will perform bed mobility for ADLs with supervision/set-up. 2.  Patient will perform grooming seated EOB with supervision/set-up for 5 minutes with Fair+ balance. 3.  Patient will perform upper body dressing with supervision/set-up. 4.  Patient will perform toilet transfers with moderate assistance . 5. Patient will perform all aspects of toileting with moderate assistance . 6. Patient will participate in upper extremity therapeutic exercise/activities with supervision/set-up for 8 minutes. 7.  Patient will utilize energy conservation techniques during functional activities with verbal cues. Prior Level of Function: Pt is not a good historian, reports he lives with his spouse and was independent in ADLs and functional mobility using cane. Outcome: Progressing Towards Goal  OCCUPATIONAL THERAPY EVALUATION    Patient: Ander Heard (6 y.o. male)  Date: 8/20/2021  Primary Diagnosis: Renal failure [N19]  Bradycardia [R00.1]  Procedure(s) (LRB):  INSERTION TUNNELED CENTRAL VENOUS CATHETER (N/A) 1 Day Post-Op   Precautions:   Fall, Aspiration, Skin    ASSESSMENT :  Pt cleared to participate in OT evaluation by RN. Upon entering room, pt received in bed, alert, and agreeable to OT eval/treatment. Based on the objective data described below, the patient presents with decreased endurance and functional activity tolerance, generalized weakness, confusion, decreased functional mobility, limiting his participation and independence with ADLs. Pt initially agreeable to attempt OOB ADLs, however, upon initiation of bed mobility reports feeling \"sore\" in UEs and LEs, declining to maneuver to EOB and OOB.  Pt performed bed mobility for linen adjustment with Min A and Max VCs for sequencing of all tasks, and required Min A to change soiled gown. Per pt he was able to perform his ADLs w/o assistance until approx a month ago when he began feeling weak. Patient will benefit from skilled intervention to address the above impairments. Patient's rehabilitation potential is considered to be Fair  Factors which may influence rehabilitation potential include:   []             None noted  [x]             Mental ability/status  [x]             Medical condition  [x]             Home/family situation and support systems  [x]             Safety awareness  [x]             Pain tolerance/management  []             Other:      PLAN :  Recommendations and Planned Interventions:   [x]               Self Care Training                  [x]      Therapeutic Activities  [x]               Functional Mobility Training   [x]      Cognitive Retraining  [x]               Therapeutic Exercises           [x]      Endurance Activities  [x]               Balance Training                    [x]      Neuromuscular Re-Education  []               Visual/Perceptual Training     [x]      Home Safety Training  [x]               Patient Education                   []      Family Training/Education  []               Other (comment):    Frequency/Duration: Patient will be followed by occupational therapy 1-2 times per day/3-5 days per week to address goals. Discharge Recommendations: Skilled Nursing Facility  Further Equipment Recommendations for Discharge: hospital bed     SUBJECTIVE:   Patient stated I don't do much at home anymore.     OBJECTIVE DATA SUMMARY:     Past Medical History:   Diagnosis Date    Atrial fibrillation ([facility] [address]     Cerebrovascular accident ([facility] [address]     1991, 1997- general weakness    Chronic diastolic heart failure ([address]     Chronic kidney disease     dialysis    Diabetes ([facility] [address]     Heart failure ([facility] [address]     History of cardioversion     Hypercholesterolemia     Hypertension     Morbid obesity ([facility] [address]      Past Surgical History:   Procedure Laterality Date HX VASCULAR ACCESS Right     right neck     Barriers to Learning/Limitations: yes;  altered mental status (i.e.Sedation, Confusion)  Compensate with: visual, verbal, tactile, kinesthetic cues/model    Home Situation:   Home Situation  Home Environment: Apartment  # Steps to Enter: 1  One/Two Story Residence: One story  Living Alone: No  Support Systems: Child(michelle), Spouse/Significant Other/Partner  Patient Expects to be Discharged to[de-identified] Apartment  Current DME Used/Available at Home: Cane, straight, Walker, rolling  Tub or Shower Type: Tub/Shower combination  [x]  Right hand dominant   []  Left hand dominant    Cognitive/Behavioral Status:  Neurologic State: Alert  Orientation Level: Oriented to person  Cognition: Follows commands  Safety/Judgement: Fall prevention; Awareness of environment    Skin: visible skin intact  Edema: none noted    Vision/Perceptual:       Acuity: Able to read clock/calendar on wall without difficulty         Coordination: BUE  Coordination: Generally decreased, functional  Fine Motor Skills-Upper: Left Intact; Right Intact    Gross Motor Skills-Upper: Left Intact; Right Intact  Strength: BUE  Strength: Generally decreased, functional   Tone & Sensation: BUE  Tone: Normal  Sensation: Intact   Range of Motion: BUE  AROM: Generally decreased, functional   Functional Mobility and Transfers for ADLs:  Bed Mobility:  Rolling: Minimum assistance   Scooting: Minimum assistance  ADL Assessment:   Feeding: Setup  Oral Facial Hygiene/Grooming: Supervision  Bathing: Moderate assistance  Upper Body Dressing: Minimum assistance  Lower Body Dressing: Maximum assistance  Toileting: Maximum assistance   ADL Intervention:     Cognitive Retraining  Safety/Judgement: Fall prevention; Awareness of environment  Pain:  Pain level pre-treatment: 0/10   Pain level post-treatment: 0/10     Activity Tolerance:   Fair  Please refer to the flowsheet for vital signs taken during this treatment.   After treatment:   [] Patient left in no apparent distress sitting up in chair  [x] Patient left in no apparent distress in bed  [x] Call bell left within reach  [x] Nursing notified  [] Caregiver present  [x] Bed alarm activated    COMMUNICATION/EDUCATION:   [x] Role of Occupational Therapy in the acute care setting  [x] Home safety education was provided and the patient/caregiver indicated understanding. [] Patient/family have participated as able in goal setting and plan of care. [] Patient/family agree to work toward stated goals and plan of care. [x] Patient understands intent and goals of therapy, but is neutral about his/her participation. [] Patient is unable to participate in goal setting and plan of care. Thank you for this referral.  Inna Manley, OTR/L  Time Calculation: 23 mins    Eval Complexity: History: MEDIUM Complexity : Expanded review of history including physical, cognitive and psychosocial  history ; Examination: MEDIUM Complexity : 3-5 performance deficits relating to physical, cognitive , or psychosocial skils that result in activity limitations and / or participation restrictions; Decision Making:MEDIUM Complexity : Patient may present with comorbidities that affect occupational performnce.  Miniml to moderate modification of tasks or assistance (eg, physical or verbal ) with assesment(s) is necessary to enable patient to complete evaluation

## 2021-08-20 NOTE — PROGRESS NOTES
RENAL DAILY PROGRESS NOTE    Patient: Kori Velasquez               Sex: male          DOA: 8/15/2021  9:53 AM        YOB: 1950      Age:  70 y.o.        LOS:  LOS: 5 days     Subjective:     Kori Velasquez is a 70 y.o.  who presents with Renal failure [N19]  Bradycardia [R00.1]. Asked to evaluate for renal failure,hyperkalemia,acidosis. presented with severe bradycardia  Chief complains: Patient denies nausea, vomiting, chest pain, dizziness, shortness of breath or headache.  - Reviewed last 24 hrs events     Current Facility-Administered Medications   Medication Dose Route Frequency    tamsulosin (FLOMAX) capsule 0.4 mg  0.4 mg Oral DAILY    heparin (porcine) injection 5,000 Units  5,000 Units SubCUTAneous Q8H    levoFLOXacin (LEVAQUIN) 500 mg in D5W IVPB  500 mg IntraVENous Q48H    epoetin negrita-epbx (RETACRIT) injection 8,000 Units  8,000 Units SubCUTAneous Q MON, WED & FRI    therapeutic multivitamin (THERAGRAN) tablet 1 Tablet  1 Tablet Oral DAILY    alteplase (CATHFLO) 4 mg in sterile water (preservative free) 4 mL injection  4 mg InterCATHeter DIALYSIS ONCE    insulin lispro (HUMALOG) injection   SubCUTAneous AC&HS    atropine 1 mg/mL injection 0.5 mg  0.5 mg IntraVENous PRN    sodium chloride (NS) flush 5-40 mL  5-40 mL IntraVENous Q8H    sodium chloride (NS) flush 5-40 mL  5-40 mL IntraVENous PRN    sodium chloride (NS) flush 5-40 mL  5-40 mL IntraVENous Q8H    sodium chloride (NS) flush 5-40 mL  5-40 mL IntraVENous PRN    acetaminophen (TYLENOL) tablet 650 mg  650 mg Oral Q6H PRN    Or    acetaminophen (TYLENOL) suppository 650 mg  650 mg Rectal Q6H PRN    senna (SENOKOT) tablet 8.6 mg  1 Tablet Oral DAILY PRN    famotidine (PF) (PEPCID) 20 mg in 0.9% sodium chloride 10 mL injection  20 mg IntraVENous DAILY    docusate sodium (COLACE) capsule 100 mg  100 mg Oral DAILY    glucose chewable tablet 16 g  4 Tablet Oral PRN    glucagon (GLUCAGEN) injection 1 mg  1 mg IntraMUSCular PRN    dextrose (D50W) injection syrg 12.5-25 g  25-50 mL IntraVENous PRN       Objective:     Visit Vitals  BP (!) 142/65 (BP 1 Location: Right upper arm, BP Patient Position: At rest)   Pulse (!) 52   Temp 98.4 °F (36.9 °C)   Resp 20   Ht 6' 1\" (1.854 m)   Wt 114.3 kg (252 lb)   SpO2 96%   BMI 33.25 kg/m²       Intake/Output Summary (Last 24 hours) at 8/20/2021 1335  Last data filed at 8/20/2021 1143  Gross per 24 hour   Intake 250 ml   Output 3000 ml   Net -2750 ml       Physical Examination:     GEN: AAO X 3, NAD  RS: Chest is bilateral equal,   CVS: S1-S2 heard irregular  Abdomen: Soft, Non tender, Not distended, Positive bowel sounds, no organomegaly, no CVA / supra pubic tenderness  Extremities: No edema, no cyanosis, skin is warm on touch  CNS: Awake & follows commands,  HEENT: Head is atraumatic, PERRLA, conjunctiva pink & non icteric. No JVD or carotid bruit        Data Review:      Labs:     Hematology:   Recent Labs     08/20/21  0146 08/19/21  0553 08/18/21  0239   WBC 10.5 12.5 9.9   HGB 8.1* 8.7* 9.0*   HCT 25.1* 26.3* 26.5*     Chemistry:   Recent Labs     08/20/21  0146 08/19/21  0553 08/18/21  0239   BUN 54* 83* 79*   CREA 6.45* 8.17* 7.46*   CA 8.2* 8.2* 8.6   K 3.6 3.7 3.8   * 137 136    100 99*   CO2 28 27 27   PHOS  --  4.6  --    GLU 83 80 80        Images:    XR (Most Recent). CXR reviewed by me and compared with previous CXR Results from Hospital Encounter encounter on 08/15/21    XR CHEST PORT    Narrative  HISTORY: Right lung base opacity. EXAM: Chest.    TECHNIQUE: Single view AP portable semiupright chest.    COMPARISON: 8/15/2021    FINDINGS: Worsened aeration of bilateral hemithoraces is demonstrated with  moderate bilateral diffuse interstitial prominence and associated worsened  perihilar and bibasilar opacities. No pneumothorax or pleural effusions. Unchanged position of tubes and catheters. Heart and mediastinal structures are  unchanged.  Heart is enlarged. . Visualized bony thorax and soft tissues are  within normal limits. Impression  IMPRESSION:    1. Worsened aeration of bilateral hemithoraces. Follow-up with plain imaging. CT (Most Recent) Results from Hospital Encounter encounter on 08/15/21    CT ABD PELV WO CONT    Narrative  CT ABDOMEN AND PELVIS WITHOUT CONTRAST    COMPARISON: 2015    INDICATIONS: Pyuria, uremia, bacteremia, sepsis. TECHNIQUE: Volumetric data acquisition was performed of the abdomen and pelvis  on a multislice scanner and reconstructed in axial coronal and sagittal planes    CT ABDOMEN FINDINGS:    Lung Bases: There are groundglass infiltrates throughout the included portions  of the right middle and lower lobes. These are dependent as patient would only  tolerate right side down decubitus position. There is multichamber cardiac  enlargement. There is a minimal pericardial effusion. .    Liver/Gallbladder/Biliary: Normal.    Spleen: Normal.    Adrenal Glands: Normal.    Kidneys: Small exophytic hypodensity on the right probably represents a small  cyst. There is no obstruction or calculus. Pancreas: Normal.    Stomach, Small Bowel, and Colon: There is mild increased gas in nonobstructed  small bowel loops. The appendix is not confidently identified. .    Lymph Nodes: Normal in size and number. The abdominal aorta is unremarkable. The IVC is unremarkable. Peritoneal Spaces: No free fluid or free air is present. Abdominal wall: No hernia or mass is evident. There is extensive edema of the  soft tissues. Bladder: The bladder is collapsed about an indwelling Elizabeth catheter. The  prostate is enlarged measuring approximately 7 x 8 cm transaxially. Osseous Structures Of Abdomen And Pelvis: No acute or aggressive abnormalities  evident. Impression  The chamber cardiac enlargement. Right lower and middle lobe infiltrate; these are dependent lobes.  Differential  includes primarily pulmonary edema, aspiration, pneumonia. There is extensive soft tissue edema    See above      . All CT scans at this facility are performed using dose optimization technique as  appropriate to the performed exam, to include automated exposure control,  adjustment of the mA and/or kV according to patient's size (Including  appropriate matching for site-specific examinations), or use of iterative  reconstruction technique. EKG No results found for this or any previous visit. I have personally reviewed the old medical records and patient's labs    Plan / Recommendation:      1. Acute/crf stage 4,obstruction,for permcath today. dialysis tomorrow . planning to place at [facility] unit  2.anemia, epo  3.hypocalcemia,sec  hyperparathyroidism  4.bacteremia,gram neg rods ,on levaquin    D/w Dr Mony East MD  Nephrology  8/20/2021

## 2021-08-20 NOTE — PROGRESS NOTES
Infectious Disease progress Note  Chart reviewed      Reason: Gram-negative bloodstream infection    Current abx Prior abx   Ceftriaxone  8/15/2021-8/18  Levofloxacin since 8/19      Lines:       Assessment :    70 y.o. male pmhx of afib, bradycardia, CKD stage 4, bladder neck obstruction, chronic indwelling vines admitted to  [facility] on 8/15/2021 with generalized weakness. Now with gram-negative bacteremia, significant pyuria, multiple organisms in urine culture    Clinical presentation consistent with enterobacter bloodstream infection-positive blood culture 8/15, negative blood culture 8/17,  cystitis, RLL aspiration pneumonia    Exact source of gram-negative bloodstream infection not entirely clear at this time. Recent history of hydronephrosis/bladder outlet obstruction/indwelling Vines concerning for cystitis as a source of bloodstream infection. Multiple organisms seen in urine culture 8/15 enterococcus, pseudomonas, Enterobacter-susceptibilities reviewed    Ct findings concerning for RLL aspiration pneumonia-no shortness of breath, increased cough noted on today's exam    Hematuria noted overnight- ? Hemorrhagic cystitis. Antibiotics switched to levofloxacin on 8/19. Urology f/u appreciated. Recommendations:    1. Continue levofloxacin every 48 hours  if continued clinical improvement. 2. Okay to proceed with hemodialysis catheter from infectious disease standpoint  3. mx of hematuria per urology      Above plan was discussed with patient, Dr. Jatinder Palacios  HPI:    .  Feels fine. Denies chest pain, shortness of breath, abdominal pain. Current Discharge Medication List      CONTINUE these medications which have NOT CHANGED    Details   finasteride (PROSCAR) 5 mg tablet TAKE 1 TABLET BY MOUTH DAILY      losartan (COZAAR) 50 mg tablet Take 50 mg by mouth daily.       metOLazone (ZAROXOLYN) 2.5 mg tablet Take 2.5 mg by mouth.      pravastatin (PRAVACHOL) 20 mg tablet TAKE 1 TABLET BY MOUTH DAILY prazosin (MINIPRESS) 5 mg capsule Take 5 mg by mouth At bedtime. simvastatin (ZOCOR) 40 mg tablet Take 40 mg by mouth At bedtime. vitamin E, dl,tocopheryl acet, (vitamin E acetate) 45 mg (100 unit) capsule Take 100 Units by mouth daily. !! hydrALAZINE (APRESOLINE) 100 mg tablet TAKE 1 TABLET BY MOUTH THREE TIMES DAILY  Qty: 270 Tab, Refills: 6    Comments: **Patient requests 90 days supply**      !! hydrALAZINE (APRESOLINE) 100 mg tablet TAKE 1 TABLET BY MOUTH THREE TIMES DAILY  Qty: 270 Tab, Refills: 6    Comments: **Patient requests 90 days supply**      ferrous sulfate 325 mg (65 mg iron) tablet Take  by mouth Daily (before breakfast). furosemide (LASIX) 40 mg tablet Take 20 mg by mouth two (2) times a day. docusate sodium (COLACE) 100 mg capsule Take 100 mg by mouth daily as needed for Constipation. carvedilol (COREG) 12.5 mg tablet Take 1 Tab by mouth two (2) times daily (with meals). Qty: 60 Tab, Refills: 0      cholecalciferol (VITAMIN D3) 1,000 unit tablet Take 1 Tab by mouth daily. Indications: VITAMIN D DEFICIENCY  Qty: 30 Tab, Refills: 0      polyethylene glycol (MIRALAX) 17 gram packet Take 1 Packet by mouth daily. Qty: 30 Packet, Refills: 0      insulin lispro (HUMALOG) 100 unit/mL injection SubCUTAneous route Before breakfast, lunch, dinner and at bedtime. For Blood Sugar (mg/dL) of:     Less than 150 =   0 units           150 -199 =   2 units  200 -249 =   4 units  250 -299 =   6 units  300 -349 =   8 units  350 and above =  10 units  Qty: 1 Vial, Refills: 0      Blood-Glucose Meter monitoring kit As directed  Qty: 1 Kit, Refills: 0      Insulin Syringe-Needle U-100 (INSULIN SYRINGE) 1 mL 28 x 1/2\" syrg As directed  Qty: 30 Syringe, Refills: 0      Lancets (ONETOUCH ULTRASOFT LANCETS) misc As directed  Qty: 1 Each, Refills: 11      aspirin (ASPIRIN) 325 mg tablet Take 325 mg by mouth daily. multivitamin (ONE A DAY) tablet Take 1 Tab by mouth daily. atorvastatin (LIPITOR) 10 mg tablet Take 10 mg by mouth daily. tamsulosin (FLOMAX) 0.4 mg capsule Take 0.4 mg by mouth daily. amLODIPine (NORVASC) 10 mg tablet Take 10 mg by mouth daily. !! - Potential duplicate medications found. Please discuss with provider. Current Facility-Administered Medications   Medication Dose Route Frequency    potassium chloride (K-DUR, KLOR-CON) SR tablet 20 mEq  20 mEq Oral NOW    tamsulosin (FLOMAX) capsule 0.4 mg  0.4 mg Oral DAILY    heparin (porcine) injection 5,000 Units  5,000 Units SubCUTAneous Q8H    levoFLOXacin (LEVAQUIN) 500 mg in D5W IVPB  500 mg IntraVENous Q48H    epoetin negrita-epbx (RETACRIT) injection 8,000 Units  8,000 Units SubCUTAneous Q MON, WED & FRI    therapeutic multivitamin (THERAGRAN) tablet 1 Tablet  1 Tablet Oral DAILY    alteplase (CATHFLO) 4 mg in sterile water (preservative free) 4 mL injection  4 mg InterCATHeter DIALYSIS ONCE    insulin lispro (HUMALOG) injection   SubCUTAneous AC&HS    atropine 1 mg/mL injection 0.5 mg  0.5 mg IntraVENous PRN    sodium chloride (NS) flush 5-40 mL  5-40 mL IntraVENous Q8H    sodium chloride (NS) flush 5-40 mL  5-40 mL IntraVENous PRN    sodium chloride (NS) flush 5-40 mL  5-40 mL IntraVENous Q8H    sodium chloride (NS) flush 5-40 mL  5-40 mL IntraVENous PRN    acetaminophen (TYLENOL) tablet 650 mg  650 mg Oral Q6H PRN    Or    acetaminophen (TYLENOL) suppository 650 mg  650 mg Rectal Q6H PRN    senna (SENOKOT) tablet 8.6 mg  1 Tablet Oral DAILY PRN    famotidine (PF) (PEPCID) 20 mg in 0.9% sodium chloride 10 mL injection  20 mg IntraVENous DAILY    docusate sodium (COLACE) capsule 100 mg  100 mg Oral DAILY    glucose chewable tablet 16 g  4 Tablet Oral PRN    glucagon (GLUCAGEN) injection 1 mg  1 mg IntraMUSCular PRN    dextrose (D50W) injection syrg 12.5-25 g  25-50 mL IntraVENous PRN       Allergies: Patient has no known allergies.     Temp (24hrs), Av.6 °F (37 °C), Min:98.2 °F (36.8 °C), Max:99 °F (37.2 °C)    Visit Vitals  BP (!) 142/78 (BP 1 Location: Right upper arm, BP Patient Position: At rest)   Pulse (!) 53   Temp 98.9 °F (37.2 °C)   Resp 18   Ht 6' 1\" (1.854 m)   Wt 114.3 kg (252 lb)   SpO2 90%   BMI 33.25 kg/m²       ROS: 12 point ROS obtained in details. Pertinent positives as mentioned in HPI,   otherwise negative    Physical Exam:    General: Well developed, well nourished male sitting on the  bed AAOx3 in no acute distress.     General:   awake alert    HEENT:  Normocephalic, atraumatic,  no scleral icterus or pallor; no conjunctival hemmohage;  nasal and oral mucous are moist and without evidence of lesions. Neck supple, no bruits. Lymph Nodes:   no cervical, axillary or inguinal adenopathy   Lungs:   non-labored, bilateral chest movements equal   Heart:  RRR, s1 and s2   Abdomen:  soft, non-distended, active bowel sounds, no hepatomegaly, no splenomegaly. Minimal suprapubic tenderness   Genitourinary:  vines in place with bloody urine   Extremities:   no clubbing, cyanosis; no joint effusions or swelling; Full ROM of all large joints to the upper and lower extremities; muscle mass appropriate for age   Neurologic:  No gross focal sensory abnormalities; 5/5 muscle strength to upper and lower extremities. Speech appropriate.  Cranial nerves intact                        Skin:  No rash or ulcers noted   Back:  not examined      Psychiatric:  No suicidal or homicidal ideations, appropriate mood and affect             Labs: Results:   Chemistry Recent Labs     08/20/21 0146 08/19/21  0553 08/18/21  0239   GLU 83 80 80   * 137 136   K 3.6 3.7 3.8    100 99*   CO2 28 27 27   BUN 54* 83* 79*   CREA 6.45* 8.17* 7.46*   CA 8.2* 8.2* 8.6   AGAP 6 10 10   BUCR 8* 10* 11*      CBC w/Diff Recent Labs     08/20/21 0146 08/19/21  0553 08/18/21  0239   WBC 10.5 12.5 9.9   RBC 3.10* 3.30* 3.33*   HGB 8.1* 8.7* 9.0*   HCT 25.1* 26.3* 26.5*    205 204   GRANS 84* 85* 81*   LYMPH 7* 6* 8*   EOS 1 1 1      Microbiology No results for input(s): CULT in the last 72 hours. RADIOLOGY:    All available imaging studies/reports in [facility] for this admission were reviewed      Disclaimer: Sections of this note are dictated utilizing voice recognition software, which may have resulted in some phonetic based errors in grammar and contents. Even though attempts were made to correct all the mistakes, some may have been missed, and remained in the body of the document. If questions arise, please contact our department.     Dr. Marlin Henry, Infectious Disease Specialist  [phone]  August 20, 2021  4:49 PM

## 2021-08-20 NOTE — PROGRESS NOTES
Hospitalist Progress Note    Patient: Germán Beck MRN: [id number]  CSN: [id number]    YOB: 1950  Age: 70 y.o. Sex: male    DOA: 8/15/2021 LOS:  LOS: 5 days            Patient feels lot better today, no confused. He denies any new complaints. Patient very energetic and optimistic today. Assessment/Plan     1. Acute metabolic encephalopathy due to underlying infection, improving   2. Enterobacter bacteremia possibly due to UTI: Continue antibiotics per ID, repeat cultures negative. 3.  Aspiration pneumonia: Continue antibiotics per ID  4. TONY on CKD 4 with obstructive uropathy, continue dialysis per nephrology. Permacath placement today by vascular surgery. 5.  Anemia of chronic disease, with iron deficiency component. On Retacrit. 6.  A. fib with slow RVR. Possible sick sinus syndrome. Rates controlled. Continue full dose aspirin. Cardiology input noted, recommend to hold off AV blocking agents. 7.  Severe protein calorie malnutrition: Continue nutritionist follows. On supplement. Multivitamin. 8.  Diabetes type 2 with hypoglycemia. Sliding scale insulin. Hypoglycemia protocol  9. Complicated urinary tract infection with obstructive uropathy. ID to follow. Urology consulted. Discussed with urology KATHERINE Gray Ards  10. Hypertension  11. Moderate pulmonary hypertension  12. hyperkalemia resolved  13. Falls at home. PT OT. He has not been on anticoagulation in the outpatient setting for this reason. Full code  PT OT recommend SNF placement    Discussed with the patient and also with son Alan Bingham and explained detail about my above plan of care. Both of them understood and agreed with my plan.     Case discussed with:  [x]Patient  [x]Family  [x]Nursing  []Case Management  DVT Prophylaxis:  []Lovenox  [x]Hep SQ  []SCDs  []Coumadin   []On Heparin gtt    Vital signs/Intake and Output:  Visit Vitals  BP (!) 142/78 (BP 1 Location: Right upper arm, BP Patient Position: At rest) Pulse (!) 53   Temp 98.9 °F (37.2 °C)   Resp 18   Ht 6' 1\" (1.854 m)   Wt 114.3 kg (252 lb)   SpO2 90%   BMI 33.25 kg/m²       Exam  General:  Alert, cooperative, no acute distress    Pulmonary:  CTA Bilaterally. No Wheezing/Rales. Cardiovascular: Regular rate and Rhythm. GI:  Soft, Non distended, Non tender. + Bowel sounds. Extremities:  No edema. No calf tenderness. Neurologic: Alert and oriented X 2-3. No acute neuro deficits. Medications Reviewed      Labs: Results:       Chemistry Recent Labs     08/20/21  0146 08/19/21  0553 08/18/21  0239   GLU 83 80 80   * 137 136   K 3.6 3.7 3.8    100 99*   CO2 28 27 27   BUN 54* 83* 79*   CREA 6.45* 8.17* 7.46*   CA 8.2* 8.2* 8.6   AGAP 6 10 10   BUCR 8* 10* 11*      CBC w/Diff Recent Labs     08/20/21 0146 08/19/21  0553 08/18/21  0239   WBC 10.5 12.5 9.9   RBC 3.10* 3.30* 3.33*   HGB 8.1* 8.7* 9.0*   HCT 25.1* 26.3* 26.5*    205 204   GRANS 84* 85* 81*   LYMPH 7* 6* 8*   EOS 1 1 1      Cardiac Enzymes No results for input(s): CPK, CKND1, MAXIMILIAN in the last 72 hours. No lab exists for component: CKRMB, TROIP   Coagulation Recent Labs     08/18/21  0239 08/17/21  1112   APTT 38.2* 35.4       Lipid Panel Lab Results   Component Value Date/Time    Cholesterol, total 208 (H) 03/22/2010 12:00 PM    HDL Cholesterol 55 03/22/2010 12:00 PM    LDL, calculated 138.4 (H) 03/22/2010 12:00 PM    VLDL, calculated 14.6 03/22/2010 12:00 PM    Triglyceride 73 03/22/2010 12:00 PM    CHOL/HDL Ratio 3.8 03/22/2010 12:00 PM      BNP No results for input(s): BNPP in the last 72 hours. Liver Enzymes No results for input(s): TP, ALB, TBIL, AP in the last 72 hours.     No lab exists for component: SGOT, GPT, DBIL   Thyroid Studies Lab Results   Component Value Date/Time    TSH 2.42 08/15/2021 11:00 AM        Procedures/imaging: see electronic medical records for all procedures/Xrays and details which were not copied into this note but were reviewed prior to creation of Plan

## 2021-08-20 NOTE — PROGRESS NOTES
Case management following for transition needs. Pt currently off the unit for cardiac cath. Anticipated transition plan is SNF once he is medically ready.       Paul Richmond MSN, RN, ACM-RN     [phone]- pager  [phone]- main office  [phone]- desk

## 2021-08-20 NOTE — PROGRESS NOTES
Problem: Falls - Risk of  Goal: *Absence of Falls  Description: Document Iliana Roxy Fall Risk and appropriate interventions in the flowsheet.   Outcome: Progressing Towards Goal  Note: Fall Risk Interventions:  Mobility Interventions: Bed/chair exit alarm, Communicate number of staff needed for ambulation/transfer, Patient to call before getting OOB    Mentation Interventions: Adequate sleep, hydration, pain control, Bed/chair exit alarm, Door open when patient unattended, Family/sitter at bedside, More frequent rounding, Reorient patient    Medication Interventions: Bed/chair exit alarm    Elimination Interventions: Bed/chair exit alarm, Call light in reach, Patient to call for help with toileting needs    History of Falls Interventions: Bed/chair exit alarm, Door open when patient unattended, Assess for delayed presentation/identification of injury for 48 hrs (comment for end date), Vital signs minimum Q4HRs X 24 hrs (comment for end date)         Problem: Patient Education: Go to Patient Education Activity  Goal: Patient/Family Education  Outcome: Progressing Towards Goal     Problem: Cardiac Output -  Decreased  Goal: *Absence of hypoxia  Outcome: Progressing Towards Goal     Problem: Cardiac Output -  Decreased  Goal: *Absence of peripheral edema  Outcome: Progressing Towards Goal

## 2021-08-20 NOTE — PROGRESS NOTES
[facility]: [phone]  formerly [facility]: [phone]     Patient Name: Jeff Langford  YOB: 1950    Date of consult : 8/20/2021  Reason for Consult: Establish goals of care  Requesting Provider: Cathi MURPHY  Primary Care Physician: Bebo Moe MD      SUMMARY:   Jeff Langford is a 70 y.o. male with a past history of chronic kidney disease stage IV on hemodialysis 3 times a week, DM type II, acute on chronic diastolic heart failure, CVA, myocardial infarction, who was admitted on 8/15/2021 from home with a diagnosis of sepsis, altered mental status. Current medical issues leading to Palliative Medicine involvement include: Establish goals of care    CHIEF COMPLAINT: Sepsis, AMS    HPI/SUBJECTIVE:    Patient is a 59-year-old -American male that has multiple complex comorbid conditions. He was admitted 2 days ago from home with sepsis. Patient's wife reported altered mental status and that patient had been acting different for \"a while\". He was found down on the ground by family, but patient had no memory of what happened stating that he just felt weak. 8/20/21:  Patient receiving new permacath due to obstruction continues on hemo-dialysis with nephrology  Following. Speech therapy following for dysphagia. The patient is:   [x] Verbal and participatory   [] Non-participatory due to:     GOALS OF CARE:  Patient/Health Care Proxy Stated Goals: Prolong life      TREATMENT PREFERENCES:   Code Status: DNR         PALLIATIVE DIAGNOSES:   1. Goals of care/ACP  2. Acute on chronic heart failure  3. Chronic kidney disease  4. Debility       PLAN:   8/20/2021: This NP and ADRIAN Ferrari have made several attempts to speak with patient regarding his goals of care. He is inconsistent and tangential.  At this time we do not believe he is able to fully understand the benefits and burdens of his decisions.   We have asked him if we could call his wife to assist with these conversations of which he approved. We reached out to his wife Keisha Garcia to discuss CPR versus DO NOT RESUSCITATE. She mentioned that she has had this discussion with her daughter who is a RN. She states that she would not want nor does she believe that her  would want to ever be intubated. For that reason she has completed a POST for DO NOT RESUSCITATE/DO NOT INTUBATE/limited interventions/okay with limited feeding tube  Have updated patient's hospitalist and placed orders on the chart. Patient's goals of care are met at this time. See below for prior discussions:    1. Goals of care/ACP  This NP in to see patient at the bedside he is a alert and interactive but only moderately oriented. Is able to state who he is and where he is but when asked where he lives patient states \"here \". When asked to explain patient states \"well now I live here\" when asked where his wife is living he stated that she lives in [place]. I then asked if he lives in [place] and he again stated I believe at [facility]". Patient is also somewhat evasive regarding questions about his children and overall health background including his baseline functional status. He states that he has been on dialysis for approximately 1 year. When addressing goals of care and patient's wishes he stated \"we all cannot live forever, when Nestora Shelling comes from a I am ready to go. \"  I then clarified asking does this mean that he would not want to be placed on life support at end-of-life and he stated \"of course that would be okay, I want to live\". This appears in direct contradiction to the prior statement. Plan will be to see if patient clears further to continue this conversation when asked further clarifying questions he stated \"now you have just gone too far\". Have briefly met with his wife and introduced our team, will reach back out to set up a meeting later this week.     At this time patient remains a full code with full interventions  2. Acute on chronic heart failure  Moderate leg edema. Bradycardia and atrial fibrillation present. Patient is being monitored and medically managed by cardiology. 3.  Chronic kidney disease  Patient on HD 3 times a week. Renal failure stage IV being medically managed by nephrology  4. Debility  Patient has a palliative performance score of around 40%, is mainly in the bed unable to do any extensive work due to disease progression, mainly assisted for all functional ADLs and self-care, and some compromise in nutritional intake. Patient has multiple complex comorbidities giving him an overall poor long-term prognosis and high risk for complications. 5.   Initial consult note routed to primary continuity provider  6. Communicated plan of care with: Palliative IDT      Advance Care Planning:  [] [facility] Interdisciplinary Team has updated the ACP Navigator with [address] and Patient Capacity    Primary Decision Maker ([address]): Spouse Brie Vides    Medical Interventions: Limited additional interventions   Other Instructions:   Artificially Administered Nutrition: Feeding tube for a defined trial period     As far as possible, the palliative care team has discussed with patient / health care proxy about goals of care / treatment preferences for patient.          HISTORY:     History obtained from: chart review   Active Problems:    Renal failure (8/15/2021)      Bradycardia (8/15/2021)      Severe protein-calorie malnutrition ([address] (8/16/2021)      Past Medical History:   Diagnosis Date    Atrial fibrillation ([address]     Cerebrovascular accident ([address]     1991, 1997- general weakness    Chronic diastolic heart failure (HCC)     Chronic kidney disease     dialysis    Diabetes ([address]     Heart failure ([address]     History of cardioversion     Hypercholesterolemia     Hypertension     Morbid obesity ([address]       Past Surgical History: Procedure Laterality Date    HX VASCULAR ACCESS Right     right neck      Family History   Problem Relation Age of Onset    Heart Attack Mother 52    Diabetes Other     Hypertension Other      History reviewed, no pertinent family history.   Social History     Tobacco Use    Smoking status: Former Smoker     Years: 8.00     Quit date: 1971     Years since quittin.1    Smokeless tobacco: Never Used    Tobacco comment: last smoked in my late 19's   Substance Use Topics    Alcohol use: No     Alcohol/week: 0.0 standard drinks     No Known Allergies   Current Facility-Administered Medications   Medication Dose Route Frequency    tamsulosin (FLOMAX) capsule 0.4 mg  0.4 mg Oral DAILY    heparin (porcine) injection 5,000 Units  5,000 Units SubCUTAneous Q8H    levoFLOXacin (LEVAQUIN) 500 mg in D5W IVPB  500 mg IntraVENous Q48H    epoetin negrita-epbx (RETACRIT) injection 8,000 Units  8,000 Units SubCUTAneous Q MON, WED & FRI    therapeutic multivitamin (THERAGRAN) tablet 1 Tablet  1 Tablet Oral DAILY    alteplase (CATHFLO) 4 mg in sterile water (preservative free) 4 mL injection  4 mg InterCATHeter DIALYSIS ONCE    insulin lispro (HUMALOG) injection   SubCUTAneous AC&HS    atropine 1 mg/mL injection 0.5 mg  0.5 mg IntraVENous PRN    sodium chloride (NS) flush 5-40 mL  5-40 mL IntraVENous Q8H    sodium chloride (NS) flush 5-40 mL  5-40 mL IntraVENous PRN    sodium chloride (NS) flush 5-40 mL  5-40 mL IntraVENous Q8H    sodium chloride (NS) flush 5-40 mL  5-40 mL IntraVENous PRN    acetaminophen (TYLENOL) tablet 650 mg  650 mg Oral Q6H PRN    Or    acetaminophen (TYLENOL) suppository 650 mg  650 mg Rectal Q6H PRN    senna (SENOKOT) tablet 8.6 mg  1 Tablet Oral DAILY PRN    famotidine (PF) (PEPCID) 20 mg in 0.9% sodium chloride 10 mL injection  20 mg IntraVENous DAILY    docusate sodium (COLACE) capsule 100 mg  100 mg Oral DAILY    glucose chewable tablet 16 g  4 Tablet Oral PRN    glucagon (GLUCAGEN) injection 1 mg  1 mg IntraMUSCular PRN    dextrose (D50W) injection syrg 12.5-25 g  25-50 mL IntraVENous PRN          Clinical Pain Assessment (nonverbal scale for nonverbal patients): Clinical Pain Assessment  Severity: 0     Activity (Movement): Lying quietly, normal position    Duration: for how long has pt been experiencing pain (e.g., 2 days, 1 month, years)  Frequency: how often pain is an issue (e.g., several times per day, once every few days, constant)     FUNCTIONAL ASSESSMENT:     Palliative Performance Scale (PPS):  PPS: 40    ECOG  ECOG Status : Limited self-care     PSYCHOSOCIAL/SPIRITUAL SCREENING:      Any spiritual / Lutheran concerns:  [] Yes /  [x] No    Caregiver Burnout:  [] Yes /  [x] No /  [] No Caregiver Present      Anticipatory grief assessment:   [x] Normal  / [] Maladaptive        REVIEW OF SYSTEMS:     Systems: constitutional, ears/nose/mouth/throat, respiratory, gastrointestinal, genitourinary, musculoskeletal, integumentary, neurologic, psychiatric, endocrine. Positive findings noted below. Modified ESAS Completed by: provider           Pain: 0   Anxiety: 0     Anorexia: 0 Dyspnea: 3           Stool Occurrence(s): 0   Positive and pertinent negative findings in ROS are noted above in HPI. The following systems were [x] reviewed / [] unable to be reviewed as noted in HPI  Other findings are noted below. PHYSICAL EXAM:     Constitutional: Alert and interactive, does not appear in distress  Eyes: pupils equal, anicteric  ENMT: no nasal discharge, moist mucous membranes  Cardiovascular: regular rhythm, distal pulses intact  Respiratory: breathing not labored, symmetric  Gastrointestinal: soft non-tender, +bowel sounds  Musculoskeletal: no deformity, no tenderness to palpation  Skin: warm, dry  Neurologic: Alert and oriented X 2  following commands, moving all extremities  Psychiatric: full affect, no hallucinations    Other:   Wt Readings from Last 3 Encounters: 08/18/21 114.3 kg (252 lb)   08/05/21 122.5 kg (270 lb)   07/05/16 147.4 kg (325 lb)     Blood pressure (!) 142/65, pulse (!) 52, temperature 98.2 °F (36.8 °C), resp. rate 20, height 6' 1\" (1.854 m), weight 114.3 kg (252 lb), SpO2 96 %. Pain:  Pain Scale 1: Numeric (0 - 10)  Pain Intensity 1: 0                      LAB AND IMAGING FINDINGS:     Lab Results   Component Value Date/Time    WBC 10.5 08/20/2021 01:46 AM    HGB 8.1 (L) 08/20/2021 01:46 AM    PLATELET 828 69/54/0199 01:46 AM     Lab Results   Component Value Date/Time    Sodium 135 (L) 08/20/2021 01:46 AM    Potassium 3.6 08/20/2021 01:46 AM    Chloride 101 08/20/2021 01:46 AM    CO2 28 08/20/2021 01:46 AM    BUN 54 (H) 08/20/2021 01:46 AM    Creatinine 6.45 (H) 08/20/2021 01:46 AM    Calcium 8.2 (L) 08/20/2021 01:46 AM    Magnesium 2.4 08/19/2021 05:53 AM    Phosphorus 4.6 08/19/2021 05:53 AM      Lab Results   Component Value Date/Time    Alk.  phosphatase 92 03/22/2010 12:00 PM    Protein, total 7.8 03/22/2010 12:00 PM    Albumin 3.8 07/20/2021 11:13 AM    Globulin 3.5 03/22/2010 12:00 PM     Lab Results   Component Value Date/Time    INR 1.5 (H) 08/15/2021 10:05 AM    Prothrombin time 17.7 (H) 08/15/2021 10:05 AM    aPTT 38.2 (H) 08/18/2021 02:39 AM      Lab Results   Component Value Date/Time    Iron 30 (L) 07/20/2021 11:13 AM    TIBC 320 07/20/2021 11:13 AM    Iron % saturation 9 (L) 07/20/2021 11:13 AM    Ferritin 521 (H) 08/16/2021 11:45 AM      No results found for: PH, PCO2, PO2  No components found for: [place]   Lab Results   Component Value Date/Time     08/15/2021 11:00 AM    CK - MB 6.5 (H) 11/04/2015 12:05 PM              Total time: 25 minutes  Counseling / coordination time, spent as noted above:   > 50% counseling / coordination:  Time spent in direct consultation with the patient, medical team, and family     Prolonged service was provided for  []30 min   []75 min in face to face time in the presence of the patient, spent as noted above.  Time Start:   Time End:     Disclaimer: Sections of this note are dictated using utilizing voice recognition software, which may have resulted in some phonetic based errors in grammar and contents. Even though attempts were made to correct all the mistakes, some may have been missed, and remained in the body of the document. If questions arise, please contact our department.

## 2021-08-20 NOTE — ACP (ADVANCE CARE PLANNING)
[address] [facility] [phone]  DR. [facility] Igor Lerner, NP and this LMSW attended to pt at bedside. Pt was laying in bed with head slightly raised. Pt was talking, but not in cohesive manner. Pt oriented to self only. Not able to have conversations regarding complex medical care decisions. This LMSW made telephone contact with pt's spouse, Aileen Reyes at [phone], to provide support and address goals of care. She reports she had been at hospital to visit pt today, and that he was not oriented at the time. LMSW  Confirmed that is what we saw when we visited him as well. LMSW addressed goals of care and discussed burdens and benefits of CPR and intubation. Pt's wife reports she has discussed this with their daughter, who is a nurse; and they don't feel doing CPR or having pt intubated would be in his best interest.  In regards to feeding tube, she reports she is not sure if they would do it long term, but does not want rule it out completely. Pt's spouse provided email address for LMSW to send POST document via [employer] for electronic signature. LMSW and Martha Pedraza NP completed POST, it was sent via [employer] to spouse at [email or web address] for completion. Thank you for this referral to Palliative Care. The Palliative Care team remains available to provide support for patient and his family, while hospitalized.       CODE STATUS:  DNR/DNI    Juan Cross, [address] [facility]  Palliative Medicine Inpatient   DR. [facility]  Palliative COPE Line: [phone] ([phone])

## 2021-08-20 NOTE — PROGRESS NOTES
Physician Progress Note      Bucky Ling  CSN #:                  [id number]  :                       1950  ADMIT DATE:       8/15/2021 9:53 AM  DISCH DATE:  RESPONDING  PROVIDER #:        Marlen Garcia MD          QUERY TEXT:    Dear Hospitalist  Pt admitted with  Hyperkalemia resolved  TONY on CKD 4 with obstructive uropathy and  Bloodstream infection, gram-negative rods.  ,.  Noted documentation of RLL aspiration pneumonia and vines cath complicated UTI   on  by ordered ID consultant. If possible, please document in progress notes and discharge summary:          The medical record reflects the following:  Risk Factors:  gram-negative bacteremia, significant pyuria, multiple organisms in urine culture + Vines concerning for cystitis as a source of bloodstream infection. ? Clinical Indicators: IDMDPN Clinical presentation consistent with enterobacter bloodstream infection, probable cystitis, RLL aspiration pneumonia Exact source of gram-negative bloodstream infection not entirely clear at this time. Recent history of hydronephrosis/bladder outlet obstruction/indwelling Vines concerning for cystitis as a source of bloodstream infection    Treatment: Repeat blood cultures (2021) no growth to date.   Follow culture, sensitivity IV ceftriaxone CT abdomen/pelvis to evaluate for hydronephrosis, complicated UTI , IV ceftriaxone changed to IV levaquin  Thank you  Yennifer Estrella RN     Options provided:  -- Asp PNA and vines cath complicated UTI confirmed present on admission  -- Asp PNA and vines cath  complicated UTI ruled out  -- Defer to IDMD consultant documentation regarding asp pna and vines cath complicated UTI  -- Other - I will add my own diagnosis  -- Disagree - Not applicable / Not valid  -- Disagree - Clinically unable to determine / Unknown  -- Refer to Clinical Documentation Reviewer    PROVIDER RESPONSE TEXT:    UTI due to obstructive uropathy, present on admission    Query created by:  Javi Persaud on 8/19/2021 3:17 PM      Electronically signed by:  Yennifer Carballo MD 8/20/2021 9:34 AM

## 2021-08-20 NOTE — ROUTINE PROCESS
0710: Bedside and Verbal shift change report given to [address] and Mandy Player RN (oncoming nurse) by Jenna Singh RN (offgoing nurse). Report included the following information SBAR, Kardex, Intake/Output, MAR and Recent Results. 7557: Bedside and Verbal shift change report given to [address] (oncoming nurse) by [address] and Mandy Breen RN (offgoing nurse). Report included the following information SBAR, Kardex, Intake/Output, MAR and Recent Results.

## 2021-08-20 NOTE — CONSULTS
1. Hyperkalemia    2. ESRD (end stage renal disease) ([address]    3. Acute renal failure, unspecified acute renal failure type ([address]    4. Paroxysmal atrial fibrillation ([address]    5. Bradycardia    6. Chronic diastolic heart failure ([address]    7. Morbid obesity ([address]    8. Goals of care, counseling/discussion    9. Chronic kidney disease, unspecified CKD stage      I have reviewed pertinent vitals, I/O's, notes, laboratory values and imaging. I have discussed the case and I agree with the provider's assessment and plan as outlined above, with ammendments as follows:        Michela Galeazzi, MD  [facility]  Pager [phone]    ASSESSMENT:   Urinary retention, 2L retained, BPH  UTI, sepsis               Vines catheter removed in office 8/5 with 100 cc PVR   UDS ordered to evaluate bladder function and storage pressure      UA 8/15 Large LE, 4+ bacteria, neg nitrites    Tmax 100- improving            TONY/ CKD- prior on HD   Creat 6.45<14.6   Baseline 3.0    Small right renal cyst     Afib, bradycardia, hyperkalemia     DM2  CHF  CVA         PLAN:    Appreciate overall management per primary  CT shows no hydronephrosis, known enlarged prostate   Maintain vines catheter for bladder stretch injury, draining well   DO NOT VOID TRIAL, Maintain vines   Abx per primary, currently on Levaquin  Creat slowly improving, continue to trend   Continue Flomax  No acute interventions needed at this time  Following peripherally, will see again Monday       Follow up arranged? msg sent for o/p f/u with Dr Zamzam Garcia for cysto/HIPOLITO after [address] Rogers NP-BC  [facility]   Pager [phone]    August 20, 2021 9:57 AM        Chief Complaint   Patient presents with    Shortness of Breath       HISTORY OF PRESENT ILLNESS:  Johana Bray is a 70 y.o. male who is seen in consultation as referred by Dr. Adan Aleman  for enlarged prostates, urinary retention, BPH and UTI.   PMH CKD, DM, CVA on Plavix and ASA, BPH with retention. Patient arrived to the ED 8/15 with complaint of altered mentation. Patient was found to be in A fib and bradycardia with hyperkalemia. Patient has a history of urinary retention and is known to [facility], last office visit was 21 with Dr Reyes Elizondo. At that time, he had a low PVR and vines was removed. Patient was scheduled to have video UDS and office cysto. CT this admission shows no hydronephrosis but he did have a significantly elevated creat, which is slowly down-trending. Hx dialysis in the past.  Today patient is a bit confusing thinking he is about to be discharged from hospital.  Denies any abdominal or flank pain, no f/c/n/v. Vines in place draining well, orange/red urine. No flowsheet data found.       Past Medical History:   Diagnosis Date    Atrial fibrillation ([address]     Cerebrovascular accident ([address]     , - general weakness    Chronic diastolic heart failure ([address]     Chronic kidney disease     dialysis    Diabetes ([address]     Heart failure ([address]     History of cardioversion     Hypercholesterolemia     Hypertension     Morbid obesity ([address]        Past Surgical History:   Procedure Laterality Date    HX VASCULAR ACCESS Right     right neck       Social History     Tobacco Use    Smoking status: Former Smoker     Years: 8.00     Quit date: 1971     Years since quittin.1    Smokeless tobacco: Never Used    Tobacco comment: last smoked in my late 19's   Substance Use Topics    Alcohol use: No     Alcohol/week: 0.0 standard drinks    Drug use: No       No Known Allergies    Family History   Problem Relation Age of Onset    Heart Attack Mother 52    Diabetes Other     Hypertension Other        Current Facility-Administered Medications   Medication Dose Route Frequency Provider Last Rate Last Admin    potassium chloride (K-DUR, KLOR-CON) SR tablet 20 mEq  20 mEq Oral NOW Frandy Barrett MD        tamsulosin (FLOMAX) capsule 0.4 mg  0.4 mg Oral DAILY Trina Robert MD   0.4 mg at 08/20/21 0852    heparin (porcine) injection 5,000 Units  5,000 Units SubCUTAneous Q8H Trina Robert MD   5,000 Units at 08/20/21 0901    levoFLOXacin (LEVAQUIN) 500 mg in D5W IVPB  500 mg IntraVENous Q48H Gibran TRIPLETT  mL/hr at 08/19/21 1842 500 mg at 08/19/21 1842    epoetin negrita-epbx (RETACRIT) injection 8,000 Units  8,000 Units SubCUTAneous Q MON, WED & Herve Mims MD   8,000 Units at 08/18/21 2137    therapeutic multivitamin [facility]) tablet 1 Tablet  1 Tablet Oral DAILY Nanci Loge, DO   1 Tablet at 08/20/21 0851    alteplase (CATHFLO) 4 mg in sterile water (preservative free) 4 mL injection  4 mg InterCATHeter DIALYSIS Zaid Corea MD   4 mg at 08/16/21 1953    insulin lispro (HUMALOG) injection   SubCUTAneous AC&HS Nilson JENKINS NP        atropine 1 mg/mL injection 0.5 mg  0.5 mg IntraVENous PRN Sandra Joy MD        sodium chloride (NS) flush 5-40 mL  5-40 mL IntraVENous Q8H Sandra Joy MD   5 mL at 08/20/21 0600    sodium chloride (NS) flush 5-40 mL  5-40 mL IntraVENous PRN Sandra Jo MD   10 mL at 08/15/21 2147    sodium chloride (NS) flush 5-40 mL  5-40 mL IntraVENous Q8H Bill JENKINS NP   5 mL at 08/20/21 0600    sodium chloride (NS) flush 5-40 mL  5-40 mL IntraVENous PRN Julio Barrett NP        acetaminophen (TYLENOL) tablet 650 mg  650 mg Oral Q6H PRN Julio Barrett NP        Or    acetaminophen (TYLENOL) suppository 650 mg  650 mg Rectal Q6H PRN Julio Barrett NP        senna (SENOKOT) tablet 8.6 mg  1 Tablet Oral DAILY PRN Julio Barrett NP        famotidine (PF) (PEPCID) 20 mg in 0.9% sodium chloride 10 mL injection  20 mg IntraVENous DAILY Tolland Ort B, NP   20 mg at 08/20/21 0851    docusate sodium (COLACE) capsule 100 mg  100 mg Oral DAILY Tolland Ort B, NP   100 mg at 08/20/21 0851    glucose chewable tablet 16 g  4 Tablet Oral PRN Rex Ricsk NP        glucagon [facility]) injection 1 mg  1 mg IntraMUSCular PRN Rex Ricks NP        dextrose (D50W) injection syrg 12.5-25 g  25-50 mL IntraVENous PRN Rex Ricks NP   25 g at 08/16/21 0345       Review of Systems  ROS is:    Negative for: Ophthalmologic issues, ENT issues, Cardiovascular issues, respiratory issues, GI issues, neurologic issues, hematoogic issues, skin lesions, musculoskeletal issues, psychiatric issues  Exceptions: yes    Positive for:    Retention, confused               PHYSICAL EXAMINATION:   Visit Vitals  BP (!) 142/78 (BP 1 Location: Right upper arm, BP Patient Position: At rest)   Pulse (!) 53   Temp 98.9 °F (37.2 °C)   Resp 18   Ht 6' 1\" (1.854 m)   Wt 114.3 kg (252 lb)   SpO2 90%   BMI 33.25 kg/m²     Constitutional: Well-nourished male, No acute distress. HEENT: Normocephalic, Atraumatic, EOM's intact   CV:  no edema  Respiratory: Nasal cannula in place, NAD   Abdomen:  Soft, obese and non tender    Male:  No CVA tenderness  SCROTUM:  No scrotal rash or lesions noticed. Normal bilateral testes and epididymis. PENIS: Urethral meatus normal in location and size. No urethral discharge. Uncircumsized   16F vines in place, orange/red urine draining   Skin: No evidence of jaundice. Normal color  Neuro/Psych:  Alert and confused   Lymphatic:   No enlarged inguinal lymph nodes. REVIEW OF LABS AND IMAGING:      CT A/P 8/17  IMPRESSION     The chamber cardiac enlargement. Right lower and middle lobe infiltrate; these are dependent lobes. Differential  includes primarily pulmonary edema, aspiration, pneumonia. There is extensive soft tissue edema     Kidneys: Small exophytic hypodensity on the right probably represents a small  cyst. There is no obstruction or calculus. Bladder: The bladder is collapsed about an indwelling Vines catheter. The  prostate is enlarged measuring approximately 7 x 8 cm transaxially.       Labs: Results:   Chemistry Recent Labs     08/20/21  0146 08/19/21  0553 08/18/21  0239   GLU 83 80 80   * 137 136   K 3.6 3.7 3.8    100 99*   CO2 28 27 27   BUN 54* 83* 79*   CREA 6.45* 8.17* 7.46*   CA 8.2* 8.2* 8.6   AGAP 6 10 10   BUCR 8* 10* 11*      CBC w/Diff Recent Labs     08/20/21  0146 08/19/21  0553 08/18/21  0239   WBC 10.5 12.5 9.9   RBC 3.10* 3.30* 3.33*   HGB 8.1* 8.7* 9.0*   HCT 25.1* 26.3* 26.5*    205 204   GRANS 84* 85* 81*   LYMPH 7* 6* 8*   EOS 1 1 1      Cultures No results for input(s): CULT in the last 72 hours.   All Micro Results       Procedure Component Value Units Date/Time    CULTURE, BLOOD [[id number]] Collected: 08/17/21 0236    Order Status: Completed Specimen: Blood Updated: 08/20/21 0703     Special Requests: NO SPECIAL REQUESTS        Culture result: NO GROWTH 3 DAYS       CULTURE, BLOOD [[id number]] Collected: 08/17/21 0236    Order Status: Completed Specimen: Blood Updated: 08/20/21 0703     Special Requests: NO SPECIAL REQUESTS        Culture result: NO GROWTH 3 DAYS       CULTURE, URINE [[id number]]  (Abnormal)  (Susceptibility) Collected: 08/15/21 1250    Order Status: Completed Specimen: Cath Urine Updated: 08/19/21 0856     Special Requests: NO SPECIAL REQUESTS        [place] Count --        >100,000  COLONIES/mL       Culture result:       ENTEROBACTER CLOACAE COMPLEX            ENTEROBACTER CLOACAE COMPLEX 2ND STRAIN      PSEUDOMONAS AERUGINOSA         ENTEROCOCCUS FAECALIS         *DR GILL REQUESTING ID AND SENSI ON ALL    CULTURE, BLOOD [[id number]]  (Abnormal) Collected: 08/15/21 1450    Order Status: Completed Specimen: Blood Updated: 08/18/21 0645     Special Requests: NO SPECIAL REQUESTS        GRAM STAIN       AEROBIC AND ANAEROBIC BOTTLES GRAM NEGATIVE RODS                  SMEAR CALLED TO AND CORRECTLY REPEATED BY: Ellie Levine RN, ICU 0800 8/16/21 TO AKILA           Culture result:       GRAM NEGATIVE RODS GROWING IN THE AEROBIC AND ANAEROBIC BOTTLES NO SITE INDICATED REFER TO [id number] FOR ID AND SENSITIVITIES           CULTURE, BLOOD [[id number]]  (Abnormal)  (Susceptibility) Collected: 08/15/21 1435    Order Status: Completed Specimen: Blood Updated: 08/18/21 0644     Special Requests: NO SPECIAL REQUESTS        GRAM STAIN       AEROBIC AND ANAEROBIC BOTTLES GRAM NEGATIVE RODS                  SMEAR CALLED TO AND CORRECTLY REPEATED BY: Deb Monahan RN, ICU 0800 8/16/21 TO AKILA.            Culture result:       ENTEROBACTER CLOACAE COMPLEX GROWING IN BOTH BOTTLES DRAWN NO SITE INDICATED          RESPIRATORY VIRUS PANEL W/COVID-19, PCR [[id number]] Collected: 08/15/21 1608    Order Status: Completed Specimen: Nasopharyngeal Updated: 08/15/21 1802     Adenovirus Not detected        Coronavirus 229E Not detected        Coronavirus HKU1 Not detected        Coronavirus CVNL63 Not detected        Coronavirus OC43 Not detected        SARS-CoV-2, PCR Not detected        Metapneumovirus Not detected        Rhinovirus and Enterovirus Not detected        Influenza A Not detected        Influenza A, subtype H1 Not detected        Influenza A, subtype H3 Not detected        INFLUENZA A H1N1 PCR Not detected        Influenza B Not detected        Parainfluenza 1 Not detected        Parainfluenza 2 Not detected        Parainfluenza 3 Not detected        Parainfluenza virus 4 Not detected        RSV by PCR Not detected        B. parapertussis, PCR Not detected        Bordetella pertussis - PCR Not detected        Chlamydophila pneumoniae DNA, QL, PCR Not detected        Mycoplasma pneumoniae DNA, QL, PCR Not detected                 Urinalysis Color   Date Value Ref Range Status   08/15/2021 DARK YELLOW   Final     Appearance   Date Value Ref Range Status   08/15/2021 TURBID   Final     Specific gravity   Date Value Ref Range Status   08/15/2021 1.012 1.005 - 1.030   Final     pH (UA)   Date Value Ref Range Status   08/15/2021 8.5 (H) 5.0 - 8.0   Final     Protein   Date Value Ref Range Status   08/15/2021 300 (A) NEG mg/dL Final     Ketone   Date Value Ref Range Status   08/15/2021 Negative NEG mg/dL Final     Bilirubin   Date Value Ref Range Status   08/15/2021 Negative NEG   Final     Blood   Date Value Ref Range Status   08/15/2021 LARGE (A) NEG   Final     Urobilinogen   Date Value Ref Range Status   08/15/2021 0.2 0.2 - 1.0 EU/dL Final     Nitrites   Date Value Ref Range Status   08/15/2021 Negative NEG   Final     Leukocyte Esterase   Date Value Ref Range Status   08/15/2021 LARGE (A) NEG   Final     Potassium   Date Value Ref Range Status   08/20/2021 3.6 3.5 - 5.5 mmol/L Final     Creatinine   Date Value Ref Range Status   08/20/2021 6.45 (H) 0.6 - 1.3 MG/DL Final     BUN   Date Value Ref Range Status   08/20/2021 54 (H) 7.0 - 18 MG/DL Final      PSA No results for input(s): PSA in the last 72 hours.    Coagulation Lab Results   Component Value Date/Time    Prothrombin time 17.7 (H) 08/15/2021 10:05 AM    Prothrombin time 18.0 (H) 12/02/2015 04:20 PM    INR 1.5 (H) 08/15/2021 10:05 AM    INR 1.5 (H) 12/02/2015 04:20 PM    aPTT 38.2 (H) 08/18/2021 02:39 AM    aPTT 35.4 08/17/2021 11:12 AM

## 2021-08-20 NOTE — PROGRESS NOTES
ST eval/tx orders acknowledged. Pt is currently on ST caseload.  Will continue to follow accordingly    Elida Chang MA [facility] SLP  Speech Language Pathologist

## 2021-08-21 LAB
ANION GAP SERPL CALC-SCNC: 9 MMOL/L (ref 3–18)
BASOPHILS # BLD: 0 K/UL (ref 0–0.1)
BASOPHILS NFR BLD: 0 % (ref 0–2)
BUN SERPL-MCNC: 66 MG/DL (ref 7–18)
BUN/CREAT SERPL: 8 (ref 12–20)
CALCIUM SERPL-MCNC: 8.6 MG/DL (ref 8.5–10.1)
CHLORIDE SERPL-SCNC: 100 MMOL/L (ref 100–111)
CO2 SERPL-SCNC: 27 MMOL/L (ref 21–32)
CREAT SERPL-MCNC: 8.35 MG/DL (ref 0.6–1.3)
DIFFERENTIAL METHOD BLD: ABNORMAL
EOSINOPHIL # BLD: 0.2 K/UL (ref 0–0.4)
EOSINOPHIL NFR BLD: 1 % (ref 0–5)
ERYTHROCYTE [DISTWIDTH] IN BLOOD BY AUTOMATED COUNT: 17 % (ref 11.6–14.5)
GLUCOSE BLD STRIP.AUTO-MCNC: 74 MG/DL (ref 70–110)
GLUCOSE BLD STRIP.AUTO-MCNC: 76 MG/DL (ref 70–110)
GLUCOSE BLD STRIP.AUTO-MCNC: 80 MG/DL (ref 70–110)
GLUCOSE BLD STRIP.AUTO-MCNC: 86 MG/DL (ref 70–110)
GLUCOSE BLD STRIP.AUTO-MCNC: 98 MG/DL (ref 70–110)
GLUCOSE SERPL-MCNC: 81 MG/DL (ref 74–99)
HCT VFR BLD AUTO: 25.9 % (ref 36–48)
HGB BLD-MCNC: 8.3 G/DL (ref 13–16)
LYMPHOCYTES # BLD: 0.6 K/UL (ref 0.9–3.6)
LYMPHOCYTES NFR BLD: 5 % (ref 21–52)
MCH RBC QN AUTO: 26.6 PG (ref 24–34)
MCHC RBC AUTO-ENTMCNC: 32 G/DL (ref 31–37)
MCV RBC AUTO: 83 FL (ref 74–97)
MONOCYTES # BLD: 0.7 K/UL (ref 0.05–1.2)
MONOCYTES NFR BLD: 5 % (ref 3–10)
NEUTS SEG # BLD: 11.7 K/UL (ref 1.8–8)
NEUTS SEG NFR BLD: 88 % (ref 40–73)
PLATELET # BLD AUTO: 220 K/UL (ref 135–420)
PMV BLD AUTO: 10.5 FL (ref 9.2–11.8)
POTASSIUM SERPL-SCNC: 4 MMOL/L (ref 3.5–5.5)
RBC # BLD AUTO: 3.12 M/UL (ref 4.35–5.65)
SODIUM SERPL-SCNC: 136 MMOL/L (ref 136–145)
WBC # BLD AUTO: 13.3 K/UL (ref 4.6–13.2)

## 2021-08-21 PROCEDURE — 65660000000 HC RM CCU STEPDOWN

## 2021-08-21 PROCEDURE — 85025 COMPLETE CBC W/AUTO DIFF WBC: CPT

## 2021-08-21 PROCEDURE — 74011250636 HC RX REV CODE- 250/636: Performed by: HOSPITALIST

## 2021-08-21 PROCEDURE — P9047 ALBUMIN (HUMAN), 25%, 50ML: HCPCS | Performed by: INTERNAL MEDICINE

## 2021-08-21 PROCEDURE — 74011250637 HC RX REV CODE- 250/637: Performed by: INTERNAL MEDICINE

## 2021-08-21 PROCEDURE — 82962 GLUCOSE BLOOD TEST: CPT

## 2021-08-21 PROCEDURE — 74011250636 HC RX REV CODE- 250/636: Performed by: NURSE PRACTITIONER

## 2021-08-21 PROCEDURE — 36415 COLL VENOUS BLD VENIPUNCTURE: CPT

## 2021-08-21 PROCEDURE — 80048 BASIC METABOLIC PNL TOTAL CA: CPT

## 2021-08-21 PROCEDURE — 74011250636 HC RX REV CODE- 250/636: Performed by: INTERNAL MEDICINE

## 2021-08-21 PROCEDURE — 74011250636 HC RX REV CODE- 250/636: Performed by: STUDENT IN AN ORGANIZED HEALTH CARE EDUCATION/TRAINING PROGRAM

## 2021-08-21 PROCEDURE — 99232 SBSQ HOSP IP/OBS MODERATE 35: CPT | Performed by: HOSPITALIST

## 2021-08-21 PROCEDURE — 74011000250 HC RX REV CODE- 250: Performed by: NURSE PRACTITIONER

## 2021-08-21 PROCEDURE — 90935 HEMODIALYSIS ONE EVALUATION: CPT

## 2021-08-21 PROCEDURE — 74011250637 HC RX REV CODE- 250/637: Performed by: HOSPITALIST

## 2021-08-21 PROCEDURE — 74011250637 HC RX REV CODE- 250/637: Performed by: NURSE PRACTITIONER

## 2021-08-21 RX ORDER — HALOPERIDOL 5 MG/ML
2 INJECTION INTRAMUSCULAR
Status: DISCONTINUED | OUTPATIENT
Start: 2021-08-21 | End: 2021-08-27 | Stop reason: HOSPADM

## 2021-08-21 RX ORDER — ALBUMIN HUMAN 250 G/1000ML
SOLUTION INTRAVENOUS
Status: DISPENSED
Start: 2021-08-21 | End: 2021-08-22

## 2021-08-21 RX ORDER — ALBUMIN HUMAN 250 G/1000ML
25 SOLUTION INTRAVENOUS
Status: COMPLETED | OUTPATIENT
Start: 2021-08-21 | End: 2021-08-22

## 2021-08-21 RX ADMIN — HALOPERIDOL LACTATE 2 MG: 5 INJECTION, SOLUTION INTRAMUSCULAR at 20:26

## 2021-08-21 RX ADMIN — SODIUM CHLORIDE 10 ML: 9 INJECTION, SOLUTION INTRAMUSCULAR; INTRAVENOUS; SUBCUTANEOUS at 17:39

## 2021-08-21 RX ADMIN — EPOETIN ALFA-EPBX 8000 UNITS: 4000 INJECTION, SOLUTION INTRAVENOUS; SUBCUTANEOUS at 05:28

## 2021-08-21 RX ADMIN — FAMOTIDINE 20 MG: 10 INJECTION INTRAVENOUS at 09:20

## 2021-08-21 RX ADMIN — HEPARIN SODIUM 5000 UNITS: 5000 INJECTION INTRAVENOUS; SUBCUTANEOUS at 17:39

## 2021-08-21 RX ADMIN — TAMSULOSIN HYDROCHLORIDE 0.4 MG: 0.4 CAPSULE ORAL at 09:20

## 2021-08-21 RX ADMIN — THERA TABS 1 TABLET: TAB at 09:20

## 2021-08-21 RX ADMIN — Medication 10 ML: at 05:06

## 2021-08-21 RX ADMIN — HEPARIN SODIUM 5000 UNITS: 5000 INJECTION INTRAVENOUS; SUBCUTANEOUS at 01:53

## 2021-08-21 RX ADMIN — SODIUM CHLORIDE 10 ML: 9 INJECTION, SOLUTION INTRAMUSCULAR; INTRAVENOUS; SUBCUTANEOUS at 09:21

## 2021-08-21 RX ADMIN — LEVOFLOXACIN 500 MG: 5 INJECTION, SOLUTION INTRAVENOUS at 17:36

## 2021-08-21 RX ADMIN — HEPARIN SODIUM 5000 UNITS: 5000 INJECTION INTRAVENOUS; SUBCUTANEOUS at 09:20

## 2021-08-21 RX ADMIN — DOCUSATE SODIUM 100 MG: 100 CAPSULE, LIQUID FILLED ORAL at 09:20

## 2021-08-21 RX ADMIN — SODIUM CHLORIDE 10 ML: 9 INJECTION, SOLUTION INTRAMUSCULAR; INTRAVENOUS; SUBCUTANEOUS at 22:04

## 2021-08-21 RX ADMIN — ALBUMIN (HUMAN) 25 G: 0.25 INJECTION, SOLUTION INTRAVENOUS at 14:32

## 2021-08-21 NOTE — PROGRESS NOTES
Hospitalist Progress Note    Patient: Bia Nicholas MRN: [id number]  CSN: [id number]    YOB: 1950  Age: 70 y.o. Sex: male    DOA: 8/15/2021 LOS:  LOS: 6 days            Patient feels lot better today. He denies any new complaints. Patient sitting up in the bed. Assessment/Plan     1. Acute metabolic encephalopathy due to underlying infection, improved   2. Enterobacter bacteremia possibly due to UTI: Continue antibiotics per ID, repeat cultures negative. 3.  Aspiration pneumonia: Continue antibiotics per ID  4. TONY on CKD 4 with obstructive uropathy, continue dialysis per nephrology. Permacath placement by vascular surgery 8/20/21.  5.  Anemia of chronic disease, with iron deficiency component. On Retacrit. 6.  A. fib with slow RVR. Possible sick sinus syndrome. Rates controlled. Continue full dose aspirin. Cardiology input noted, recommend to hold off AV blocking agents. 7.  Severe protein calorie malnutrition: Continue nutritionist follows. On supplement. Multivitamin. 8.  Diabetes type 2 with hypoglycemia. Sliding scale insulin. Hypoglycemia protocol  9. Complicated urinary tract infection with obstructive uropathy. ID to follow. Urology input noted. Urology recommend to continue Elizabeth, no voiding trials. 10. Hypertension  11. Moderate pulmonary hypertension  12. hyperkalemia resolved  13. Falls at home. PT OT. He has not been on anticoagulation in the outpatient setting for this reason. Full code  PT OT recommend SNF placement    Discussed with the patient and explained detail about my above plan of care.     johana Steel [phone]       Case discussed with:  [x]Patient  []Family  [x]Nursing  []Case Management  DVT Prophylaxis:  []Lovenox  [x]Hep SQ  []SCDs  []Coumadin   []On Heparin gtt    Vital signs/Intake and Output:  Visit Vitals  BP (!) 162/71 (BP 1 Location: Right upper arm, BP Patient Position: At rest;Lying left side)   Pulse 64   Temp 99.8 °F (37.7 °C)   Resp 20 Ht 6' 1\" (1.854 m)   Wt 114.2 kg (251 lb 11.2 oz)   SpO2 95%   BMI 33.21 kg/m²       Exam  General:  Alert, cooperative, no acute distress    Pulmonary:  CTA Bilaterally. No Wheezing/Rales. Cardiovascular: Regular rate and Rhythm. GI:  Soft, Non distended, Non tender. + Bowel sounds. Extremities:  No edema. No calf tenderness. Neurologic: Alert and oriented X 2-3. No acute neuro deficits. Medications Reviewed      Labs: Results:       Chemistry Recent Labs     08/21/21 0453 08/20/21  0146 08/19/21  0553   GLU 81 83 80    135* 137   K 4.0 3.6 3.7    101 100   CO2 27 28 27   BUN 66* 54* 83*   CREA 8.35* 6.45* 8.17*   CA 8.6 8.2* 8.2*   AGAP 9 6 10   BUCR 8* 8* 10*      CBC w/Diff Recent Labs     08/21/21 0453 08/20/21 0146 08/19/21  0553   WBC 13.3* 10.5 12.5   RBC 3.12* 3.10* 3.30*   HGB 8.3* 8.1* 8.7*   HCT 25.9* 25.1* 26.3*    206 205   GRANS 88* 84* 85*   LYMPH 5* 7* 6*   EOS 1 1 1      Cardiac Enzymes No results for input(s): CPK, CKND1, MAXIMILIAN in the last 72 hours. No lab exists for component: CKRMB, TROIP   Coagulation No results for input(s): PTP, INR, APTT, INREXT, INREXT in the last 72 hours. Lipid Panel Lab Results   Component Value Date/Time    Cholesterol, total 208 (H) 03/22/2010 12:00 PM    HDL Cholesterol 55 03/22/2010 12:00 PM    LDL, calculated 138.4 (H) 03/22/2010 12:00 PM    VLDL, calculated 14.6 03/22/2010 12:00 PM    Triglyceride 73 03/22/2010 12:00 PM    CHOL/HDL Ratio 3.8 03/22/2010 12:00 PM      BNP No results for input(s): BNPP in the last 72 hours. Liver Enzymes No results for input(s): TP, ALB, TBIL, AP in the last 72 hours.     No lab exists for component: SGOT, GPT, DBIL   Thyroid Studies Lab Results   Component Value Date/Time    TSH 2.42 08/15/2021 11:00 AM        Procedures/imaging: see electronic medical records for all procedures/Xrays and details which were not copied into this note but were reviewed prior to creation of Plan

## 2021-08-21 NOTE — PROGRESS NOTES
RENAL DAILY PROGRESS NOTE              Subjective:       Complaint: feels ok  Overnight events noted  no nausea, vomiting, chest pain, short of breath, cough, seizure. 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 On levaquin  S/p left arm AVF(occluded) in past  S/p tunneled access placement 8/20/21  Hypertension  Hx neurogenic bladder  ACD  Secondary hyperparathyroidism  PAF   PLAN:    on dialysis. Seen and examined on dialysis. At 12:02 PM on 8/21/2021, I saw and examined patient during hemodialysis treatment. The patient was receiving hemodialysis for treatment of  renal failure. I have also reviewed vital signs, intake and output, lab results and recent events, and agreed with today's dialysis order. C/w epogen. Phos is ok. Hold off on phos binders. Monitor output-?not recorded           Current Facility-Administered Medications   Medication Dose Route Frequency    albumin human 25% (BUMINATE) solution 25 g  25 g IntraVENous DIALYSIS TUE, THU & SAT    tamsulosin (FLOMAX) capsule 0.4 mg  0.4 mg Oral DAILY    heparin (porcine) injection 5,000 Units  5,000 Units SubCUTAneous Q8H    levoFLOXacin (LEVAQUIN) 500 mg in D5W IVPB  500 mg IntraVENous Q48H    epoetin negrita-epbx (RETACRIT) injection 8,000 Units  8,000 Units SubCUTAneous Q MON, WED & FRI    therapeutic multivitamin (THERAGRAN) tablet 1 Tablet  1 Tablet Oral DAILY    alteplase (CATHFLO) 4 mg in sterile water (preservative free) 4 mL injection  4 mg InterCATHeter DIALYSIS ONCE    insulin lispro (HUMALOG) injection   SubCUTAneous AC&HS    atropine 1 mg/mL injection 0.5 mg  0.5 mg IntraVENous PRN    sodium chloride (NS) flush 5-40 mL  5-40 mL IntraVENous Q8H    sodium chloride (NS) flush 5-40 mL  5-40 mL IntraVENous PRN    sodium chloride (NS) flush 5-40 mL  5-40 mL IntraVENous Q8H    sodium chloride (NS) flush 5-40 mL  5-40 mL IntraVENous PRN    acetaminophen (TYLENOL) tablet 650 mg  650 mg Oral Q6H PRN    Or    acetaminophen (TYLENOL) suppository 650 mg  650 mg Rectal Q6H PRN    senna (SENOKOT) tablet 8.6 mg  1 Tablet Oral DAILY PRN    famotidine (PF) (PEPCID) 20 mg in 0.9% sodium chloride 10 mL injection  20 mg IntraVENous DAILY    docusate sodium (COLACE) capsule 100 mg  100 mg Oral DAILY    glucose chewable tablet 16 g  4 Tablet Oral PRN    glucagon (GLUCAGEN) injection 1 mg  1 mg IntraMUSCular PRN    dextrose (D50W) injection syrg 12.5-25 g  25-50 mL IntraVENous PRN       Review of Symptoms: comprehensive ROS negative except above.    Objective:     Patient Vitals for the past 24 hrs:   Temp Pulse Resp BP SpO2   08/21/21 1445  67  135/81    08/21/21 1430  61  139/71    08/21/21 1415  (!) 59  (!) 81/48    08/21/21 1400  65  139/65    08/21/21 1345  66  136/71    08/21/21 1330  63  (!) 150/69    08/21/21 1315  66  135/71    08/21/21 1300  (!) 57  129/78    08/21/21 1245  (!) 55  (!) 146/66    08/21/21 1230  (!) 51  132/70    08/21/21 1215  (!) 55  137/83    08/21/21 1200  66  (!) 154/78    08/21/21 1145  61  (!) 145/74    08/21/21 1135 98.5 °F (36.9 °C) 64  (!) 144/73    08/21/21 1117 98.5 °F (36.9 °C) 65 (!) 50 (!) 162/77 94 %   08/21/21 0754 99.8 °F (37.7 °C) 64 20 (!) 162/71 95 %   08/21/21 0459 99.5 °F (37.5 °C) (!) 45 20 (!) 162/72 98 %   08/21/21 0016 98.1 °F (36.7 °C) (!) 45 20 (!) 168/79 98 %   08/20/21 2035     98 %   08/20/21 1722 98.2 °F (36.8 °C) (!) 45 18 129/65 98 %   08/20/21 1515    132/67 100 %        Weight change:      08/19 1901 - 08/21 0700  In: 250 [P.O.:250]  Out: -     Intake/Output Summary (Last 24 hours) at 8/21/2021 1512  Last data filed at 8/20/2021 1600  Gross per 24 hour   Intake 50 ml Output    Net 50 ml     Physical Exam:   General: comfortable, no acute distress   HEENT sclera anicteric, supple neck, no thyromegaly  CVS: S1S2 heard,  no rub  RS: + air entry b/l,   Abd: Soft, Non tender, Not distended, Positive bowel sounds, no organomegaly, no CVA / supra pubic tenderness  Neuro: non focal, awake, alert , CN II-XII are grossly intact  Extrm: plus 1edema, no cyanosis, clubbing   Skin: no visible  Rash  Musculoskeletal: No gross joints or bone deformities         Data Review:     LABS:   Hematology:   Recent Labs     08/21/21  0453 08/20/21  0146 08/19/21  0553   WBC 13.3* 10.5 12.5   HGB 8.3* 8.1* 8.7*   HCT 25.9* 25.1* 26.3*     Chemistry:   Recent Labs     08/21/21  0453 08/20/21  0146 08/19/21  0553   BUN 66* 54* 83*   CREA 8.35* 6.45* 8.17*   CA 8.6 8.2* 8.2*   K 4.0 3.6 3.7    135* 137    101 100   CO2 27 28 27   PHOS  --   --  4.6   GLU 81 83 80            Procedures/imaging: see electronic medical records for all procedures, Xrays and details which were not copied into this note but were reviewed prior to creation of Plan          Assessment & Plan:       See above      Amy Douglas MD  8/21/2021

## 2021-08-21 NOTE — PROGRESS NOTES
Problem: Falls - Risk of  Goal: *Absence of Falls  Description: Document [place] Danii Fall Risk and appropriate interventions in the flowsheet. Outcome: Progressing Towards Goal  Note: Fall Risk Interventions:  Mobility Interventions: Assess mobility with egress test, Bed/chair exit alarm, Patient to call before getting OOB, Utilize walker, cane, or other assistive device    Mentation Interventions: Bed/chair exit alarm, Adequate sleep, hydration, pain control, Room close to nurse's station, Update white board    Medication Interventions: Bed/chair exit alarm, Teach patient to arise slowly, Patient to call before getting OOB    Elimination Interventions: Bed/chair exit alarm, Call light in reach, Patient to call for help with toileting needs, Toileting schedule/hourly rounds    History of Falls Interventions: Bed/chair exit alarm         Problem: Pressure Injury - Risk of  Goal: *Prevention of pressure injury  Description: Document Max Scale and appropriate interventions in the flowsheet.   Outcome: Progressing Towards Goal  Note: Pressure Injury Interventions:  Sensory Interventions: Assess changes in LOC, Avoid rigorous massage over bony prominences, Keep linens dry and wrinkle-free, Minimize linen layers, Pressure redistribution bed/mattress (bed type)    Moisture Interventions: Absorbent underpads, Internal/External urinary devices, Minimize layers, Moisture barrier, Maintain skin hydration (lotion/cream), Apply protective barrier, creams and emollients    Activity Interventions: Increase time out of bed, Pressure redistribution bed/mattress(bed type), PT/OT evaluation    Mobility Interventions: HOB 30 degrees or less, PT/OT evaluation, Pressure redistribution bed/mattress (bed type)    Nutrition Interventions: Document food/fluid/supplement intake, Discuss nutritional consult with provider, Offer support with meals,snacks and hydration    Friction and Shear Interventions: Apply protective barrier, creams and emollients, Feet elevated on foot rest, Foam dressings/transparent film/skin sealants, HOB 30 degrees or less, Lift sheet, Sit at 90-degree angle            Problem: Patient Education: Go to Patient Education Activity  Goal: Patient/Family Education  Outcome: Progressing Towards Goal

## 2021-08-21 NOTE — PROGRESS NOTES
Problem: Falls - Risk of  Goal: *Absence of Falls  Description: Document Kaci Terry Fall Risk and appropriate interventions in the flowsheet.   Outcome: Progressing Towards Goal  Note: Fall Risk Interventions:  Mobility Interventions: Bed/chair exit alarm, Communicate number of staff needed for ambulation/transfer, Patient to call before getting OOB    Mentation Interventions: Adequate sleep, hydration, pain control, Bed/chair exit alarm, Door open when patient unattended, Increase mobility, More frequent rounding, Reorient patient, Self-releasing belt, Room close to nurse's station, Toileting rounds, Update white board    Medication Interventions: Bed/chair exit alarm, Evaluate medications/consider consulting pharmacy, Patient to call before getting OOB    Elimination Interventions: Bed/chair exit alarm, Call light in reach, Patient to call for help with toileting needs, Toilet paper/wipes in reach, Toileting schedule/hourly rounds, Urinal in reach    History of Falls Interventions: Bed/chair exit alarm, Door open when patient unattended, Evaluate medications/consider consulting pharmacy, Room close to nurse's station, Assess for delayed presentation/identification of injury for 48 hrs (comment for end date), Vital signs minimum Q4HRs X 24 hrs (comment for end date)         Problem: Patient Education: Go to Patient Education Activity  Goal: Patient/Family Education  Outcome: Progressing Towards Goal     Problem: Cardiac Output -  Decreased  Goal: *Vital signs within specified parameters  Outcome: Progressing Towards Goal  Goal: *Optimal cardiac output  Outcome: Progressing Towards Goal  Goal: *Absence of hypoxia  Outcome: Progressing Towards Goal  Goal: *Absence of peripheral edema  Outcome: Progressing Towards Goal  Goal: *Intravascular fluid volume and electrolyte balance  Outcome: Progressing Towards Goal     Problem: Patient Education: Go to Patient Education Activity  Goal: Patient/Family Education  Outcome: Progressing Towards Goal     Problem: Injury - Risk of, Adverse Drug Event  Goal: *Absence of adverse drug events  Outcome: Progressing Towards Goal  Goal: *Absence of medication errors  Outcome: Progressing Towards Goal  Goal: *Knowledge of prescribed medications  Outcome: Progressing Towards Goal     Problem: Patient Education: Go to Patient Education Activity  Goal: Patient/Family Education  Outcome: Progressing Towards Goal     Problem: Patient Education: Go to Patient Education Activity  Goal: Patient/Family Education  Outcome: Progressing Towards Goal     Problem: Acute Renal Failure: Day 2  Goal: Activity/Safety  Outcome: Progressing Towards Goal  Goal: Consults, if ordered  Outcome: Progressing Towards Goal  Goal: Diagnostic Test/Procedures  Outcome: Progressing Towards Goal  Goal: Nutrition/Diet  Outcome: Progressing Towards Goal  Goal: Discharge Planning  Outcome: Progressing Towards Goal  Goal: Medications  Outcome: Progressing Towards Goal  Goal: Respiratory  Outcome: Progressing Towards Goal  Goal: Treatments/Interventions/Procedures  Outcome: Progressing Towards Goal  Goal: Psychosocial  Outcome: Progressing Towards Goal  Goal: *Optimal pain control at patient's stated goal  Outcome: Progressing Towards Goal  Goal: *Urinary output within identified parameters  Outcome: Progressing Towards Goal  Goal: *Hemodynamically stable  Outcome: Progressing Towards Goal  Goal: *Tolerating diet  Outcome: Progressing Towards Goal  Goal: *Lab values improving  Outcome: Progressing Towards Goal     Problem: Acute Renal Failure: Day 3  Goal: Off Pathway (Use only if patient is Off Pathway)  Outcome: Progressing Towards Goal  Goal: Activity/Safety  Outcome: Progressing Towards Goal  Goal: Consults, if ordered  Outcome: Progressing Towards Goal  Goal: Diagnostic Test/Procedures  Outcome: Progressing Towards Goal  Goal: Nutrition/Diet  Outcome: Progressing Towards Goal  Goal: Discharge Planning  Outcome: Progressing Towards Goal  Goal: Medications  Outcome: Progressing Towards Goal  Goal: Respiratory  Outcome: Progressing Towards Goal  Goal: Treatments/Interventions/Procedures  Outcome: Progressing Towards Goal  Goal: Psychosocial  Outcome: Progressing Towards Goal  Goal: *Optimal pain control at patient's stated goal  Outcome: Progressing Towards Goal  Goal: *Urinary output within identified parameters  Outcome: Progressing Towards Goal  Goal: *Hemodynamically stable  Outcome: Progressing Towards Goal  Goal: *Tolerating diet  Outcome: Progressing Towards Goal  Goal: *Lab values improving  Outcome: Progressing Towards Goal     Problem: Acute Renal Failure: Day 4  Goal: Off Pathway (Use only if patient is Off Pathway)  Outcome: Progressing Towards Goal  Goal: Activity/Safety  Outcome: Progressing Towards Goal  Goal: Consults, if ordered  Outcome: Progressing Towards Goal  Goal: Diagnostic Test/Procedures  Outcome: Progressing Towards Goal  Goal: Nutrition/Diet  Outcome: Progressing Towards Goal  Goal: Discharge Planning  Outcome: Progressing Towards Goal  Goal: Medications  Outcome: Progressing Towards Goal  Goal: Respiratory  Outcome: Progressing Towards Goal  Goal: Treatments/Interventions/Procedures  Outcome: Progressing Towards Goal  Goal: Psychosocial  Outcome: Progressing Towards Goal  Goal: *Optimal pain control at patient's stated goal  Outcome: Progressing Towards Goal  Goal: *Urinary output within identified parameters  Outcome: Progressing Towards Goal  Goal: *Hemodynamically stable  Outcome: Progressing Towards Goal  Goal: *Tolerating diet  Outcome: Progressing Towards Goal  Goal: *Lab values improving  Outcome: Progressing Towards Goal     Problem: Acute Renal Failure: Day 5  Goal: Off Pathway (Use only if patient is Off Pathway)  Outcome: Progressing Towards Goal  Goal: Activity/Safety  Outcome: Progressing Towards Goal  Goal: Diagnostic Test/Procedures  Outcome: Progressing Towards Goal  Goal: Nutrition/Diet  Outcome: Progressing Towards Goal  Goal: Discharge Planning  Outcome: Progressing Towards Goal  Goal: Medications  Outcome: Progressing Towards Goal  Goal: Respiratory  Outcome: Progressing Towards Goal  Goal: Treatments/Interventions/Procedures  Outcome: Progressing Towards Goal  Goal: Psychosocial  Outcome: Progressing Towards Goal  Goal: *Optimal pain control at patient's stated goal  Outcome: Progressing Towards Goal  Goal: *Urinary output within identified parameters  Outcome: Progressing Towards Goal  Goal: *Hemodynamically stable  Outcome: Progressing Towards Goal  Goal: *Tolerating diet  Outcome: Progressing Towards Goal  Goal: *Lab values improving  Outcome: Progressing Towards Goal     Problem: Acute Renal Failure: Day 6  Goal: Off Pathway (Use only if patient is Off Pathway)  Outcome: Progressing Towards Goal  Goal: Activity/Safety  Outcome: Progressing Towards Goal  Goal: Diagnostic Test/Procedures  Outcome: Progressing Towards Goal  Goal: Nutrition/Diet  Outcome: Progressing Towards Goal  Goal: Discharge Planning  Outcome: Progressing Towards Goal  Goal: Medications  Outcome: Progressing Towards Goal  Goal: Respiratory  Outcome: Progressing Towards Goal  Goal: Treatments/Interventions/Procedures  Outcome: Progressing Towards Goal  Goal: Psychosocial  Outcome: Progressing Towards Goal     Problem: Acute Renal Failure: Discharge Outcomes  Goal: *Optimal pain control at patient's stated goal  Outcome: Progressing Towards Goal  Goal: *Urinary output within identified parameters  Outcome: Progressing Towards Goal  Goal: *Hemodynamically stable  Outcome: Progressing Towards Goal  Goal: *Tolerating diet  Outcome: Progressing Towards Goal  Goal: *Lab values stabilized  Outcome: Progressing Towards Goal  Goal: *Verbalizes understanding and describes medication purposes and frequencies  Outcome: Progressing Towards Goal  Goal: *Medication reconciliation  Outcome: Progressing Towards Goal

## 2021-08-21 NOTE — ROUTINE PROCESS
Bedside and Verbal shift change report given to Flavia Chauhan RN (oncoming nurse) by Flaco Navas RN  (offgoing nurse). Report included the following information SBAR, Kardex, MAR, Recent Results and Cardiac Rhythm Sinus joel, PVC. Patient had dialysis done today; same tolerated per report. Patient refused to go OOB to chair. Very poor appetite, tolerated fruits, refused supplements. Family aware.

## 2021-08-21 NOTE — DIALYSIS
ACUTE HEMODIALYSIS FLOW SHEET    HEMODIALYSIS ORDERS: Physician: Dr. Brittanie Wiseman: [place]   Duration: 3.5 hr   BFR: 350   DFR: 600   Dialysate:  Temp 36-37*C   K+  2.0    Ca+ 2.5   Na 138   Bicarb 30   Wt Readings from Last 1 Encounters:   08/21/21 114.2 kg (251 lb 11.2 oz)    Patient Chart [x]   Unable to Obtain []  Dry weight/UF Goal: 2000 ml    Heparin []  Bolus    Units    [] Hourly    Units    [x]None       Pre BP:   144/73   Pulse:  64   Respirations: 20   Temperature:  98.6  [x] oral  [] ax  [] esophageal   Labs: []  Pre  []  Post:   [x] N/A   Additional Orders(medications, blood products, hypotension management): [] Yes   [x] No     [x]  [facility] Consent Verified     CATHETER ACCESS:  []N/A   [x]Right   []Left   []IJ   []Fem  [x]Chest wall  []TransHepatic   [] First use X-ray verified     [x]Tunnel    [] Non Tunneled   [x]No S/S infection  []Redness  []Drainage []Cultured []Swelling []Pain   []Medical Aseptic Prep Utilized   []Dressing Changed  [] Biopatch  Date:  08/20/2021   []Clotted   [x]Patent   Flows: [x]Good  []Poor  []Reversed   If access problem,  notified: []Yes    [x]N/A               GENERAL ASSESSMENT:    LUNGS:  Resp Rate 20   [x] Clear  [] Coarse  [] Crackles  [] Wheezing  [] Diminished                                                            [] RLL  [] LLL [] RUL  [] RONNIE       Respirations:  [x]Easy  []Labored  []N/A  Cough:  []Productive  []Dry  []N/A      Therapy:  []RA  O2 Type:  [x]NC   []  NRB    [] BiPaP  Flow:3 l/min                   [] Ventilated  [] Intubated  [] Trach     CARDIAC: [x] Regular      [] Irregular   [] Rhythm:          [] Monitored   [] Bedside   [] Remotely monitored     EDEMA: [] None   [x]Generalized  [] Pitting [] 1+   [] 2 +   [] 3+    [] 4+     SKIN:   [x] Warm  [] Hot     [] Cold   [x] Dry     [] Pale   [] Diaphoretic                  [] Flushed  [] Jaundiced  [] Cyanotic     LOC:    [x] Alert      [x]Oriented:    [x] Person     [x] Place [x]Time               [] Confused  [] Lethargic  [] Medicated  [] Non-responsive  [] Non-verbal   GI / ABDOMEN:                     [] Flat    [] Distended    [x] Soft    [] Firm   []  Obese                   [] Diarrhea   [] FMS [x] Bowel Sounds  [] Nausea  [] Vomiting                   [] NGT  [] OGT    [] PEG  [] Tube Feedings @     / URINE ASSESSMENT:                   [] Voiding    [x] Oliguria  [] Anuria                    []  Elizabeth  [] Incontinent  []  Incontinent Brief   []  PureWick     PAIN:  [x] 0 []1  []2   []3   []4   []5   []6   []7   []8   []9   []10                MOBILITY:  [x] Bed    [] Stretcher      All Vitals and Treatment Details on Attached [address]: [facility]          Room # 360/01    [x] Routine         [] 1st Time Acute/Chronic   [] Urgent      [] Stat            [x] Acute Room   []  Bedside    [] ICU/CCU     [] ER   Isolation Precautions:  [x] Dialysis    There are currently no Active Isolations     ALLERGIES:     No Known Allergies   Code Status:  DNR     Hepatitis Status      Lab Results   Component Value Date/Time    Hepatitis B surface Ag <0.10 08/15/2021 09:50 PM    Hepatitis B surface Ab <3.10 (L) 08/15/2021 09:50 PM    Hepatitis C virus Ab 0.28 12/07/2015 04:15 PM        Current Labs:      Lab Results   Component Value Date/Time    WBC 13.3 (H) 08/21/2021 04:53 AM    Hemoglobin, POC 10.2 (L) 03/11/2016 07:26 AM    HGB 8.3 (L) 08/21/2021 04:53 AM    Hematocrit, POC 30 (L) 03/11/2016 07:26 AM    HCT 25.9 (L) 08/21/2021 04:53 AM    PLATELET 723 69/99/0121 04:53 AM    MCV 83.0 08/21/2021 04:53 AM     Lab Results   Component Value Date/Time    Sodium 136 08/21/2021 04:53 AM    Potassium 4.0 08/21/2021 04:53 AM    Chloride 100 08/21/2021 04:53 AM    CO2 27 08/21/2021 04:53 AM    Anion gap 9 08/21/2021 04:53 AM    Glucose 81 08/21/2021 04:53 AM    BUN 66 (H) 08/21/2021 04:53 AM    Creatinine 8.35 (H) 08/21/2021 04:53 AM    BUN/Creatinine ratio 8 (L) 08/21/2021 04:53 AM    GFR est AA 8 (L) 08/21/2021 04:53 AM    GFR est non-AA 6 (L) 08/21/2021 04:53 AM    Calcium 8.6 08/21/2021 04:53 AM          DIET:  DIET ADULT ORAL NUTRITION SUPPLEMENT  DIET ADULT ORAL NUTRITION SUPPLEMENT  DIET ADULT     PRIMARY NURSE REPORT:   Pre Dialysis: Tito Adams RN    Time: 733 067 319      EDUCATION:    [x] Patient           Knowledge Basis: []None [x]Minimal [] Substantial [] Unknown  Barriers to learning  [x]N/A  [] Intubated/Trached/Ventilated  [] Sedated/Paralyzed   [] Access Care     [] S&S of infection  [] Fluid Management  [] K+   [x] Procedural    [] Medications   [] Tx Options   [] Transplant   [] Diet      Teaching Tools:  [x] Explain  [] Demo  [] Handouts [] Video  Patient response: [x] Verbalized understanding   [x] Requires follow up        [x] Time Out/Safety Check    [x] Extracorporeal Circuit Tested for integrity       RO/HEMODIALYSIS MACHINE SAFETY CHECKS  Before each treatment:        [address] [place] [address]                                     [x] Unit Machine # 8 with centralized RO                                                                                                                                        Alarm Test:  Pass time 1000            [x] RO/Machine Log Complete    Machine Temp    36-37*C             Dialysate: pH  7.4    Conductivity: Meter 14.0    HD Machine  14.1     TCD: 13.8  Dialyzer Lot # [id number]     Blood Tubing Lot # [id number]     Saline Lot # [id number]     CHLORINE TESTING-Before each treatment and every 4 hours    Total Chlorine: [x] less than 0.1 ppm  Initial Time Check: 0900       4 Hr/2nd Check Time: 1300   (if greater than 0.1 ppm from Primary then every 30 minutes from Secondary)     TREATMENT INITIATION  with Dialysis Precautions:   [x] All Connections Secured              [x] Saline Line Double Clamped   [x] Venous Parameters Set               [x] Arterial Parameters Set    [x] Prime Given 250ml NSS              [x]Air Foam Detector Engaged Treatment Initiation Note:  4914  3071     During Treatment Notes:  0054  Face & Vascular access visible with arterial and venous line connections intact. Pt tolerating HD.  1200  Face & Vascular access visible with arterial and venous line connections intact. Pt tolerating HD.  1215  Face & Vascular access visible with arterial and venous line connections intact. Pt tolerating HD.  1230  Face & Vascular access visible with arterial and venous line connections intact. Pt tolerating HD.  1245  Face & Vascular access visible with arterial and venous line connections intact. Pt tolerating HD.  1300  Face & Vascular access visible with arterial and venous line connections intact. Pt tolerating HD.  1315  Face & Vascular access visible with arterial and venous line connections intact. Pt tolerating HD.  1330  Face & Vascular access visible with arterial and venous line connections intact. Pt tolerating HD.  1345  Face & Vascular access visible with arterial and venous line connections intact. Pt tolerating HD.  1400  Face & Vascular access visible with arterial and venous line connections intact. Pt tolerating HD.  1415  Face & Vascular access visible with arterial and venous line connections intact. Pt tolerating HD.  1430  Face & Vascular access visible with arterial and venous line connections intact. Pt tolerating HD.  1445  Face & Vascular access visible with arterial and venous line connections intact. Pt tolerating HD.  1500  Face & Vascular access visible with arterial and venous line connections intact. Pt tolerating HD.    1500  Dialysis treatment complete.        Medication Dose Volume Route Time Axel Nurse, Title   N/A   DOMO Padilla RN      HD  Daisy Padilla RN      HD  Daisy Padilla RN     Post Assessment  Dialyzer Cleared:   [] Good  [x] Fair  [] Poor  Blood processed:  69.3 L  UF Removed:  2000 Ml    Post /67   Pulse  57 Resp  18  Temp 98.4 Lungs: [x] Lung sounds same as pre dialysis assessment       Cardiac :[x] Regular   [] Irregular   Rhythm:  [] Monitored   [x] Not Monitored    CVC Catheter: [] N/A  Locking solution: Heparin 1:1000 U  Arterial port 1.9 ml   Venous port 1.9 ml   Edema:  [] None  [x] Generalized                     Skin:[x] Warm  [x] Dry [] Diaphoretic               [] Flushed  [] Pale [] Cyanotic Pain:  [x]0  []1 []2  []3 []4  []5  []6  []7 []8    []9  []10     Post Treatment Note:   2323 Pt tolerated dialysis well. Dialysis catheter intact, patent and heplocked as noted above.      POST TREATMENT PRIMARY NURSE HANDOFF REPORT:   Post Dialysis: Michele Whitehead, RN               Time:  1500       Abbreviations: AVG-arterial venous graft, AVF-arterial venous fistula, IJ-Internal Jugular, Subcl-Subclavian, Fem-Femoral, Tx-treatment, AP/HR-apical heart rate, VSS- Vital Signs Stable, CVC- Central Venous Catheter, DFR-dialysate flow rate, BFR-blood flow rate, AP-arterial pressure, -venous pressure, UF-ultrafiltrate, TMP-transmembrane pressure, Mykel-Venous, Art-Arterial, RO-Reverse Osmosis

## 2021-08-22 ENCOUNTER — APPOINTMENT (OUTPATIENT)
Dept: GENERAL RADIOLOGY | Age: 71
DRG: 673 | End: 2021-08-22
Attending: INTERNAL MEDICINE
Payer: MEDICARE

## 2021-08-22 LAB
ANION GAP SERPL CALC-SCNC: 8 MMOL/L (ref 3–18)
BASOPHILS # BLD: 0 K/UL (ref 0–0.1)
BASOPHILS NFR BLD: 0 % (ref 0–2)
BUN SERPL-MCNC: 57 MG/DL (ref 7–18)
BUN/CREAT SERPL: 9 (ref 12–20)
CALCIUM SERPL-MCNC: 8.3 MG/DL (ref 8.5–10.1)
CHLORIDE SERPL-SCNC: 102 MMOL/L (ref 100–111)
CO2 SERPL-SCNC: 25 MMOL/L (ref 21–32)
COVID-19 RAPID TEST, COVR: NOT DETECTED
CREAT SERPL-MCNC: 6.3 MG/DL (ref 0.6–1.3)
DIFFERENTIAL METHOD BLD: ABNORMAL
EOSINOPHIL # BLD: 0 K/UL (ref 0–0.4)
EOSINOPHIL NFR BLD: 0 % (ref 0–5)
ERYTHROCYTE [DISTWIDTH] IN BLOOD BY AUTOMATED COUNT: 16.7 % (ref 11.6–14.5)
GLUCOSE BLD STRIP.AUTO-MCNC: 104 MG/DL (ref 70–110)
GLUCOSE BLD STRIP.AUTO-MCNC: 121 MG/DL (ref 70–110)
GLUCOSE BLD STRIP.AUTO-MCNC: 85 MG/DL (ref 70–110)
GLUCOSE BLD STRIP.AUTO-MCNC: 86 MG/DL (ref 70–110)
GLUCOSE SERPL-MCNC: 112 MG/DL (ref 74–99)
HCT VFR BLD AUTO: 19.5 % (ref 36–48)
HCT VFR BLD AUTO: 22.1 % (ref 36–48)
HEMOCCULT STL QL: POSITIVE
HGB BLD-MCNC: 6.3 G/DL (ref 13–16)
HGB BLD-MCNC: 7.2 G/DL (ref 13–16)
HISTORY CHECKED?,CKHIST: NORMAL
LYMPHOCYTES # BLD: 0.4 K/UL (ref 0.9–3.6)
LYMPHOCYTES NFR BLD: 2 % (ref 21–52)
MCH RBC QN AUTO: 27.6 PG (ref 24–34)
MCHC RBC AUTO-ENTMCNC: 32.6 G/DL (ref 31–37)
MCV RBC AUTO: 84.7 FL (ref 74–97)
MONOCYTES # BLD: 0.6 K/UL (ref 0.05–1.2)
MONOCYTES NFR BLD: 3 % (ref 3–10)
NEUTS SEG # BLD: 18.3 K/UL (ref 1.8–8)
NEUTS SEG NFR BLD: 95 % (ref 40–73)
PLATELET # BLD AUTO: 225 K/UL (ref 135–420)
PLATELET COMMENTS,PCOM: ABNORMAL
PMV BLD AUTO: 10.5 FL (ref 9.2–11.8)
POTASSIUM SERPL-SCNC: 4.2 MMOL/L (ref 3.5–5.5)
RBC # BLD AUTO: 2.61 M/UL (ref 4.35–5.65)
RBC MORPH BLD: ABNORMAL
RBC MORPH BLD: ABNORMAL
SODIUM SERPL-SCNC: 135 MMOL/L (ref 136–145)
SOURCE, COVRS: NORMAL
WBC # BLD AUTO: 19.3 K/UL (ref 4.6–13.2)

## 2021-08-22 PROCEDURE — 85018 HEMOGLOBIN: CPT

## 2021-08-22 PROCEDURE — 80048 BASIC METABOLIC PNL TOTAL CA: CPT

## 2021-08-22 PROCEDURE — 2709999900 HC NON-CHARGEABLE SUPPLY

## 2021-08-22 PROCEDURE — 74011250637 HC RX REV CODE- 250/637: Performed by: INTERNAL MEDICINE

## 2021-08-22 PROCEDURE — 74011000250 HC RX REV CODE- 250: Performed by: NURSE PRACTITIONER

## 2021-08-22 PROCEDURE — 74011250636 HC RX REV CODE- 250/636: Performed by: INTERNAL MEDICINE

## 2021-08-22 PROCEDURE — 87635 SARS-COV-2 COVID-19 AMP PRB: CPT

## 2021-08-22 PROCEDURE — P9016 RBC LEUKOCYTES REDUCED: HCPCS

## 2021-08-22 PROCEDURE — 90935 HEMODIALYSIS ONE EVALUATION: CPT

## 2021-08-22 PROCEDURE — 51798 US URINE CAPACITY MEASURE: CPT

## 2021-08-22 PROCEDURE — 99232 SBSQ HOSP IP/OBS MODERATE 35: CPT | Performed by: HOSPITALIST

## 2021-08-22 PROCEDURE — 86901 BLOOD TYPING SEROLOGIC RH(D): CPT

## 2021-08-22 PROCEDURE — 74011250637 HC RX REV CODE- 250/637: Performed by: HOSPITALIST

## 2021-08-22 PROCEDURE — 36415 COLL VENOUS BLD VENIPUNCTURE: CPT

## 2021-08-22 PROCEDURE — 74011000250 HC RX REV CODE- 250: Performed by: STUDENT IN AN ORGANIZED HEALTH CARE EDUCATION/TRAINING PROGRAM

## 2021-08-22 PROCEDURE — 65660000000 HC RM CCU STEPDOWN

## 2021-08-22 PROCEDURE — 86923 COMPATIBILITY TEST ELECTRIC: CPT

## 2021-08-22 PROCEDURE — 74011250636 HC RX REV CODE- 250/636: Performed by: NURSE PRACTITIONER

## 2021-08-22 PROCEDURE — 30233N1 TRANSFUSION OF NONAUTOLOGOUS RED BLOOD CELLS INTO PERIPHERAL VEIN, PERCUTANEOUS APPROACH: ICD-10-PCS | Performed by: STUDENT IN AN ORGANIZED HEALTH CARE EDUCATION/TRAINING PROGRAM

## 2021-08-22 PROCEDURE — 85025 COMPLETE CBC W/AUTO DIFF WBC: CPT

## 2021-08-22 PROCEDURE — C9113 INJ PANTOPRAZOLE SODIUM, VIA: HCPCS | Performed by: HOSPITALIST

## 2021-08-22 PROCEDURE — 74011250636 HC RX REV CODE- 250/636: Performed by: STUDENT IN AN ORGANIZED HEALTH CARE EDUCATION/TRAINING PROGRAM

## 2021-08-22 PROCEDURE — 71045 X-RAY EXAM CHEST 1 VIEW: CPT

## 2021-08-22 PROCEDURE — 74011250636 HC RX REV CODE- 250/636: Performed by: HOSPITALIST

## 2021-08-22 PROCEDURE — 82272 OCCULT BLD FECES 1-3 TESTS: CPT

## 2021-08-22 PROCEDURE — 82962 GLUCOSE BLOOD TEST: CPT

## 2021-08-22 PROCEDURE — P9047 ALBUMIN (HUMAN), 25%, 50ML: HCPCS | Performed by: INTERNAL MEDICINE

## 2021-08-22 RX ORDER — INSULIN LISPRO 100 [IU]/ML
INJECTION, SOLUTION INTRAVENOUS; SUBCUTANEOUS EVERY 6 HOURS
Status: DISCONTINUED | OUTPATIENT
Start: 2021-08-23 | End: 2021-08-27 | Stop reason: HOSPADM

## 2021-08-22 RX ORDER — SODIUM CHLORIDE 9 MG/ML
250 INJECTION, SOLUTION INTRAVENOUS AS NEEDED
Status: DISCONTINUED | OUTPATIENT
Start: 2021-08-22 | End: 2021-08-26 | Stop reason: ALTCHOICE

## 2021-08-22 RX ORDER — PANTOPRAZOLE SODIUM 40 MG/10ML
40 INJECTION, POWDER, LYOPHILIZED, FOR SOLUTION INTRAVENOUS EVERY 12 HOURS
Status: DISCONTINUED | OUTPATIENT
Start: 2021-08-22 | End: 2021-08-23

## 2021-08-22 RX ORDER — FUROSEMIDE 10 MG/ML
80 INJECTION INTRAMUSCULAR; INTRAVENOUS ONCE
Status: COMPLETED | OUTPATIENT
Start: 2021-08-22 | End: 2021-08-22

## 2021-08-22 RX ADMIN — FAMOTIDINE 20 MG: 10 INJECTION INTRAVENOUS at 10:12

## 2021-08-22 RX ADMIN — SODIUM CHLORIDE 10 ML: 9 INJECTION, SOLUTION INTRAMUSCULAR; INTRAVENOUS; SUBCUTANEOUS at 21:10

## 2021-08-22 RX ADMIN — PANTOPRAZOLE SODIUM 40 MG: 40 INJECTION, POWDER, FOR SOLUTION INTRAVENOUS at 21:10

## 2021-08-22 RX ADMIN — HALOPERIDOL LACTATE 2 MG: 5 INJECTION, SOLUTION INTRAMUSCULAR at 02:40

## 2021-08-22 RX ADMIN — ALBUMIN (HUMAN) 25 G: 0.25 INJECTION, SOLUTION INTRAVENOUS at 16:45

## 2021-08-22 RX ADMIN — HALOPERIDOL LACTATE 2 MG: 5 INJECTION, SOLUTION INTRAMUSCULAR at 10:11

## 2021-08-22 RX ADMIN — FUROSEMIDE 80 MG: 10 INJECTION, SOLUTION INTRAMUSCULAR; INTRAVENOUS at 12:41

## 2021-08-22 RX ADMIN — ALBUMIN (HUMAN) 25 G: 0.25 INJECTION, SOLUTION INTRAVENOUS at 15:15

## 2021-08-22 RX ADMIN — SODIUM CHLORIDE 10 ML: 9 INJECTION, SOLUTION INTRAMUSCULAR; INTRAVENOUS; SUBCUTANEOUS at 06:36

## 2021-08-22 RX ADMIN — TAMSULOSIN HYDROCHLORIDE 0.4 MG: 0.4 CAPSULE ORAL at 10:12

## 2021-08-22 RX ADMIN — THERA TABS 1 TABLET: TAB at 10:11

## 2021-08-22 NOTE — ROUTINE PROCESS
1940 Assumed care of patient from off going nurse, [facility] RN. Patient restless in bed. Attempting to get OOB. Instructed patient not to get up without assistance. No distress noted. Call bell within reach, siderails up x 3, bed in lowest position, and patient instructed to use call bell for assistance, and bed alarm on. Will continue to monitor. 2000 Dr. Yasmin Pires notified of pt's agitation. Attempting multiple times to get OOB and pulling at indwelling vines catheter. Received order for Haldol. 2026 Haldol 2 mg given IV as ordered. 2124  Patient resting quietly in bed. 0240 Haldol 2 mg given again for agitation. 0720 Bedside and Verbal shift change report given to Ezequiel Henao RN (oncoming nurse) by Deven Edwards RN  (offgoing nurse). Report included the following information SBAR, Intake/Output, MAR, Recent Results and Cardiac Rhythm Afib PVCs.

## 2021-08-22 NOTE — DIALYSIS
ACUTE HEMODIALYSIS FLOW SHEET    HEMODIALYSIS ORDERS: Physician: Dr. Lucia Chua     Dialyzer: Revaclear   Duration: 3 hr  BFR: 350   DFR: 800   Dialysate:  Temp 36   K+   2    Ca+  2.5   Na 138   Bicarb 32   Wt Readings from Last 1 Encounters:   08/22/21 109.2 kg (240 lb 11.9 oz)    Patient Chart [x]   Unable to Obtain []  Dry weight/UF Goal: 3000 ml   Heparin []  Bolus    Units    [] Hourly    Units    [x]None       Pre BP:   120/72    Pulse:  60   Respirations: 18    Temperature:  98.0  [] Oral [] Axillary  Tx: NSS   ml/Bolus   [x] N/A   Labs: []  Pre  []  Post:   [x] N/A   Additional Orders(medications, blood products, hypotension management): [x] Yes   [] No     [x]  [facility] Consent Verified     CATHETER ACCESS:  []N/A   [x]Right   []Left   []IJ     []Fem   [x]Chest wall   [] First use X-ray verified     [x]Tunnel    [] Non Tunneled   [x]No S/S infection  []Redness  []Drainage []Cultured []Swelling []Pain   [x]Medical Aseptic Prep Utilized   [x]Dressing Changed  [] Biopatch  Date: 8/20/21   []Clotted   [x]Patent   Flows: [x]Good  []Poor  []Reversed   If access problem,  notified: []Yes    [x]N/A        GENERAL ASSESSMENT:    LUNGS:   SaO2%  99    [] Clear  [] Coarse  [x] Crackles  [] Wheezing                                                [] Diminished     Location : []RLL   [x]LLL    []RUL  [x]RONNIE   Cough: []Productive  []Dry  [x]N/A   Respirations:  [x]Easy  []Labored   Therapy:  []RA  [x]NC 2l/min    Mask: []NRB  [] Venti    O2%                  []Ventilator  []Intubated  [] Trach  [] BiPaP   CARDIAC: [x]Regular      [] Irregular   [] Pericardial Rub  [] JVD          []  Monitored  [] Bedside  [] Remotely monitored     EDEMA: [] None  []Generalized  [x] Pitting [x] 1    [] 2    [] 3    [] 4                 [] Facial  [x] Pedal  []  UE  [] LE   SKIN:   [x] Warm  [] Hot     [] Cold   [x] Dry     [] Pale   [] Diaphoretic                  [] Flushed  [] Jaundiced  [] Cyanotic  [] Rash  [] Weeping LOC:    [x] Alert      [x]Oriented:    [x] Person     [x] Place  []Time               [] Confused  [] Lethargic  [] Medicated  [] Non-responsive  [] Non-Verbal   GI / ABDOMEN:                     [] Flat    [] Distended    [x] Soft    [] Firm   []  Obese                   [] Diarrhea  [x] Bowel Sounds  [] Nausea  [] Vomiting     / URINE ASSESSMENT:                   [] Voiding   [] Oliguria  [] Anuria   [x]  Elizabeth                  [] Incontinent  []  Incontinent Brief []  Fecal Management System     PAIN:  [x] 0 []1  []2   []3   []4   []5   []6   []7   []8   []9   []10            Scale 0-10  Action/Follow Up:    MOBILITY:  [x] Bed    [] Stretcher      All Vitals and Treatment Details on Attached [email or web address] [address]: [facility]          Room # 360/01   [] 1st Time Acute      [] Stat       [x] Routine      [] Urgent     [] Acute Room  [x]  Bedside  [] ICU/CCU  [] ER   Isolation Precautions:  [x] Dialysis    There are currently no Active Isolations       ALLERGIES:     No Known Allergies   Code Status:  DNR     Hepatitis Status     Lab Results   Component Value Date/Time    Hepatitis B surface Ag <0.10 08/15/2021 09:50 PM    Hepatitis B surface Ab <3.10 (L) 08/15/2021 09:50 PM    Hepatitis C virus Ab 0.28 12/07/2015 04:15 PM        Current Labs:      Lab Results   Component Value Date/Time    WBC 19.3 (H) 08/22/2021 02:41 AM    Hemoglobin, POC 10.2 (L) 03/11/2016 07:26 AM    HGB 6.3 (L) 08/22/2021 12:07 PM    Hematocrit, POC 30 (L) 03/11/2016 07:26 AM    HCT 19.5 (L) 08/22/2021 12:07 PM    PLATELET 817 80/24/6682 02:41 AM    MCV 84.7 08/22/2021 02:41 AM     Lab Results   Component Value Date/Time    Sodium 135 (L) 08/22/2021 02:41 AM    Potassium 4.2 08/22/2021 02:41 AM    Chloride 102 08/22/2021 02:41 AM    CO2 25 08/22/2021 02:41 AM    Anion gap 8 08/22/2021 02:41 AM    Glucose 112 (H) 08/22/2021 02:41 AM    BUN 57 (H) 08/22/2021 02:41 AM    Creatinine 6.30 (H) 08/22/2021 02:41 AM    BUN/Creatinine ratio 9 (L) 08/22/2021 02:41 AM    GFR est AA 11 (L) 08/22/2021 02:41 AM    GFR est non-AA 9 (L) 08/22/2021 02:41 AM    Calcium 8.3 (L) 08/22/2021 02:41 AM          DIET:  DIET ADULT ORAL NUTRITION SUPPLEMENT  DIET ADULT ORAL NUTRITION SUPPLEMENT  DIET NPO      PRIMARY NURSE REPORT:   Pre Dialysis:  SYEDA Ta     Time: 3534        EDUCATION:    [x] Patient [] Other           Knowledge Basis: []None [x]Minimal [] Substantial   Barriers to learning  [x]N/A  []Intubated/Ventilated  []Sedated   [] Access Care     [] S&S of infection  [] Fluid Management  [] K+   [x] Procedural    []Albumin   [] Medications   [] Tx Options   [] Transplant   [] Diet   [] Other   Teaching Tools:  [x] Explain  [] Demo  [] Handouts [] Video  Patient response: [x] Verbalized understanding  [] Teach back  [] Return demonstration   [] Requires follow up      [x]Time Out/Safety Check  [x] Extracorporeal Circuit Tested for integrity       RO/HEMODIALYSIS MACHINE SAFETY CHECKS  Before each treatment:     Machine Number:                   [facility]                                    [x] Portable Machine #10/RO serial # [id number]                                                                              Alarm Test:  Pass time 1420            [x] RO/Machine Log Complete    Machine Temp    36.0             Dialysate: pH  7.4    Conductivity: Meter 13.8     HD Machine  14.0      TCD: 13.8  Dialyzer Lot # [id number]     Blood Tubing Lot # [id number]   Saline Lot # [id number]     CHLORINE TESTING-Before each treatment and every 4 hours    Total Chlorine: [x] less than 0.1 ppm  Initial Time Check: 1450       4 Hr/2nd Check Time:    (if greater than 0.1 ppm from Primary then every 30 minutes from Secondary)     TREATMENT INITIATION  with Dialysis Precautions:   [x] All Connections Secured              [x] Saline Line Double Clamped   [x] Venous Parameters Set               [x] Arterial Parameters Set    [x] Prime Given 250ml NSS              [x]Air Foam Detector Engaged      Treatment Initiation Note:  1440; Arrived at pt's room, pt denies any pain or SOB. Pt A & O  X 3, follows commands, no distress noted at this time. On oxygen 2L via NC,  VSS, has bilateral basal rales on auscultation. , Rt chest wall CVC assessed no abnormalities noted, line patent with good flow. CVC accessed without any difficulty. HGB- 6.3 for blood transfusion with dialysis as soon as blood is ready, as per Dr Marnie Carrillo, UF goal set at 3800mls to account for blood and rinse back. 1445;  Time out performed per policy, HD commenced, pt tolerated well. During Treatment Notes:  1500; pt alert and well VSS. Vascular access visible with arterial and venous line connections intact. 1515; BP noted dropping,99/65, Albumin 25g/100mls administered  1530; Pt resting with eyes closed, VSS. BP now 113/59,Vascular access visible with arterial and venous line connections intact. Phlebotomist in attendance reports to have had a discrepancy with patient's blood group hence janice another blood sample for re- crossmatching of pt's blood group. Dr Josh Gay contacted and updated. 1600; pt resting comfortably, VSS. Vascular access visible with arterial and venous line connections intact. 1630; Pt alert and well, VSS. Vascular access visible with arterial and venous line connections intact. 1645; BP; 98/60, 2nd dose of Albumin administered. 1700; Pt alert and well, VSS. BP; 98/62 Vascular access visible with arterial and venous line connections intact. Blood available, same counterchecked and administered per policy, no reaction observed. 441 0134; pt alert and well, HD in good progress, VSS. BP; 120/60,Vascular access visible with arterial and venous line connections intact. 1800; Dialysis treatment completed. Medication Dose Volume Route Time Axel Nurse, Title   Albumin 25g 100mls HD 1515 JEWEL Clark RN   Albumin 25g 100mls HD 1645 JEWEL Clark RN     Post Assessment  Dialyzer Cleared:[] Good [x] Fair  [] Poor  Blood processed:  60.7 L  UF Removed:  3000 Ml  Post /68  Pulse  66 Resp  18   Temp 98.2  [x] Oral [] Axillary        CVC Catheter: [] N/A  Locking solution: Heparin 1:1000 U  Arterial port 1.6 ml   Venous port 1.6ml         Skin:[x] Warm  [x] Dry [] Diaphoretic               [] Flushed  [] Pale [] Cyanotic   Pain:  [x]0  []1 []2  []3 []4  []5  []6 []7 []8  []9  []10       Post Treatment Note:  6869; VSS. Dialysis catheter intact, patent and heplocked accordingly, Dressing clean, dry and intact. POST TREATMENT PRIMARY NURSE HANDOFF REPORT:   Post Dialysis:  SYEDA Ta                Time:  4198       Abbreviations: AVG-arterial venous graft, AVF-arterial venous fistula, IJ-Internal Jugular, Subcl-Subclavian, Fem-Femoral, Tx-treatment, AP/HR-apical heart rate, DFR-dialysate flow rate, BFR-blood flow rate, AP-arterial pressure, -venous pressure, UF-ultrafiltrate, TMP-transmembrane pressure, Mykel-Venous, Art-Arterial, RO-Reverse Osmosis

## 2021-08-22 NOTE — PROGRESS NOTES
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 On levaquin  S/p left arm AVF(occluded) in past  S/p tunneled access placement 8/20/21  Hypertension  Hx neurogenic bladder  ACD  Secondary hyperparathyroidism  PAF   PLAN:   Get STAT CXR. Might need emergent dialysis.  Will follow up with CXR  Give lasix 80 mg iv now           Current Facility-Administered Medications   Medication Dose Route Frequency    albumin human 25% (BUMINATE) solution 25 g  25 g IntraVENous DIALYSIS TUE, THU & SAT    haloperidol lactate (HALDOL) injection 2 mg  2 mg IntraVENous Q6H PRN    tamsulosin (FLOMAX) capsule 0.4 mg  0.4 mg Oral DAILY    heparin (porcine) injection 5,000 Units  5,000 Units SubCUTAneous Q8H    levoFLOXacin (LEVAQUIN) 500 mg in D5W IVPB  500 mg IntraVENous Q48H    epoetin negrita-epbx (RETACRIT) injection 8,000 Units  8,000 Units SubCUTAneous Q MON, WED & FRI    therapeutic multivitamin (THERAGRAN) tablet 1 Tablet  1 Tablet Oral DAILY    alteplase (CATHFLO) 4 mg in sterile water (preservative free) 4 mL injection  4 mg InterCATHeter DIALYSIS ONCE    insulin lispro (HUMALOG) injection   SubCUTAneous AC&HS    atropine 1 mg/mL injection 0.5 mg  0.5 mg IntraVENous PRN    sodium chloride (NS) flush 5-40 mL  5-40 mL IntraVENous Q8H    sodium chloride (NS) flush 5-40 mL  5-40 mL IntraVENous PRN    sodium chloride (NS) flush 5-40 mL  5-40 mL IntraVENous Q8H    sodium chloride (NS) flush 5-40 mL  5-40 mL IntraVENous PRN    acetaminophen (TYLENOL) tablet 650 mg  650 mg Oral Q6H PRN    Or    acetaminophen (TYLENOL) suppository 650 mg  650 mg Rectal Q6H PRN    senna (SENOKOT) tablet 8.6 mg  1 Tablet Oral DAILY PRN    famotidine (PF) (PEPCID) 20 mg in 0.9% sodium chloride 10 mL injection  20 mg IntraVENous DAILY    docusate sodium (COLACE) capsule 100 mg  100 mg Oral DAILY    glucose chewable tablet 16 g  4 Tablet Oral PRN    glucagon (GLUCAGEN) injection 1 mg  1 mg IntraMUSCular PRN    dextrose (D50W) injection syrg 12.5-25 g  25-50 mL IntraVENous PRN       Review of Symptoms: comprehensive ROS negative except above.    Objective:     Patient Vitals for the past 24 hrs:   Temp Pulse Resp BP SpO2   08/22/21 0727 99.9 °F (37.7 °C) 84 18 133/85 96 %   08/22/21 0432 98.7 °F (37.1 °C) 86 15 133/73 95 %   08/21/21 2356 98.9 °F (37.2 °C) 75 16 (!) 149/64 92 %   08/21/21 1940 98.4 °F (36.9 °C) (!) 54 18 (!) 147/60 96 %   08/21/21 1559 98 °F (36.7 °C) (!) 57 18 (!) 154/63 98 %   08/21/21 1505  (!) 57  139/67    08/21/21 1500  (!) 57  139/67    08/21/21 1445  67  135/81    08/21/21 1430  61  139/71    08/21/21 1415  (!) 59  (!) 81/48    08/21/21 1400  65  139/65    08/21/21 1345  66  136/71    08/21/21 1330  63  (!) 150/69    08/21/21 1315  66  135/71    08/21/21 1300  (!) 57  129/78    08/21/21 1245  (!) 55  (!) 146/66    08/21/21 1230  (!) 51  132/70    08/21/21 1215  (!) 55  137/83    08/21/21 1200  66  (!) 154/78    08/21/21 1145  61  (!) 145/74    08/21/21 1135 98.5 °F (36.9 °C) 64  (!) 144/73    08/21/21 1117 98.5 °F (36.9 °C) 65 (!) 50 (!) 162/77 94 %        Weight change: -4.763 kg (-10 lb 8 oz)     08/20 1901 - 08/22 0700  In: 120 [P.O.:120]  Out: 2300 [Urine:300]    Intake/Output Summary (Last 24 hours) at 8/22/2021 1105  Last data filed at 8/22/2021 3161  Gross per 24 hour   Intake 220 ml   Output 2300 ml   Net -2080 ml     Physical Exam:   General: appears SOB   HEENT sclera anicteric, supple neck, no thyromegaly  CVS: S1S2 heard,  no rub  RS: rales at bases  Abd: Soft, Non tender, Not distended, Positive bowel sounds, no organomegaly, no CVA / supra pubic tenderness  Neuro: non focal, awake, alert , CN II-XII are grossly intact  Extrm: plus 1edema, no cyanosis, clubbing   Skin: no visible  Rash  Musculoskeletal: No gross joints or bone deformities         Data Review:     LABS:   Hematology:   Recent Labs     08/22/21  0241 08/21/21  0453 08/20/21  0146   WBC 19.3* 13.3* 10.5   HGB 7.2* 8.3* 8.1*   HCT 22.1* 25.9* 25.1*     Chemistry:   Recent Labs     08/22/21  0241 08/21/21  0453 08/20/21  0146   BUN 57* 66* 54*   CREA 6.30* 8.35* 6.45*   CA 8.3* 8.6 8.2*   K 4.2 4.0 3.6   * 136 135*    100 101   CO2 25 27 28   * 81 83            Procedures/imaging: see electronic medical records for all procedures, Xrays and details which were not copied into this note but were reviewed prior to creation of Plan          Assessment & Plan:       See above      Amy Douglas MD  8/22/2021

## 2021-08-22 NOTE — ROUTINE PROCESS
9541: Bedside and Verbal shift change report given to [address] and Cecily RN (oncoming nurse) by Nayeli Ramirez RN (offgoing nurse). Report included the following information SBAR, Kardex, Intake/Output, MAR and Recent Results. 1500: Pt receiving dialysis at bedside. 1700: Pt receiving one unit of PRBC by dialysis RN at bedside. 1905: Bedside and Verbal shift change report given to Lindsay (oncoming nurse) by [address] and Juan Ramon LANDA (offgoing nurse). Report included the following information SBAR, Kardex, Intake/Output, MAR and Recent Results.

## 2021-08-22 NOTE — PROGRESS NOTES
Found Pt in bed after large Incontinent episode of liquid black stool. Sent page to Dr Rachel Polanco to notify. Dr Rachel Polanco agrees with orders of STAT h/h and occult blood stool. Also to add order for type and screen.

## 2021-08-22 NOTE — PROGRESS NOTES
Problem: Falls - Risk of  Goal: *Absence of Falls  Description: Document Michelle Valentino Fall Risk and appropriate interventions in the flowsheet.   Outcome: Progressing Towards Goal  Note: Fall Risk Interventions:  Mobility Interventions: Assess mobility with egress test, Bed/chair exit alarm, Communicate number of staff needed for ambulation/transfer, OT consult for ADLs, Patient to call before getting OOB, PT Consult for mobility concerns    Mentation Interventions: Adequate sleep, hydration, pain control, Bed/chair exit alarm, Door open when patient unattended, Evaluate medications/consider consulting pharmacy, More frequent rounding, Reorient patient, Room close to nurse's station, Toileting rounds, Update white board    Medication Interventions: Bed/chair exit alarm, Evaluate medications/consider consulting pharmacy, Patient to call before getting OOB, Teach patient to arise slowly    Elimination Interventions: Bed/chair exit alarm, Call light in reach, Elevated toilet seat, Patient to call for help with toileting needs, Stay With Me (per policy), Toilet paper/wipes in reach, Toileting schedule/hourly rounds    History of Falls Interventions: Bed/chair exit alarm, Door open when patient unattended, Room close to nurse's station, Utilize gait belt for transfer/ambulation, Assess for delayed presentation/identification of injury for 48 hrs (comment for end date), Vital signs minimum Q4HRs X 24 hrs (comment for end date)         Problem: Patient Education: Go to Patient Education Activity  Goal: Patient/Family Education  Outcome: Progressing Towards Goal     Problem: Cardiac Output -  Decreased  Goal: *Vital signs within specified parameters  Outcome: Progressing Towards Goal  Goal: *Optimal cardiac output  Outcome: Progressing Towards Goal  Goal: *Absence of hypoxia  Outcome: Progressing Towards Goal  Goal: *Absence of peripheral edema  Outcome: Progressing Towards Goal  Goal: *Intravascular fluid volume and electrolyte balance  Outcome: Progressing Towards Goal     Problem: Patient Education: Go to Patient Education Activity  Goal: Patient/Family Education  Outcome: Progressing Towards Goal     Problem: Patient Education: Go to Patient Education Activity  Goal: Patient/Family Education  Outcome: Progressing Towards Goal     Problem: Acute Renal Failure: Discharge Outcomes  Goal: *Optimal pain control at patient's stated goal  Outcome: Progressing Towards Goal  Goal: *Urinary output within identified parameters  Outcome: Progressing Towards Goal  Goal: *Hemodynamically stable  Outcome: Progressing Towards Goal  Goal: *Tolerating diet  Outcome: Progressing Towards Goal  Goal: *Lab values stabilized  Outcome: Progressing Towards Goal  Goal: *Verbalizes understanding and describes medication purposes and frequencies  Outcome: Progressing Towards Goal  Goal: *Medication reconciliation  Outcome: Progressing Towards Goal     Problem: Pressure Injury - Risk of  Goal: *Prevention of pressure injury  Description: Document Max Scale and appropriate interventions in the flowsheet. Outcome: Progressing Towards Goal  Note: Pressure Injury Interventions:  Sensory Interventions: Assess changes in LOC, Assess need for specialty bed, Avoid rigorous massage over bony prominences, Check visual cues for pain, Discuss PT/OT consult with provider, Float heels, Keep linens dry and wrinkle-free, Maintain/enhance activity level, Minimize linen layers, Monitor skin under medical devices, Pressure redistribution bed/mattress (bed type), Suspension boots, Turn and reposition approx.  every two hours (pillows and wedges if needed)    Moisture Interventions: Absorbent underpads, Apply protective barrier, creams and emollients, Assess need for specialty bed, Check for incontinence Q2 hours and as needed, Limit adult briefs, Maintain skin hydration (lotion/cream), Minimize layers, Moisture barrier, Internal/External urinary devices    Activity Interventions: Assess need for specialty bed, Increase time out of bed, Pressure redistribution bed/mattress(bed type), PT/OT evaluation    Mobility Interventions: Assess need for specialty bed, Float heels, HOB 30 degrees or less, Pressure redistribution bed/mattress (bed type), PT/OT evaluation, Turn and reposition approx.  every two hours(pillow and wedges)    Nutrition Interventions: Document food/fluid/supplement intake, Discuss nutritional consult with provider, Offer support with meals,snacks and hydration    Friction and Shear Interventions: Apply protective barrier, creams and emollients, Feet elevated on foot rest, Foam dressings/transparent film/skin sealants, HOB 30 degrees or less, Lift team/patient mobility team, Minimize layers, Transferring/repositioning devices                Problem: Patient Education: Go to Patient Education Activity  Goal: Patient/Family Education  Outcome: Progressing Towards Goal

## 2021-08-22 NOTE — PROGRESS NOTES
GI Brief Progress Note    Consulted by Dr. Robert Cohen for worsening anemia and one episode of melena- + FOB. Patient appears to have been admitted for acute metabolic encephalopathy with bacteremia likely from a UTI. Also noted was aspiration pneumonia and TONY on CKD requiring dialysis. At baseline he has chronic anemia with Hgb ~8-9. He trended down in the last 24 hours and most recent H/H 6.3/19.5  He was noted by nursing to have one BM that was liquid and black. Of note he also had the appearance of worsening SOB this AM with nephrology- CXR noted increase in vascular congestion- plans for emergency dialysis today and they will transfuse blood during HD. Will plan for EGD tomorrow pending respiratory and clinical status. Will have him be NPO after MN and will get rapid covid testing. He will also need PPI BID. Monitor H/H and transfuse per protocol.        Full consult in AM    Michelle Mercado, NP-C  [facility], [address] [place] [address]

## 2021-08-22 NOTE — PROGRESS NOTES
CXR reviewed  Has significant pul vascular congestion  Will get emergency dialysis  Dialysis RN called  Spoke with Floor RN  If blood needed then will give it during dialysis  Spoke with Gosia Chin

## 2021-08-22 NOTE — PROGRESS NOTES
Problem: Falls - Risk of  Goal: *Absence of Falls  Description: Document [place] Fall Risk and appropriate interventions in the flowsheet.   Outcome: Progressing Towards Goal  Note: Fall Risk Interventions:  Mobility Interventions: Assess mobility with egress test, Bed/chair exit alarm, Patient to call before getting OOB, PT Consult for mobility concerns, Strengthening exercises (ROM-active/passive)    Mentation Interventions: Adequate sleep, hydration, pain control, Bed/chair exit alarm, Door open when patient unattended, Increase mobility, More frequent rounding, Room close to nurse's station, Reorient patient, Toileting rounds, Update white board    Medication Interventions: Bed/chair exit alarm, Patient to call before getting OOB, Teach patient to arise slowly    Elimination Interventions: Bed/chair exit alarm, Call light in reach, Stay With Me (per policy), Toilet paper/wipes in reach, Toileting schedule/hourly rounds, Urinal in reach    History of Falls Interventions: Bed/chair exit alarm, Evaluate medications/consider consulting pharmacy, Door open when patient unattended, Room close to nurse's station, Assess for delayed presentation/identification of injury for 48 hrs (comment for end date), Vital signs minimum Q4HRs X 24 hrs (comment for end date)         Problem: Patient Education: Go to Patient Education Activity  Goal: Patient/Family Education  Outcome: Progressing Towards Goal     Problem: Cardiac Output -  Decreased  Goal: *Vital signs within specified parameters  Outcome: Progressing Towards Goal  Goal: *Optimal cardiac output  Outcome: Progressing Towards Goal  Goal: *Absence of hypoxia  Outcome: Progressing Towards Goal  Goal: *Absence of peripheral edema  Outcome: Progressing Towards Goal  Goal: *Intravascular fluid volume and electrolyte balance  Outcome: Progressing Towards Goal     Problem: Patient Education: Go to Patient Education Activity  Goal: Patient/Family Education  Outcome: Progressing Towards Goal     Problem: Injury - Risk of, Adverse Drug Event  Goal: *Absence of adverse drug events  Outcome: Progressing Towards Goal  Goal: *Absence of medication errors  Outcome: Progressing Towards Goal  Goal: *Knowledge of prescribed medications  Outcome: Progressing Towards Goal     Problem: Patient Education: Go to Patient Education Activity  Goal: Patient/Family Education  Outcome: Progressing Towards Goal     Problem: Patient Education: Go to Patient Education Activity  Goal: Patient/Family Education  Outcome: Progressing Towards Goal     Problem: Acute Renal Failure: Day 2  Goal: Activity/Safety  Outcome: Progressing Towards Goal  Goal: Consults, if ordered  Outcome: Progressing Towards Goal  Goal: Diagnostic Test/Procedures  Outcome: Progressing Towards Goal  Goal: Nutrition/Diet  Outcome: Progressing Towards Goal  Goal: Discharge Planning  Outcome: Progressing Towards Goal  Goal: Medications  Outcome: Progressing Towards Goal  Goal: Respiratory  Outcome: Progressing Towards Goal  Goal: Treatments/Interventions/Procedures  Outcome: Progressing Towards Goal  Goal: Psychosocial  Outcome: Progressing Towards Goal  Goal: *Optimal pain control at patient's stated goal  Outcome: Progressing Towards Goal  Goal: *Urinary output within identified parameters  Outcome: Progressing Towards Goal  Goal: *Hemodynamically stable  Outcome: Progressing Towards Goal  Goal: *Tolerating diet  Outcome: Progressing Towards Goal  Goal: *Lab values improving  Outcome: Progressing Towards Goal     Problem: Acute Renal Failure: Day 3  Goal: Off Pathway (Use only if patient is Off Pathway)  Outcome: Progressing Towards Goal  Goal: Activity/Safety  Outcome: Progressing Towards Goal  Goal: Consults, if ordered  Outcome: Progressing Towards Goal  Goal: Diagnostic Test/Procedures  Outcome: Progressing Towards Goal  Goal: Nutrition/Diet  Outcome: Progressing Towards Goal  Goal: Discharge Planning  Outcome: Progressing Towards Goal  Goal: Medications  Outcome: Progressing Towards Goal  Goal: Respiratory  Outcome: Progressing Towards Goal  Goal: Treatments/Interventions/Procedures  Outcome: Progressing Towards Goal  Goal: Psychosocial  Outcome: Progressing Towards Goal  Goal: *Optimal pain control at patient's stated goal  Outcome: Progressing Towards Goal  Goal: *Urinary output within identified parameters  Outcome: Progressing Towards Goal  Goal: *Hemodynamically stable  Outcome: Progressing Towards Goal  Goal: *Tolerating diet  Outcome: Progressing Towards Goal  Goal: *Lab values improving  Outcome: Progressing Towards Goal     Problem: Acute Renal Failure: Day 4  Goal: Off Pathway (Use only if patient is Off Pathway)  Outcome: Progressing Towards Goal  Goal: Activity/Safety  Outcome: Progressing Towards Goal  Goal: Consults, if ordered  Outcome: Progressing Towards Goal  Goal: Diagnostic Test/Procedures  Outcome: Progressing Towards Goal  Goal: Nutrition/Diet  Outcome: Progressing Towards Goal  Goal: Discharge Planning  Outcome: Progressing Towards Goal  Goal: Medications  Outcome: Progressing Towards Goal  Goal: Respiratory  Outcome: Progressing Towards Goal  Goal: Treatments/Interventions/Procedures  Outcome: Progressing Towards Goal  Goal: Psychosocial  Outcome: Progressing Towards Goal  Goal: *Optimal pain control at patient's stated goal  Outcome: Progressing Towards Goal  Goal: *Urinary output within identified parameters  Outcome: Progressing Towards Goal  Goal: *Hemodynamically stable  Outcome: Progressing Towards Goal  Goal: *Tolerating diet  Outcome: Progressing Towards Goal  Goal: *Lab values improving  Outcome: Progressing Towards Goal     Problem: Acute Renal Failure: Day 5  Goal: Off Pathway (Use only if patient is Off Pathway)  Outcome: Progressing Towards Goal  Goal: Activity/Safety  Outcome: Progressing Towards Goal  Goal: Diagnostic Test/Procedures  Outcome: Progressing Towards Goal  Goal: Nutrition/Diet  Outcome: Progressing Towards Goal  Goal: Discharge Planning  Outcome: Progressing Towards Goal  Goal: Medications  Outcome: Progressing Towards Goal  Goal: Respiratory  Outcome: Progressing Towards Goal  Goal: Treatments/Interventions/Procedures  Outcome: Progressing Towards Goal  Goal: Psychosocial  Outcome: Progressing Towards Goal  Goal: *Optimal pain control at patient's stated goal  Outcome: Progressing Towards Goal  Goal: *Urinary output within identified parameters  Outcome: Progressing Towards Goal  Goal: *Hemodynamically stable  Outcome: Progressing Towards Goal  Goal: *Tolerating diet  Outcome: Progressing Towards Goal  Goal: *Lab values improving  Outcome: Progressing Towards Goal     Problem: Acute Renal Failure: Day 6  Goal: Off Pathway (Use only if patient is Off Pathway)  Outcome: Progressing Towards Goal  Goal: Activity/Safety  Outcome: Progressing Towards Goal  Goal: Diagnostic Test/Procedures  Outcome: Progressing Towards Goal  Goal: Nutrition/Diet  Outcome: Progressing Towards Goal  Goal: Discharge Planning  Outcome: Progressing Towards Goal  Goal: Medications  Outcome: Progressing Towards Goal  Goal: Respiratory  Outcome: Progressing Towards Goal  Goal: Treatments/Interventions/Procedures  Outcome: Progressing Towards Goal  Goal: Psychosocial  Outcome: Progressing Towards Goal     Problem: Acute Renal Failure: Discharge Outcomes  Goal: *Optimal pain control at patient's stated goal  Outcome: Progressing Towards Goal  Goal: *Urinary output within identified parameters  Outcome: Progressing Towards Goal  Goal: *Hemodynamically stable  Outcome: Progressing Towards Goal  Goal: *Tolerating diet  Outcome: Progressing Towards Goal  Goal: *Lab values stabilized  Outcome: Progressing Towards Goal  Goal: *Verbalizes understanding and describes medication purposes and frequencies  Outcome: Progressing Towards Goal  Goal: *Medication reconciliation  Outcome: Progressing Towards Goal

## 2021-08-22 NOTE — PROGRESS NOTES
Hospitalist Progress Note    Patient: Gloria Welch MRN: [id number]  CSN: [id number]    YOB: 1950  Age: 70 y.o. Sex: male    DOA: 8/15/2021 LOS:  LOS: 7 days            Patient lying in bed, slightly slow today. But still able to answer questions appropriately. He denies abdominal pain, no nausea vomiting. No dizziness or lightheadedness. Patient denies any shortness of breath or chest pain. Per RN, patient had a large melanotic stool    Assessment/Plan     1. Acute GI bleed with blood loss anemia: Possible upper GI, will start IV PPI, will transfuse 1 unit of blood. Will consult GI, discussed with Dr. Mitchel Andres. Will monitor H&H and transfuse as needed. Discussed with nephrology, blood will be given during hemodialysis. 2. Mild fluid overload: Needs HD today. Discussed with nephrology. 3. Enterobacter bacteremia possibly due to UTI: Continue antibiotics per ID, repeat cultures negative. 4. Leukocytosis: Worsened today, discussed with ID, suspect due to GI bleed. Recommend no need for changing antibiotics. 5. Aspiration pneumonia: Continue antibiotics per ID  6. TONY on CKD 4 with obstructive uropathy, continue dialysis per nephrology. Permacath placement by vascular surgery 8/20/21.  7. Acute metabolic encephalopathy due to underlying infection, improved    8. A. fib with slow RVR. Possible sick sinus syndrome. Rates controlled. Continue full dose aspirin. Cardiology input noted, recommend to hold off AV blocking agents. 9. Severe protein calorie malnutrition: Continue nutritionist follows. On supplement. Multivitamin. 10. Diabetes type 2 with hypoglycemia. Sliding scale insulin. Hypoglycemia protocol  11. Complicated urinary tract infection with obstructive uropathy. ID to follow. Urology input noted. Urology recommend to continue Elizabeth, no voiding trials. 12. Hypertension  13. Moderate pulmonary hypertension  14. hyperkalemia resolved  Falls at home. PT OT.   He has not been on anticoagulation in the outpatient setting for this reason. Full code  PT OT recommend SNF placement    Discussed with the patient and also with the son Rita Siegel over the phone and explained about my above plan care. Also discussed with both of them about need of blood transfusion, explained about risk and benefits of blood transfusion. Both of them understood and wants to proceed with transfusion. son Georgia [phone]       Case discussed with:  [x]Patient  []Family  [x]Nursing  []Case Management  DVT Prophylaxis:  []Lovenox  [x]Hep SQ  []SCDs  []Coumadin   []On Heparin gtt    Vital signs/Intake and Output:  Visit Vitals  /70 (BP 1 Location: Right upper arm, BP Patient Position: At rest;Supine)   Pulse 66   Temp 98.2 °F (36.8 °C)   Resp 20   Ht 6' 1\" (1.854 m)   Wt 109.2 kg (240 lb 11.9 oz)   SpO2 97%   BMI 31.76 kg/m²       Exam  General:  Alert, cooperative, no acute distress    Pulmonary: Bilaterally min basal rales. No Wheezing/Rales. Cardiovascular: Regular rate and Rhythm. GI:  Soft, Non distended, Non tender. + Bowel sounds. Extremities:  No edema. No calf tenderness. Neurologic: Alert and oriented X 2-3. No acute neuro deficits. Medications Reviewed      Labs: Results:       Chemistry Recent Labs     08/22/21  0241 08/21/21 0453 08/20/21 0146   * 81 83   * 136 135*   K 4.2 4.0 3.6    100 101   CO2 25 27 28   BUN 57* 66* 54*   CREA 6.30* 8.35* 6.45*   CA 8.3* 8.6 8.2*   AGAP 8 9 6   BUCR 9* 8* 8*      CBC w/Diff Recent Labs     08/22/21  1207 08/22/21  0241 08/21/21  0453 08/20/21  0146 08/20/21  0146   WBC  --  19.3* 13.3*  --  10.5   RBC  --  2.61* 3.12*  --  3.10*   HGB 6.3* 7.2* 8.3*   < > 8.1*   HCT 19.5* 22.1* 25.9*   < > 25.1*   PLT  --  225 220  --  206   GRANS  --  95* 88*  --  84*   LYMPH  --  2* 5*  --  7*   EOS  --  0 1  --  1    < > = values in this interval not displayed.       Cardiac Enzymes No results for input(s): CPK, CKND1, MAXIMILIAN in the last 72 hours.    No lab exists for component: CKRMB, TROIP   Coagulation No results for input(s): PTP, INR, APTT, INREXT, INREXT in the last 72 hours. Lipid Panel Lab Results   Component Value Date/Time    Cholesterol, total 208 (H) 03/22/2010 12:00 PM    HDL Cholesterol 55 03/22/2010 12:00 PM    LDL, calculated 138.4 (H) 03/22/2010 12:00 PM    VLDL, calculated 14.6 03/22/2010 12:00 PM    Triglyceride 73 03/22/2010 12:00 PM    CHOL/HDL Ratio 3.8 03/22/2010 12:00 PM      BNP No results for input(s): BNPP in the last 72 hours. Liver Enzymes No results for input(s): TP, ALB, TBIL, AP in the last 72 hours.     No lab exists for component: SGOT, GPT, DBIL   Thyroid Studies Lab Results   Component Value Date/Time    TSH 2.42 08/15/2021 11:00 AM        Procedures/imaging: see electronic medical records for all procedures/Xrays and details which were not copied into this note but were reviewed prior to creation of Plan

## 2021-08-23 ENCOUNTER — ANESTHESIA (OUTPATIENT)
Dept: ENDOSCOPY | Age: 71
DRG: 673 | End: 2021-08-23
Payer: MEDICARE

## 2021-08-23 ENCOUNTER — ANESTHESIA EVENT (OUTPATIENT)
Dept: ENDOSCOPY | Age: 71
DRG: 673 | End: 2021-08-23
Payer: MEDICARE

## 2021-08-23 LAB
ANION GAP SERPL CALC-SCNC: 8 MMOL/L (ref 3–18)
BACTERIA SPEC CULT: NORMAL
BACTERIA SPEC CULT: NORMAL
BASOPHILS # BLD: 0.1 K/UL (ref 0–0.1)
BASOPHILS NFR BLD: 1 % (ref 0–2)
BUN SERPL-MCNC: 48 MG/DL (ref 7–18)
BUN/CREAT SERPL: 9 (ref 12–20)
CALCIUM SERPL-MCNC: 8.9 MG/DL (ref 8.5–10.1)
CHLORIDE SERPL-SCNC: 107 MMOL/L (ref 100–111)
CO2 SERPL-SCNC: 25 MMOL/L (ref 21–32)
CREAT SERPL-MCNC: 5.22 MG/DL (ref 0.6–1.3)
DIFFERENTIAL METHOD BLD: ABNORMAL
EOSINOPHIL # BLD: 0.1 K/UL (ref 0–0.4)
EOSINOPHIL NFR BLD: 1 % (ref 0–5)
ERYTHROCYTE [DISTWIDTH] IN BLOOD BY AUTOMATED COUNT: 16.5 % (ref 11.6–14.5)
GLUCOSE BLD STRIP.AUTO-MCNC: 105 MG/DL (ref 70–110)
GLUCOSE BLD STRIP.AUTO-MCNC: 83 MG/DL (ref 70–110)
GLUCOSE BLD STRIP.AUTO-MCNC: 85 MG/DL (ref 70–110)
GLUCOSE BLD STRIP.AUTO-MCNC: 86 MG/DL (ref 70–110)
GLUCOSE BLD STRIP.AUTO-MCNC: 87 MG/DL (ref 70–110)
GLUCOSE BLD STRIP.AUTO-MCNC: 90 MG/DL (ref 70–110)
GLUCOSE SERPL-MCNC: 83 MG/DL (ref 74–99)
HCT VFR BLD AUTO: 20 % (ref 36–48)
HCT VFR BLD AUTO: 21.8 % (ref 36–48)
HCT VFR BLD AUTO: 22.6 % (ref 36–48)
HGB BLD-MCNC: 6.5 G/DL (ref 13–16)
HGB BLD-MCNC: 7 G/DL (ref 13–16)
HGB BLD-MCNC: 7.4 G/DL (ref 13–16)
HISTORY CHECKED?,CKHIST: NORMAL
LYMPHOCYTES # BLD: 0.6 K/UL (ref 0.9–3.6)
LYMPHOCYTES NFR BLD: 5 % (ref 21–52)
MAGNESIUM SERPL-MCNC: 2.5 MG/DL (ref 1.6–2.6)
MCH RBC QN AUTO: 26.9 PG (ref 24–34)
MCHC RBC AUTO-ENTMCNC: 32.5 G/DL (ref 31–37)
MCV RBC AUTO: 82.6 FL (ref 74–97)
MONOCYTES # BLD: 0.7 K/UL (ref 0.05–1.2)
MONOCYTES NFR BLD: 6 % (ref 3–10)
NEUTS SEG # BLD: 10.4 K/UL (ref 1.8–8)
NEUTS SEG NFR BLD: 87 % (ref 40–73)
PLATELET # BLD AUTO: 178 K/UL (ref 135–420)
PMV BLD AUTO: 10 FL (ref 9.2–11.8)
POTASSIUM SERPL-SCNC: 3.8 MMOL/L (ref 3.5–5.5)
RBC # BLD AUTO: 2.42 M/UL (ref 4.35–5.65)
SERVICE CMNT-IMP: NORMAL
SERVICE CMNT-IMP: NORMAL
SODIUM SERPL-SCNC: 140 MMOL/L (ref 136–145)
WBC # BLD AUTO: 12 K/UL (ref 4.6–13.2)

## 2021-08-23 PROCEDURE — 36430 TRANSFUSION BLD/BLD COMPNT: CPT

## 2021-08-23 PROCEDURE — 0DJ08ZZ INSPECTION OF UPPER INTESTINAL TRACT, VIA NATURAL OR ARTIFICIAL OPENING ENDOSCOPIC: ICD-10-PCS | Performed by: STUDENT IN AN ORGANIZED HEALTH CARE EDUCATION/TRAINING PROGRAM

## 2021-08-23 PROCEDURE — 77030019988 HC FCPS ENDOSC DISP BSC -B: Performed by: STUDENT IN AN ORGANIZED HEALTH CARE EDUCATION/TRAINING PROGRAM

## 2021-08-23 PROCEDURE — 2709999900 HC NON-CHARGEABLE SUPPLY

## 2021-08-23 PROCEDURE — 82962 GLUCOSE BLOOD TEST: CPT

## 2021-08-23 PROCEDURE — 65660000000 HC RM CCU STEPDOWN

## 2021-08-23 PROCEDURE — 00731 ANES UPR GI NDSC PX NOS: CPT | Performed by: NURSE ANESTHETIST, CERTIFIED REGISTERED

## 2021-08-23 PROCEDURE — C9113 INJ PANTOPRAZOLE SODIUM, VIA: HCPCS | Performed by: HOSPITALIST

## 2021-08-23 PROCEDURE — 36415 COLL VENOUS BLD VENIPUNCTURE: CPT

## 2021-08-23 PROCEDURE — 85014 HEMATOCRIT: CPT

## 2021-08-23 PROCEDURE — 00731 ANES UPR GI NDSC PX NOS: CPT | Performed by: ANESTHESIOLOGY

## 2021-08-23 PROCEDURE — 97530 THERAPEUTIC ACTIVITIES: CPT

## 2021-08-23 PROCEDURE — 74011250636 HC RX REV CODE- 250/636: Performed by: INTERNAL MEDICINE

## 2021-08-23 PROCEDURE — 74011000250 HC RX REV CODE- 250: Performed by: NURSE ANESTHETIST, CERTIFIED REGISTERED

## 2021-08-23 PROCEDURE — 77030008565 HC TBNG SUC IRR ERBE -B: Performed by: STUDENT IN AN ORGANIZED HEALTH CARE EDUCATION/TRAINING PROGRAM

## 2021-08-23 PROCEDURE — 83735 ASSAY OF MAGNESIUM: CPT

## 2021-08-23 PROCEDURE — 76060000031 HC ANESTHESIA FIRST 0.5 HR: Performed by: STUDENT IN AN ORGANIZED HEALTH CARE EDUCATION/TRAINING PROGRAM

## 2021-08-23 PROCEDURE — 76040000019: Performed by: STUDENT IN AN ORGANIZED HEALTH CARE EDUCATION/TRAINING PROGRAM

## 2021-08-23 PROCEDURE — 99100 ANES PT EXTEME AGE<1 YR&>70: CPT | Performed by: NURSE ANESTHETIST, CERTIFIED REGISTERED

## 2021-08-23 PROCEDURE — 74011250636 HC RX REV CODE- 250/636: Performed by: HOSPITALIST

## 2021-08-23 PROCEDURE — 99231 SBSQ HOSP IP/OBS SF/LOW 25: CPT | Performed by: NURSE PRACTITIONER

## 2021-08-23 PROCEDURE — 99100 ANES PT EXTEME AGE<1 YR&>70: CPT | Performed by: ANESTHESIOLOGY

## 2021-08-23 PROCEDURE — 74011250637 HC RX REV CODE- 250/637: Performed by: INTERNAL MEDICINE

## 2021-08-23 PROCEDURE — P9016 RBC LEUKOCYTES REDUCED: HCPCS

## 2021-08-23 PROCEDURE — 99232 SBSQ HOSP IP/OBS MODERATE 35: CPT | Performed by: HOSPITALIST

## 2021-08-23 PROCEDURE — 74011250636 HC RX REV CODE- 250/636: Performed by: NURSE ANESTHETIST, CERTIFIED REGISTERED

## 2021-08-23 PROCEDURE — 74011250637 HC RX REV CODE- 250/637: Performed by: NURSE PRACTITIONER

## 2021-08-23 PROCEDURE — 85025 COMPLETE CBC W/AUTO DIFF WBC: CPT

## 2021-08-23 PROCEDURE — 74011250637 HC RX REV CODE- 250/637: Performed by: HOSPITALIST

## 2021-08-23 PROCEDURE — 97110 THERAPEUTIC EXERCISES: CPT

## 2021-08-23 PROCEDURE — 80048 BASIC METABOLIC PNL TOTAL CA: CPT

## 2021-08-23 PROCEDURE — 2709999900 HC NON-CHARGEABLE SUPPLY: Performed by: STUDENT IN AN ORGANIZED HEALTH CARE EDUCATION/TRAINING PROGRAM

## 2021-08-23 RX ORDER — ONDANSETRON 2 MG/ML
4 INJECTION INTRAMUSCULAR; INTRAVENOUS ONCE
Status: CANCELLED | OUTPATIENT
Start: 2021-08-23 | End: 2021-08-23

## 2021-08-23 RX ORDER — DIPHENHYDRAMINE HYDROCHLORIDE 50 MG/ML
12.5 INJECTION, SOLUTION INTRAMUSCULAR; INTRAVENOUS
Status: CANCELLED | OUTPATIENT
Start: 2021-08-23

## 2021-08-23 RX ORDER — OXYCODONE AND ACETAMINOPHEN 5; 325 MG/1; MG/1
1 TABLET ORAL AS NEEDED
Status: CANCELLED | OUTPATIENT
Start: 2021-08-23

## 2021-08-23 RX ORDER — LIDOCAINE HYDROCHLORIDE 20 MG/ML
INJECTION, SOLUTION EPIDURAL; INFILTRATION; INTRACAUDAL; PERINEURAL AS NEEDED
Status: DISCONTINUED | OUTPATIENT
Start: 2021-08-23 | End: 2021-08-23 | Stop reason: HOSPADM

## 2021-08-23 RX ORDER — SODIUM CHLORIDE 0.9 % (FLUSH) 0.9 %
5-40 SYRINGE (ML) INJECTION AS NEEDED
Status: CANCELLED | OUTPATIENT
Start: 2021-08-23

## 2021-08-23 RX ORDER — SODIUM CHLORIDE 9 MG/ML
250 INJECTION, SOLUTION INTRAVENOUS AS NEEDED
Status: DISCONTINUED | OUTPATIENT
Start: 2021-08-23 | End: 2021-08-26 | Stop reason: ALTCHOICE

## 2021-08-23 RX ORDER — DEXTROSE 50 % IN WATER (D50W) INTRAVENOUS SYRINGE
25-50 AS NEEDED
Status: CANCELLED | OUTPATIENT
Start: 2021-08-23

## 2021-08-23 RX ORDER — INSULIN LISPRO 100 [IU]/ML
INJECTION, SOLUTION INTRAVENOUS; SUBCUTANEOUS ONCE
Status: CANCELLED | OUTPATIENT
Start: 2021-08-23 | End: 2021-08-24

## 2021-08-23 RX ORDER — SODIUM CHLORIDE 0.9 % (FLUSH) 0.9 %
5-40 SYRINGE (ML) INJECTION EVERY 8 HOURS
Status: CANCELLED | OUTPATIENT
Start: 2021-08-23

## 2021-08-23 RX ORDER — PROPOFOL 10 MG/ML
INJECTION, EMULSION INTRAVENOUS AS NEEDED
Status: DISCONTINUED | OUTPATIENT
Start: 2021-08-23 | End: 2021-08-23 | Stop reason: HOSPADM

## 2021-08-23 RX ORDER — FUROSEMIDE 10 MG/ML
40 INJECTION INTRAMUSCULAR; INTRAVENOUS ONCE
Status: COMPLETED | OUTPATIENT
Start: 2021-08-23 | End: 2021-08-23

## 2021-08-23 RX ORDER — SODIUM CHLORIDE, SODIUM LACTATE, POTASSIUM CHLORIDE, CALCIUM CHLORIDE 600; 310; 30; 20 MG/100ML; MG/100ML; MG/100ML; MG/100ML
100 INJECTION, SOLUTION INTRAVENOUS CONTINUOUS
Status: CANCELLED | OUTPATIENT
Start: 2021-08-23

## 2021-08-23 RX ORDER — MAGNESIUM SULFATE 100 %
4 CRYSTALS MISCELLANEOUS AS NEEDED
Status: CANCELLED | OUTPATIENT
Start: 2021-08-23

## 2021-08-23 RX ADMIN — PROPOFOL 20 MG: 10 INJECTION, EMULSION INTRAVENOUS at 16:09

## 2021-08-23 RX ADMIN — SODIUM CHLORIDE 10 ML: 9 INJECTION, SOLUTION INTRAMUSCULAR; INTRAVENOUS; SUBCUTANEOUS at 05:11

## 2021-08-23 RX ADMIN — DOCUSATE SODIUM 100 MG: 100 CAPSULE, LIQUID FILLED ORAL at 08:55

## 2021-08-23 RX ADMIN — TAMSULOSIN HYDROCHLORIDE 0.4 MG: 0.4 CAPSULE ORAL at 08:55

## 2021-08-23 RX ADMIN — PANTOPRAZOLE SODIUM 40 MG: 40 INJECTION, POWDER, FOR SOLUTION INTRAVENOUS at 08:55

## 2021-08-23 RX ADMIN — SODIUM CHLORIDE 40 MG: 9 INJECTION, SOLUTION INTRAMUSCULAR; INTRAVENOUS; SUBCUTANEOUS at 21:06

## 2021-08-23 RX ADMIN — Medication 10 ML: at 13:48

## 2021-08-23 RX ADMIN — SODIUM CHLORIDE 10 ML: 9 INJECTION, SOLUTION INTRAMUSCULAR; INTRAVENOUS; SUBCUTANEOUS at 17:40

## 2021-08-23 RX ADMIN — PROPOFOL 50 MG: 10 INJECTION, EMULSION INTRAVENOUS at 16:07

## 2021-08-23 RX ADMIN — FUROSEMIDE 40 MG: 10 INJECTION, SOLUTION INTRAMUSCULAR; INTRAVENOUS at 11:35

## 2021-08-23 RX ADMIN — THERA TABS 1 TABLET: TAB at 08:55

## 2021-08-23 RX ADMIN — LIDOCAINE HYDROCHLORIDE 60 MG: 20 INJECTION, SOLUTION EPIDURAL; INFILTRATION; INTRACAUDAL; PERINEURAL at 16:07

## 2021-08-23 RX ADMIN — SODIUM CHLORIDE 250 ML: 900 INJECTION, SOLUTION INTRAVENOUS at 13:48

## 2021-08-23 RX ADMIN — LEVOFLOXACIN 500 MG: 5 INJECTION, SOLUTION INTRAVENOUS at 17:40

## 2021-08-23 NOTE — CONSULTS
[email or web address]  [phone]    GASTROENTEROLOGY CONSULT      Impression:   1. Gi bleed with anemia- had one episode of melena yesterday- Hgb dropped from 8.3 to 6.3- s/p one unit PRBCS he is now Hgb 6.5  2. Fluid overload - yesterday given IV lasix and underwent HD- improved today  3. TONY on CKD stage IV- on HD and followed by nephrology  4. Aspiration PNA; on antibiotics  5. Enterobactor bacteremia due to UTI- continue antibiotics  6. Afib with RVR- cardiology following  7. Severe protein calorie malnutrition  8. DM Type II  9. Moderate pulmonary HTN      Plan:     1. Added on for endoscopy today with Dr. Urrutia Filter to r/o PUD/AVMs as cause for GI bleeding- patient had HD yesterday and today respiratory status is stable with no reported plans to undergo HD  2. D/W Dr. Kathleen Osorio- he will proceed with blood transfusion to get Hgb >7 for procedure  3. Will watch for fluid overload with blood transfusion  4. Continue PPI BID   5. Keep NPO  6. Rapid covid test negative 8/22/21  7. Medical management per primary team.       Chief Complaint: melena, anemia       HPI:  Bia Nicholsa is a 70 y.o. male who I am being asked to see in consultation for an opinion regarding the above. Patient admitted for acute metabolic encephalopathy, UTI, aspiration pneumonia- TONY on CKD and on HD. He was noted to have an episode of melena yesterday with drop in Hgb by 2 points. He denies abd pain, heartburn/dysphagia. Has been having a decent appetite. He denies previous GI issues, no previous EGD/colonoscopy to note. BMs have been normal up until yesterday. Admits to random NSAID use but nothing recently and has not used it regularly.       PMH:   Past Medical History:   Diagnosis Date    Atrial fibrillation ([address]     Cerebrovascular accident ([address]     1991, 1997- general weakness    Chronic diastolic heart failure (HCC)     Chronic kidney disease     dialysis    Diabetes ([address]     Heart failure ([address]     History of cardioversion     Hypercholesterolemia     Hypertension     Morbid obesity (HCC)        PSH:   Past Surgical History:   Procedure Laterality Date    HX VASCULAR ACCESS Right     right neck       Social HX:   Social History     Socioeconomic History    Marital status:      Spouse name: Not on file    Number of children: Not on file    Years of education: Not on file    Highest education level: Not on file   Occupational History    Not on file   Tobacco Use    Smoking status: Former Smoker     Years: 8.00     Quit date: 1971     Years since quittin.1    Smokeless tobacco: Never Used    Tobacco comment: last smoked in my late 19's   Substance and Sexual Activity    Alcohol use: No     Alcohol/week: 0.0 standard drinks    Drug use: No    Sexual activity: Never   Other Topics Concern    Not on file   Social History Narrative    Not on file     Social Determinants of Health     Financial Resource Strain:     Difficulty of Paying Living Expenses:    Food Insecurity:     Worried About Running Out of Food in the Last Year:     [address] in the Last Year:    Transportation Needs:     Lack of Transportation (Medical):      Lack of Transportation (Non-Medical):    Physical Activity:     Days of Exercise per Week:     Minutes of Exercise per Session:    Stress:     Feeling of Stress :    Social Connections:     Frequency of Communication with Friends and Family:     Frequency of Social Gatherings with Friends and Family:     Attends Rastafari Services:     Active Member of Clubs or Organizations:     Attends Club or Organization Meetings:     Marital Status:    Intimate Partner Violence:     Fear of Current or Ex-Partner:     Emotionally Abused:     Physically Abused:     Sexually Abused:        FHX:   Family History   Problem Relation Age of Onset    Heart Attack Mother 52    Diabetes Other     Hypertension Other        Allergy:   No Known Allergies    Patient Active Problem List Diagnosis Code    Hypertension I10    Diabetes ([facility] E11.9    Atrial fibrillation ([facility] I48.91    Cerebrovascular accident ([facility] I63.9    Hypercholesterolemia E78.00    Chronic diastolic heart failure (HCC) I50.32    Morbid obesity ([facility]) E66.01    BPH (benign prostatic hyperplasia) N40.0    Bladder outlet obstruction N32.0    Acute renal failure (ARF) ([facility]) N17.9    Hyperkalemia E87.5    CKD (chronic kidney disease) N18.9    Renal failure N19    Bradycardia R00.1    Severe protein-calorie malnutrition ([facility]) E43       Home Medications:     Medications Prior to Admission   Medication Sig    finasteride (PROSCAR) 5 mg tablet TAKE 1 TABLET BY MOUTH DAILY    losartan (COZAAR) 50 mg tablet Take 50 mg by mouth daily.  metOLazone (ZAROXOLYN) 2.5 mg tablet Take 2.5 mg by mouth.  pravastatin (PRAVACHOL) 20 mg tablet TAKE 1 TABLET BY MOUTH DAILY    prazosin (MINIPRESS) 5 mg capsule Take 5 mg by mouth At bedtime.  simvastatin (ZOCOR) 40 mg tablet Take 40 mg by mouth At bedtime.  vitamin E, dl,tocopheryl acet, (vitamin E acetate) 45 mg (100 unit) capsule Take 100 Units by mouth daily.  hydrALAZINE (APRESOLINE) 100 mg tablet TAKE 1 TABLET BY MOUTH THREE TIMES DAILY    hydrALAZINE (APRESOLINE) 100 mg tablet TAKE 1 TABLET BY MOUTH THREE TIMES DAILY    ferrous sulfate 325 mg (65 mg iron) tablet Take  by mouth Daily (before breakfast).  furosemide (LASIX) 40 mg tablet Take 20 mg by mouth two (2) times a day.  docusate sodium (COLACE) 100 mg capsule Take 100 mg by mouth daily as needed for Constipation.  carvedilol (COREG) 12.5 mg tablet Take 1 Tab by mouth two (2) times daily (with meals). (Patient taking differently: Take 25 mg by mouth two (2) times daily (with meals). )    cholecalciferol (VITAMIN D3) 1,000 unit tablet Take 1 Tab by mouth daily. Indications: VITAMIN D DEFICIENCY    polyethylene glycol (MIRALAX) 17 gram packet Take 1 Packet by mouth daily.     insulin lispro (HUMALOG) 100 unit/mL injection SubCUTAneous route Before breakfast, lunch, dinner and at bedtime. For Blood Sugar (mg/dL) of:     Less than 150 =   0 units           150 -199 =   2 units  200 -249 =   4 units  250 -299 =   6 units  300 -349 =   8 units  350 and above =  10 units    Blood-Glucose Meter monitoring kit As directed    Insulin Syringe-Needle U-100 (INSULIN SYRINGE) 1 mL 28 x 1/2\" syrg As directed    Lancets (ONETOUCH ULTRASOFT LANCETS) misc As directed    aspirin (ASPIRIN) 325 mg tablet Take 325 mg by mouth daily.  multivitamin (ONE A DAY) tablet Take 1 Tab by mouth daily.  atorvastatin (LIPITOR) 10 mg tablet Take 10 mg by mouth daily.  tamsulosin (FLOMAX) 0.4 mg capsule Take 0.4 mg by mouth daily.  amLODIPine (NORVASC) 10 mg tablet Take 10 mg by mouth daily. Review of Systems:     Constitutional: No fevers, chills, weight loss, fatigue. Skin: No rashes, pruritis, jaundice, ulcerations, erythema. HENT: No headaches, nosebleeds, sinus pressure, rhinorrhea, sore throat. Eyes: No visual changes, blurred vision, eye pain, photophobia, jaundice. Cardiovascular: No chest pain, heart palpitations. Respiratory: No cough, SOB, wheezing, chest discomfort, orthopnea. Gastrointestinal: See HPI    Genitourinary: No dysuria, bleeding, discharge, pyuria. Musculoskeletal: No weakness, arthralgias, wasting. Endo: No sweats. Heme: No bruising, easy bleeding. Allergies: As noted. Neurological: Cranial nerves intact. Alert and oriented. Gait not assessed. Psychiatric:  No anxiety, depression, hallucinations. Visit Vitals  /65 (BP 1 Location: Right upper arm, BP Patient Position: At rest)   Pulse 65   Temp 97.3 °F (36.3 °C)   Resp 16   Ht 6' 1\" (1.854 m)   Wt 106 kg (233 lb 9.6 oz)   SpO2 97%   BMI 30.82 kg/m²       Physical Assessment:     constitutional: appearance: well developed, well nourished, overweight  habitus, no deformities, in no acute distress.    skin: inspection: no rashes, ulcers, icterus or other lesions; no clubbing or telangiectasias. palpation: no induration or subcutaneos nodules. eyes: inspection: normal conjunctivae and lids; no jaundice pupils: normal  ENMT: mouth: normal oral mucosa,lips and gums; good dentition. oropharynx: normal tongue, hard and soft palate; posterior pharynx without erithema, exudate or lesions. neck: thyroid: normal size, consistency and position; no masses or tenderness. respiratory: effort: normal chest excursion; no intercostal retraction or accessory muscle use. cardiovascular: abdominal aorta: normal size and position; no bruits. palpation: PMI of normal size and position; normal rhythm; no thrill or murmurs. abdominal: abdomen: normal consistency; no tenderness or masses. hernias: no hernias appreciated. liver: not palpable spleen: not palpable. rectal: hemoccult/guaiac: not performed. musculoskeletal: digits and nails: no clubbing, cyanosis, petechiae or other inflammatory conditions. head and neck: normal range of motion; no pain, crepitation or contracture. mal. Pupils intact. psychiatric: judgement/insight: within normal limits. memory: within normal limits for recent and remote events. mood and affect: no evidence of depression, anxiety or agitation. orientation: oriented to time, space and person. Basic Metabolic Profile   Recent Labs     08/23/21 0044      K 3.8      CO2 25   BUN 48*   GLU 83   CA 8.9   MG 2.5         CBC w/Diff    Recent Labs     08/23/21 0044   WBC 12.0   RBC 2.42*   HGB 6.5*   HCT 20.0*   MCV 82.6   MCH 26.9   MCHC 32.5   RDW 16.5*       Recent Labs     08/23/21 0044   GRANS 87*   LYMPH 5*   EOS 1        Hepatic Function   No results for input(s): ALB, TP, TBILI, AP, AML, LPSE in the last 72 hours. No lab exists for component: DBILI, GPT, SGOT     Coags   No results for input(s): PTP, INR, APTT, INREXT in the last 72 hours.         Benton Swanson NP.   [facility], [address] [place] [address]  Cell: [phone]  [email or web address]

## 2021-08-23 NOTE — PROGRESS NOTES
Problem: Falls - Risk of  Goal: *Absence of Falls  Description: Document Davide Marinelli Fall Risk and appropriate interventions in the flowsheet.   Outcome: Progressing Towards Goal  Note: Fall Risk Interventions:  Mobility Interventions: Assess mobility with egress test, Bed/chair exit alarm, Communicate number of staff needed for ambulation/transfer, PT Consult for mobility concerns    Mentation Interventions: Adequate sleep, hydration, pain control, Bed/chair exit alarm, Door open when patient unattended, Evaluate medications/consider consulting pharmacy, Increase mobility, More frequent rounding, Reorient patient, Toileting rounds, Update white board    Medication Interventions: Bed/chair exit alarm, Evaluate medications/consider consulting pharmacy    Elimination Interventions: Bed/chair exit alarm, Call light in reach, Patient to call for help with toileting needs, Toilet paper/wipes in reach, Toileting schedule/hourly rounds    History of Falls Interventions: Bed/chair exit alarm, Door open when patient unattended, Evaluate medications/consider consulting pharmacy         Problem: Patient Education: Go to Patient Education Activity  Goal: Patient/Family Education  Outcome: Progressing Towards Goal     Problem: Cardiac Output -  Decreased  Goal: *Vital signs within specified parameters  Outcome: Progressing Towards Goal  Goal: *Optimal cardiac output  Outcome: Progressing Towards Goal  Goal: *Absence of hypoxia  Outcome: Progressing Towards Goal  Goal: *Absence of peripheral edema  Outcome: Progressing Towards Goal  Goal: *Intravascular fluid volume and electrolyte balance  Outcome: Progressing Towards Goal     Problem: Patient Education: Go to Patient Education Activity  Goal: Patient/Family Education  Outcome: Progressing Towards Goal     Problem: Injury - Risk of, Adverse Drug Event  Goal: *Absence of adverse drug events  Outcome: Progressing Towards Goal  Goal: *Absence of medication errors  Outcome: Progressing Towards Goal  Goal: *Knowledge of prescribed medications  Outcome: Progressing Towards Goal     Problem: Patient Education: Go to Patient Education Activity  Goal: Patient/Family Education  Outcome: Progressing Towards Goal     Problem: Nutrition Deficit  Goal: *Optimize nutritional status  Outcome: Progressing Towards Goal     Problem: Patient Education: Go to Patient Education Activity  Goal: Patient/Family Education  Outcome: Progressing Towards Goal     Problem: Pressure Injury - Risk of  Goal: *Prevention of pressure injury  Description: Document Max Scale and appropriate interventions in the flowsheet. Outcome: Progressing Towards Goal  Note: Pressure Injury Interventions:  Sensory Interventions: Assess changes in LOC, Assess need for specialty bed, Avoid rigorous massage over bony prominences, Check visual cues for pain, Discuss PT/OT consult with provider, Float heels, Keep linens dry and wrinkle-free, Maintain/enhance activity level, Minimize linen layers, Monitor skin under medical devices, Pad between skin to skin, Pressure redistribution bed/mattress (bed type), Turn and reposition approx. every two hours (pillows and wedges if needed)    Moisture Interventions: Absorbent underpads, Apply protective barrier, creams and emollients, Assess need for specialty bed, Check for incontinence Q2 hours and as needed, Internal/External urinary devices, Limit adult briefs, Maintain skin hydration (lotion/cream), Minimize layers, Moisture barrier, Offer toileting Q_hr    Activity Interventions: Assess need for specialty bed, Pressure redistribution bed/mattress(bed type), PT/OT evaluation    Mobility Interventions: Assess need for specialty bed, Float heels, HOB 30 degrees or less, Pressure redistribution bed/mattress (bed type), PT/OT evaluation, Turn and reposition approx.  every two hours(pillow and wedges)    Nutrition Interventions: Document food/fluid/supplement intake, Discuss nutritional consult with provider, Offer support with meals,snacks and hydration    Friction and Shear Interventions: Apply protective barrier, creams and emollients, Foam dressings/transparent film/skin sealants, HOB 30 degrees or less, Lift sheet, Lift team/patient mobility team, Minimize layers                Problem: Patient Education: Go to Patient Education Activity  Goal: Patient/Family Education  Outcome: Progressing Towards Goal     Problem: Patient Education: Go to Patient Education Activity  Goal: Patient/Family Education  Outcome: Progressing Towards Goal     Problem: Patient Education: Go to Patient Education Activity  Goal: Patient/Family Education  Outcome: Progressing Towards Goal     Problem: Pain  Goal: *Control of Pain  Outcome: Progressing Towards Goal  Goal: *PALLIATIVE CARE:  Alleviation of Pain  Outcome: Progressing Towards Goal     Problem: Patient Education: Go to Patient Education Activity  Goal: Patient/Family Education  Outcome: Progressing Towards Goal

## 2021-08-23 NOTE — ROUTINE PROCESS
2310 Pt has yet to urinate this shift with vines in place, will plan to bladder scan as available. 2336 Bladder scan performed at this time, 0 ml of urine currently. Informed by Radha Smith RN that Pt had a burst of vtach. Pt denies any complaints and appeared in NAD during the event. Rai Paged hospitalist to make aware of lack of urine output and frequent ventricular ectopy. 7126 Dr. Kinney returned page and updated of ectopy and lack of output. Dr. Kinney noted that Pt has received dialysis on Sunday and could contribute to the lack of output. Order placed for magnesium level per Dr. Kinney.    0310 Hemoglobin of 6.5 noted at this time, will page MD as available to make aware. Keila Vazquez 1950 paged at this time to make aware of low h&h.    0335 Per Dr. Kinney, Pt has been receiving blood during dialysis to prevent hypervolemia. Will hold transfusion at this time as instructed. 3635 Bedside shift change report given to Syed Marcos RN (oncoming nurse) by Rudolpho Buerger (offgoing nurse). Report included the following information SBAR, Intake/Output, MAR, Recent Results and Cardiac Rhythm A fib with pvcs.

## 2021-08-23 NOTE — PROGRESS NOTES
Problem: Falls - Risk of  Goal: *Absence of Falls  Description: Document Meredith Yates Fall Risk and appropriate interventions in the flowsheet.   Outcome: Progressing Towards Goal  Note: Fall Risk Interventions:  Mobility Interventions: Assess mobility with egress test, Bed/chair exit alarm, Communicate number of staff needed for ambulation/transfer, PT Consult for mobility concerns    Mentation Interventions: Adequate sleep, hydration, pain control, Bed/chair exit alarm, Door open when patient unattended, Evaluate medications/consider consulting pharmacy, Increase mobility, More frequent rounding, Reorient patient, Toileting rounds, Update white board    Medication Interventions: Bed/chair exit alarm, Evaluate medications/consider consulting pharmacy    Elimination Interventions: Bed/chair exit alarm, Call light in reach, Patient to call for help with toileting needs, Toilet paper/wipes in reach, Toileting schedule/hourly rounds    History of Falls Interventions: Bed/chair exit alarm, Door open when patient unattended, Evaluate medications/consider consulting pharmacy         Problem: Patient Education: Go to Patient Education Activity  Goal: Patient/Family Education  Outcome: Progressing Towards Goal     Problem: Cardiac Output -  Decreased  Goal: *Vital signs within specified parameters  Outcome: Progressing Towards Goal  Goal: *Optimal cardiac output  Outcome: Progressing Towards Goal  Goal: *Absence of hypoxia  Outcome: Progressing Towards Goal  Goal: *Absence of peripheral edema  Outcome: Progressing Towards Goal  Goal: *Intravascular fluid volume and electrolyte balance  Outcome: Progressing Towards Goal     Problem: Patient Education: Go to Patient Education Activity  Goal: Patient/Family Education  Outcome: Progressing Towards Goal     Problem: Injury - Risk of, Adverse Drug Event  Goal: *Absence of adverse drug events  Outcome: Progressing Towards Goal  Goal: *Absence of medication errors  Outcome: Progressing Towards Goal  Goal: *Knowledge of prescribed medications  Outcome: Progressing Towards Goal     Problem: Patient Education: Go to Patient Education Activity  Goal: Patient/Family Education  Outcome: Progressing Towards Goal     Problem: Patient Education: Go to Patient Education Activity  Goal: Patient/Family Education  Outcome: Progressing Towards Goal     Problem: Acute Renal Failure: Day 2  Goal: Activity/Safety  Outcome: Progressing Towards Goal  Goal: Consults, if ordered  Outcome: Progressing Towards Goal  Goal: Diagnostic Test/Procedures  Outcome: Progressing Towards Goal  Goal: Nutrition/Diet  Outcome: Progressing Towards Goal  Goal: Discharge Planning  Outcome: Progressing Towards Goal  Goal: Medications  Outcome: Progressing Towards Goal  Goal: Respiratory  Outcome: Progressing Towards Goal  Goal: Treatments/Interventions/Procedures  Outcome: Progressing Towards Goal  Goal: Psychosocial  Outcome: Progressing Towards Goal  Goal: *Optimal pain control at patient's stated goal  Outcome: Progressing Towards Goal  Goal: *Urinary output within identified parameters  Outcome: Progressing Towards Goal  Goal: *Hemodynamically stable  Outcome: Progressing Towards Goal  Goal: *Tolerating diet  Outcome: Progressing Towards Goal  Goal: *Lab values improving  Outcome: Progressing Towards Goal     Problem: Acute Renal Failure: Day 3  Goal: Off Pathway (Use only if patient is Off Pathway)  Outcome: Progressing Towards Goal  Goal: Activity/Safety  Outcome: Progressing Towards Goal  Goal: Consults, if ordered  Outcome: Progressing Towards Goal  Goal: Diagnostic Test/Procedures  Outcome: Progressing Towards Goal  Goal: Nutrition/Diet  Outcome: Progressing Towards Goal  Goal: Discharge Planning  Outcome: Progressing Towards Goal  Goal: Medications  Outcome: Progressing Towards Goal  Goal: Respiratory  Outcome: Progressing Towards Goal  Goal: Treatments/Interventions/Procedures  Outcome: Progressing Towards Goal  Goal: Psychosocial  Outcome: Progressing Towards Goal  Goal: *Optimal pain control at patient's stated goal  Outcome: Progressing Towards Goal  Goal: *Urinary output within identified parameters  Outcome: Progressing Towards Goal  Goal: *Hemodynamically stable  Outcome: Progressing Towards Goal  Goal: *Tolerating diet  Outcome: Progressing Towards Goal  Goal: *Lab values improving  Outcome: Progressing Towards Goal     Problem: Acute Renal Failure: Day 4  Goal: Off Pathway (Use only if patient is Off Pathway)  Outcome: Progressing Towards Goal  Goal: Activity/Safety  Outcome: Progressing Towards Goal  Goal: Consults, if ordered  Outcome: Progressing Towards Goal  Goal: Diagnostic Test/Procedures  Outcome: Progressing Towards Goal  Goal: Nutrition/Diet  Outcome: Progressing Towards Goal  Goal: Discharge Planning  Outcome: Progressing Towards Goal  Goal: Medications  Outcome: Progressing Towards Goal  Goal: Respiratory  Outcome: Progressing Towards Goal  Goal: Treatments/Interventions/Procedures  Outcome: Progressing Towards Goal  Goal: Psychosocial  Outcome: Progressing Towards Goal  Goal: *Optimal pain control at patient's stated goal  Outcome: Progressing Towards Goal  Goal: *Urinary output within identified parameters  Outcome: Progressing Towards Goal  Goal: *Hemodynamically stable  Outcome: Progressing Towards Goal  Goal: *Tolerating diet  Outcome: Progressing Towards Goal  Goal: *Lab values improving  Outcome: Progressing Towards Goal     Problem: Acute Renal Failure: Day 5  Goal: Off Pathway (Use only if patient is Off Pathway)  Outcome: Progressing Towards Goal  Goal: Activity/Safety  Outcome: Progressing Towards Goal  Goal: Diagnostic Test/Procedures  Outcome: Progressing Towards Goal  Goal: Nutrition/Diet  Outcome: Progressing Towards Goal  Goal: Discharge Planning  Outcome: Progressing Towards Goal  Goal: Medications  Outcome: Progressing Towards Goal  Goal: Respiratory  Outcome: Progressing Towards Goal  Goal: Treatments/Interventions/Procedures  Outcome: Progressing Towards Goal  Goal: Psychosocial  Outcome: Progressing Towards Goal  Goal: *Optimal pain control at patient's stated goal  Outcome: Progressing Towards Goal  Goal: *Urinary output within identified parameters  Outcome: Progressing Towards Goal  Goal: *Hemodynamically stable  Outcome: Progressing Towards Goal  Goal: *Tolerating diet  Outcome: Progressing Towards Goal  Goal: *Lab values improving  Outcome: Progressing Towards Goal     Problem: Acute Renal Failure: Day 6  Goal: Off Pathway (Use only if patient is Off Pathway)  Outcome: Progressing Towards Goal  Goal: Activity/Safety  Outcome: Progressing Towards Goal  Goal: Diagnostic Test/Procedures  Outcome: Progressing Towards Goal  Goal: Nutrition/Diet  Outcome: Progressing Towards Goal  Goal: Discharge Planning  Outcome: Progressing Towards Goal  Goal: Medications  Outcome: Progressing Towards Goal  Goal: Respiratory  Outcome: Progressing Towards Goal  Goal: Treatments/Interventions/Procedures  Outcome: Progressing Towards Goal  Goal: Psychosocial  Outcome: Progressing Towards Goal     Problem: Acute Renal Failure: Discharge Outcomes  Goal: *Optimal pain control at patient's stated goal  Outcome: Progressing Towards Goal  Goal: *Urinary output within identified parameters  Outcome: Progressing Towards Goal  Goal: *Hemodynamically stable  Outcome: Progressing Towards Goal  Goal: *Tolerating diet  Outcome: Progressing Towards Goal  Goal: *Lab values stabilized  Outcome: Progressing Towards Goal  Goal: *Verbalizes understanding and describes medication purposes and frequencies  Outcome: Progressing Towards Goal  Goal: *Medication reconciliation  Outcome: Progressing Towards Goal

## 2021-08-23 NOTE — PROGRESS NOTES
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 On levaquin  S/p left arm AVF(occluded) in past  S/p tunneled access placement 8/20/21  Hypertension  Hx neurogenic bladder  ACD  Secondary hyperparathyroidism  PAF   PLAN:   HD again tomorrow  For Endoscopy today  Got close to 4 L UF yesterday (with emergent dialysis)           Current Facility-Administered Medications   Medication Dose Route Frequency    0.9% sodium chloride infusion 250 mL  250 mL IntraVENous PRN    furosemide (LASIX) injection 40 mg  40 mg IntraVENous ONCE    pantoprazole (PROTONIX) injection 40 mg  40 mg IntraVENous Q12H    0.9% sodium chloride infusion 250 mL  250 mL IntraVENous PRN    insulin lispro (HUMALOG) injection   SubCUTAneous Q6H    haloperidol lactate (HALDOL) injection 2 mg  2 mg IntraVENous Q6H PRN    tamsulosin (FLOMAX) capsule 0.4 mg  0.4 mg Oral DAILY    levoFLOXacin (LEVAQUIN) 500 mg in D5W IVPB  500 mg IntraVENous Q48H    epoetin negrita-epbx (RETACRIT) injection 8,000 Units  8,000 Units SubCUTAneous Q MON, WED & FRI    therapeutic multivitamin (THERAGRAN) tablet 1 Tablet  1 Tablet Oral DAILY    alteplase (CATHFLO) 4 mg in sterile water (preservative free) 4 mL injection  4 mg InterCATHeter DIALYSIS ONCE    atropine 1 mg/mL injection 0.5 mg  0.5 mg IntraVENous PRN    sodium chloride (NS) flush 5-40 mL  5-40 mL IntraVENous Q8H    sodium chloride (NS) flush 5-40 mL  5-40 mL IntraVENous PRN    sodium chloride (NS) flush 5-40 mL  5-40 mL IntraVENous Q8H    sodium chloride (NS) flush 5-40 mL  5-40 mL IntraVENous PRN    acetaminophen (TYLENOL) tablet 650 mg  650 mg Oral Q6H PRN    Or    acetaminophen (TYLENOL) suppository 650 mg  650 mg Rectal Q6H PRN    senna (SENOKOT) tablet 8.6 mg  1 Tablet Oral DAILY PRN    docusate sodium (COLACE) capsule 100 mg  100 mg Oral DAILY    glucose chewable tablet 16 g  4 Tablet Oral PRN    glucagon (GLUCAGEN) injection 1 mg  1 mg IntraMUSCular PRN    dextrose (D50W) injection syrg 12.5-25 g  25-50 mL IntraVENous PRN       Review of Symptoms: comprehensive ROS negative except above.    Objective:     Patient Vitals for the past 24 hrs:   Temp Pulse Resp BP SpO2   08/23/21 1104 98.4 °F (36.9 °C) 68 20 134/72 97 %   08/23/21 1049 97.7 °F (36.5 °C) 65 20 116/66 99 %   08/23/21 1034 98 °F (36.7 °C) 64 18 128/75 97 %   08/23/21 1019 97.5 °F (36.4 °C) 62 18 125/62 98 %   08/23/21 0811 97.3 °F (36.3 °C) 65 16 129/65 97 %   08/23/21 0310 97.8 °F (36.6 °C) 60 16 132/72 97 %   08/22/21 2310 98.4 °F (36.9 °C) 60 18 127/65 100 %   08/22/21 1940 98.1 °F (36.7 °C) 60 16 117/64 99 %   08/22/21 1815 98.2 °F (36.8 °C) 66 18 104/68    08/22/21 1800  65  109/65    08/22/21 1745  61  (!) 107/54    08/22/21 1730  73  120/60    08/22/21 1715  68  (!) 103/58    08/22/21 1700  70  98/60    08/22/21 1645  64  98/60    08/22/21 1630  65  114/74    08/22/21 1615  63  101/64    08/22/21 1600  61  (!) 114/58    08/22/21 1545  70  108/62    08/22/21 1530  62  (!) 113/59    08/22/21 1515  (!) 54  99/65    08/22/21 1500  62  121/63    08/22/21 1455  (!) 52  105/62    08/22/21 1440 98 °F (36.7 °C) 60 18 120/72 99 %   08/22/21 1138 98.2 °F (36.8 °C) 66 20 128/70 97 %        Weight change: -3.447 kg (-7 lb 9.6 oz)     08/21 1901 - 08/23 0700  In: 580 [P.O.:220]  Out: 3310 [Urine:310]    Intake/Output Summary (Last 24 hours) at 8/23/2021 1113  Last data filed at 8/23/2021 0310  Gross per 24 hour   Intake 360 ml   Output 3010 ml   Net -2650 ml     Physical Exam: HEENT sclera anicteric, supple neck, no thyromegaly  CVS: S1S2 heard,  no rub  RS: rales at bases  Abd: Soft, Non tender, Not distended, Positive bowel sounds, no organomegaly, no CVA / supra pubic tenderness  Neuro: non focal, awake, alert , CN II-XII are grossly intact  Extrm: plus 1edema, no cyanosis, clubbing   Skin: no visible  Rash  Musculoskeletal: No gross joints or bone deformities         Data Review:     LABS:   Hematology:   Recent Labs     08/23/21  0044 08/22/21  1207 08/22/21  0241 08/21/21  0453   WBC 12.0  --  19.3* 13.3*   HGB 6.5* 6.3* 7.2* 8.3*   HCT 20.0* 19.5* 22.1* 25.9*     Chemistry:   Recent Labs     08/23/21  0044 08/22/21  0241 08/21/21  0453   BUN 48* 57* 66*   CREA 5.22* 6.30* 8.35*   CA 8.9 8.3* 8.6   K 3.8 4.2 4.0    135* 136    102 100   CO2 25 25 27   GLU 83 112* 81            Procedures/imaging: see electronic medical records for all procedures, Xrays and details which were not copied into this note but were reviewed prior to creation of Plan          Assessment & Plan:       See above      Jessee Camarena MD  8/23/2021

## 2021-08-23 NOTE — PROGRESS NOTES
0710: Bedside shift change report given to Do Hill RN (oncoming nurse) by Do Hill RN (offgoing nurse). Report included the following information SBAR, Kardex, Intake/Output, MAR, Recent Results and Cardiac Rhythm AFIB.   0920: Dr. Shaun Chaney at bedside. 1019: Blood transfusion started, pt educated on signs and symptoms of blood transfusion reactions/side effects. Pt verbalized understanding. 1150: Dr. Kathleen Osorio rounding on pt. Pt still on blood transfusion. 1226:Blood transfusion completed. Pt tolerated well.  1309: Pt off floor for EGD via bed and oxygen. 1749: Pt back to floor. Family at bedside. H& H drawn. Bedside shift change report given to Yaz Hernandez RN (oncoming nurse) by Do Hill RN (offgoing nurse). Report included the following information SBAR, Kardex, Procedure Summary, Intake/Output, MAR, Recent Results and Cardiac Rhythm AFIB.

## 2021-08-23 NOTE — PROGRESS NOTES
Hospitalist Progress Note    Patient: Abel Cyr MRN: [id number]  CSN: [id number]    YOB: 1950  Age: 70 y.o. Sex: male    DOA: 8/15/2021 LOS:  LOS: 8 days            Patient lying in bed, feeling lot better. Denies any abdominal pain, no nausea vomiting. Per RN, he had 1 more melanotic stool last night. No bloody stool. Assessment/Plan     1. Acute GI bleed with blood loss anemia: Possible upper GI, continue IV PPI, will transfuse 1 more unit of blood today, H&H still on the lower side. Discussed with GI, plan for endoscopy today. We will continue to keep n.p.o.    2. ESRD on HD, hemodialysis per nephrology. 3. Enterobacter bacteremia possibly due to UTI: Continue antibiotics per ID, repeat cultures negative. 4. Leukocytosis: Improved now. 5. Aspiration pneumonia: Continue antibiotics per ID  6. TONY on CKD 4 with obstructive uropathy, continue dialysis per nephrology. Permacath placement by vascular surgery 8/20/21.  7. Acute metabolic encephalopathy due to underlying infection, improved    8. A. fib with slow RVR. Possible sick sinus syndrome. Rates controlled. Continue full dose aspirin. Cardiology input noted, recommend to hold off AV blocking agents. 9. Severe protein calorie malnutrition: Continue nutritionist follows. On supplement. Multivitamin. 10. Diabetes type 2 with hypoglycemia. Sliding scale insulin. Hypoglycemia protocol  11. Complicated urinary tract infection with obstructive uropathy. ID to follow. Urology input noted. Urology recommend to continue Elizabeth, no voiding trials. 12. Hypertension  13. Moderate pulmonary hypertension  14. hyperkalemia resolved  Falls at home. PT OT. He has not been on anticoagulation in the outpatient setting for this reason. Full code  PT OT recommend SNF placement    Addendum  5:30 PM  EGD results noted, patient has esophagitis, antral and duodenal ulcer.   We will start clear liquids and advance as tolerated in next 24-48 hours.    Discussed with the patient and also with the son Pancho De over the phone and explained about my above plan care. Both understood and agreed with my plan. son Georgia [phone]       Case discussed with:  [x]Patient  [x]Family  [x]Nursing  []Case Management  DVT Prophylaxis:  []Lovenox  [x]Hep SQ  []SCDs  []Coumadin   []On Heparin gtt    Vital signs/Intake and Output:  Visit Vitals  /77 (BP 1 Location: Right arm, BP Patient Position: At rest)   Pulse 64   Temp 98 °F (36.7 °C)   Resp 20   Ht 6' 1\" (1.854 m)   Wt 106 kg (233 lb 9.6 oz)   SpO2 100%   BMI 30.82 kg/m²       Exam  General:  Alert, cooperative, no acute distress    Pulmonary: Bilaterally min basal rales. No Wheezing/Rales. Cardiovascular: Regular rate and Rhythm. GI:  Soft, Non distended, Non tender. + Bowel sounds. Extremities:  No edema. No calf tenderness. Neurologic: Alert and oriented X 2-3. No acute neuro deficits. Medications Reviewed      Labs: Results:       Chemistry Recent Labs     08/23/21  0044 08/22/21  0241 08/21/21  0453   GLU 83 112* 81    135* 136   K 3.8 4.2 4.0    102 100   CO2 25 25 27   BUN 48* 57* 66*   CREA 5.22* 6.30* 8.35*   CA 8.9 8.3* 8.6   AGAP 8 8 9   BUCR 9* 9* 8*      CBC w/Diff Recent Labs     08/23/21  0044 08/22/21  1207 08/22/21  0241 08/21/21  0453 08/21/21  0453   WBC 12.0  --  19.3*  --  13.3*   RBC 2.42*  --  2.61*  --  3.12*   HGB 6.5* 6.3* 7.2*   < > 8.3*   HCT 20.0* 19.5* 22.1*   < > 25.9*     --  225  --  220   GRANS 87*  --  95*  --  88*   LYMPH 5*  --  2*  --  5*   EOS 1  --  0  --  1    < > = values in this interval not displayed. Cardiac Enzymes No results for input(s): CPK, CKND1, MAXIMILIAN in the last 72 hours. No lab exists for component: CKRMB, TROIP   Coagulation No results for input(s): PTP, INR, APTT, INREXT, INREXT in the last 72 hours.     Lipid Panel Lab Results   Component Value Date/Time    Cholesterol, total 208 (H) 03/22/2010 12:00 PM    HDL Cholesterol 55 03/22/2010 12:00 PM    LDL, calculated 138.4 (H) 03/22/2010 12:00 PM    VLDL, calculated 14.6 03/22/2010 12:00 PM    Triglyceride 73 03/22/2010 12:00 PM    CHOL/HDL Ratio 3.8 03/22/2010 12:00 PM      BNP No results for input(s): BNPP in the last 72 hours. Liver Enzymes No results for input(s): TP, ALB, TBIL, AP in the last 72 hours.     No lab exists for component: SGOT, GPT, DBIL   Thyroid Studies Lab Results   Component Value Date/Time    TSH 2.42 08/15/2021 11:00 AM        Procedures/imaging: see electronic medical records for all procedures/Xrays and details which were not copied into this note but were reviewed prior to creation of Plan

## 2021-08-23 NOTE — PROGRESS NOTES
Problem: Mobility Impaired (Adult and Pediatric)  Goal: *Acute Goals and Plan of Care (Insert Text)  Description: Physical Therapy Goals  Initiated 8/19/2021 and to be accomplished within 7 day(s)  1. Patient will move from supine to sit and sit to supine  in bed with minimal assistance/contact guard assist.    2.  Patient will transfer from bed to chair and chair to bed with moderate assistance  using the least restrictive device. 3.  Patient will perform sit to stand with moderate assistance . 4. Patient will ambulate with maximal assistance for 5 feet with the least restrictive device. PLOF: Pt is an unreliable historian and confused on eval, stating he lives with his wife. He was able to amb short distances with a RW and his wife helped him around. Outcome: Progressing Towards Goal     PHYSICAL THERAPY TREATMENT    Patient: Morgan Peters (56 y.o. male)  Date: 8/23/2021  Diagnosis: Renal failure [N19]  Bradycardia [R00.1] <principal problem not specified>  Procedure(s) (LRB):  INSERTION TUNNELED CENTRAL VENOUS CATHETER (Right) 3 Days Post-Op  Precautions: Fall    ASSESSMENT:  RN cleared for mobility. Pt found semi-reclined in bed, in NAD, willing to work with PT. Sup-sit with Karen and additional time. He reports feeling better when sitting up, but declined standing numerous times today. Exercises performed per flow sheet and pt washing his face indep. Pt able to sit on EOB for ~15 mins. He states he will stand up and walk tomorrow. Pt was assisted back semi-reclined in bed with Karen. He was left with HOB elevated, call bell nearby, all needs met, bed alarm on. Progression toward goals:   []      Improving appropriately and progressing toward goals  [x]      Improving slowly and progressing toward goals  []      Not making progress toward goals and plan of care will be adjusted     PLAN:  Patient continues to benefit from skilled intervention to address the above impairments.   Continue treatment per established plan of care. Discharge Recommendations:  Rehab  Further Equipment Recommendations for Discharge:  N/A     SUBJECTIVE:   Patient stated I will stand and walk tomorrow, I promise.     OBJECTIVE DATA SUMMARY:   Critical Behavior:  Neurologic State: Alert  Orientation Level: Oriented to person, Oriented to place, Oriented to situation  Cognition: Follows commands  Safety/Judgement: Fall prevention  Functional Mobility Training:  Bed Mobility:     Supine to Sit: Minimum assistance; Additional time  Sit to Supine: Minimum assistance; Additional time  Scooting: Contact guard assistance    Balance:  Sitting: Impaired; With support  Sitting - Static: Good (unsupported)  Sitting - Dynamic: Fair (occasional)   Therapeutic Exercises:   Pt performed exercises per flow sheet sitting on EOB      EXERCISE   Sets   Reps   Active Active Assist   Passive Self ROM   Comments   Ankle Pumps 2 15  [x] [] [] []    Quad Sets/Glut Sets    [] [] [] [] Hold for 5 secs   Hamstring Sets   [] [] [] []    Short Arc Quads   [] [] [] []    Heel Slides   [] [] [] []    Straight Leg Raises   [] [] [] []    Hip Add   [] [] [] [] Hold for 5 secs, w/ pillow squeeze   Long Arc Quads 2 15 [x] [] [] []    Seated Marching 2 10 [x] [] [] []    Standing Marching   [] [] [] []       [] [] [] []        Pain:  Pain level pre-treatment: 0/10  Pain level post-treatment: 0/10   Pain Intervention(s): Medication (see MAR); Rest, Ice, Repositioning   Response to intervention: Nurse notified, See doc flow    Activity Tolerance:   Pt tolerated mobility well  Please refer to the flowsheet for vital signs taken during this treatment.   After treatment:   [] Patient left in no apparent distress sitting up in chair  [x] Patient left in no apparent distress in bed  [x] Call bell left within reach  [x] Nursing notified  [] Caregiver present  [x] Bed alarm activated  [] SCDs applied      COMMUNICATION/EDUCATION:   [x]         Role of Physical Therapy in the acute care setting. [x]         Fall prevention education was provided and the patient/caregiver indicated understanding. [x]         Patient/family have participated as able in working toward goals and plan of care. []         Patient/family agree to work toward stated goals and plan of care. []         Patient understands intent and goals of therapy, but is neutral about his/her participation.   []         Patient is unable to participate in stated goals/plan of care: ongoing with therapy staff.  []         Other:        Justa Carmichael   Time Calculation: 23 mins

## 2021-08-23 NOTE — PROCEDURES
[facility]  [facility], [address]     Endoscopic Gastroduodenoscopy Procedure Note    Eleazar Zurita  1950  [id number]    Indication:  Melena/hematochezia     : La Goode MD    Referring Provider:  Paty Zepeda MD    Anesthesia/Sedation:  MAC anesthesia Propofol      Procedure Details     After infomed consent was obtained for the procedure, with all risks and benefits of procedure explained the patient was taken to the endoscopy suite and placed in the left lateral decubitus position. Following sequential administration of sedation as per above, the endoscope was inserted into the mouth and advanced under direct vision to second portion of the duodenum. A careful inspection was made as the gastroscope was withdrawn, including a retroflexed view of the proximal stomach; findings and interventions are described below. Findings:   Esophagus:Sloughing esophagitis  Stomach: Erosions in antrum  Duodenum/jejunum: Two crater-like clean based ulcer measuring approximately 1cm in anterior duodenal bulb. These were likely the source for GI bleed. Therapies:  none    Specimens: * No specimens in log *           Complications:   None; patient tolerated the procedure well. EBL:  None. Impression:    sloughing esophagitis, stomach antral and duodenal erosions, 2 large duodenal ulcers      Recommendations:  -Continue acid suppression with IV PPI BID until discharge. May transition to PO BID for outpatient.  -No NSAIDS. -Monitor H/H. Expect passage of melenic stools in the next 3-4 days.  -Avoid hypotensive episodes.   -Can be on clears. May advance diet as tolerated in 24-48 hours if H/H remains stable.     La Goode MD  8/23/2021  4:17 PM

## 2021-08-23 NOTE — PERIOP NOTES
TRANSFER - OUT REPORT:    Telephone report given to [facility]) on Gloria Welch  being transferred to [facility](unit) for routine post - op       Report consisted of patients Situation, Background, Assessment and   Recommendations(SBAR). Information from the following report(s) SBAR and OR Summary was reviewed with the receiving nurse. Lines:   Peripheral IV 08/23/21 Anterior;Distal;Right Forearm (Active)   Site Assessment Clean, dry, & intact 08/23/21 1300   Phlebitis Assessment 0 08/23/21 1300   Infiltration Assessment 0 08/23/21 1300   Dressing Status Clean, dry, & intact 08/23/21 1300   Dressing Type Transparent;Tape 08/23/21 1300   Hub Color/Line Status Pink; Infusing 08/23/21 1300        Opportunity for questions and clarification was provided.       Patient transported with:   O2 @ 3 liters  Tech

## 2021-08-23 NOTE — PROGRESS NOTES
Brief Progress Note    Paged by Karl Perdomo RN concerning Hgb of 6.5. Will order blood and place transfusion order - blood to be given during dialysis per nephrology.      Bebo Booker DO  08/23/21  3:39 AM

## 2021-08-23 NOTE — PROGRESS NOTES
[facility]: [phone]  [facility]: [phone]     Patient Name: Alivia Oquendo  YOB: 1950    Date of Progress Note : 8/23/2021  Reason for Consult: Establish goals of care  Requesting Provider: John MURPHY  Primary Care Physician: Jw Mendez MD      SUMMARY:   Alivia Oquendo is a 70 y.o. male with a past history of chronic kidney disease stage IV on hemodialysis 3 times a week, DM type II, acute on chronic diastolic heart failure, CVA, myocardial infarction, who was admitted on 8/15/2021 from home with a diagnosis of sepsis, altered mental status. Current medical issues leading to Palliative Medicine involvement include: Establish goals of care    CHIEF COMPLAINT: Sepsis, AMS    HPI/SUBJECTIVE:    Patient is a 51-year-old -American male that has multiple complex comorbid conditions. He was admitted 2 days ago from home with sepsis. Patient's wife reported altered mental status and that patient had been acting different for \"a while\". He was found down on the ground by family, but patient had no memory of what happened stating that he just felt weak. 8/23/21:  Patient had episode GI bleed with anemia hemoglobin dropped from 8.3-6.3 was given 1 unit PRBCs. Continues with fluid overload, and UTI treatments. Patient getting EGD today diagnostically. 8/20/21:  Patient receiving new permacath due to obstruction continues on hemo-dialysis with nephrology  Following. Speech therapy following for dysphagia. The patient is:   [x] Verbal and participatory   [] Non-participatory due to:     GOALS OF CARE:  Patient/Health Care Proxy Stated Goals: Prolong life      TREATMENT PREFERENCES:   Code Status: DNR         PALLIATIVE DIAGNOSES:   1. Goals of care/ACP  2. Acute on chronic heart failure  3. Chronic kidney disease  4. Debility       PLAN:   8/23/21:   This NP and Jesi Cocks in to see patient at the bedside he was accompanied by Dr. Aida Willis. Had developed complication of bleeding over the last couple of days and is being taken for an EGD today. Goals of care have been set patient is DO NOT RESUSCITATE DO NOT INTUBATE. Our team will continue to follow to determine if he is now on a road to recovery. See below for prior discussions:    8/20/2021: This NP and ADRIAN Macias have made several attempts to speak with patient regarding his goals of care. He is inconsistent and tangential.  At this time we do not believe he is able to fully understand the benefits and burdens of his decisions. We have asked him if we could call his wife to assist with these conversations of which he approved. We reached out to his wife Brenden Nieves to discuss CPR versus DO NOT RESUSCITATE. She mentioned that she has had this discussion with her daughter who is a RN. She states that she would not want nor does she believe that her  would want to ever be intubated. For that reason she has completed a POST for DO NOT RESUSCITATE/DO NOT INTUBATE/limited interventions/okay with limited feeding tube  Have updated patient's hospitalist and placed orders on the chart. Patient's goals of care are met at this time. See below for prior discussions:    1. Goals of care/ACP  This NP in to see patient at the bedside he is a alert and interactive but only moderately oriented. Is able to state who he is and where he is but when asked where he lives patient states \"here \". When asked to explain patient states \"well now I live here\" when asked where his wife is living he stated that she lives in [place]. I then asked if he lives in [place] and he again stated I believe at [facility]". Patient is also somewhat evasive regarding questions about his children and overall health background including his baseline functional status. He states that he has been on dialysis for approximately 1 year.   When addressing goals of care and patient's wishes he stated \"we all cannot live forever, when Cullen Romero comes from a I am ready to go. \"  I then clarified asking does this mean that he would not want to be placed on life support at end-of-life and he stated \"of course that would be okay, I want to live\". This appears in direct contradiction to the prior statement. Plan will be to see if patient clears further to continue this conversation when asked further clarifying questions he stated \"now you have just gone too far\". Have briefly met with his wife and introduced our team, will reach back out to set up a meeting later this week. At this time patient remains a full code with full interventions  2. Acute on chronic heart failure  Moderate leg edema. Bradycardia and atrial fibrillation present. Patient is being monitored and medically managed by cardiology. 3.  Chronic kidney disease  Patient on HD 3 times a week. Renal failure stage IV being medically managed by nephrology  4. Debility  Patient has a palliative performance score of around 40%, is mainly in the bed unable to do any extensive work due to disease progression, mainly assisted for all functional ADLs and self-care, and some compromise in nutritional intake. Patient has multiple complex comorbidities giving him an overall poor long-term prognosis and high risk for complications. 5.   Initial consult note routed to primary continuity provider  6.    Communicated plan of care with: Palliative IDT      Advance Care Planning:  [] The [facility] Interdisciplinary Team has updated the ACP Navigator with [address] and Patient Capacity    Primary Decision Maker ([address]): Spouse Noemi Mauro    Medical Interventions: Limited additional interventions   Other Instructions:   Artificially Administered Nutrition: Feeding tube for a defined trial period     As far as possible, the palliative care team has discussed with patient / health care proxy about goals of care / treatment preferences for patient. HISTORY:     History obtained from: chart review   Active Problems:    Renal failure (8/15/2021)      Bradycardia (8/15/2021)      Severe protein-calorie malnutrition ([facility] (2021)      Past Medical History:   Diagnosis Date    Atrial fibrillation ([facility]     Cerebrovascular accident ([facility]     , - general weakness    Chronic diastolic heart failure (HCC)     Chronic kidney disease     dialysis    Diabetes ([facility]     Heart failure ([facility]     History of cardioversion     Hypercholesterolemia     Hypertension     Morbid obesity ([facility]       Past Surgical History:   Procedure Laterality Date    HX VASCULAR ACCESS Right     right neck      Family History   Problem Relation Age of Onset    Heart Attack Mother 52    Diabetes Other     Hypertension Other      History reviewed, no pertinent family history.   Social History     Tobacco Use    Smoking status: Former Smoker     Years: 8.00     Quit date: 1971     Years since quittin.1    Smokeless tobacco: Never Used    Tobacco comment: last smoked in my late 19's   Substance Use Topics    Alcohol use: No     Alcohol/week: 0.0 standard drinks     No Known Allergies   Current Facility-Administered Medications   Medication Dose Route Frequency    0.9% sodium chloride infusion 250 mL  250 mL IntraVENous PRN    [START ON 2021] epoetin negrita-epbx (RETACRIT) injection 8,000 Units  8,000 Units SubCUTAneous DIALYSIS TUE, THU & SAT    pantoprazole (PROTONIX) injection 40 mg  40 mg IntraVENous Q12H    0.9% sodium chloride infusion 250 mL  250 mL IntraVENous PRN    insulin lispro (HUMALOG) injection   SubCUTAneous Q6H    haloperidol lactate (HALDOL) injection 2 mg  2 mg IntraVENous Q6H PRN    tamsulosin (FLOMAX) capsule 0.4 mg  0.4 mg Oral DAILY    levoFLOXacin (LEVAQUIN) 500 mg in D5W IVPB  500 mg IntraVENous Q48H    therapeutic multivitamin (THERAGRAN) tablet 1 Tablet  1 Tablet Oral DAILY    alteplase (CATHFLO) 4 mg in sterile water (preservative free) 4 mL injection  4 mg InterCATHeter DIALYSIS ONCE    atropine 1 mg/mL injection 0.5 mg  0.5 mg IntraVENous PRN    sodium chloride (NS) flush 5-40 mL  5-40 mL IntraVENous Q8H    sodium chloride (NS) flush 5-40 mL  5-40 mL IntraVENous PRN    sodium chloride (NS) flush 5-40 mL  5-40 mL IntraVENous Q8H    sodium chloride (NS) flush 5-40 mL  5-40 mL IntraVENous PRN    acetaminophen (TYLENOL) tablet 650 mg  650 mg Oral Q6H PRN    Or    acetaminophen (TYLENOL) suppository 650 mg  650 mg Rectal Q6H PRN    senna (SENOKOT) tablet 8.6 mg  1 Tablet Oral DAILY PRN    docusate sodium (COLACE) capsule 100 mg  100 mg Oral DAILY    glucose chewable tablet 16 g  4 Tablet Oral PRN    glucagon (GLUCAGEN) injection 1 mg  1 mg IntraMUSCular PRN    dextrose (D50W) injection syrg 12.5-25 g  25-50 mL IntraVENous PRN          Clinical Pain Assessment (nonverbal scale for nonverbal patients): Clinical Pain Assessment  Severity: 0     Activity (Movement): Lying quietly, normal position    Duration: for how long has pt been experiencing pain (e.g., 2 days, 1 month, years)  Frequency: how often pain is an issue (e.g., several times per day, once every few days, constant)     FUNCTIONAL ASSESSMENT:     Palliative Performance Scale (PPS):  PPS: 40    ECOG  ECOG Status : Limited self-care     PSYCHOSOCIAL/SPIRITUAL SCREENING:      Any spiritual / Worship concerns:  [] Yes /  [x] No    Caregiver Burnout:  [] Yes /  [x] No /  [] No Caregiver Present      Anticipatory grief assessment:   [x] Normal  / [] Maladaptive        REVIEW OF SYSTEMS:     Systems: constitutional, ears/nose/mouth/throat, respiratory, gastrointestinal, genitourinary, musculoskeletal, integumentary, neurologic, psychiatric, endocrine. Positive findings noted below.   Modified ESAS Completed by: provider           Pain: 0   Anxiety: 0     Anorexia: 0 Dyspnea: 3           Stool Occurrence(s): 1   Positive and pertinent negative findings in ROS are noted above in HPI. The following systems were [x] reviewed / [] unable to be reviewed as noted in HPI  Other findings are noted below. PHYSICAL EXAM:     Constitutional: Alert and interactive, does not appear in distress  Eyes: pupils equal, anicteric  ENMT: no nasal discharge, moist mucous membranes  Cardiovascular: regular rhythm, distal pulses intact  Respiratory: breathing not labored, symmetric  Gastrointestinal: soft non-tender, +bowel sounds  Musculoskeletal: no deformity, no tenderness to palpation  Skin: warm, dry  Neurologic: Alert and oriented X 2  following commands, moving all extremities  Psychiatric: full affect, no hallucinations    Other: Wt Readings from Last 3 Encounters:   08/23/21 106 kg (233 lb 9.6 oz)   08/05/21 122.5 kg (270 lb)   07/05/16 147.4 kg (325 lb)     Blood pressure 138/77, pulse 64, temperature 98 °F (36.7 °C), resp. rate 20, height 6' 1\" (1.854 m), weight 106 kg (233 lb 9.6 oz), SpO2 100 %. Pain:  Pain Scale 1: Numeric (0 - 10)  Pain Intensity 1: 0                      LAB AND IMAGING FINDINGS:     Lab Results   Component Value Date/Time    WBC 12.0 08/23/2021 12:44 AM    HGB 6.5 (L) 08/23/2021 12:44 AM    PLATELET 215 33/64/1138 12:44 AM     Lab Results   Component Value Date/Time    Sodium 140 08/23/2021 12:44 AM    Potassium 3.8 08/23/2021 12:44 AM    Chloride 107 08/23/2021 12:44 AM    CO2 25 08/23/2021 12:44 AM    BUN 48 (H) 08/23/2021 12:44 AM    Creatinine 5.22 (H) 08/23/2021 12:44 AM    Calcium 8.9 08/23/2021 12:44 AM    Magnesium 2.5 08/23/2021 12:44 AM    Phosphorus 4.6 08/19/2021 05:53 AM      Lab Results   Component Value Date/Time    Alk.  phosphatase 92 03/22/2010 12:00 PM    Protein, total 7.8 03/22/2010 12:00 PM    Albumin 3.8 07/20/2021 11:13 AM    Globulin 3.5 03/22/2010 12:00 PM     Lab Results   Component Value Date/Time    INR 1.5 (H) 08/15/2021 10:05 AM    Prothrombin time 17.7 (H) 08/15/2021 10:05 AM    aPTT 38.2 (H) 08/18/2021 02:39 AM      Lab Results   Component Value Date/Time    Iron 30 (L) 07/20/2021 11:13 AM    TIBC 320 07/20/2021 11:13 AM    Iron % saturation 9 (L) 07/20/2021 11:13 AM    Ferritin 521 (H) 08/16/2021 11:45 AM      No results found for: PH, PCO2, PO2  No components found for: [place]   Lab Results   Component Value Date/Time     08/15/2021 11:00 AM    CK - MB 6.5 (H) 11/04/2015 12:05 PM              Total time: 15 minutes  Counseling / coordination time, spent as noted above:   > 50% counseling / coordination:  Time spent in direct consultation with the patient, medical team, and family     Prolonged service was provided for  []30 min   []75 min in face to face time in the presence of the patient, spent as noted above. Time Start:   Time End:     Disclaimer: Sections of this note are dictated using utilizing voice recognition software, which may have resulted in some phonetic based errors in grammar and contents. Even though attempts were made to correct all the mistakes, some may have been missed, and remained in the body of the document. If questions arise, please contact our department.

## 2021-08-23 NOTE — PROGRESS NOTES
Urology Progress Note        Assessment/Plan:     ASSESSMENT:   Urinary retention, 2L retained, BPH  UTI, sepsis               Vines catheter removed in office  with 100 cc PVR              UDS ordered to evaluate bladder function and storage pressure              UA 8/15 Large LE, 4+ bacteria, neg nitrites    Ucx 8/15 >100K Enterococcus, Pseudomonas, Enterobacter            Afebrile, VSS            TONY/ CKD- prior on HD              Creat 5.22<14.6              Baseline 3.0    Anemia   Hgb 6.5<8.3   Transfusion received today      Small right renal cyst      Afib, bradycardia, hyperkalemia      DM2  CHF  CVA           PLAN:    Appreciate overall management per primary  Ucx >100K, Now afebrile, Abx per primary, currently on Levaquin  TDC placed  for dialysis, creat improving, Neph following   Transfusion received today, Hgb 6.5, managed per primary   CT shows no hydronephrosis, known enlarged prostate   Maintain vines catheter for bladder stretch injury, low UOP but draining well per nursing    DO NOT VOID TRIAL  Continue Flomax  No acute interventions needed at this time  Will sign off, available sooner if needed         Follow up arranged? msg sent for o/p f/u with Dr Jona Simon for cysto/HIPOLITO after [address] [place] [address], NP-BC  [facility]   Pager [phone]    Subjective:     Daily Progress Note: 2021 2:08 PM    Blood transfusion today, currently in Endo  Maintain vines at discharge      Objective:     Visit Vitals  /77 (BP 1 Location: Right arm, BP Patient Position: At rest)   Pulse 64   Temp 98 °F (36.7 °C)   Resp 20   Ht 6' 1\" (1.854 m)   Wt 106 kg (233 lb 9.6 oz)   SpO2 100%   BMI 30.82 kg/m²        Temp (24hrs), Av °F (36.7 °C), Min:97.3 °F (36.3 °C), Max:98.4 °F (36.9 °C)      Intake and Output:  1901 -  0700  In: 580 [P.O.:220]  Out: 3310 [Urine:310]   07 -  190  In: 310   Out: -     PHYSICAL EXAMINATION: Visit Vitals  /77 (BP 1 Location: Right arm, BP Patient Position: At rest)   Pulse 64   Temp 98 °F (36.7 °C)   Resp 20   Ht 6' 1\" (1.854 m)   Wt 106 kg (233 lb 9.6 oz)   SpO2 100%   BMI 30.82 kg/m²     Chart reviewed, Off the floor          Lab/Data Review: All lab results for the last 24 hours reviewed. CT A/P 8/17  IMPRESSION     The chamber cardiac enlargement. Right lower and middle lobe infiltrate; these are dependent lobes. Differential  includes primarily pulmonary edema, aspiration, pneumonia. There is extensive soft tissue edema     Kidneys: Small exophytic hypodensity on the right probably represents a small  cyst. There is no obstruction or calculus.     Bladder: The bladder is collapsed about an indwelling Elizabeth catheter. The  prostate is enlarged measuring approximately 7 x 8 cm transaxially.       Labs:     Labs: Results:   Chemistry    Recent Labs     08/23/21  0044 08/22/21  0241 08/21/21  0453   GLU 83 112* 81    135* 136   K 3.8 4.2 4.0    102 100   CO2 25 25 27   BUN 48* 57* 66*   CREA 5.22* 6.30* 8.35*   CA 8.9 8.3* 8.6   AGAP 8 8 9   BUCR 9* 9* 8*      CBC w/Diff Recent Labs     08/23/21  0044 08/22/21  1207 08/22/21  0241 08/21/21  0453 08/21/21  0453   WBC 12.0  --  19.3*  --  13.3*   RBC 2.42*  --  2.61*  --  3.12*   HGB 6.5* 6.3* 7.2*   < > 8.3*   HCT 20.0* 19.5* 22.1*   < > 25.9*     --  225  --  220   GRANS 87*  --  95*  --  88*   LYMPH 5*  --  2*  --  5*   EOS 1  --  0  --  1    < > = values in this interval not displayed. Cultures No results for input(s): CULT in the last 72 hours.   All Micro Results     Procedure Component Value Units Date/Time    CULTURE, BLOOD [[id number]] Collected: 08/17/21 0236    Order Status: Completed Specimen: Blood Updated: 08/23/21 0701     Special Requests: NO SPECIAL REQUESTS        Culture result: NO GROWTH 6 DAYS       CULTURE, BLOOD [[id number]] Collected: 08/17/21 0236    Order Status: Completed Specimen: Blood Updated: 08/23/21 0701     Special Requests: NO SPECIAL REQUESTS        Culture result: NO GROWTH 6 DAYS       COVID-19 RAPID TEST [[id number]] Collected: 08/22/21 1837    Order Status: Completed Specimen: Nasopharyngeal Updated: 08/22/21 1930     Specimen source Nasopharyngeal        COVID-19 rapid test Not detected        Comment: Rapid Abbott ID Now       Rapid NAAT:  The specimen is NEGATIVE for SARS-CoV-2, the novel coronavirus associated with COVID-19. Negative results should be treated as presumptive and, if inconsistent with clinical signs and symptoms or necessary for patient management, should be tested with an alternative molecular assay. Negative results do not preclude SARS-CoV-2 infection and should not be used as the sole basis for patient management decisions. This test has been authorized by the FDA under an Emergency Use Authorization (EUA) for use by authorized laboratories.    Fact sheet for Healthcare Providers: [employer]  Fact sheet for Patients: Luminescentdate.co.nz       Methodology: Isothermal Nucleic Acid Amplification         CULTURE, URINE [[id number]]  (Abnormal)  (Susceptibility) Collected: 08/15/21 1250    Order Status: Completed Specimen: Cath Urine Updated: 08/19/21 0856     Special Requests: NO SPECIAL REQUESTS        [place] Count --        >100,000  COLONIES/mL       Culture result:       ENTEROBACTER CLOACAE COMPLEX            ENTEROBACTER CLOACAE COMPLEX 2ND STRAIN      PSEUDOMONAS AERUGINOSA         ENTEROCOCCUS FAECALIS         *DR GILL REQUESTING ID AND SENSI ON ALL    CULTURE, BLOOD [[id number]]  (Abnormal) Collected: 08/15/21 1450    Order Status: Completed Specimen: Blood Updated: 08/18/21 0645     Special Requests: NO SPECIAL REQUESTS        GRAM STAIN       AEROBIC AND ANAEROBIC BOTTLES GRAM NEGATIVE RODS                  SMEAR CALLED TO AND CORRECTLY REPEATED BY: Omar Nuno RN, ICU 0800 8/16/21 TO AKILA Culture result:       GRAM NEGATIVE RODS GROWING IN THE AEROBIC AND ANAEROBIC BOTTLES NO SITE INDICATED REFER TO [id number] FOR ID AND SENSITIVITIES           CULTURE, BLOOD [[id number]]  (Abnormal)  (Susceptibility) Collected: 08/15/21 1435    Order Status: Completed Specimen: Blood Updated: 08/18/21 0644     Special Requests: NO SPECIAL REQUESTS        GRAM STAIN       AEROBIC AND ANAEROBIC BOTTLES GRAM NEGATIVE RODS                  SMEAR CALLED TO AND CORRECTLY REPEATED BY: Ari Zuñiga RN, ICU 0800 8/16/21 TO AKILA.            Culture result:       ENTEROBACTER CLOACAE COMPLEX GROWING IN BOTH BOTTLES DRAWN NO SITE INDICATED          RESPIRATORY VIRUS PANEL W/COVID-19, PCR [[id number]] Collected: 08/15/21 1608    Order Status: Completed Specimen: Nasopharyngeal Updated: 08/15/21 1802     Adenovirus Not detected        Coronavirus 229E Not detected        Coronavirus HKU1 Not detected        Coronavirus CVNL63 Not detected        Coronavirus OC43 Not detected        SARS-CoV-2, PCR Not detected        Metapneumovirus Not detected        Rhinovirus and Enterovirus Not detected        Influenza A Not detected        Influenza A, subtype H1 Not detected        Influenza A, subtype H3 Not detected        INFLUENZA A H1N1 PCR Not detected        Influenza B Not detected        Parainfluenza 1 Not detected        Parainfluenza 2 Not detected        Parainfluenza 3 Not detected        Parainfluenza virus 4 Not detected        RSV by PCR Not detected        B. parapertussis, PCR Not detected        Bordetella pertussis - PCR Not detected        Chlamydophila pneumoniae DNA, QL, PCR Not detected        Mycoplasma pneumoniae DNA, QL, PCR Not detected               Urinalysis Color   Date Value Ref Range Status   08/15/2021 DARK YELLOW   Final     Appearance   Date Value Ref Range Status   08/15/2021 TURBID   Final     Specific gravity   Date Value Ref Range Status   08/15/2021 1.012 1.005 - 1.030   Final     pH (UA)   Date Value Ref Range Status   08/15/2021 8.5 (H) 5.0 - 8.0   Final     Protein   Date Value Ref Range Status   08/15/2021 300 (A) NEG mg/dL Final     Ketone   Date Value Ref Range Status   08/15/2021 Negative NEG mg/dL Final     Bilirubin   Date Value Ref Range Status   08/15/2021 Negative NEG   Final     Blood   Date Value Ref Range Status   08/15/2021 LARGE (A) NEG   Final     Urobilinogen   Date Value Ref Range Status   08/15/2021 0.2 0.2 - 1.0 EU/dL Final     Nitrites   Date Value Ref Range Status   08/15/2021 Negative NEG   Final     Leukocyte Esterase   Date Value Ref Range Status   08/15/2021 LARGE (A) NEG   Final     Potassium   Date Value Ref Range Status   08/23/2021 3.8 3.5 - 5.5 mmol/L Final     Creatinine   Date Value Ref Range Status   08/23/2021 5.22 (H) 0.6 - 1.3 MG/DL Final     BUN   Date Value Ref Range Status   08/23/2021 48 (H) 7.0 - 18 MG/DL Final      PSA No results for input(s): PSA in the last 72 hours.    Coagulation Lab Results   Component Value Date/Time    Prothrombin time 17.7 (H) 08/15/2021 10:05 AM    Prothrombin time 18.0 (H) 12/02/2015 04:20 PM    INR 1.5 (H) 08/15/2021 10:05 AM    INR 1.5 (H) 12/02/2015 04:20 PM    aPTT 38.2 (H) 08/18/2021 02:39 AM    aPTT 35.4 08/17/2021 11:12 AM           Patient Active Problem List   Diagnosis Code    Hypertension I10    Diabetes ([facility] E11.9    Atrial fibrillation (HCC) I48.91    Cerebrovascular accident ([facility] I63.9    Hypercholesterolemia E78.00    Chronic diastolic heart failure (HCC) I50.32    Morbid obesity (HCC) E66.01    BPH (benign prostatic hyperplasia) N40.0    Bladder outlet obstruction N32.0    Acute renal failure (ARF) (HCC) N17.9    Hyperkalemia E87.5    CKD (chronic kidney disease) N18.9    Renal failure N19    Bradycardia R00.1    Severe protein-calorie malnutrition ([facility] E43

## 2021-08-23 NOTE — ANESTHESIA POSTPROCEDURE EVALUATION
Procedure(s):  ESOPHAGOGASTRODUODENOSCOPY (EGD). MAC    Anesthesia Post Evaluation      Multimodal analgesia: multimodal analgesia used between 6 hours prior to anesthesia start to PACU discharge  Patient location during evaluation: PACU  Patient participation: complete - patient cannot participate  Level of consciousness: awake  Pain management: adequate  Airway patency: patent  Anesthetic complications: no  Cardiovascular status: acceptable  Respiratory status: acceptable  Hydration status: acceptable  Post anesthesia nausea and vomiting:  controlled  Final Post Anesthesia Temperature Assessment:  Normothermia (36.0-37.5 degrees C)      INITIAL Post-op Vital signs:   Vitals Value Taken Time   /53 08/23/21 1702   Temp 36.6 °C (97.8 °F) 08/23/21 1626   Pulse 60 08/23/21 1700   Resp 28 08/23/21 1700   SpO2 100 % 08/23/21 1703   Vitals shown include unvalidated device data.

## 2021-08-23 NOTE — ANESTHESIA PREPROCEDURE EVALUATION
Relevant Problems   NEUROLOGY   (+) Cerebrovascular accident ([facility] [address]      CARDIOVASCULAR   (+) Atrial fibrillation (HCC)   (+) Hypertension      RENAL FAILURE   (+) Acute renal failure (ARF) (HCC)   (+) CKD (chronic kidney disease)   (+) Renal failure      ENDOCRINE   (+) Diabetes (HCC)   (+) Morbid obesity (HCC)       Anesthetic History   No history of anesthetic complications            Review of Systems / Medical History  Patient summary reviewed and pertinent labs reviewed    Pulmonary  Within defined limits                 Neuro/Psych       CVA       Cardiovascular    Hypertension: well controlled        Dysrhythmias : atrial fibrillation           GI/Hepatic/Renal  Within defined limits              Endo/Other    Diabetes: type 2    Morbid obesity     Other Findings              Physical Exam    Airway  Mallampati: II  TM Distance: 4 - 6 cm  Neck ROM: normal range of motion   Mouth opening: Normal     Cardiovascular    Rhythm: irregular           Dental    Dentition: Poor dentition     Pulmonary  Breath sounds clear to auscultation               Abdominal  GI exam deferred       Other Findings            Anesthetic Plan    ASA: 3  Anesthesia type: MAC          Induction: Intravenous  Anesthetic plan and risks discussed with: Healthcare power of  and Son / Daughter

## 2021-08-23 NOTE — ROUTINE PROCESS
TRANSFER - IN REPORT:    Verbal report received from Ilsa Mcgill RN  on Morgan Lorraine  being received from [employer]  for ordered procedure      Report consisted of patients Situation, Background, Assessment and   Recommendations(SBAR). Information from the following report(s) SBAR was reviewed with the receiving nurse. Opportunity for questions and clarification was provided. Assessment completed upon patients arrival to unit and care assumed.

## 2021-08-23 NOTE — PROGRESS NOTES
Speech Pathology:     Pt currently NPO for possible EGD this date. Will follow up at a later date/time or as medical condition permits.      Thank you for this referral.    Feliciano Ramos M.S. CCC-SLP/L  Speech-Language Pathologist

## 2021-08-24 LAB
ABO + RH BLD: NORMAL
BASOPHILS # BLD: 0.1 K/UL (ref 0–0.1)
BASOPHILS NFR BLD: 1 % (ref 0–2)
BLD PROD TYP BPU: NORMAL
BLD PROD TYP BPU: NORMAL
BLOOD BANK CMNT PATIENT-IMP: NORMAL
BLOOD GROUP ANTIBODIES SERPL: NORMAL
BPU ID: NORMAL
BPU ID: NORMAL
CALLED TO:,BCALL2: NORMAL
CROSSMATCH RESULT,%XM: NORMAL
CROSSMATCH RESULT,%XM: NORMAL
DIFFERENTIAL METHOD BLD: ABNORMAL
EOSINOPHIL # BLD: 0.2 K/UL (ref 0–0.4)
EOSINOPHIL NFR BLD: 2 % (ref 0–5)
ERYTHROCYTE [DISTWIDTH] IN BLOOD BY AUTOMATED COUNT: 16.7 % (ref 11.6–14.5)
GLUCOSE BLD STRIP.AUTO-MCNC: 112 MG/DL (ref 70–110)
GLUCOSE BLD STRIP.AUTO-MCNC: 84 MG/DL (ref 70–110)
GLUCOSE BLD STRIP.AUTO-MCNC: 85 MG/DL (ref 70–110)
GLUCOSE BLD STRIP.AUTO-MCNC: 86 MG/DL (ref 70–110)
GLUCOSE BLD STRIP.AUTO-MCNC: 98 MG/DL (ref 70–110)
HBV CORE IGM SER QL: NEGATIVE
HCT VFR BLD AUTO: 21.8 % (ref 36–48)
HCT VFR BLD AUTO: 22.1 % (ref 36–48)
HCT VFR BLD AUTO: 22.7 % (ref 36–48)
HCT VFR BLD AUTO: 23 % (ref 36–48)
HCT VFR BLD AUTO: 23.2 % (ref 36–48)
HGB BLD-MCNC: 7 G/DL (ref 13–16)
HGB BLD-MCNC: 7.1 G/DL (ref 13–16)
HGB BLD-MCNC: 7.2 G/DL (ref 13–16)
HGB BLD-MCNC: 7.3 G/DL (ref 13–16)
HGB BLD-MCNC: 7.3 G/DL (ref 13–16)
LYMPHOCYTES # BLD: 0.7 K/UL (ref 0.9–3.6)
LYMPHOCYTES NFR BLD: 6 % (ref 21–52)
MCH RBC QN AUTO: 27.7 PG (ref 24–34)
MCHC RBC AUTO-ENTMCNC: 32.1 G/DL (ref 31–37)
MCV RBC AUTO: 86.2 FL (ref 78–100)
MONOCYTES # BLD: 1 K/UL (ref 0.05–1.2)
MONOCYTES NFR BLD: 9 % (ref 3–10)
NEUTS SEG # BLD: 8.8 K/UL (ref 1.8–8)
NEUTS SEG NFR BLD: 82 % (ref 40–73)
PLATELET # BLD AUTO: 206 K/UL (ref 135–420)
PMV BLD AUTO: 10.1 FL (ref 9.2–11.8)
RBC # BLD AUTO: 2.53 M/UL (ref 4.35–5.65)
SPECIMEN EXP DATE BLD: NORMAL
STATUS OF UNIT,%ST: NORMAL
STATUS OF UNIT,%ST: NORMAL
UNIT DIVISION, %UDIV: 0
UNIT DIVISION, %UDIV: 0
WBC # BLD AUTO: 10.8 K/UL (ref 4.6–13.2)

## 2021-08-24 PROCEDURE — 86705 HEP B CORE ANTIBODY IGM: CPT

## 2021-08-24 PROCEDURE — C9113 INJ PANTOPRAZOLE SODIUM, VIA: HCPCS | Performed by: HOSPITALIST

## 2021-08-24 PROCEDURE — 85014 HEMATOCRIT: CPT

## 2021-08-24 PROCEDURE — 99232 SBSQ HOSP IP/OBS MODERATE 35: CPT | Performed by: HOSPITALIST

## 2021-08-24 PROCEDURE — 74011250636 HC RX REV CODE- 250/636: Performed by: INTERNAL MEDICINE

## 2021-08-24 PROCEDURE — 85025 COMPLETE CBC W/AUTO DIFF WBC: CPT

## 2021-08-24 PROCEDURE — 74011000250 HC RX REV CODE- 250: Performed by: HOSPITALIST

## 2021-08-24 PROCEDURE — 65660000000 HC RM CCU STEPDOWN

## 2021-08-24 PROCEDURE — 82962 GLUCOSE BLOOD TEST: CPT

## 2021-08-24 PROCEDURE — 36415 COLL VENOUS BLD VENIPUNCTURE: CPT

## 2021-08-24 PROCEDURE — 90935 HEMODIALYSIS ONE EVALUATION: CPT

## 2021-08-24 PROCEDURE — 86704 HEP B CORE ANTIBODY TOTAL: CPT

## 2021-08-24 PROCEDURE — 74011250637 HC RX REV CODE- 250/637: Performed by: NURSE PRACTITIONER

## 2021-08-24 PROCEDURE — 74011250637 HC RX REV CODE- 250/637: Performed by: HOSPITALIST

## 2021-08-24 PROCEDURE — 74011250637 HC RX REV CODE- 250/637: Performed by: INTERNAL MEDICINE

## 2021-08-24 PROCEDURE — 74011250636 HC RX REV CODE- 250/636: Performed by: HOSPITALIST

## 2021-08-24 RX ORDER — HEPARIN SODIUM 1000 [USP'U]/ML
5000 INJECTION, SOLUTION INTRAVENOUS; SUBCUTANEOUS
Status: DISCONTINUED | OUTPATIENT
Start: 2021-08-24 | End: 2021-08-27 | Stop reason: HOSPADM

## 2021-08-24 RX ORDER — ALBUMIN HUMAN 250 G/1000ML
25 SOLUTION INTRAVENOUS
Status: DISCONTINUED | OUTPATIENT
Start: 2021-08-24 | End: 2021-08-27 | Stop reason: HOSPADM

## 2021-08-24 RX ADMIN — TAMSULOSIN HYDROCHLORIDE 0.4 MG: 0.4 CAPSULE ORAL at 09:17

## 2021-08-24 RX ADMIN — THERA TABS 1 TABLET: TAB at 09:17

## 2021-08-24 RX ADMIN — HEPARIN SODIUM 5000 UNITS: 1000 INJECTION INTRAVENOUS; SUBCUTANEOUS at 11:11

## 2021-08-24 RX ADMIN — ACETAMINOPHEN 650 MG: 325 TABLET ORAL at 20:07

## 2021-08-24 RX ADMIN — SODIUM CHLORIDE 10 ML: 9 INJECTION, SOLUTION INTRAMUSCULAR; INTRAVENOUS; SUBCUTANEOUS at 17:13

## 2021-08-24 RX ADMIN — DOCUSATE SODIUM 100 MG: 100 CAPSULE, LIQUID FILLED ORAL at 09:17

## 2021-08-24 RX ADMIN — SODIUM CHLORIDE 10 ML: 9 INJECTION, SOLUTION INTRAMUSCULAR; INTRAVENOUS; SUBCUTANEOUS at 20:07

## 2021-08-24 RX ADMIN — SODIUM CHLORIDE 40 MG: 9 INJECTION, SOLUTION INTRAMUSCULAR; INTRAVENOUS; SUBCUTANEOUS at 09:17

## 2021-08-24 RX ADMIN — SODIUM CHLORIDE 40 MG: 9 INJECTION, SOLUTION INTRAMUSCULAR; INTRAVENOUS; SUBCUTANEOUS at 20:07

## 2021-08-24 NOTE — PROGRESS NOTES
OT attempted 2x. Pt TIKI for dialysis this am.    2nd attempt, pt adamantly refusing despite max encouragement/education. Will continue to follow.

## 2021-08-24 NOTE — PROGRESS NOTES
Chart reviewed. Recommendations for rehab/SNF placement. Wife wanting rehab/SNF. CM sent referrals to local SNFs in [id number]. 1618- [facility] submitted for processing.     Kin Grace RN BSN  Care Manager  [phone]

## 2021-08-24 NOTE — PROGRESS NOTES
0708: Bedside shift change report given to Oh Kumar RN (oncoming nurse) by Chance Mesa RN (offgoing nurse). Report included the following information SBAR, Kardex, Intake/Output, MAR and Cardiac Rhythm AFIB.    0945: Pt off floor for dialysis via bed and oxygen. Nephrologist at bedside before pt left. 1230: Dialysis nurse, Migue Carias informed this nurse that pt requested dialysis stopped half way during treatment. Nurse Migue Carias stated they will attempt dialysis again tomorrow. Bedside shift change report given to Macy Rivero RN (oncoming nurse) by Oh Kumar RN (offgoing nurse). Report included the following information SBAR, Kardex, Procedure Summary, Intake/Output, MAR, Recent Results and Cardiac Rhythm AFIB.

## 2021-08-24 NOTE — PROGRESS NOTES
Infectious Disease progress Note        Reason: Gram-negative bloodstream infection    Current abx Prior abx   Ceftriaxone  8/15/2021-8/18  Levofloxacin since 8/19      Lines:       Assessment :    70 y.o. male pmhx of afib, bradycardia, CKD stage 4, bladder neck obstruction, chronic indwelling vines [facility] with generalized weakness. Now with gram-negative bacteremia, significant pyuria, multiple organisms in urine culture    Clinical presentation consistent with enterobacter bloodstream infection-positive blood culture 8/15, negative blood culture 8/17,  cystitis, RLL aspiration pneumonia    Exact source of gram-negative bloodstream infection not entirely clear at this time. Recent history of hydronephrosis/bladder outlet obstruction/indwelling Vines concerning for cystitis as a source of bloodstream infection. Multiple organisms seen in urine culture 8/15 enterococcus, pseudomonas, Enterobacter-susceptibilities reviewed    Ct findings concerning for RLL aspiration pneumonia-no shortness of breath, increased cough noted on today's exam    Recent leukocytosis likely due to GI bleed, esophagitis status post EGD 8/24/2021    Recommendations:    1. Continue levofloxacin till 8/28  2. F/u GI recommnedations regarding GI bleed  3. F/u urology recommendations regarding vines  4. Discharge planning per primary team      Above plan was discussed with patient, Dr. Sumi Solares  HPI:    .  Feels better. Denies chest pain, shortness of breath, abdominal pain. Current Discharge Medication List      CONTINUE these medications which have NOT CHANGED    Details   finasteride (PROSCAR) 5 mg tablet TAKE 1 TABLET BY MOUTH DAILY      losartan (COZAAR) 50 mg tablet Take 50 mg by mouth daily. metOLazone (ZAROXOLYN) 2.5 mg tablet Take 2.5 mg by mouth.      pravastatin (PRAVACHOL) 20 mg tablet TAKE 1 TABLET BY MOUTH DAILY      prazosin (MINIPRESS) 5 mg capsule Take 5 mg by mouth At bedtime. simvastatin (ZOCOR) 40 mg tablet Take 40 mg by mouth At bedtime. vitamin E, dl,tocopheryl acet, (vitamin E acetate) 45 mg (100 unit) capsule Take 100 Units by mouth daily. !! hydrALAZINE (APRESOLINE) 100 mg tablet TAKE 1 TABLET BY MOUTH THREE TIMES DAILY  Qty: 270 Tab, Refills: 6    Comments: **Patient requests 90 days supply**      !! hydrALAZINE (APRESOLINE) 100 mg tablet TAKE 1 TABLET BY MOUTH THREE TIMES DAILY  Qty: 270 Tab, Refills: 6    Comments: **Patient requests 90 days supply**      ferrous sulfate 325 mg (65 mg iron) tablet Take  by mouth Daily (before breakfast). furosemide (LASIX) 40 mg tablet Take 20 mg by mouth two (2) times a day. docusate sodium (COLACE) 100 mg capsule Take 100 mg by mouth daily as needed for Constipation. carvedilol (COREG) 12.5 mg tablet Take 1 Tab by mouth two (2) times daily (with meals). Qty: 60 Tab, Refills: 0      cholecalciferol (VITAMIN D3) 1,000 unit tablet Take 1 Tab by mouth daily. Indications: VITAMIN D DEFICIENCY  Qty: 30 Tab, Refills: 0      polyethylene glycol (MIRALAX) 17 gram packet Take 1 Packet by mouth daily. Qty: 30 Packet, Refills: 0      insulin lispro (HUMALOG) 100 unit/mL injection SubCUTAneous route Before breakfast, lunch, dinner and at bedtime. For Blood Sugar (mg/dL) of:     Less than 150 =   0 units           150 -199 =   2 units  200 -249 =   4 units  250 -299 =   6 units  300 -349 =   8 units  350 and above =  10 units  Qty: 1 Vial, Refills: 0      Blood-Glucose Meter monitoring kit As directed  Qty: 1 Kit, Refills: 0      Insulin Syringe-Needle U-100 (INSULIN SYRINGE) 1 mL 28 x 1/2\" syrg As directed  Qty: 30 Syringe, Refills: 0      Lancets (ONETOUCH ULTRASOFT LANCETS) misc As directed  Qty: 1 Each, Refills: 11      aspirin (ASPIRIN) 325 mg tablet Take 325 mg by mouth daily. multivitamin (ONE A DAY) tablet Take 1 Tab by mouth daily. atorvastatin (LIPITOR) 10 mg tablet Take 10 mg by mouth daily. tamsulosin (FLOMAX) 0.4 mg capsule Take 0.4 mg by mouth daily. amLODIPine (NORVASC) 10 mg tablet Take 10 mg by mouth daily. !! - Potential duplicate medications found. Please discuss with provider.           Current Facility-Administered Medications   Medication Dose Route Frequency    heparin (porcine) 1,000 unit/mL injection 5,000 Units  5,000 Units IntraCATHeter DIALYSIS PRN    albumin human 25% (BUMINATE) solution 25 g  25 g IntraVENous DIALYSIS PRN    0.9% sodium chloride infusion 250 mL  250 mL IntraVENous PRN    epoetin negrita-epbx (RETACRIT) injection 8,000 Units  8,000 Units SubCUTAneous DIALYSIS TUE, THU & SAT    pantoprazole (PROTONIX) 40 mg in 0.9% sodium chloride 10 mL injection  40 mg IntraVENous Q12H    0.9% sodium chloride infusion 250 mL  250 mL IntraVENous PRN    insulin lispro (HUMALOG) injection   SubCUTAneous Q6H    haloperidol lactate (HALDOL) injection 2 mg  2 mg IntraVENous Q6H PRN    tamsulosin (FLOMAX) capsule 0.4 mg  0.4 mg Oral DAILY    levoFLOXacin (LEVAQUIN) 500 mg in D5W IVPB  500 mg IntraVENous Q48H    therapeutic multivitamin (THERAGRAN) tablet 1 Tablet  1 Tablet Oral DAILY    alteplase (CATHFLO) 4 mg in sterile water (preservative free) 4 mL injection  4 mg InterCATHeter DIALYSIS ONCE    atropine 1 mg/mL injection 0.5 mg  0.5 mg IntraVENous PRN    sodium chloride (NS) flush 5-40 mL  5-40 mL IntraVENous Q8H    sodium chloride (NS) flush 5-40 mL  5-40 mL IntraVENous PRN    acetaminophen (TYLENOL) tablet 650 mg  650 mg Oral Q6H PRN    Or    acetaminophen (TYLENOL) suppository 650 mg  650 mg Rectal Q6H PRN    senna (SENOKOT) tablet 8.6 mg  1 Tablet Oral DAILY PRN    docusate sodium (COLACE) capsule 100 mg  100 mg Oral DAILY    glucose chewable tablet 16 g  4 Tablet Oral PRN    glucagon (GLUCAGEN) injection 1 mg  1 mg IntraMUSCular PRN    dextrose (D50W) injection syrg 12.5-25 g  25-50 mL IntraVENous PRN       Allergies: Patient has no known allergies. Temp (24hrs), Av.9 °F (36.6 °C), Min:97.4 °F (36.3 °C), Max:98.4 °F (36.9 °C)    Visit Vitals  /64   Pulse (!) 45   Temp 97.9 °F (36.6 °C) (Oral)   Resp 18   Ht 6' 1\" (1.854 m)   Wt 106 kg (233 lb 9.6 oz)   SpO2 100%   BMI 30.82 kg/m²       ROS: 12 point ROS obtained in details. Pertinent positives as mentioned in HPI,   otherwise negative    Physical Exam:    General: Well developed, well nourished male sitting on the  bed AAOx3 in no acute distress.     General:   awake alert    HEENT:  Normocephalic, atraumatic,  no scleral icterus or pallor; no conjunctival hemmohage;  nasal and oral mucous are moist and without evidence of lesions. Neck supple, no bruits. Lymph Nodes:   no cervical, axillary or inguinal adenopathy   Lungs:   non-labored, bilateral chest movements equal   Heart:  RRR, s1 and s2   Abdomen:  soft, non-distended, active bowel sounds, no hepatomegaly, no splenomegaly. Minimal suprapubic tenderness   Genitourinary:  vines in place with bloody urine   Extremities:   no clubbing, cyanosis; no joint effusions or swelling; Full ROM of all large joints to the upper and lower extremities; muscle mass appropriate for age   Neurologic:  No gross focal sensory abnormalities; 5/5 muscle strength to upper and lower extremities. Speech appropriate.  Cranial nerves intact                        Skin:  No rash or ulcers noted   Back:  not examined      Psychiatric:  No suicidal or homicidal ideations, appropriate mood and affect             Labs: Results:   Chemistry Recent Labs     21  0044 21  0241   GLU 83 112*    135*   K 3.8 4.2    102   CO2 25 25   BUN 48* 57*   CREA 5.22* 6.30*   CA 8.9 8.3*   AGAP 8 8   BUCR 9* 9*      CBC w/Diff Recent Labs     21  0429 21  2238 21  1745 21  0044 21  0044 21  1207 21  0241   WBC 10.8  --   --   --  12.0  --  19.3*   RBC 2.53*  --   --   --  2.42*  --  2.61*   HGB 7.0* 7.0* 7.4*   < > 6.5*   < > 7.2*   HCT 21.8* 21.8* 22.6*   < > 20.0*   < > 22.1*     --   --   --  178  --  225   GRANS 82*  --   --   --  87*  --  95*   LYMPH 6*  --   --   --  5*  --  2*   EOS 2  --   --   --  1  --  0    < > = values in this interval not displayed. Microbiology No results for input(s): CULT in the last 72 hours. RADIOLOGY:    All available imaging studies/reports in [facility] care for this admission were reviewed      Disclaimer: Sections of this note are dictated utilizing voice recognition software, which may have resulted in some phonetic based errors in grammar and contents. Even though attempts were made to correct all the mistakes, some may have been missed, and remained in the body of the document. If questions arise, please contact our department.     Dr. Viv Washburn, Infectious Disease Specialist  [phone]  August 24, 2021  4:49 PM

## 2021-08-24 NOTE — PROGRESS NOTES
Hospitalist Progress Note    Patient: Azael Long MRN: [id number]  CSN: [id number]    YOB: 1950  Age: 70 y.o. Sex: male    DOA: 8/15/2021 LOS:  LOS: 9 days            Patient seen in HD. Patient feels tired and did not wanted to continue HD. Patient did almost hour and half of hemodialysis and he has been discontinued at patient request now. Patient denies any shortness of breath, no chest pain, he states he just feels tired. No melena or hematochezia. Assessment/Plan     1. Acute GI bleed with blood loss anemia: EGD on 8/22/2021 shows esophagitis, antral and duodenal ulcer. continue IV PPI, will continue clear liquids, advance as recommended by GI. Will monitor H&H and transfuse as needed. 2. ESRD on HD, hemodialysis per nephrology. 3. Enterobacter bacteremia possibly due to UTI: Continue antibiotics per ID, repeat cultures negative. 4. Leukocytosis: Improved now. 5. Aspiration pneumonia: Continue antibiotics per ID  6. TONY on CKD 4 with obstructive uropathy, continue dialysis per nephrology. Permacath placement by vascular surgery 8/20/21.  7. Acute metabolic encephalopathy due to underlying infection, improved    8. A. fib with slow RVR. Possible sick sinus syndrome. Rates controlled. Continue full dose aspirin. Cardiology input noted, recommend to hold off AV blocking agents. 9. Severe protein calorie malnutrition: Continue nutritionist follows. On supplement. Multivitamin. 10. Diabetes type 2 with hypoglycemia. Sliding scale insulin. Hypoglycemia protocol  11. Complicated urinary tract infection with obstructive uropathy. ID to follow. Urology input noted. Urology recommend to continue Elizabeth, no voiding trials. 12. Hypertension: BP stable, continue current medications  13. Moderate pulmonary hypertension  14. Hyperkalemia: resolved  Falls at home. PT OT. He has not been on anticoagulation in the outpatient due to falls.   Full code  PT OT recommend SNF placement    Discussed with the patient and also with the son Kelsi Betancourt over the phone and explained about my above plan care. Both understood and agreed with my plan. son Georgia [phone]       Case discussed with:  [x]Patient  [x]Family  [x]Nursing  []Case Management  DVT Prophylaxis:  []Lovenox  [x]Hep SQ  []SCDs  []Coumadin   []On Heparin gtt    Vital signs/Intake and Output:  Visit Vitals  /73   Pulse (!) 59   Temp 97.5 °F (36.4 °C)   Resp 20   Ht 6' 1\" (1.854 m)   Wt 106 kg (233 lb 9.6 oz)   SpO2 99%   BMI 30.82 kg/m²       Exam  General:  Alert, cooperative, no acute distress, no tachypnea    Pulmonary: Bilaterally min basal rales. No Wheezing/Rales. Cardiovascular: Regular rate and Rhythm. GI:  Soft, Non distended, Non tender. + Bowel sounds. Extremities:  No edema. No calf tenderness. Neurologic: Alert and oriented X 2-3. No acute neuro deficits. Medications Reviewed      Labs: Results:       Chemistry Recent Labs     08/23/21  0044 08/22/21  0241   GLU 83 112*    135*   K 3.8 4.2    102   CO2 25 25   BUN 48* 57*   CREA 5.22* 6.30*   CA 8.9 8.3*   AGAP 8 8   BUCR 9* 9*      CBC w/Diff Recent Labs     08/24/21  0942 08/24/21  0429 08/23/21  2238 08/23/21  1745 08/23/21  0044 08/22/21  1207 08/22/21  0241   WBC  --  10.8  --   --  12.0  --  19.3*   RBC  --  2.53*  --   --  2.42*  --  2.61*   HGB 7.3* 7.0* 7.0*   < > 6.5*   < > 7.2*   HCT 23.2* 21.8* 21.8*   < > 20.0*   < > 22.1*   PLT  --  206  --   --  178  --  225   GRANS  --  82*  --   --  87*  --  95*   LYMPH  --  6*  --   --  5*  --  2*   EOS  --  2  --   --  1  --  0    < > = values in this interval not displayed. Cardiac Enzymes No results for input(s): CPK, CKND1, MAXIMILIAN in the last 72 hours. No lab exists for component: CKRMB, TROIP   Coagulation No results for input(s): PTP, INR, APTT, INREXT, INREXT in the last 72 hours.     Lipid Panel Lab Results   Component Value Date/Time    Cholesterol, total 208 (H) 03/22/2010 12:00 PM    HDL Cholesterol 55 03/22/2010 12:00 PM    LDL, calculated 138.4 (H) 03/22/2010 12:00 PM    VLDL, calculated 14.6 03/22/2010 12:00 PM    Triglyceride 73 03/22/2010 12:00 PM    CHOL/HDL Ratio 3.8 03/22/2010 12:00 PM      BNP No results for input(s): BNPP in the last 72 hours. Liver Enzymes No results for input(s): TP, ALB, TBIL, AP in the last 72 hours.     No lab exists for component: SGOT, GPT, DBIL   Thyroid Studies Lab Results   Component Value Date/Time    TSH 2.42 08/15/2021 11:00 AM        Procedures/imaging: see electronic medical records for all procedures/Xrays and details which were not copied into this note but were reviewed prior to creation of Plan

## 2021-08-24 NOTE — PROGRESS NOTES
Problem: Falls - Risk of  Goal: *Absence of Falls  Description: Document [place] Fall Risk and appropriate interventions in the flowsheet.   Outcome: Progressing Towards Goal  Note: Fall Risk Interventions:  Mobility Interventions: Bed/chair exit alarm    Mentation Interventions: Adequate sleep, hydration, pain control, Bed/chair exit alarm    Medication Interventions: Bed/chair exit alarm    Elimination Interventions: Bed/chair exit alarm, Call light in reach    History of Falls Interventions: Bed/chair exit alarm         Problem: Patient Education: Go to Patient Education Activity  Goal: Patient/Family Education  Outcome: Progressing Towards Goal     Problem: Cardiac Output -  Decreased  Goal: *Vital signs within specified parameters  Outcome: Progressing Towards Goal  Goal: *Optimal cardiac output  Outcome: Progressing Towards Goal  Goal: *Absence of hypoxia  Outcome: Progressing Towards Goal  Goal: *Absence of peripheral edema  Outcome: Progressing Towards Goal  Goal: *Intravascular fluid volume and electrolyte balance  Outcome: Progressing Towards Goal     Problem: Patient Education: Go to Patient Education Activity  Goal: Patient/Family Education  Outcome: Progressing Towards Goal     Problem: Injury - Risk of, Adverse Drug Event  Goal: *Absence of adverse drug events  Outcome: Progressing Towards Goal  Goal: *Absence of medication errors  Outcome: Progressing Towards Goal  Goal: *Knowledge of prescribed medications  Outcome: Progressing Towards Goal     Problem: Patient Education: Go to Patient Education Activity  Goal: Patient/Family Education  Outcome: Progressing Towards Goal     Problem: Acute Renal Failure: Day 2  Goal: Activity/Safety  Outcome: Progressing Towards Goal  Goal: Consults, if ordered  Outcome: Progressing Towards Goal  Goal: Diagnostic Test/Procedures  Outcome: Progressing Towards Goal  Goal: Nutrition/Diet  Outcome: Progressing Towards Goal  Goal: Discharge Planning  Outcome: Progressing Towards Goal  Goal: Medications  Outcome: Progressing Towards Goal  Goal: Respiratory  Outcome: Progressing Towards Goal  Goal: Treatments/Interventions/Procedures  Outcome: Progressing Towards Goal  Goal: Psychosocial  Outcome: Progressing Towards Goal  Goal: *Optimal pain control at patient's stated goal  Outcome: Progressing Towards Goal  Goal: *Urinary output within identified parameters  Outcome: Progressing Towards Goal  Goal: *Hemodynamically stable  Outcome: Progressing Towards Goal  Goal: *Tolerating diet  Outcome: Progressing Towards Goal  Goal: *Lab values improving  Outcome: Progressing Towards Goal     Problem: Patient Education: Go to Patient Education Activity  Goal: Patient/Family Education  Outcome: Progressing Towards Goal     Problem: Acute Renal Failure: Day 3  Goal: Off Pathway (Use only if patient is Off Pathway)  Outcome: Progressing Towards Goal  Goal: Activity/Safety  Outcome: Progressing Towards Goal  Goal: Consults, if ordered  Outcome: Progressing Towards Goal  Goal: Diagnostic Test/Procedures  Outcome: Progressing Towards Goal  Goal: Nutrition/Diet  Outcome: Progressing Towards Goal  Goal: Discharge Planning  Outcome: Progressing Towards Goal  Goal: Medications  Outcome: Progressing Towards Goal  Goal: Respiratory  Outcome: Progressing Towards Goal  Goal: Treatments/Interventions/Procedures  Outcome: Progressing Towards Goal  Goal: Psychosocial  Outcome: Progressing Towards Goal  Goal: *Optimal pain control at patient's stated goal  Outcome: Progressing Towards Goal  Goal: *Urinary output within identified parameters  Outcome: Progressing Towards Goal  Goal: *Hemodynamically stable  Outcome: Progressing Towards Goal  Goal: *Tolerating diet  Outcome: Progressing Towards Goal  Goal: *Lab values improving  Outcome: Progressing Towards Goal     Problem: Acute Renal Failure: Day 4  Goal: Off Pathway (Use only if patient is Off Pathway)  Outcome: Progressing Towards Goal  Goal: Activity/Safety  Outcome: Progressing Towards Goal  Goal: Consults, if ordered  Outcome: Progressing Towards Goal  Goal: Diagnostic Test/Procedures  Outcome: Progressing Towards Goal  Goal: Nutrition/Diet  Outcome: Progressing Towards Goal  Goal: Discharge Planning  Outcome: Progressing Towards Goal  Goal: Medications  Outcome: Progressing Towards Goal  Goal: Respiratory  Outcome: Progressing Towards Goal  Goal: Treatments/Interventions/Procedures  Outcome: Progressing Towards Goal  Goal: Psychosocial  Outcome: Progressing Towards Goal  Goal: *Optimal pain control at patient's stated goal  Outcome: Progressing Towards Goal  Goal: *Urinary output within identified parameters  Outcome: Progressing Towards Goal  Goal: *Hemodynamically stable  Outcome: Progressing Towards Goal  Goal: *Tolerating diet  Outcome: Progressing Towards Goal  Goal: *Lab values improving  Outcome: Progressing Towards Goal     Problem: Acute Renal Failure: Day 5  Goal: Off Pathway (Use only if patient is Off Pathway)  Outcome: Progressing Towards Goal  Goal: Activity/Safety  Outcome: Progressing Towards Goal  Goal: Diagnostic Test/Procedures  Outcome: Progressing Towards Goal  Goal: Nutrition/Diet  Outcome: Progressing Towards Goal  Goal: Discharge Planning  Outcome: Progressing Towards Goal  Goal: Medications  Outcome: Progressing Towards Goal  Goal: Respiratory  Outcome: Progressing Towards Goal  Goal: Treatments/Interventions/Procedures  Outcome: Progressing Towards Goal  Goal: Psychosocial  Outcome: Progressing Towards Goal  Goal: *Optimal pain control at patient's stated goal  Outcome: Progressing Towards Goal  Goal: *Urinary output within identified parameters  Outcome: Progressing Towards Goal  Goal: *Hemodynamically stable  Outcome: Progressing Towards Goal  Goal: *Tolerating diet  Outcome: Progressing Towards Goal  Goal: *Lab values improving  Outcome: Progressing Towards Goal     Problem: Acute Renal Failure: Day 6  Goal: Off Pathway (Use only if patient is Off Pathway)  Outcome: Progressing Towards Goal  Goal: Activity/Safety  Outcome: Progressing Towards Goal  Goal: Diagnostic Test/Procedures  Outcome: Progressing Towards Goal  Goal: Nutrition/Diet  Outcome: Progressing Towards Goal  Goal: Discharge Planning  Outcome: Progressing Towards Goal  Goal: Medications  Outcome: Progressing Towards Goal  Goal: Respiratory  Outcome: Progressing Towards Goal  Goal: Treatments/Interventions/Procedures  Outcome: Progressing Towards Goal  Goal: Psychosocial  Outcome: Progressing Towards Goal     Problem: Acute Renal Failure: Discharge Outcomes  Goal: *Optimal pain control at patient's stated goal  Outcome: Progressing Towards Goal  Goal: *Urinary output within identified parameters  Outcome: Progressing Towards Goal  Goal: *Hemodynamically stable  Outcome: Progressing Towards Goal  Goal: *Tolerating diet  Outcome: Progressing Towards Goal  Goal: *Lab values stabilized  Outcome: Progressing Towards Goal  Goal: *Verbalizes understanding and describes medication purposes and frequencies  Outcome: Progressing Towards Goal  Goal: *Medication reconciliation  Outcome: Progressing Towards Goal

## 2021-08-24 NOTE — PROGRESS NOTES
Speech Pathology:     Attempted follow up treatment; however, pt currently off unit for dialysis. Will follow up next business date or as medical condition permits.      Thank you for this referral.    Jay Fleming M.S. CCC-SLP/L  Speech-Language Pathologist

## 2021-08-24 NOTE — PROGRESS NOTES
RENAL DAILY PROGRESS NOTE              Subjective:       Complaint:breathing is much better  No chest pain or abdominal pain    IMPRESSION:   TONY on CKD stage 4, due to obstructive uropathy(urinary retention) s/p vines with close to 2 l of UO immediately. 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 On levaquin  GI bleed due to duodenal ulcers. S/p EGD 8/23  S/p left arm AVF(occluded) in past  S/p tunneled access placement 8/20/21  Hypertension  Hx neurogenic bladder  ACD  Secondary hyperparathyroidism  PAF   PLAN:   HD today  Goal UF of 3 l  Working on getting him to [facility] HD unit as OP.            Current Facility-Administered Medications   Medication Dose Route Frequency    heparin (porcine) 1,000 unit/mL injection 5,000 Units  5,000 Units IntraCATHeter DIALYSIS PRN    0.9% sodium chloride infusion 250 mL  250 mL IntraVENous PRN    epoetin negrita-epbx (RETACRIT) injection 8,000 Units  8,000 Units SubCUTAneous DIALYSIS TUE, THU & SAT    pantoprazole (PROTONIX) 40 mg in 0.9% sodium chloride 10 mL injection  40 mg IntraVENous Q12H    0.9% sodium chloride infusion 250 mL  250 mL IntraVENous PRN    insulin lispro (HUMALOG) injection   SubCUTAneous Q6H    haloperidol lactate (HALDOL) injection 2 mg  2 mg IntraVENous Q6H PRN    tamsulosin (FLOMAX) capsule 0.4 mg  0.4 mg Oral DAILY    levoFLOXacin (LEVAQUIN) 500 mg in D5W IVPB  500 mg IntraVENous Q48H    therapeutic multivitamin (THERAGRAN) tablet 1 Tablet  1 Tablet Oral DAILY    alteplase (CATHFLO) 4 mg in sterile water (preservative free) 4 mL injection  4 mg InterCATHeter DIALYSIS ONCE    atropine 1 mg/mL injection 0.5 mg  0.5 mg IntraVENous PRN    sodium chloride (NS) flush 5-40 mL  5-40 mL IntraVENous Q8H    sodium chloride (NS) flush 5-40 mL  5-40 mL IntraVENous PRN    acetaminophen (TYLENOL) tablet 650 mg  650 mg Oral Q6H PRN    Or    acetaminophen (TYLENOL) suppository 650 mg  650 mg Rectal Q6H PRN    senna (SENOKOT) tablet 8.6 mg  1 Tablet Oral DAILY PRN    docusate sodium (COLACE) capsule 100 mg  100 mg Oral DAILY    glucose chewable tablet 16 g  4 Tablet Oral PRN    glucagon (GLUCAGEN) injection 1 mg  1 mg IntraMUSCular PRN    dextrose (D50W) injection syrg 12.5-25 g  25-50 mL IntraVENous PRN       Review of Symptoms: comprehensive ROS negative except above. Objective:     Patient Vitals for the past 24 hrs:   Temp Pulse Resp BP SpO2   08/24/21 0759 97.5 °F (36.4 °C) (!) 56 20 137/80 100 %   08/24/21 0341 98.1 °F (36.7 °C) (!) 57 20 136/73 100 %   08/23/21 2329 97.6 °F (36.4 °C) (!) 54 22 (!) 156/77 99 %   08/23/21 2005 97.4 °F (36.3 °C) (!) 57 21 (!) 141/76 100 %   08/23/21 1703     100 %   08/23/21 1702 98 °F (36.7 °C) (!) 52 22 (!) 120/53 100 %   08/23/21 1658  63 29  100 %   08/23/21 1655  64 26  100 %   08/23/21 1652  62 28 (!) 120/45 100 %   08/23/21 1642  61 19 (!) 111/50 100 %   08/23/21 1632  60 14 (!) 112/37 100 %   08/23/21 1626 97.8 °F (36.6 °C) 70 10 (!) 95/59 100 %   08/23/21 1622 97.8 °F (36.6 °C) 70 17 (!) 95/59 100 %   08/23/21 1249 98 °F (36.7 °C) 64 20 138/77 100 %   08/23/21 1226 98 °F (36.7 °C) 71 20 136/71 99 %   08/23/21 1158  60      08/23/21 1120 98 °F (36.7 °C) 69 20 (!) 144/71 100 %   08/23/21 1104 98.4 °F (36.9 °C) 68 20 134/72 97 %   08/23/21 1049 97.7 °F (36.5 °C) 65 20 116/66 99 %   08/23/21 1034 98 °F (36.7 °C) 64 18 128/75 97 %   08/23/21 1019 97.5 °F (36.4 °C) 62 18 125/62 98 %        Weight change:      08/22 1901 - 08/24 0700  In: 850 [P.O.:240;  I.V.:250]  Out: 685 [Urine:685]    Intake/Output Summary (Last 24 hours) at 8/24/2021 0950  Last data filed at 8/24/2021 9689  Gross per 24 hour   Intake 800 ml   Output 675 ml   Net 125 ml     Physical Exam:     HEENT sclera anicteric, supple neck, no thyromegaly  CVS: S1S2 heard,  no rub  RS: rales at bases  Abd: Soft, Non tender, Not distended, Positive bowel sounds, no organomegaly, no CVA / supra pubic tenderness  Neuro: non focal, awake, alert , CN II-XII are grossly intact  Extrm: plus 1edema, no cyanosis, clubbing   Skin: no visible  Rash  Musculoskeletal: No gross joints or bone deformities         Data Review:     LABS:   Hematology:   Recent Labs     08/24/21  0429 08/23/21  2238 08/23/21  1745 08/23/21  0044 08/22/21  1207 08/22/21  0241   WBC 10.8  --   --  12.0  --  19.3*   HGB 7.0* 7.0* 7.4* 6.5* 6.3* 7.2*   HCT 21.8* 21.8* 22.6* 20.0* 19.5* 22.1*     Chemistry:   Recent Labs     08/23/21  0044 08/22/21  0241   BUN 48* 57*   CREA 5.22* 6.30*   CA 8.9 8.3*   K 3.8 4.2    135*    102   CO2 25 25   GLU 83 112*            Procedures/imaging: see electronic medical records for all procedures, Xrays and details which were not copied into this note but were reviewed prior to creation of Plan          Assessment & Plan:       See above      Kiko Mckeon MD  8/24/2021

## 2021-08-24 NOTE — DIALYSIS
AX[facility]        ACUTE HEMODIALYSIS FLOW SHEET      HEMODIALYSIS ORDERS: Physician: Solitario Sharif     Dialyzer: revaclear   Duration: 3.5 hr  BFR: 350   DFR: 600   Dialysate:  Temp 36-37*C  K+   2    Ca+  2.5 Na 138 Bicarb 30   Weight:  106 kg    Patient Chart [x]     Unable to Obtain []   Dry weight/UF Goal: 3000   Access: right chest TDC    Heparin []  Bolus      Units    [] Hourly       Units    [x]None      Catheter locking solution: heparin    Pre BP:   136/71    Pulse:     63       Respirations: 18  Temperature:   97.9   Labs: Pre        Post:         [x] N/A   Additional Orders(medications, blood products, hypotension management):       [x] N/A     [x] Axel Consent Verified     CATHETER ACCESS: []N/A   [x]Right chest   []Left   []IJ     []Fem   []transhepatic   [] First use X-ray verified     [x]Permanent                [] Temporary   [x]No S/S infection  []Redness  []Drainage []Cultured []Swelling []Pain   [x]Medical Aseptic Prep Utilized   [x]Dressing Changed  [x] Biopatch  Date: 8/24/21       []Clotted   [x]Patent   Flows: [x]Good  []Poor  []Reversed   If access problem,  notified: []Yes    [x]N/A        GRAFT/FISTULA ACCESS:  [x]N/A                              GENERAL ASSESSMENT:      LUNGS:  Rate 18 SaO2%        [] N/A    [x] Clear  [] Coarse  [] Crackles  [] Wheezing        [] Diminished     Location : []RLL   []LLL    []RUL  []RONNIE     Cough: []Productive  []Dry  [x]N/A   Respirations:  [x]Easy  []Labored     Therapy:   []RA  [x]NC 2 l/min    Mask: []NRB []Venti       O2%                  []Ventilator  []Intubated  [] Trach  [] BiPaP     CARDIAC: [x]Regular      [x] Irregular   [] Pericardial Rub  [] JVD        []  Monitored  [] Bedside  [] Remotely monitored [x] N/A       EDEMA: [] None   [x]Generalized  [] Pitting [] 1    [] 2    [] 3    [] 4                 [] Facial  [] Pedal  []  UE  [] LE     SKIN:   [x] Warm   [] Hot     [] Cold   [x] Dry     [] Pale   [] Diaphoretic                  [] Flushed  [] Jaundiced  [] Cyanotic  [] Rash  [] Weeping     LOC:    [x] Alert      [x]Oriented:    [x] Person     [x] Place  []Time               [] Confused  [] Lethargic  [] Medicated  [] Non-responsive     GI / ABDOMEN:  [] Flat    [] Distended    [x] Soft    [] Firm   []  Obese                             [] Diarrhea  [x] Bowel Sounds  [] Nausea  [] Vomiting       / URINE ASSESSMENT:[] Voiding   [] Oliguria  [] Anuria   [x]  Elizabeth     [] Incontinent    []  Incontinent Brief      []  Bathroom Privileges       PAIN: [x] 0 []1  []2   []3   []4   []5   []6   []7   []8   []9   []10              Scale 0-10  Action/Follow Up:      MOBILITY:  [] Amb    [] Amb/Assist    [x] Bed    [] Wheelchair  [] Stretcher      All Vitals and Treatment Details on Attached [address]: [facility]          Room # 360/01      [] 1st Time Acute  [] Stat  [x] Routine  [] Urgent     [x] Acute Room  []  Bedside  [] ICU/CCU  [] ER   Isolation Precautions:   There are currently no Active Isolations      Special Considerations:         [] Blood Consent Verified [x]N/A      ALLERGIES:   No Known Allergies            Code Status:DNR        Hepatitis Status:                       Lab Results   Component Value Date/Time    Hepatitis B surface Ag <0.10 08/15/2021 09:50 PM    Hepatitis B surface Ab <3.10 (L) 08/15/2021 09:50 PM    Hepatitis C virus Ab 0.28 12/07/2015 04:15 PM                     Current Labs:   Lab Results   Component Value Date/Time    Sodium 140 08/23/2021 12:44 AM    Potassium 3.8 08/23/2021 12:44 AM    Chloride 107 08/23/2021 12:44 AM    CO2 25 08/23/2021 12:44 AM    Anion gap 8 08/23/2021 12:44 AM    Glucose 83 08/23/2021 12:44 AM    BUN 48 (H) 08/23/2021 12:44 AM    Creatinine 5.22 (H) 08/23/2021 12:44 AM    BUN/Creatinine ratio 9 (L) 08/23/2021 12:44 AM    GFR est AA 13 (L) 08/23/2021 12:44 AM    GFR est non-AA 11 (L) 08/23/2021 12:44 AM    Calcium 8.9 08/23/2021 12:44 AM      Lab Results   Component Value Date/Time    WBC 10.8 08/24/2021 04:29 AM    Hemoglobin, POC 10.2 (L) 03/11/2016 07:26 AM    HGB 7.0 (L) 08/24/2021 04:29 AM    Hematocrit, POC 30 (L) 03/11/2016 07:26 AM    HCT 21.8 (L) 08/24/2021 04:29 AM    PLATELET 830 20/05/8239 04:29 AM    MCV 86.2 08/24/2021 04:29 AM                                                                                     DIET:   DIET ADULT ORAL NUTRITION SUPPLEMENT  DIET ADULT ORAL NUTRITION SUPPLEMENT  DIET ADULT       PRIMARY NURSE REPORT: First initial/Last name/Title      Pre Dialysis: Ella Morales RN     Time: 2454      EDUCATION:    [x] Patient [] Other         Knowledge Basis: []None [x]Minimal [] Substantial   Barriers to learning  [x]N/A   [] Access Care     [] S&S of infection     [] Fluid Management     []K+     [x]Procedural    []Albumin     [] Medications     [] Tx Options     [] Transplant     [] Diet     [] Other   Teaching Tools:  [x] Explain  [x] Demo  [] Handouts [] Video  Patient response:  [x] Verbalized understanding  [] Teach back  [] Return demonstration [] Requires follow up   Inappropriate due to:            [x]Time Out/Safety Check  [x]Extracorporeal Circuit Tested for integrity       RO/HEMODIALYSIS MACHINE SAFETY CHECKS  Before each treatment:     Machine Number:                   [facility]                                   [x] Unit Machine # 8 with centralized RO                                       Alarm Test:  Pass time 0910               [x] RO/Machine Log Complete      Temp   36             Dialysate: pH  7.6 Conductivity: Meter   13.9     HD Machine   13.9                  TCD: 13.8  Dialyzer Lot # [id number]            Blood Tubing Lot # [id number]          Saline Lot #  [id number]     CHLORINE TESTING-Before each treatment and every 4 hours    Total Chlorine: [x] less than 0.1 ppm  Time: 0900 4 Hr/2nd Check Time: 1300   (if greater than 0.1 ppm from Primary then every 30 minutes from Secondary)     TREATMENT INITIATION  with Dialysis Precautions:   [x] All Connections Secured                 [x] Saline Line Double Clamped   [x] Venous Parameters Set                  [x] Arterial Parameters Set    [x] Prime Given 250ml                          [x]Air Foam Detector Engaged      Treatment Initiation Note:   Pt arrived to HD unit via bed. A&Ox3 with confusion to time. Resp even/unlbaored with O2 @ 2 LPM NC.  FC in place, draining bloody urine. Right chest TDC assessed with no s/s complications. HD initiated without difficulty. During Treatment Notes:  1015 Dr. Claude Landa at bedside. Pt awake and alert. Face and access in view with connections secure. 1030 Pt awake and alert. Face and access in view with connections secure. 1045 Pt resting with eyes closed. Face and access in view with connections secure. 1100 Pt awake and alert. Face and access in view with connections secure. 1110 Pt requesting to stop treatment, states he too tired. Dr. Claude Landa at bedside; explained consequences of not completing full treatment. Pt still wants to come off the machine. Receieved order from Dr. Claude Landa to rinse blood back at this time. Pt in no noted distress. Medication Dose Volume Route Time [facility] name Title   none     CHERIE Mejia RN                   Post Assessment:   Dialyzer Cleared: [] Good [x] Fair  [] Poor  Blood processed:  22.3 L  UF Removed  1136 mL  POst BP:   134/69       Pulse: 62        Respirations: 18  Temperature: 98.5 Lungs:     [x] Clear      [] Course         [] Crackles    [] Wheezing         [] Diminished   Post Tx Vascular Access:      N/A Cardiac:   [] Regular   [x] Irregular   [] Monitor  [x] N/A         Catheter:   Locking solution: Heparin 1:1000   Art. 1.6  Mykel. 1.6     Skin:   Pain:   [x] Warm  [x] Dry [] Diaphoretic    [] Flushed    [] Pale [] Cyanotic [x]0  []1  []2   []3  []4   []5   []6   []7   []8   []9   []10     Post Treatment Note:  Pt completed 1 hour of 3.5 treatment per pt request.  Net 636 UF removed.      POST TREATMENT PRIMARY NURSE HANDOFF REPORT:     First initial/Last name/Title         Post Dialysis: Hussain Cardenas RN Time:  3635     Abbreviations: AVG-arterial venous graft, AVF-arterial venous fistula, IJ-Internal Jugular, Subcl-Subclavian, Fem-Femoral, Tx-treatment, AP/HR-apical heart rate, DFR-dialysate flow rate, BFR-blood flow rate, AP-arterial pressure, -venous pressure, UF-ultrafiltrate, TMP-transmembrane pressure, Mykel-Venous, Art-Arterial, RO-Reverse Osmosis

## 2021-08-24 NOTE — PROGRESS NOTES
[email or web address]  [phone]    Gastroenterology follow up-Progress note    Impression:  1. Sloughing esophagitis- noted on EGD 8/24/21  2. Gastric erosions and two duodenal ulcers which were likely source of bleeding  3. GI bleed with anemia- had one episode of melena 8/23/21- no further episodes- Hgb dropped from 8.3 to 6.3- s/p transfusion H/H 7.0/21.8 which is stable over the last 24 hours  4. Fluid overload - managed by diuretics prn and HD  5. TONY on CKD stage IV- on HD and followed by nephrology  6. Aspiration PNA; on antibiotics  7. Enterobactor bacteremia due to UTI- continue antibiotics  8. Afib with RVR- cardiology following  9. Severe protein calorie malnutrition  10. DM Type II  11. Moderate pulmonary HTN    Plan:  1. Continue PPI BID- he can transition to PO PPI BID when discharged- he is aware he will need to be on this as outpatient  2. Monitor H/H and transfuse per protocol  3. Watch for re-bleeding- do expect some residual melena next couple of days. 4. Clear liquid diet and advance as tolerated if stable later this evening. 5. Medical management per primary team  6. F/u in office in 8-12 weeks as outpatient  7. Will sign off. Please let us know if there are any further questions or concerns. Chief Complaint: melena, anemia      Subjective:  Taking clear liquids today- no abd pain or N/V. He denies further BMs. No bleeding noted. ROS: Denies any fevers, chills, rash.      Eyes: conjunctiva normal, EOM normal   Neck: ROM normal, supple and trachea normal   Cardiovascular: heart normal, intact distal pulses, normal rate and regular rhythm   Pulmonary/Chest Wall: Respiratory  effort normal   Abdominal: appearance normal,  soft, non-acute, non-tender     Patient Active Problem List   Diagnosis Code    Hypertension I10    Diabetes ([address] E11.9    Atrial fibrillation ([address] I48.91    Cerebrovascular accident ([facility] I63.9    Hypercholesterolemia E78.00    Chronic diastolic heart failure (HCC) I50.32    Morbid obesity (HCC) E66.01    BPH (benign prostatic hyperplasia) N40.0    Bladder outlet obstruction N32.0    Acute renal failure (ARF) (HCC) N17.9    Hyperkalemia E87.5    CKD (chronic kidney disease) N18.9    Renal failure N19    Bradycardia R00.1    Severe protein-calorie malnutrition (HCC) E43         Visit Vitals  /64   Pulse (!) 45   Temp 97.9 °F (36.6 °C) (Oral)   Resp 18   Ht 6' 1\" (1.854 m)   Wt 106 kg (233 lb 9.6 oz)   SpO2 100%   BMI 30.82 kg/m²           Intake/Output Summary (Last 24 hours) at 8/24/2021 1045  Last data filed at 8/24/2021 0759  Gross per 24 hour   Intake 1040 ml   Output 675 ml   Net 365 ml       CBC w/Diff    Lab Results   Component Value Date/Time    WBC 10.8 08/24/2021 04:29 AM    RBC 2.53 (L) 08/24/2021 04:29 AM    HGB 7.0 (L) 08/24/2021 04:29 AM    HCT 21.8 (L) 08/24/2021 04:29 AM    MCV 86.2 08/24/2021 04:29 AM    MCH 27.7 08/24/2021 04:29 AM    MCHC 32.1 08/24/2021 04:29 AM    RDW 16.7 (H) 08/24/2021 04:29 AM     08/24/2021 04:29 AM    Lab Results   Component Value Date/Time    GRANS 82 (H) 08/24/2021 04:29 AM    LYMPH 6 (L) 08/24/2021 04:29 AM    EOS 2 08/24/2021 04:29 AM    BANDS 4 08/15/2021 10:05 AM    BASOS 1 08/24/2021 04:29 AM      Basic Metabolic Profile   Recent Labs     08/23/21  0044      K 3.8      CO2 25   BUN 48*   CA 8.9   MG 2.5        Hepatic Function    Lab Results   Component Value Date/Time    ALB 3.8 07/20/2021 11:13 AM    TP 7.8 03/22/2010 12:00 PM    AP 92 03/22/2010 12:00 PM    No results found for: TBIL       Coags   No results for input(s): PTP, INR, APTT, INREXT in the last 72 hours. Nicola Manning NP    [facility]. [email or web address]  Phone: [phone]  Pager: [phone]

## 2021-08-24 NOTE — PROGRESS NOTES
Nutrition Assessment     Type and Reason for Visit: Reassess    Nutrition Recommendations/Plan:   - Monitor po intake, diet tolerance and ability to advance to regular solid diet with chopped meats- diet consistency per SLP recommendations and advancement once stable by this afternoon per GI.    - Resume oral nutrition supplements once diet advanced (Nepro TID, Magic Cup BID). Nutrition Assessment:  Patient with anemia and episode of melena yesterday, NPO since midnight s/p endoscopy 8/23/21 with findings of sloughing esophagitis, stomach antral and duodenal erosions and two large duodenal ulcers. Started on clear liquids yesterday evening and able to advance as tolerated if patient remains stable this evening per GI note today. SLP following and last recommended regular solid diet with chopped meats and thin liquids on 8/16/21, patient noted to be off the unit for dialysis during attempt for follow up swallow evaluation today. Patient with poor appetite and minimal intake of clear liquid meals, lethargic after dialysis this afternoon. Malnutrition Assessment:  Malnutrition Status: Severe malnutrition     Estimated Daily Nutrient Needs:  Energy (kcal):  6997-5508  Protein (g):  101-151       Fluid (ml/day):  750-1500    Nutrition Related Findings:  Black, loose stool today (8/24). Minimal urine output s/p HD today (8/24/21) net 636 mL UF removed. Medications: colace, pantoprazole, theragran, senna prn, epoetin, albumin  Electrolytes WNL, K and Phos in normal range, recent BG  84-86 mg/dL (no indication for restrictive diet in the context of poor appetite and minimal intake).      Current Nutrition Therapies:  ADULT ORAL NUTRITION SUPPLEMENT Breakfast, Dinner; Renal Supplement  ADULT ORAL NUTRITION SUPPLEMENT Lunch, Dinner; Frozen Supplement  ADULT DIET Clear Liquid    Anthropometric Measures:  · Height:  6' 1\" (185.4 cm)  · Current Body Wt:  106 kg (233 lb 11 oz)  · BMI: 30.8    Nutrition Diagnosis:   · Severe malnutrition, In context of chronic illness related to inadequate protein-energy intake, increased demand for energy/nutrients, renal dysfunction, partial or complete edentulism, early satiety as evidenced by poor intake prior to admission, poor dentition, intake 0-25%, intake 26-50%      Nutrition Intervention:  Food and/or Nutrient Delivery: Modify current diet, Vitamin supplement, Continue oral nutrition supplement  Nutrition Education and Counseling: No recommendations at this time  Coordination of Nutrition Care: Continue to monitor while inpatient, Speech therapy, Swallow evaluation, Feeding assistance/environmental change    Goals:  PO nutrition intake will continue to meet >75% of patients estimated nutritional needs over the next 7 days. Nutrition Monitoring and Evaluation:   Behavioral-Environmental Outcomes: None identified  Food/Nutrient Intake Outcomes: Diet advancement/tolerance, Food and nutrient intake, Supplement intake, Vitamin/mineral intake  Physical Signs/Symptoms Outcomes: Biochemical data, Chewing or swallowing, GI status, Nausea/vomiting, Fluid status or edema, Meal time behavior, Nutrition focused physical findings    Discharge Planning:     Too soon to determine     Electronically signed by Nereida Hernandez RD, [address]  on 8/24/2021 at 2:47 PM    Contact Number: [phone]

## 2021-08-25 LAB
ANION GAP SERPL CALC-SCNC: 7 MMOL/L (ref 3–18)
BASOPHILS # BLD: 0.1 K/UL (ref 0–0.1)
BASOPHILS NFR BLD: 1 % (ref 0–2)
BUN SERPL-MCNC: 63 MG/DL (ref 7–18)
BUN/CREAT SERPL: 9 (ref 12–20)
CALCIUM SERPL-MCNC: 8.6 MG/DL (ref 8.5–10.1)
CHLORIDE SERPL-SCNC: 104 MMOL/L (ref 100–111)
CO2 SERPL-SCNC: 25 MMOL/L (ref 21–32)
CREAT SERPL-MCNC: 7.38 MG/DL (ref 0.6–1.3)
DIFFERENTIAL METHOD BLD: ABNORMAL
EOSINOPHIL # BLD: 0.2 K/UL (ref 0–0.4)
EOSINOPHIL NFR BLD: 2 % (ref 0–5)
ERYTHROCYTE [DISTWIDTH] IN BLOOD BY AUTOMATED COUNT: 16.8 % (ref 11.6–14.5)
GLUCOSE BLD STRIP.AUTO-MCNC: 100 MG/DL (ref 70–110)
GLUCOSE BLD STRIP.AUTO-MCNC: 101 MG/DL (ref 70–110)
GLUCOSE BLD STRIP.AUTO-MCNC: 67 MG/DL (ref 70–110)
GLUCOSE BLD STRIP.AUTO-MCNC: 84 MG/DL (ref 70–110)
GLUCOSE BLD STRIP.AUTO-MCNC: 99 MG/DL (ref 70–110)
GLUCOSE SERPL-MCNC: 79 MG/DL (ref 74–99)
HBV CORE AB SERPL QL IA: NEGATIVE
HCT VFR BLD AUTO: 22.2 % (ref 36–48)
HCT VFR BLD AUTO: 22.4 % (ref 36–48)
HCT VFR BLD AUTO: 23.8 % (ref 36–48)
HCT VFR BLD AUTO: 23.9 % (ref 36–48)
HGB BLD-MCNC: 7.1 G/DL (ref 13–16)
HGB BLD-MCNC: 7.2 G/DL (ref 13–16)
HGB BLD-MCNC: 7.5 G/DL (ref 13–16)
HGB BLD-MCNC: 7.6 G/DL (ref 13–16)
LYMPHOCYTES # BLD: 0.7 K/UL (ref 0.9–3.6)
LYMPHOCYTES NFR BLD: 8 % (ref 21–52)
MCH RBC QN AUTO: 28.1 PG (ref 24–34)
MCHC RBC AUTO-ENTMCNC: 32.4 G/DL (ref 31–37)
MCV RBC AUTO: 86.7 FL (ref 78–100)
MONOCYTES # BLD: 0.9 K/UL (ref 0.05–1.2)
MONOCYTES NFR BLD: 11 % (ref 3–10)
NEUTS SEG # BLD: 6.7 K/UL (ref 1.8–8)
NEUTS SEG NFR BLD: 78 % (ref 40–73)
PLATELET # BLD AUTO: 196 K/UL (ref 135–420)
PMV BLD AUTO: 10.3 FL (ref 9.2–11.8)
POTASSIUM SERPL-SCNC: 4.4 MMOL/L (ref 3.5–5.5)
RBC # BLD AUTO: 2.56 M/UL (ref 4.35–5.65)
SODIUM SERPL-SCNC: 136 MMOL/L (ref 136–145)
WBC # BLD AUTO: 8.5 K/UL (ref 4.6–13.2)

## 2021-08-25 PROCEDURE — 36415 COLL VENOUS BLD VENIPUNCTURE: CPT

## 2021-08-25 PROCEDURE — 82962 GLUCOSE BLOOD TEST: CPT

## 2021-08-25 PROCEDURE — 97116 GAIT TRAINING THERAPY: CPT

## 2021-08-25 PROCEDURE — 92526 ORAL FUNCTION THERAPY: CPT

## 2021-08-25 PROCEDURE — 74011250636 HC RX REV CODE- 250/636: Performed by: INTERNAL MEDICINE

## 2021-08-25 PROCEDURE — 99232 SBSQ HOSP IP/OBS MODERATE 35: CPT | Performed by: HOSPITALIST

## 2021-08-25 PROCEDURE — 74011000250 HC RX REV CODE- 250: Performed by: HOSPITALIST

## 2021-08-25 PROCEDURE — 97535 SELF CARE MNGMENT TRAINING: CPT

## 2021-08-25 PROCEDURE — 85025 COMPLETE CBC W/AUTO DIFF WBC: CPT

## 2021-08-25 PROCEDURE — 74011250636 HC RX REV CODE- 250/636: Performed by: STUDENT IN AN ORGANIZED HEALTH CARE EDUCATION/TRAINING PROGRAM

## 2021-08-25 PROCEDURE — 85018 HEMOGLOBIN: CPT

## 2021-08-25 PROCEDURE — 90935 HEMODIALYSIS ONE EVALUATION: CPT

## 2021-08-25 PROCEDURE — 65660000000 HC RM CCU STEPDOWN

## 2021-08-25 PROCEDURE — 74011250636 HC RX REV CODE- 250/636: Performed by: HOSPITALIST

## 2021-08-25 PROCEDURE — 97530 THERAPEUTIC ACTIVITIES: CPT

## 2021-08-25 PROCEDURE — C9113 INJ PANTOPRAZOLE SODIUM, VIA: HCPCS | Performed by: HOSPITALIST

## 2021-08-25 PROCEDURE — 2709999900 HC NON-CHARGEABLE SUPPLY

## 2021-08-25 PROCEDURE — 80048 BASIC METABOLIC PNL TOTAL CA: CPT

## 2021-08-25 PROCEDURE — 74011250637 HC RX REV CODE- 250/637: Performed by: HOSPITALIST

## 2021-08-25 PROCEDURE — 74011250637 HC RX REV CODE- 250/637: Performed by: INTERNAL MEDICINE

## 2021-08-25 RX ADMIN — SODIUM CHLORIDE 10 ML: 9 INJECTION, SOLUTION INTRAMUSCULAR; INTRAVENOUS; SUBCUTANEOUS at 21:02

## 2021-08-25 RX ADMIN — TAMSULOSIN HYDROCHLORIDE 0.4 MG: 0.4 CAPSULE ORAL at 10:49

## 2021-08-25 RX ADMIN — SODIUM CHLORIDE 40 MG: 9 INJECTION, SOLUTION INTRAMUSCULAR; INTRAVENOUS; SUBCUTANEOUS at 21:02

## 2021-08-25 RX ADMIN — SODIUM CHLORIDE 40 MG: 9 INJECTION, SOLUTION INTRAMUSCULAR; INTRAVENOUS; SUBCUTANEOUS at 10:50

## 2021-08-25 RX ADMIN — HALOPERIDOL LACTATE 2 MG: 5 INJECTION, SOLUTION INTRAMUSCULAR at 21:08

## 2021-08-25 RX ADMIN — SODIUM CHLORIDE 10 ML: 9 INJECTION, SOLUTION INTRAMUSCULAR; INTRAVENOUS; SUBCUTANEOUS at 06:26

## 2021-08-25 RX ADMIN — THERA TABS 1 TABLET: TAB at 10:49

## 2021-08-25 RX ADMIN — LEVOFLOXACIN 500 MG: 5 INJECTION, SOLUTION INTRAVENOUS at 21:02

## 2021-08-25 NOTE — PROGRESS NOTES
CM met with pt to discuss discharge plans. CM discussed with pt the importance of participating with physical therapy. Pt states \"I'll do what I can. I'm ready to go home. \" CM informed pt that the current recommendations are for rehab and that pt's wife was also requesting SNF. Pt states \"You need to discuss this with me! \" CM explained to pt the importance of ensuring pt can ambulate safely before returning home. CM also discussed the need to make arrangements for transportation to and from hemodialysis. Pt states \"I'll get there when I get there. \" CM informed pt that dialysis would be 3 times per week. Pt repeated \"I'll get there when I can. \"  Pt does have SNF acceptances if he does agree to SNF. CM will reach out to the wife to discuss transportation arrangements for dialysis.     Jhoana Hancock RN BSN  Care Manager  [phone]

## 2021-08-25 NOTE — PROGRESS NOTES
RENAL DAILY PROGRESS NOTE              Subjective:       Complaint:breathing is much better  No chest pain or abdominal pain    IMPRESSION:   TONY on CKD stage 4, due to obstructive uropathy(urinary retention) s/p vines with close to 2 l of UO immediately. 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 On levaquin  GI bleed due to duodenal ulcers. S/p EGD 8/23  S/p left arm AVF(occluded) in past  S/p tunneled access placement 8/20/21  Hypertension  Hx neurogenic bladder  ACD  Secondary hyperparathyroidism  PAF   PLAN:   Refused HD yesterday. Will perform today. Phos levels are ok. Epo to continue. No iron therapy as on abx. Goal UF of 3 l  He has a spot for TTS shift ,second shift, [facility] unit as OP.  Will dialyze him again in am to keep him on schedule           Current Facility-Administered Medications   Medication Dose Route Frequency    heparin (porcine) 1,000 unit/mL injection 5,000 Units  5,000 Units IntraCATHeter DIALYSIS PRN    albumin human 25% (BUMINATE) solution 25 g  25 g IntraVENous DIALYSIS PRN    0.9% sodium chloride infusion 250 mL  250 mL IntraVENous PRN    epoetin negrita-epbx (RETACRIT) injection 8,000 Units  8,000 Units SubCUTAneous DIALYSIS TUE, THU & SAT    pantoprazole (PROTONIX) 40 mg in 0.9% sodium chloride 10 mL injection  40 mg IntraVENous Q12H    0.9% sodium chloride infusion 250 mL  250 mL IntraVENous PRN    insulin lispro (HUMALOG) injection   SubCUTAneous Q6H    haloperidol lactate (HALDOL) injection 2 mg  2 mg IntraVENous Q6H PRN    tamsulosin (FLOMAX) capsule 0.4 mg  0.4 mg Oral DAILY    levoFLOXacin (LEVAQUIN) 500 mg in D5W IVPB  500 mg IntraVENous Q48H    therapeutic multivitamin (THERAGRAN) tablet 1 Tablet  1 Tablet Oral DAILY    alteplase (CATHFLO) 4 mg in sterile water (preservative free) 4 mL injection  4 mg InterCATHeter DIALYSIS ONCE    atropine 1 mg/mL injection 0.5 mg  0.5 mg IntraVENous PRN    sodium chloride (NS) flush 5-40 mL  5-40 mL IntraVENous Q8H    sodium chloride (NS) flush 5-40 mL  5-40 mL IntraVENous PRN    acetaminophen (TYLENOL) tablet 650 mg  650 mg Oral Q6H PRN    Or    acetaminophen (TYLENOL) suppository 650 mg  650 mg Rectal Q6H PRN    senna (SENOKOT) tablet 8.6 mg  1 Tablet Oral DAILY PRN    docusate sodium (COLACE) capsule 100 mg  100 mg Oral DAILY    glucose chewable tablet 16 g  4 Tablet Oral PRN    glucagon (GLUCAGEN) injection 1 mg  1 mg IntraMUSCular PRN    dextrose (D50W) injection syrg 12.5-25 g  25-50 mL IntraVENous PRN       Review of Symptoms: comprehensive ROS negative except above.    Objective:     Patient Vitals for the past 24 hrs:   Temp Pulse Resp BP SpO2   08/25/21 0902  (!) 45      08/25/21 0852  (!) 37      08/25/21 0420 98 °F (36.7 °C) (!) 54 15 136/78 99 %   08/25/21 0008 97.8 °F (36.6 °C) (!) 58 15 (!) 147/72 96 %   08/24/21 1937 97.9 °F (36.6 °C) (!) 58 17 (!) 152/67 93 %   08/24/21 1610 97.2 °F (36.2 °C) (!) 57 18 (!) 147/76 96 %   08/24/21 1205 97.5 °F (36.4 °C) (!) 59 20 138/73 99 %   08/24/21 1118 98.5 °F (36.9 °C) 62 18 134/69    08/24/21 1100  (!) 57  124/70    08/24/21 1045  (!) 51  132/89         Weight change:      08/23 1901 - 08/25 0700  In: 1140 [P.O.:1140]  Out: 1346 [Urine:710]    Intake/Output Summary (Last 24 hours) at 8/25/2021 1044  Last data filed at 8/25/2021 0420  Gross per 24 hour   Intake 900 ml   Output 671 ml   Net 229 ml     Physical Exam:     HEENT sclera anicteric, supple neck, no thyromegaly  CVS: S1S2 heard,  no rub  RS: rales at bases  Abd: Soft, Non tender, Not distended, Positive bowel sounds, no organomegaly, no CVA / supra pubic tenderness  Neuro: non focal, awake, alert , CN II-XII are grossly intact  Extrm: plus 1edema, no cyanosis, clubbing   Skin: no visible  Rash  Musculoskeletal: No gross joints or bone deformities         Data Review:     LABS:   Hematology:   Recent Labs     08/25/21  0258 08/24/21  2222 08/24/21  1431 08/24/21  1115 08/24/21  1607 08/24/21  0429 08/23/21  2238 08/23/21  1745 08/23/21  0044 08/22/21  1207   WBC 8.5  --   --   --   --  10.8  --   --  12.0  --    HGB 7.2* 7.1* 7.2* 7.3* 7.3* 7.0* 7.0* 7.4* 6.5* 6.3*   HCT 22.2* 22.7* 22.1* 23.0* 23.2* 21.8* 21.8* 22.6* 20.0* 19.5*     Chemistry:   Recent Labs     08/25/21  0258 08/23/21  0044   BUN 63* 48*   CREA 7.38* 5.22*   CA 8.6 8.9   K 4.4 3.8    140    107   CO2 25 25   GLU 79 83            Procedures/imaging: see electronic medical records for all procedures, Xrays and details which were not copied into this note but were reviewed prior to creation of Plan          Assessment & Plan:       See above      Boo Ge MD  8/25/2021

## 2021-08-25 NOTE — PROGRESS NOTES
Problem: Self Care Deficits Care Plan (Adult)  Goal: *Acute Goals and Plan of Care (Insert Text)  Description: Occupational Therapy Goals  Initiated 8/20/2021 within 7 day(s). 1.  Patient will perform bed mobility for ADLs with supervision/set-up. 2.  Patient will perform grooming seated EOB with supervision/set-up for 5 minutes with Fair+ balance. 3.  Patient will perform upper body dressing with supervision/set-up. 4.  Patient will perform toilet transfers with moderate assistance . 5. Patient will perform all aspects of toileting with moderate assistance . 6. Patient will participate in upper extremity therapeutic exercise/activities with supervision/set-up for 8 minutes. 7.  Patient will utilize energy conservation techniques during functional activities with verbal cues. Prior Level of Function: Pt is not a good historian, reports he lives with his spouse and was independent in ADLs and functional mobility using cane. Outcome: Progressing Towards Goal   OCCUPATIONAL THERAPY TREATMENT    Patient: Elijah Rogers (89 y.o. male)  Date: 8/25/2021  Diagnosis: Renal failure [N19]  Bradycardia [R00.1] <principal problem not specified>  Procedure(s) (LRB):  ESOPHAGOGASTRODUODENOSCOPY (EGD) (N/A) 2 Days Post-Op  Precautions: Fall    Chart, occupational therapy assessment, plan of care, and goals were reviewed. ASSESSMENT:  Pt continues to requires max encouragement for participation. Co-treated w/PT to maximize participation prior to dialysis treatment. Pt educated on energy conservation techniques w/ADLs and benefits of shower chair. Pt requires increase time and vc's to attend to task for ADLs at EOB. (see functional levels below) Pt requires max vc's for hand placement w/functional transfer to standing for 2 trials. Pt performs lateral steps at EOB in prep for functional transfer training to BSC/toilet. Pt  returns to supine for transport. No c/o pain.    Progression toward goals:  [] Improving appropriately and progressing toward goals  [x]          Improving slowly and progressing toward goals  []          Not making progress toward goals and plan of care will be adjusted     PLAN:  Patient continues to benefit from skilled intervention to address the above impairments. Continue treatment per established plan of care. Discharge Recommendations:  Rehab  Further Equipment Recommendations for Discharge:  shower chair     SUBJECTIVE:   Patient stated I've got one of these at home.  reference RW    OBJECTIVE DATA SUMMARY:   Cognitive/Behavioral Status:  Neurologic State: Alert  Orientation Level: Oriented X4  Cognition: Follows commands  Safety/Judgement: Fall prevention    Functional Mobility and Transfers for ADLs:   Bed Mobility:  Supine to Sit: Additional time;Contact guard assistance  Sit to Supine: Additional time;Contact guard assistance  Scooting: Stand-by assistance   Transfers:  Sit to Stand: Contact guard assistance (w/RW)  Balance:  Sitting: Intact  Standing: Impaired; With support    ADL Intervention:  Grooming  Position Performed: Seated edge of bed  Washing Face: Stand-by assistance  Brushing Teeth: Stand-by assistance  Brushing/Combing Hair: Stand-by assistance    Upper [address]: Stand-by assistance    Pain:  Pain level pre-treatment: 0/10   Pain level post-treatment: 0/10    Activity Tolerance:    Fair    Please refer to the flowsheet for vital signs taken during this treatment. After treatment:   []  Patient left in no apparent distress sitting up in chair  [x]  Patient left in no apparent distress in bed  [x]  Leaving w/transport for dialysis  [x]  Nursing notified  []  Caregiver present  []  Bed alarm activated    COMMUNICATION/EDUCATION:   [] Role of Occupational Therapy in the acute care setting  [] Home safety education was provided and the patient/caregiver indicated understanding.   [] Patient/family have participated as able in working towards goals and plan of care. [x] Patient/family agree to work toward stated goals and plan of care. [] Patient understands intent and goals of therapy, but is neutral about his/her participation. [] Patient is unable to participate in goal setting and plan of care.       Thank you for this referral.  TRICE Zuniga  Time Calculation: 23 mins

## 2021-08-25 NOTE — PROGRESS NOTES
Problem: Falls - Risk of  Goal: *Absence of Falls  Description: Document Mills Fall Risk and appropriate interventions in the flowsheet.   Outcome: Progressing Towards Goal  Note: Fall Risk Interventions:  Mobility Interventions: Assess mobility with egress test, Bed/chair exit alarm, Communicate number of staff needed for ambulation/transfer, Patient to call before getting OOB, PT Consult for assist device competence, Strengthening exercises (ROM-active/passive), Utilize walker, cane, or other assistive device    Mentation Interventions: Adequate sleep, hydration, pain control, Bed/chair exit alarm, Door open when patient unattended, Evaluate medications/consider consulting pharmacy, Eyeglasses and hearing aids, More frequent rounding, Reorient patient, Room close to nurse's station, Self-releasing belt, Update white board, Toileting rounds    Medication Interventions: Bed/chair exit alarm, Evaluate medications/consider consulting pharmacy, Patient to call before getting OOB, Teach patient to arise slowly    Elimination Interventions: Bed/chair exit alarm, Call light in reach, Elevated toilet seat, Patient to call for help with toileting needs, Stay With Me (per policy), Toilet paper/wipes in reach, Toileting schedule/hourly rounds    History of Falls Interventions: Bed/chair exit alarm, Door open when patient unattended, Room close to nurse's station, Assess for delayed presentation/identification of injury for 48 hrs (comment for end date)         Problem: Patient Education: Go to Patient Education Activity  Goal: Patient/Family Education  Outcome: Progressing Towards Goal     Problem: Cardiac Output -  Decreased  Goal: *Vital signs within specified parameters  Outcome: Progressing Towards Goal  Goal: *Optimal cardiac output  Outcome: Progressing Towards Goal  Goal: *Absence of hypoxia  Outcome: Progressing Towards Goal  Goal: *Absence of peripheral edema  Outcome: Progressing Towards Goal  Goal: *Intravascular fluid volume and electrolyte balance  Outcome: Progressing Towards Goal     Problem: Patient Education: Go to Patient Education Activity  Goal: Patient/Family Education  Outcome: Progressing Towards Goal     Problem: Patient Education: Go to Patient Education Activity  Goal: Patient/Family Education  Outcome: Progressing Towards Goal     Problem: Acute Renal Failure: Discharge Outcomes  Goal: *Optimal pain control at patient's stated goal  Outcome: Progressing Towards Goal  Goal: *Urinary output within identified parameters  Outcome: Progressing Towards Goal  Goal: *Hemodynamically stable  Outcome: Progressing Towards Goal  Goal: *Tolerating diet  Outcome: Progressing Towards Goal  Goal: *Lab values stabilized  Outcome: Progressing Towards Goal  Goal: *Verbalizes understanding and describes medication purposes and frequencies  Outcome: Progressing Towards Goal  Goal: *Medication reconciliation  Outcome: Progressing Towards Goal

## 2021-08-25 NOTE — PROGRESS NOTES
Nutrition Note    Patient tolerating liquid diet, advanced to full liquids last evening and started on solid diet this morning after follow up swallow evaluation. Patient reports fair appetite, consuming 50% or less of recent meals and denies nausea/vomiting, passing flatus with black, nonbloody stool yesterday. Episode of hypoglycemia overnight now, recent glucose 100 mg/dL. Likes and is consuming Nepro supplements. Encouraged intake of meals and supplements. Recommendations/Plan   - Monitor and encourage po intake. Continue oral nutrition supplements: Nepro TID, Magic Cup BID.     Electronically signed by Jay Jenkins RD, [address]  on 8/25/2021 at 12:59 PM    Contact: [phone]

## 2021-08-25 NOTE — PROGRESS NOTES
Hospitalist Progress Note    Patient: Vinny Joyner MRN: [id number]  CSN: [id number]    YOB: 1950  Age: 70 y.o. Sex: male    DOA: 8/15/2021 LOS:  LOS: 10 days            Patient feels lot better today. No melena or hematochezia. Patient denies any shortness of breath, no chest pain. His wife and sister at the bedside. Assessment/Plan     1. Acute GI bleed with blood loss anemia: EGD on 8/22/2021 shows esophagitis, antral and duodenal ulcer. continue IV PPI, will advance diet to regular diet. Will monitor H&H and transfuse as needed. 2. ESRD on HD, hemodialysis per nephrology. 3. Enterobacter bacteremia possibly due to UTI: Continue antibiotics per ID, repeat cultures negative. 4. Leukocytosis: Improved now. 5. Aspiration pneumonia: Continue antibiotics per ID  6. TONY on CKD 4 with obstructive uropathy, continue dialysis per nephrology. Permacath placement by vascular surgery 8/20/21.  7. Acute metabolic encephalopathy due to underlying infection, improved    8. A. fib with slow RVR. Possible sick sinus syndrome. Rates controlled. Continue full dose aspirin. Cardiology input noted, recommend to hold off AV blocking agents. 9. Severe protein calorie malnutrition: Continue nutritionist follows. On supplement. Multivitamin. 10. Diabetes type 2 with hypoglycemia. Sliding scale insulin. Hypoglycemia protocol  11. Complicated urinary tract infection with obstructive uropathy. ID to follow. Urology input noted. Urology recommend to continue Elizabeth, no voiding trials. 12. Hypertension: BP stable, continue current medications  13. Moderate pulmonary hypertension  14. Hyperkalemia: resolved  Falls at home. PT OT. He has not been on anticoagulation in the outpatient due to falls. Full code  PT OT recommend SNF placement    Discussed with the patient and also with wife and sister at the bedside and explained about my above plan care.   All of them understood and agreed with my plan.  son Duayne Sandifer [phone]     Addendum  Patient prefers going home with home of care  Discussed with patient's son Duayne Sandifer, family prefers patient going to SNF. Son greater discussed with the patient and make further plans. If remains stable, will possibly plan for discharge tomorrow. Case discussed with:  [x]Patient  [x]Family  [x]Nursing  []Case Management  DVT Prophylaxis:  []Lovenox  []Hep SQ  [x]SCDs  []Coumadin   []On Heparin gtt    Vital signs/Intake and Output:  Visit Vitals  /68   Pulse (!) 57   Temp 97.8 °F (36.6 °C) (Oral)   Resp 18   Ht 6' 1\" (1.854 m)   Wt 107.1 kg (236 lb 1.8 oz)   SpO2 100%   BMI 31.15 kg/m²       Exam  General:  Alert, cooperative, no acute distress, no tachypnea    Pulmonary: Bilaterally min basal rales. No Wheezing/Rales. Cardiovascular: Regular rate and Rhythm. GI:  Soft, Non distended, Non tender. + Bowel sounds. Extremities:  No edema. No calf tenderness. Neurologic: Alert and oriented X 3. No acute neuro deficits. Medications Reviewed      Labs: Results:       Chemistry Recent Labs     08/25/21  0258 08/23/21  0044   GLU 79 83    140   K 4.4 3.8    107   CO2 25 25   BUN 63* 48*   CREA 7.38* 5.22*   CA 8.6 8.9   AGAP 7 8   BUCR 9* 9*      CBC w/Diff Recent Labs     08/25/21  1407 08/25/21  0921 08/25/21  0258 08/24/21  0942 08/24/21  0429 08/23/21  1745 08/23/21  0044   WBC  --   --  8.5  --  10.8  --  12.0   RBC  --   --  2.56*  --  2.53*  --  2.42*   HGB 7.6* 7.1* 7.2*   < > 7.0*   < > 6.5*   HCT 23.8* 22.4* 22.2*   < > 21.8*   < > 20.0*   PLT  --   --  196  --  206  --  178   GRANS  --   --  78*  --  82*  --  87*   LYMPH  --   --  8*  --  6*  --  5*   EOS  --   --  2  --  2  --  1    < > = values in this interval not displayed. Cardiac Enzymes No results for input(s): CPK, CKND1, MAXIMILIAN in the last 72 hours. No lab exists for component: CKRMB, TROIP   Coagulation No results for input(s): PTP, INR, APTT, INREXT, INREXT in the last 72 hours. Lipid Panel Lab Results   Component Value Date/Time    Cholesterol, total 208 (H) 03/22/2010 12:00 PM    HDL Cholesterol 55 03/22/2010 12:00 PM    LDL, calculated 138.4 (H) 03/22/2010 12:00 PM    VLDL, calculated 14.6 03/22/2010 12:00 PM    Triglyceride 73 03/22/2010 12:00 PM    CHOL/HDL Ratio 3.8 03/22/2010 12:00 PM      BNP No results for input(s): BNPP in the last 72 hours. Liver Enzymes No results for input(s): TP, ALB, TBIL, AP in the last 72 hours.     No lab exists for component: SGOT, GPT, DBIL   Thyroid Studies Lab Results   Component Value Date/Time    TSH 2.42 08/15/2021 11:00 AM        Procedures/imaging: see electronic medical records for all procedures/Xrays and details which were not copied into this note but were reviewed prior to creation of Plan

## 2021-08-25 NOTE — PROGRESS NOTES
Problem: Mobility Impaired (Adult and Pediatric)  Goal: *Acute Goals and Plan of Care (Insert Text)  Description: Physical Therapy Goals  Initiated 8/19/2021 and to be accomplished within 7 day(s)  1. Patient will move from supine to sit and sit to supine  in bed with minimal assistance/contact guard assist.    2.  Patient will transfer from bed to chair and chair to bed with moderate assistance  using the least restrictive device. 3.  Patient will perform sit to stand with moderate assistance . 4. Patient will ambulate with maximal assistance for 5 feet with the least restrictive device. PLOF: Pt is an unreliable historian and confused on eval, stating he lives with his wife. He was able to amb short distances with a RW and his wife helped him around. Outcome: Progressing Towards Goal     PHYSICAL THERAPY TREATMENT    Patient: Germán Beck (89 y.o. male)  Date: 8/25/2021  Diagnosis: Renal failure [N19]  Bradycardia [R00.1] <principal problem not specified>  Procedure(s) (LRB):  ESOPHAGOGASTRODUODENOSCOPY (EGD) (N/A) 2 Days Post-Op  Precautions: Fall  PLOF: see above     ASSESSMENT:  Pt seen in bed in NAD, on 2L via NC. Pt is seen with OT to maximize functional mobility and safety as well as participation. Pt is able to sit on EOB with good sitting balance for grooming activity in prep for OOB and increasing activity tolerance. Pt amb approx 12 ft around room next to bed with RW and CGA, safe management of RW and no LOB noted. Pt reports feeling better after mobility this date and is positioned for comfort back in bed due to transport coming to take to dialysis. Will continue to follow per POC.    Progression toward goals:   []      Improving appropriately and progressing toward goals  []      Improving slowly and progressing toward goals  []      Not making progress toward goals and plan of care will be adjusted     PLAN:  Patient continues to benefit from skilled intervention to address the above impairments. Continue treatment per established plan of care. Discharge Recommendations:  Home Health vs rehab pending progress and need for assist at home  Further Equipment Recommendations for Discharge:  rolling walker if pt does not own     SUBJECTIVE:   Patient stated I will sit up with yall.     OBJECTIVE DATA SUMMARY:   Critical Behavior:  Neurologic State: Alert  Orientation Level: Oriented X4  Cognition: Follows commands  Safety/Judgement: Fall prevention  Functional Mobility Training:  Bed Mobility:     Supine to Sit: Additional time;Contact guard assistance  Sit to Supine: Additional time;Contact guard assistance  Scooting: Stand-by assistance         Transfers:  Sit to Stand: Contact guard assistance (w/RW)       Balance:  Sitting: Intact  Standing: Impaired; With support     Ambulation/Gait Training:  Distance (ft): 12 Feet (ft)  Assistive Device: Walker, rolling  Ambulation - Level of Assistance: Contact guard assistance        Gait Abnormalities: Decreased step clearance       Speed/Bhumi: Slow;Shuffled  Step Length: Left shortened;Right shortened    Pain:  Pain level pre-treatment: 0/10  Pain level post-treatment: 0/10   Pain Intervention(s): Medication (see MAR); Rest, Ice, Repositioning   Response to intervention: Nurse notified    Activity Tolerance:   Goo  d  Please refer to the flowsheet for vital signs taken during this treatment. After treatment:   [] Patient left in no apparent distress sitting up in chair  [x] Patient left in no apparent distress in bed  [x] Call bell left within reach  [x] Nursing notified  [] Caregiver present  [] Bed alarm activated  [] SCDs applied      COMMUNICATION/EDUCATION:   [x]         Role of Physical Therapy in the acute care setting. [x]         Fall prevention education was provided and the patient/caregiver indicated understanding. [x]         Patient/family have participated as able in working toward goals and plan of care.   [x]         Patient/family agree to work toward stated goals and plan of care. []         Patient understands intent and goals of therapy, but is neutral about his/her participation.   []         Patient is unable to participate in stated goals/plan of care: ongoing with therapy staff.  []         Other:        Benito Roles   Time Calculation: 23 mins

## 2021-08-25 NOTE — PROGRESS NOTES
Problem: Dysphagia (Adult)  Goal: *Acute Goals and Plan of Care (Insert Text)  Description:   Patient will:  1. Tolerate PO trials with 0 s/s overt distress in 4/5 trials - met  2. Utilize compensatory swallow strategies/maneuvers (decrease bite/sip, size/rate, alt. liq/sol) with min cues in 4/5 trials - met    Rec:     Reg solid diet with thin liquids  Chopped meats  Aspiration precautions  HOB >45 during po intake, remain >30 for 30-45 minutes after po   Small bites/sips; alternate liquid/solid with slow feeding rate   Oral care TID  Meds per pt preference  Outcome: Resolved/Met   [facility]    Patient: Lindsay Espinosa (15 y.o. male)  Date: 8/25/2021  Diagnosis: Renal failure [N19]  Bradycardia [R00.1] <principal problem not specified>  Procedure(s) (LRB):  ESOPHAGOGASTRODUODENOSCOPY (EGD) (N/A) 2 Days Post-Op  Precautions: aspiration Fall  PLOF: As per H&P     ASSESSMENT:  Pt was seen at bedside for follow up dysphagia management. Pt tolerated reg solid and thin liquid + straw serial swallows without any overt s/sx of aspiration. Minimally delayed mastication observed secondary to limited natural dentition (baseline from evaluation). Laryngeal elevation/excursion was functional/timely to palpation. Recommend reg diet with thin liquids, chopped meats, aspiration precautions, oral care TID, and meds as tolerated. No further acute SLP services are indicated at this time as pt appears to be tolerating LRD without difficulty. Progression toward goals:  [x]         Goals met/approximated  []         Not making progress/Not appropriate - will d/c POC     PLAN:  Recommendations and Planned Interventions:  Maximum therapeutic gains met; safest, least restrictive diet achieved. D/C ST intervention at this time. Discharge Recommendations:  None     SUBJECTIVE:   Patient stated I'm just hanging in there the best I can lately.     OBJECTIVE:   Cognitive and Communication Status:  Neurologic State: Alert  Orientation Level: Oriented X4  Cognition: Follows commands  Perception: Appears intact  Perseveration: No perseveration noted  Safety/Judgement: Fall prevention  Dysphagia Treatment:  Oral Assessment:  Oral Assessment  Labial: No impairment  Dentition: Natural, Intact, Limited  Oral Hygiene: adequate  Lingual: No impairment  Velum: No impairment  Mandible: No impairment  P.O. Trials:   Patient Position: [facility] at [facility]   Vocal quality prior to P.O.: No impairment   Consistency Presented:  Thin liquid, Solid   How Presented: Self-fed/presented, Cup/sip, Straw   Bolus Acceptance: No impairment   Bolus Formation/Control: Impaired   Type of Impairment: Mastication   Propulsion: No impairment   Oral Residue: None   Initiation of Swallow: No impairment   Laryngeal Elevation: Functional   Aspiration Signs/Symptoms: None   Pharyngeal Phase Characteristics: No impairment, issues, or problems    Effective Modifications: Small sips and bites   Cues for Modifications: Minimal       Oral Phase Severity: Mild   Pharyngeal Phase Severity : No impairment      PAIN:  Start of Tx: 0  End of Tx: 0     After treatment:   []              Patient left in no apparent distress sitting up in chair  [x]              Patient left in no apparent distress in bed  [x]              Call bell left within reach  [x]              Nursing notified  []              Caregiver present  []              Bed alarm activated    COMMUNICATION/EDUCATION:   [x] Aspiration precautions; swallow safety; compensatory techniques  [x]        Patient/family able to participate in training and education   []  Patient unable to participate in education; education ongoing with staff  [] Patient understands goals/intent of therapy; neutral about participation     Thank you for this referral.    Tamra Delgado M.S. CCC-SLP/L  Speech-Language Pathologist

## 2021-08-25 NOTE — DIALYSIS
ACUTE HEMODIALYSIS FLOW SHEET    HEMODIALYSIS ORDERS: Physician: Dr. Wendelyn Schirmer: [facility]   Duration: 3.5 hr  BFR: 350   DFR: 600   Dialysate:  Temp 36   K+   2   Ca+  2.5   Na 138   Bicarb 30   Wt Readings from Last 1 Encounters:   08/25/21 107.1 kg (236 lb 1.8 oz)    Patient Chart [x]   Unable to Obtain []  Dry weight/UF Goal: 3000 ml   Heparin []  Bolus    Units    [] Hourly    Units    [x]None       Pre BP:   115/65    Pulse:  65   Respirations: 18    Temperature:  97.8  [] Oral [] Axillary  Tx: NSS   ml/Bolus   [x] N/A   Labs: []  Pre  []  Post:   [x] N/A   Additional Orders(medications, blood products, hypotension management): [x] Yes   [] No     [x]  Axel Consent Verified     CATHETER ACCESS:  []N/A   [x]Right   []Left   []IJ     []Fem   [x]Chest wall   [] First use X-ray verified     [x]Tunnel    [] Non Tunneled   [x]No S/S infection  []Redness  []Drainage []Cultured []Swelling []Pain   [x]Medical Aseptic Prep Utilized   [x]Dressing Changed  [] Biopatch  Date: 8/24/21   []Clotted   [x]Patent   Flows: [x]Good  []Poor  []Reversed   If access problem,  notified: []Yes    [x]N/A        GENERAL ASSESSMENT:    LUNGS:   SaO2%      [x] Clear  [] Coarse  [] Crackles  [] Wheezing                                                [] Diminished     Location : []RLL   []LLL    []RUL  []RONNIE   Cough: []Productive  []Dry  [x]N/A   Respirations:  [x]Easy  []Labored   Therapy:  [x]RA  []NC l/min    Mask: []NRB  [] Venti    O2%                  []Ventilator  []Intubated  [] Trach  [] BiPaP   CARDIAC: [x]Regular      [] Irregular   [] Pericardial Rub  [] JVD          []  Monitored  [] Bedside  [] Remotely monitored     EDEMA: [x] None  []Generalized  [] Pitting [] 1    [] 2    [] 3    [] 4                 [] Facial  [] Pedal  []  UE  [] LE   SKIN:   [x] Warm  [] Hot     [] Cold   [x] Dry     [] Pale   [] Diaphoretic                  [] Flushed  [] Jaundiced  [] Cyanotic  [] Rash  [] Weeping   LOC: [x] Alert      [x]Oriented:    [x] Person     [x] Place  [x]Time               [] Confused  [] Lethargic  [] Medicated  [] Non-responsive  [] Non-Verbal   GI / ABDOMEN:                     [] Flat    [] Distended    [x] Soft    [] Firm   []  Obese                   [] Diarrhea  [x] Bowel Sounds  [] Nausea  [] Vomiting     / URINE ASSESSMENT:                   [] Voiding   [] Oliguria  [x] Anuria   []  Elizabeth                  [] Incontinent  []  Incontinent Brief []  Fecal Management System     PAIN:  [x] 0 []1  []2   []3   []4   []5   []6   []7   []8   []9   []10            Scale 0-10  Action/Follow Up:    MOBILITY:  [x] Bed    [] Stretcher      All Vitals and Treatment Details on Attached [address]: [facility]          Room # 360/01   [] 1st Time Acute      [] Stat       [x] Routine      [] Urgent     [x] Acute Room  []  Bedside  [] ICU/CCU  [] ER   Isolation Precautions:  [x] Dialysis    There are currently no Active Isolations       ALLERGIES:     No Known Allergies   Code Status:  DNR     Hepatitis Status     Lab Results   Component Value Date/Time    Hepatitis B surface Ag <0.10 08/15/2021 09:50 PM    Hepatitis B surface Ab <3.10 (L) 08/15/2021 09:50 PM    Hepatitis B core, IgM Negative 08/24/2021 10:00 AM    Hep B Core Ab, total Negative 08/24/2021 10:15 AM    Hepatitis C virus Ab 0.28 12/07/2015 04:15 PM        Current Labs:      Lab Results   Component Value Date/Time    WBC 8.5 08/25/2021 02:58 AM    Hemoglobin, POC 10.2 (L) 03/11/2016 07:26 AM    HGB 7.6 (L) 08/25/2021 02:07 PM    Hematocrit, POC 30 (L) 03/11/2016 07:26 AM    HCT 23.8 (L) 08/25/2021 02:07 PM    PLATELET 676 12/19/4689 02:58 AM    MCV 86.7 08/25/2021 02:58 AM     Lab Results   Component Value Date/Time    Sodium 136 08/25/2021 02:58 AM    Potassium 4.4 08/25/2021 02:58 AM    Chloride 104 08/25/2021 02:58 AM    CO2 25 08/25/2021 02:58 AM    Anion gap 7 08/25/2021 02:58 AM    Glucose 79 08/25/2021 02:58 AM    BUN 63 (H) 08/25/2021 02:58 AM    Creatinine 7.38 (H) 08/25/2021 02:58 AM    BUN/Creatinine ratio 9 (L) 08/25/2021 02:58 AM    GFR est AA 9 (L) 08/25/2021 02:58 AM    GFR est non-AA 7 (L) 08/25/2021 02:58 AM    Calcium 8.6 08/25/2021 02:58 AM          DIET:  DIET ADULT ORAL NUTRITION SUPPLEMENT  DIET ADULT ORAL NUTRITION SUPPLEMENT  DIET ADULT      PRIMARY NURSE REPORT:   Pre Dialysis:  SYEDA Ta     Time: 1250        EDUCATION:    [x] Patient [] Other           Knowledge Basis: []None [x]Minimal [] Substantial   Barriers to learning  [x]N/A  []Intubated/Ventilated  []Sedated   [] Access Care     [] S&S of infection  [] Fluid Management  [] K+   [x] Procedural    []Albumin   [] Medications   [] Tx Options   [] Transplant   [] Diet   [] Other   Teaching Tools:  [x] Explain  [] Demo  [] Handouts [] Video  Patient response: [x] Verbalized understanding  [] Teach back  [] Return demonstration   [] Requires follow up      [x]Time Out/Safety Check  [x] Extracorporeal Circuit Tested for integrity       RO/HEMODIALYSIS MACHINE SAFETY CHECKS  Before each treatment:     Machine Number:                   [address] [facility]                                     [x] Unit Machine # 5 with centralized RO                                                                     Alarm Test:  Pass time 1225            [x] RO/Machine Log Complete    Machine Temp    36.0             Dialysate: pH  7.4    Conductivity: Meter 13.5     HD Machine  13.7      TCD: 13.7  Dialyzer Lot # [id number]     Blood Tubing Lot # [id number]   Saline Lot # [id number]     CHLORINE TESTING-Before each treatment and every 4 hours    Total Chlorine: [x] less than 0.1 ppm  Initial Time Check: 1300       4 Hr/2nd Check Time:    (if greater than 0.1 ppm from Primary then every 30 minutes from Secondary)     TREATMENT INITIATION  with Dialysis Precautions:   [x] All Connections Secured              [x] Saline Line Double Clamped   [x] Venous Parameters Set               [x] Arterial Parameters Set    [x] Prime Given 250ml NSS              [x]Air Foam Detector Engaged      Treatment Initiation Note:  4454; Pt arrived to HD without any complaints. Pt A &O X 3, follows commands, no distress noted on RA. Rt chest wall CVC assessed no abnormalities noted, line patent with good flow. CVC accessed without any difficulty. 1318;  Time out performed per policy, HD commenced, pt tolerated well. During Treatment Notes:  1330; pt alert and well VSS. Vascular access visible with arterial and venous line connections intact. 1400; Pt resting well no issue, VSS. Vascular access visible with arterial and venous line connections intact. 1430; pt resting comfortably, VSS. Vascular access visible with arterial and venous line connections intact. 1500; Pt alert and well, VSS. Vascular access visible with arterial and venous line connections intact. 1530; Pt alert and well, VSS. Vascular access visible with arterial and venous line connections intact. 1600; pt alert and well, HD in good progress, VSS. Vascular access visible with arterial and venous line connections intact. [id number]; Dialysis treatment completed. Medication Dose Volume Route Time [facility] Nurse, Title   None     JEWEL Clark RN     Post Assessment  Dialyzer Cleared:[] Good [x] Fair  [] Poor  Blood processed:  69.5 L  UF Removed:  3000 Ml  Post /64  Pulse  54 Resp  18   Temp 97.5  [x] Oral [] Axillary      CVC Catheter: [] N/A  Locking solution: Heparin 1:1000 U  Arterial port 1.6 ml   Venous port 1.6ml         Skin:[x] Warm  [x] Dry [] Diaphoretic               [] Flushed  [] Pale [] Cyanotic   Pain:  [x]0  []1 []2  []3 []4  []5  []6 []7 []8  []9  []10       Post Treatment Note:   1650;VSS. Dialysis catheter intact, patent and heplocked accordingly, Dressing clean, dry and intact. POST TREATMENT PRIMARY NURSE HANDOFF REPORT:   Post Dialysis:  SYEDA Ta                Time:  8554       Abbreviations: AVG-arterial venous graft, AVF-arterial venous fistula, IJ-Internal Jugular, Subcl-Subclavian, Fem-Femoral, Tx-treatment, AP/HR-apical heart rate, DFR-dialysate flow rate, BFR-blood flow rate, AP-arterial pressure, -venous pressure, UF-ultrafiltrate, TMP-transmembrane pressure, Mykel-Venous, Art-Arterial, RO-Reverse Osmosis

## 2021-08-25 NOTE — ROUTINE PROCESS
1908 Assumed care of patient from off going nurse, Lisandra Rose RN. Patient resting in bed. No distress noted. Call bell within reach, siderails up x 3, bed in lowest position, and patient instructed to use call bell for assistance. Will continue to monitor. Bed alarm activated. 0010 BS 67. Patient asymptomatic. OJ given. 0029 BS 84.    0625 .    0713 Bedside and Verbal shift change report given to Nirmal Pat RN (oncoming nurse) by Gomez Guthrie RN (offgoing nurse). Report included the following information SBAR, Intake/Output, MAR, Recent Results and Cardiac Rhythm Afib 40s-60s.

## 2021-08-26 LAB
BASOPHILS # BLD: 0 K/UL (ref 0–0.1)
BASOPHILS NFR BLD: 1 % (ref 0–2)
DIFFERENTIAL METHOD BLD: ABNORMAL
EOSINOPHIL # BLD: 0.1 K/UL (ref 0–0.4)
EOSINOPHIL NFR BLD: 2 % (ref 0–5)
ERYTHROCYTE [DISTWIDTH] IN BLOOD BY AUTOMATED COUNT: 16.9 % (ref 11.6–14.5)
GLUCOSE BLD STRIP.AUTO-MCNC: 122 MG/DL (ref 70–110)
GLUCOSE BLD STRIP.AUTO-MCNC: 79 MG/DL (ref 70–110)
GLUCOSE BLD STRIP.AUTO-MCNC: 88 MG/DL (ref 70–110)
HCT VFR BLD AUTO: 23.9 % (ref 36–48)
HCT VFR BLD AUTO: 24 % (ref 36–48)
HGB BLD-MCNC: 7.6 G/DL (ref 13–16)
HGB BLD-MCNC: 7.8 G/DL (ref 13–16)
LYMPHOCYTES # BLD: 0.7 K/UL (ref 0.9–3.6)
LYMPHOCYTES NFR BLD: 9 % (ref 21–52)
MCH RBC QN AUTO: 27.8 PG (ref 24–34)
MCHC RBC AUTO-ENTMCNC: 31.8 G/DL (ref 31–37)
MCV RBC AUTO: 87.5 FL (ref 78–100)
MONOCYTES # BLD: 0.9 K/UL (ref 0.05–1.2)
MONOCYTES NFR BLD: 12 % (ref 3–10)
NEUTS SEG # BLD: 6.1 K/UL (ref 1.8–8)
NEUTS SEG NFR BLD: 77 % (ref 40–73)
PLATELET # BLD AUTO: 199 K/UL (ref 135–420)
PMV BLD AUTO: 9.6 FL (ref 9.2–11.8)
RBC # BLD AUTO: 2.73 M/UL (ref 4.35–5.65)
WBC # BLD AUTO: 7.9 K/UL (ref 4.6–13.2)

## 2021-08-26 PROCEDURE — 65660000000 HC RM CCU STEPDOWN

## 2021-08-26 PROCEDURE — 74011250636 HC RX REV CODE- 250/636: Performed by: STUDENT IN AN ORGANIZED HEALTH CARE EDUCATION/TRAINING PROGRAM

## 2021-08-26 PROCEDURE — 97535 SELF CARE MNGMENT TRAINING: CPT

## 2021-08-26 PROCEDURE — 82962 GLUCOSE BLOOD TEST: CPT

## 2021-08-26 PROCEDURE — 97168 OT RE-EVAL EST PLAN CARE: CPT

## 2021-08-26 PROCEDURE — C9113 INJ PANTOPRAZOLE SODIUM, VIA: HCPCS | Performed by: HOSPITALIST

## 2021-08-26 PROCEDURE — 99232 SBSQ HOSP IP/OBS MODERATE 35: CPT | Performed by: HOSPITALIST

## 2021-08-26 PROCEDURE — 74011250637 HC RX REV CODE- 250/637: Performed by: NURSE PRACTITIONER

## 2021-08-26 PROCEDURE — 36415 COLL VENOUS BLD VENIPUNCTURE: CPT

## 2021-08-26 PROCEDURE — 85025 COMPLETE CBC W/AUTO DIFF WBC: CPT

## 2021-08-26 PROCEDURE — 74011000250 HC RX REV CODE- 250: Performed by: HOSPITALIST

## 2021-08-26 PROCEDURE — 74011250637 HC RX REV CODE- 250/637: Performed by: INTERNAL MEDICINE

## 2021-08-26 PROCEDURE — 97116 GAIT TRAINING THERAPY: CPT

## 2021-08-26 PROCEDURE — 97164 PT RE-EVAL EST PLAN CARE: CPT

## 2021-08-26 PROCEDURE — 85018 HEMOGLOBIN: CPT

## 2021-08-26 PROCEDURE — 74011250637 HC RX REV CODE- 250/637: Performed by: HOSPITALIST

## 2021-08-26 PROCEDURE — 74011250636 HC RX REV CODE- 250/636: Performed by: HOSPITALIST

## 2021-08-26 RX ORDER — LANOLIN ALCOHOL/MO/W.PET/CERES
325 CREAM (GRAM) TOPICAL 2 TIMES DAILY
Qty: 30 TABLET | Refills: 0 | Status: SHIPPED
Start: 2021-08-26 | End: 2021-09-25

## 2021-08-26 RX ORDER — PANTOPRAZOLE SODIUM 40 MG/1
40 TABLET, DELAYED RELEASE ORAL
Qty: 60 TABLET | Refills: 0 | Status: SHIPPED
Start: 2021-08-26 | End: 2021-09-25

## 2021-08-26 RX ORDER — LEVOFLOXACIN 500 MG/1
500 TABLET, FILM COATED ORAL
Qty: 2 TABLET | Refills: 0 | Status: SHIPPED
Start: 2021-08-27 | End: 2021-08-30

## 2021-08-26 RX ORDER — PANTOPRAZOLE SODIUM 40 MG/1
40 TABLET, DELAYED RELEASE ORAL
Status: DISCONTINUED | OUTPATIENT
Start: 2021-08-26 | End: 2021-08-27 | Stop reason: HOSPADM

## 2021-08-26 RX ORDER — LEVOFLOXACIN 500 MG/1
500 TABLET, FILM COATED ORAL
Status: DISCONTINUED | OUTPATIENT
Start: 2021-08-27 | End: 2021-08-27 | Stop reason: HOSPADM

## 2021-08-26 RX ORDER — AMLODIPINE BESYLATE 5 MG/1
5 TABLET ORAL DAILY
Qty: 30 TABLET | Refills: 0 | Status: SHIPPED
Start: 2021-08-26 | End: 2021-09-25

## 2021-08-26 RX ADMIN — HALOPERIDOL LACTATE 2 MG: 5 INJECTION, SOLUTION INTRAMUSCULAR at 02:45

## 2021-08-26 RX ADMIN — SODIUM CHLORIDE 10 ML: 9 INJECTION, SOLUTION INTRAMUSCULAR; INTRAVENOUS; SUBCUTANEOUS at 13:51

## 2021-08-26 RX ADMIN — PANTOPRAZOLE SODIUM 40 MG: 40 TABLET, DELAYED RELEASE ORAL at 16:53

## 2021-08-26 RX ADMIN — THERA TABS 1 TABLET: TAB at 08:16

## 2021-08-26 RX ADMIN — DOCUSATE SODIUM 100 MG: 100 CAPSULE, LIQUID FILLED ORAL at 08:16

## 2021-08-26 RX ADMIN — TAMSULOSIN HYDROCHLORIDE 0.4 MG: 0.4 CAPSULE ORAL at 08:16

## 2021-08-26 RX ADMIN — SODIUM CHLORIDE 40 MG: 9 INJECTION, SOLUTION INTRAMUSCULAR; INTRAVENOUS; SUBCUTANEOUS at 08:16

## 2021-08-26 RX ADMIN — Medication 10 ML: at 02:45

## 2021-08-26 NOTE — PROGRESS NOTES
Problem: Falls - Risk of  Goal: *Absence of Falls  Description: Document Bonnie Hernández Fall Risk and appropriate interventions in the flowsheet.   Outcome: Progressing Towards Goal  Note: Fall Risk Interventions:  Mobility Interventions: Patient to call before getting OOB    Mentation Interventions: Door open when patient unattended, More frequent rounding, Reorient patient, Eyeglasses and hearing aids, Bed/chair exit alarm    Medication Interventions: Bed/chair exit alarm, Teach patient to arise slowly, Patient to call before getting OOB    Elimination Interventions: Bed/chair exit alarm, Call light in reach, Patient to call for help with toileting needs    History of Falls Interventions: Bed/chair exit alarm, Investigate reason for fall, Door open when patient unattended, Room close to nurse's station

## 2021-08-26 NOTE — PROGRESS NOTES
RENAL DAILY PROGRESS NOTE              Subjective:       Complaint:breathing is much better  No chest pain or abdominal pain    IMPRESSION:   TONY on CKD stage 4, due to obstructive uropathy(urinary retention) s/p vines with close to 2 l of UO immediately. 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 On levaquin  GI bleed due to duodenal ulcers. S/p EGD 8/23  S/p left arm AVF(occluded) in past  S/p tunneled access placement 8/20/21  Hypertension  Hx neurogenic bladder  ACD  Secondary hyperparathyroidism  PAF   PLAN:   Ok for discharge from renal standpoint  He will need iv iron at unit once abx therapy is completed  He is for MWF- 5:30 am chair time(first day I.e - 8/27, he needs to come at 5:00 am to take care of paper work at unit).  -- [facility] unit  D/w            Current Facility-Administered Medications   Medication Dose Route Frequency    pantoprazole (PROTONIX) tablet 40 mg  40 mg Oral ACB&D    [START ON 8/27/2021] levoFLOXacin (LEVAQUIN) tablet 500 mg  500 mg Oral Q48H    heparin (porcine) 1,000 unit/mL injection 5,000 Units  5,000 Units IntraCATHeter DIALYSIS PRN    albumin human 25% (BUMINATE) solution 25 g  25 g IntraVENous DIALYSIS PRN    0.9% sodium chloride infusion 250 mL  250 mL IntraVENous PRN    epoetin negrita-epbx (RETACRIT) injection 8,000 Units  8,000 Units SubCUTAneous DIALYSIS TUE, THU & SAT    0.9% sodium chloride infusion 250 mL  250 mL IntraVENous PRN    insulin lispro (HUMALOG) injection   SubCUTAneous Q6H    haloperidol lactate (HALDOL) injection 2 mg  2 mg IntraVENous Q6H PRN    tamsulosin (FLOMAX) capsule 0.4 mg  0.4 mg Oral DAILY    therapeutic multivitamin (THERAGRAN) tablet 1 Tablet  1 Tablet Oral DAILY    alteplase (CATHFLO) 4 mg in sterile water (preservative free) 4 mL injection  4 mg InterCATHeter DIALYSIS ONCE    atropine 1 mg/mL injection 0.5 mg  0.5 mg IntraVENous PRN    sodium chloride (NS) flush 5-40 mL  5-40 mL IntraVENous Q8H    sodium chloride (NS) flush 5-40 mL  5-40 mL IntraVENous PRN    acetaminophen (TYLENOL) tablet 650 mg  650 mg Oral Q6H PRN    Or    acetaminophen (TYLENOL) suppository 650 mg  650 mg Rectal Q6H PRN    senna (SENOKOT) tablet 8.6 mg  1 Tablet Oral DAILY PRN    docusate sodium (COLACE) capsule 100 mg  100 mg Oral DAILY    glucose chewable tablet 16 g  4 Tablet Oral PRN    glucagon (GLUCAGEN) injection 1 mg  1 mg IntraMUSCular PRN    dextrose (D50W) injection syrg 12.5-25 g  25-50 mL IntraVENous PRN       Review of Symptoms: comprehensive ROS negative except above.    Objective:     Patient Vitals for the past 24 hrs:   Temp Pulse Resp BP SpO2   08/26/21 0833 98.5 °F (36.9 °C) 68 18 (!) 158/74 93 %   08/26/21 0436 98 °F (36.7 °C) 70 18 138/78 98 %   08/25/21 2325 97.5 °F (36.4 °C) 64 16 (!) 145/83    08/25/21 2024 97.3 °F (36.3 °C) 60 18 (!) 141/82    08/25/21 1650 97.5 °F (36.4 °C) (!) 54 18 113/64    08/25/21 1645  (!) 51  (!) 99/55    08/25/21 1630  (!) 57  108/68    08/25/21 1615  62  95/61    08/25/21 1600  62  (!) 117/59    08/25/21 1545  80  123/72    08/25/21 1530  (!) 55  114/69    08/25/21 1515  65  108/65    08/25/21 1500  70  133/77    08/25/21 1445  (!) 55  121/74    08/25/21 1430  64  128/71    08/25/21 1415  64  (!) 141/74    08/25/21 1400  64  130/70    08/25/21 1345  64  121/69    08/25/21 1330  (!) 58  136/77    08/25/21 1318  (!) 55  113/62    08/25/21 1314 97.8 °F (36.6 °C) 65 18 115/65    08/25/21 1103 97.4 °F (36.3 °C) (!) 55 17 (!) 143/75 100 %        Weight change:      08/24 1901 - 08/26 0700  In: 580 [P.O.:480; I.V.:100]  Out: 3065 [Urine:65]    Intake/Output Summary (Last 24 hours) at 8/26/2021 1011  Last data filed at 8/26/2021 7873  Gross per 24 hour   Intake 500 ml   Output 3030 ml Net -2530 ml     Physical Exam:     HEENT sclera anicteric, supple neck, no thyromegaly  CVS: S1S2 heard,  no rub  RS: rales at bases  Abd: Soft, Non tender, Not distended, Positive bowel sounds, no organomegaly, no CVA / supra pubic tenderness  Neuro: non focal, awake, alert , CN II-XII are grossly intact  Extrm: plus 1edema, no cyanosis, clubbing   Skin: no visible  Rash  Musculoskeletal: No gross joints or bone deformities         Data Review:     LABS:   Hematology:   Recent Labs     08/26/21  0310 08/25/21  2218 08/25/21  1407 08/25/21  0921 08/25/21  0258 08/24/21  2222 08/24/21  1431 08/24/21  1115 08/24/21  0942 08/24/21  0429 08/23/21  2238 08/23/21  1745   WBC 7.9  --   --   --  8.5  --   --   --   --  10.8  --   --    HGB 7.6* 7.5* 7.6* 7.1* 7.2* 7.1* 7.2* 7.3* 7.3* 7.0* 7.0* 7.4*   HCT 23.9* 23.9* 23.8* 22.4* 22.2* 22.7* 22.1* 23.0* 23.2* 21.8* 21.8* 22.6*     Chemistry:   Recent Labs     08/25/21  0258   BUN 63*   CREA 7.38*   CA 8.6   K 4.4         CO2 25   GLU 79            Procedures/imaging: see electronic medical records for all procedures, Xrays and details which were not copied into this note but were reviewed prior to creation of Plan          Assessment & Plan:       See above      Stephanie White MD  8/26/2021

## 2021-08-26 NOTE — PROGRESS NOTES
Hospitalist Progress Note    Patient: Lázaro Cruz MRN: [id number]  CSN: [id number]    YOB: 1950  Age: 70 y.o. Sex: male    DOA: 8/15/2021 LOS:  LOS: 11 days            Patient feels lot better today. No melena or hematochezia. Patient denies any shortness of breath, no chest pain. Assessment/Plan     1. Acute GI bleed with blood loss anemia: EGD on 8/22/2021 shows esophagitis, antral and duodenal ulcer. Start PO PPI. Will monitor H&H and transfuse as needed. 2. ESRD on HD, hemodialysis per nephrology. 3. Enterobacter bacteremia possibly due to UTI: Continue antibiotics per ID, repeat cultures negative. 4. Leukocytosis: Improved now. 5. Aspiration pneumonia: Continue antibiotics per ID  6. TONY on CKD 4 with obstructive uropathy, continue dialysis per nephrology. Permacath placement by vascular surgery 8/20/21.  7. Acute metabolic encephalopathy due to underlying infection, improved    8. A. fib with slow RVR. Possible sick sinus syndrome. Rates controlled. Continue full dose aspirin. Cardiology input noted, recommend to hold off AV blocking agents. 9. Severe protein calorie malnutrition: Continue nutritionist follows. On supplement. Multivitamin. 10. Diabetes type 2 with hypoglycemia. Sliding scale insulin. Hypoglycemia protocol  11. Complicated urinary tract infection with obstructive uropathy. ID to follow. Urology input noted. Urology recommend to continue Elizabeth, no voiding trials. 12. Hypertension: BP slightly elevated, will start low-dose of amlodipine  13. Moderate pulmonary hypertension  14. Hyperkalemia: resolved  Falls at home. PT OT. He has not been on anticoagulation in the outpatient due to falls. Full code  PT OT recommend SNF placement    Discussed with the patient and also with his son Georgia, both agree with the plan to discharge to SNF. Patient medically stable for transfer to SNF once bed available.   Discussed with case management    Case discussed with:  [x]Patient  [x]Family  [x]Nursing  []Case Management  DVT Prophylaxis:  []Lovenox  []Hep SQ  [x]SCDs  []Coumadin   []On Heparin gtt    Vital signs/Intake and Output:  Visit Vitals  BP (!) 158/74 (BP 1 Location: Right upper arm)   Pulse 68   Temp 98.5 °F (36.9 °C)   Resp 18   Ht 6' 1\" (1.854 m)   Wt 107.1 kg (236 lb 1.8 oz)   SpO2 93%   BMI 31.15 kg/m²       Exam  General:  Alert, cooperative, no acute distress, no tachypnea    Pulmonary: Bilaterally min basal rales. No Wheezing/Rales. Cardiovascular: Regular rate and Rhythm. GI:  Soft, Non distended, Non tender. + Bowel sounds. Extremities:  No edema. No calf tenderness. Neurologic: Alert and oriented X 3. No acute neuro deficits. Medications Reviewed      Labs: Results:       Chemistry Recent Labs     08/25/21  0258   GLU 79      K 4.4      CO2 25   BUN 63*   CREA 7.38*   CA 8.6   AGAP 7   BUCR 9*      CBC w/Diff Recent Labs     08/26/21  1420 08/26/21  0310 08/25/21  2218 08/25/21  0921 08/25/21  0258 08/24/21  0942 08/24/21  0429   WBC  --  7.9  --   --  8.5  --  10.8   RBC  --  2.73*  --   --  2.56*  --  2.53*   HGB 7.8* 7.6* 7.5*   < > 7.2*   < > 7.0*   HCT 24.0* 23.9* 23.9*   < > 22.2*   < > 21.8*   PLT  --  199  --   --  196  --  206   GRANS  --  77*  --   --  78*  --  82*   LYMPH  --  9*  --   --  8*  --  6*   EOS  --  2  --   --  2  --  2    < > = values in this interval not displayed. Cardiac Enzymes No results for input(s): CPK, CKND1, MAXIMILIAN in the last 72 hours. No lab exists for component: CKRMB, TROIP   Coagulation No results for input(s): PTP, INR, APTT, INREXT, INREXT in the last 72 hours.     Lipid Panel Lab Results   Component Value Date/Time    Cholesterol, total 208 (H) 03/22/2010 12:00 PM    HDL Cholesterol 55 03/22/2010 12:00 PM    LDL, calculated 138.4 (H) 03/22/2010 12:00 PM    VLDL, calculated 14.6 03/22/2010 12:00 PM    Triglyceride 73 03/22/2010 12:00 PM    CHOL/HDL Ratio 3.8 03/22/2010 12:00 PM BNP No results for input(s): BNPP in the last 72 hours. Liver Enzymes No results for input(s): TP, ALB, TBIL, AP in the last 72 hours.     No lab exists for component: SGOT, GPT, DBIL   Thyroid Studies Lab Results   Component Value Date/Time    TSH 2.42 08/15/2021 11:00 AM        Procedures/imaging: see electronic medical records for all procedures/Xrays and details which were not copied into this note but were reviewed prior to creation of Plan

## 2021-08-26 NOTE — PROGRESS NOTES
Problem: Falls - Risk of  Goal: *Absence of Falls  Description: Document Jenifer Frye Fall Risk and appropriate interventions in the flowsheet.   Outcome: Progressing Towards Goal  Note: Fall Risk Interventions:  Mobility Interventions: Patient to call before getting OOB    Mentation Interventions: Door open when patient unattended, More frequent rounding, Reorient patient, Eyeglasses and hearing aids, Bed/chair exit alarm    Medication Interventions: Bed/chair exit alarm, Teach patient to arise slowly, Patient to call before getting OOB    Elimination Interventions: Bed/chair exit alarm, Call light in reach, Patient to call for help with toileting needs    History of Falls Interventions: Bed/chair exit alarm, Investigate reason for fall, Door open when patient unattended, Room close to nurse's station         Problem: Patient Education: Go to Patient Education Activity  Goal: Patient/Family Education  Outcome: Progressing Towards Goal     Problem: Cardiac Output -  Decreased  Goal: *Vital signs within specified parameters  Outcome: Progressing Towards Goal  Goal: *Optimal cardiac output  Outcome: Progressing Towards Goal  Goal: *Absence of hypoxia  Outcome: Progressing Towards Goal  Goal: *Absence of peripheral edema  Outcome: Progressing Towards Goal  Goal: *Intravascular fluid volume and electrolyte balance  Outcome: Progressing Towards Goal     Problem: Patient Education: Go to Patient Education Activity  Goal: Patient/Family Education  Outcome: Progressing Towards Goal     Problem: Injury - Risk of, Adverse Drug Event  Goal: *Absence of adverse drug events  Outcome: Progressing Towards Goal  Goal: *Absence of medication errors  Outcome: Progressing Towards Goal  Goal: *Knowledge of prescribed medications  Outcome: Progressing Towards Goal     Problem: Patient Education: Go to Patient Education Activity  Goal: Patient/Family Education  Outcome: Progressing Towards Goal     Problem: Patient Education: Go to Patient Education Activity  Goal: Patient/Family Education  Outcome: Progressing Towards Goal     Problem: Acute Renal Failure: Day 2  Goal: Activity/Safety  Outcome: Progressing Towards Goal  Goal: Consults, if ordered  Outcome: Progressing Towards Goal  Goal: Diagnostic Test/Procedures  Outcome: Progressing Towards Goal  Goal: Nutrition/Diet  Outcome: Progressing Towards Goal  Goal: Discharge Planning  Outcome: Progressing Towards Goal  Goal: Medications  Outcome: Progressing Towards Goal  Goal: Respiratory  Outcome: Progressing Towards Goal  Goal: Treatments/Interventions/Procedures  Outcome: Progressing Towards Goal  Goal: Psychosocial  Outcome: Progressing Towards Goal  Goal: *Optimal pain control at patient's stated goal  Outcome: Progressing Towards Goal  Goal: *Urinary output within identified parameters  Outcome: Progressing Towards Goal  Goal: *Hemodynamically stable  Outcome: Progressing Towards Goal  Goal: *Tolerating diet  Outcome: Progressing Towards Goal  Goal: *Lab values improving  Outcome: Progressing Towards Goal     Problem: Acute Renal Failure: Day 3  Goal: Off Pathway (Use only if patient is Off Pathway)  Outcome: Progressing Towards Goal  Goal: Activity/Safety  Outcome: Progressing Towards Goal  Goal: Consults, if ordered  Outcome: Progressing Towards Goal  Goal: Diagnostic Test/Procedures  Outcome: Progressing Towards Goal  Goal: Nutrition/Diet  Outcome: Progressing Towards Goal  Goal: Discharge Planning  Outcome: Progressing Towards Goal  Goal: Medications  Outcome: Progressing Towards Goal  Goal: Respiratory  Outcome: Progressing Towards Goal  Goal: Treatments/Interventions/Procedures  Outcome: Progressing Towards Goal  Goal: Psychosocial  Outcome: Progressing Towards Goal  Goal: *Optimal pain control at patient's stated goal  Outcome: Progressing Towards Goal  Goal: *Urinary output within identified parameters  Outcome: Progressing Towards Goal  Goal: *Hemodynamically stable  Outcome: Progressing Towards Goal  Goal: *Tolerating diet  Outcome: Progressing Towards Goal  Goal: *Lab values improving  Outcome: Progressing Towards Goal     Problem: Acute Renal Failure: Day 4  Goal: Off Pathway (Use only if patient is Off Pathway)  Outcome: Progressing Towards Goal  Goal: Activity/Safety  Outcome: Progressing Towards Goal  Goal: Consults, if ordered  Outcome: Progressing Towards Goal  Goal: Diagnostic Test/Procedures  Outcome: Progressing Towards Goal  Goal: Nutrition/Diet  Outcome: Progressing Towards Goal  Goal: Discharge Planning  Outcome: Progressing Towards Goal  Goal: Medications  Outcome: Progressing Towards Goal  Goal: Respiratory  Outcome: Progressing Towards Goal  Goal: Treatments/Interventions/Procedures  Outcome: Progressing Towards Goal  Goal: Psychosocial  Outcome: Progressing Towards Goal  Goal: *Optimal pain control at patient's stated goal  Outcome: Progressing Towards Goal  Goal: *Urinary output within identified parameters  Outcome: Progressing Towards Goal  Goal: *Hemodynamically stable  Outcome: Progressing Towards Goal  Goal: *Tolerating diet  Outcome: Progressing Towards Goal  Goal: *Lab values improving  Outcome: Progressing Towards Goal     Problem: Acute Renal Failure: Day 5  Goal: Off Pathway (Use only if patient is Off Pathway)  Outcome: Progressing Towards Goal  Goal: Activity/Safety  Outcome: Progressing Towards Goal  Goal: Diagnostic Test/Procedures  Outcome: Progressing Towards Goal  Goal: Nutrition/Diet  Outcome: Progressing Towards Goal  Goal: Discharge Planning  Outcome: Progressing Towards Goal  Goal: Medications  Outcome: Progressing Towards Goal  Goal: Respiratory  Outcome: Progressing Towards Goal  Goal: Treatments/Interventions/Procedures  Outcome: Progressing Towards Goal  Goal: Psychosocial  Outcome: Progressing Towards Goal  Goal: *Optimal pain control at patient's stated goal  Outcome: Progressing Towards Goal  Goal: *Urinary output within identified parameters  Outcome: Progressing Towards Goal  Goal: *Hemodynamically stable  Outcome: Progressing Towards Goal  Goal: *Tolerating diet  Outcome: Progressing Towards Goal  Goal: *Lab values improving  Outcome: Progressing Towards Goal     Problem: Acute Renal Failure: Day 6  Goal: Off Pathway (Use only if patient is Off Pathway)  Outcome: Progressing Towards Goal  Goal: Activity/Safety  Outcome: Progressing Towards Goal  Goal: Diagnostic Test/Procedures  Outcome: Progressing Towards Goal  Goal: Nutrition/Diet  Outcome: Progressing Towards Goal  Goal: Discharge Planning  Outcome: Progressing Towards Goal  Goal: Medications  Outcome: Progressing Towards Goal  Goal: Respiratory  Outcome: Progressing Towards Goal  Goal: Treatments/Interventions/Procedures  Outcome: Progressing Towards Goal  Goal: Psychosocial  Outcome: Progressing Towards Goal     Problem: Acute Renal Failure: Discharge Outcomes  Goal: *Optimal pain control at patient's stated goal  Outcome: Progressing Towards Goal  Goal: *Urinary output within identified parameters  Outcome: Progressing Towards Goal  Goal: *Hemodynamically stable  Outcome: Progressing Towards Goal  Goal: *Tolerating diet  Outcome: Progressing Towards Goal  Goal: *Lab values stabilized  Outcome: Progressing Towards Goal  Goal: *Verbalizes understanding and describes medication purposes and frequencies  Outcome: Progressing Towards Goal  Goal: *Medication reconciliation  Outcome: Progressing Towards Goal

## 2021-08-26 NOTE — PROGRESS NOTES
1930: Bedside and Verbal shift change report given to Marcelo Howell RN (oncoming nurse) by Iraida Short RN (offgoing nurse). Report included the following information SBAR, Kardex, Intake/Output, MAR, Recent Results and Cardiac Rhythm . Assumed care of patient. Patient resting in bed with family at bedside. No distress noted. No needs expressed at this time. 2100: Patient attempting to get out of bed without assistance. Patient seems more confused, will not answer questions appropriately. 2300: Patient pulled out PIV. Still attempting to get out of bed without assistance. Blood glucose obtained. 0245: Patient attempting to get out of bed. Patient disoriented to place, time and situation. Patient states he is calling the police and thinks he is at a school and people are doing drugs. Reoriented patient. Medicated per MAR.    0400: Patient resting in bed. Awake and cooperative at this time. 0430: Vitals obtained. Elizabeth emptied. Safety measures in place    0550: Patient resting in bed, BG obtained. No coverage needed. 0700: Bedside and Verbal shift change report given to Kenton Julien RN (oncoming nurse) by Marcleo Howell RN (offgoing nurse). Report included the following information SBAR, Kardex, Intake/Output, MAR and Recent Results.

## 2021-08-26 NOTE — DISCHARGE SUMMARY
Transfer Summary    Patient: Lázaro Cruz MRN: [id number]  CSN: [id number]    YOB: 1950  Age: 70 y.o. Sex: male    DOA: 8/15/2021 LOS:  LOS: 12 days   Discharge Date: 8/27/2021     Disposition: Transfer to SNF    Admission Diagnoses: Renal failure [N19]  Bradycardia [R00.1]    Discharge Diagnoses:    1. Acute GI bleed with blood loss anemia: EGD on 8/22/2021 shows esophagitis, antral and duodenal ulcer. 2. ESRD on HD  3. Complicated urinary tract infection with obstructive uropathy. 4. Enterobacter bacteremia possibly due to UTI. 5. Leukocytosis. Improved now  6. Aspiration pneumonia  7. 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 8. Acute metabolic encephalopathy, improved    9. A. fib with slow RVR. 10. Severe protein calorie malnutrition  11. Diabetes type 2  12. Hypertension  13. Moderate pulmonary hypertension  14. Hyperkalemia: resolved    Discharge Condition: Stable    PHYSICAL EXAM  Visit Vitals  /74   Pulse 74   Temp 98.5 °F (36.9 °C) (Oral)   Resp 18   Ht 6' 1\" (1.854 m)   Wt 107.1 kg (236 lb 1.8 oz)   SpO2 91%   BMI 31.15 kg/m²       General: Alert, cooperative, no acute distress    Lungs:             Bilaterally clear. No Wheezing/Rales. Heart:             Regular rate and Rhythm. Abdomen: Soft, Non distended, Non tender. + Bowel sounds. Extremities: No edema. Psych:   Not anxious or agitated. Neurologic:  AA, oriented X 3. Moves all ext                                 Hospital Course:   Patient is a 74 y. o. male with  pmhx of afib, bradycardia, CKD stage 4, bladder neck obstruction, chronic indwelling vines ( vines removed by urology on 8/5/21) who presented to the ED on 8/15/21 with  2 weeks of progressive weakness, falls and syncope. On initial presentation patient was bradycardic (underlying rhythm afib) with HR in the 40's and hyperkalemic w/ K+ of 6.2,   and creatinine of  14.6.  Patient was given  calcium gluconate, insulin, d50, 1 amp bicarb, and 80 mg IV lasix. A vines catheter was placed w/ > 2L UOP. Nephrology was consulted for emergent IHD. Patient  was transferred to the ICU for further management. Patient underwent hemodialysis in the ICU, patient responded well. Hyperkalemia resolved. Bradycardia improved. Patient did continue to remain confused. It was thought that recommendable consolability due to an infection. Patient was on empiric antibiotics for concerning for aspiration pneumonia and also for complicated UTI. Patient on admission was noted to have a urinary retention with more than 2 L of urine in the bladder. Patient had Vines catheter placed and bladder decompression was done. Patient improved and he was eventually transferred out of ICU. Patient mental status slowly improved in the hospital with aggressive antibiotic treatment. Patient also grew Enterobacter in the blood, ID was consulted. ID thought Enterobacter is most likely from UTI. Repeat cultures were done which were negative. Patient had worsening leukocytosis in the hospital, ID did not think this infection. It was later realized that patient started having GI bleed. Patient was started on IV PPI and GI was consulted. GI recommended upper GI endoscopy. Patient underwent upper GI endoscopy which showed esophagitis, antral and duodenal ulcer. GI recommended PPI twice daily. GI signed off on the patient. Patient is bleeding resolved, is hemoglobin hematocrit remained stable. Patient has been continued on hemodialysis in the hospital.  Nephrology discussed with vascular surgery and tunneled dialysis catheter was placed. Patient is currently being cleared by ID for discharge. Nephrology already arranged outpatient dialysis on Monday Wednesday Friday. Nephrology cleared the patient for discharge. In view of his obstructive uropathy, urology was consulted. Patient has known history of BPH. Urology saw the patient and recommended continue Vines cath.   Urology recommended no voiding trials but they will see the patient in the outpatient and make further plans. Patient also had A. fib with slow RVR, cardiology was consulted. Cardiology recommended no need for AV blocking agents. Recommended to continue current treatment. Patient not a candidate for anticoagulation due to fall. Cardiology signed off on the patient. Patient in the hospital was seen and eval by PT/OT recommended SNF placement. Patient and family wants patient to go to SNF, patient will be transferred to SNF once bed available. Discussed with the patient and also with his son over the phone Mr. Ramila Araiza about the discharge plan, follow-up appointments, new medications and side effects of medication. Both of them understood and agreed with my plan. Procedures:   Upper GI endoscopy by gastroenterology  Tunneled dialysis catheter placement by vascular surgery      Consults:   Dr. Nazanin Ramírez, gastroenterology  Dr. Jennifer Alva, urology  Dr. Lucero Munson, vascular surgery  Dr. Sarahi Lezama, infectious disease  Dr. Sarahi Lezama, cardiology    Imaging studies:   08/15/21    ECHO ADULT FOLLOW-UP OR LIMITED 08/17/2021 8/17/2021    Interpretation Summary  · LV: Estimated LVEF is 55 - 60%. Visually measured ejection fraction. Normal cavity size and systolic function (ejection fraction normal). Mild concentric hypertrophy. · TV: Mild tricuspid valve regurgitation is present. · PA: Moderate pulmonary hypertension. Pulmonary arterial systolic pressure is 61 mmHg. · IVC: Severely elevated central venous pressure (15 mmHg); IVC diameter is larger than 21 mm and collapses less than 50% with respiration. Signed by: Parveen Chin MD on 8/17/2021 11:20 AM      Discharge Medications:     Current Discharge Medication List      START taking these medications    Details   levoFLOXacin (LEVAQUIN) 500 mg tablet Take 1 Tablet by mouth every fourty-eight (48) hours for 2 doses.   Qty: 2 Tablet, Refills: 0      pantoprazole (PROTONIX) 40 mg tablet Take 1 Tablet by mouth Before breakfast and dinner for 30 days. Qty: 60 Tablet, Refills: 0         CONTINUE these medications which have CHANGED    Details   ferrous sulfate 325 mg (65 mg iron) tablet Take 1 Tablet by mouth two (2) times a day for 30 days. Qty: 30 Tablet, Refills: 0      amLODIPine (NORVASC) 5 mg tablet Take 1 Tablet by mouth daily for 30 days. Qty: 30 Tablet, Refills: 0         CONTINUE these medications which have NOT CHANGED    Details   finasteride (PROSCAR) 5 mg tablet TAKE 1 TABLET BY MOUTH DAILY      vitamin E, dl,tocopheryl acet, (vitamin E acetate) 45 mg (100 unit) capsule Take 100 Units by mouth daily. docusate sodium (COLACE) 100 mg capsule Take 100 mg by mouth daily as needed for Constipation. cholecalciferol (VITAMIN D3) 1,000 unit tablet Take 1 Tab by mouth daily. Indications: VITAMIN D DEFICIENCY  Qty: 30 Tab, Refills: 0      polyethylene glycol (MIRALAX) 17 gram packet Take 1 Packet by mouth daily. Qty: 30 Packet, Refills: 0      insulin lispro (HUMALOG) 100 unit/mL injection SubCUTAneous route Before breakfast, lunch, dinner and at bedtime. For Blood Sugar (mg/dL) of:     Less than 150 =   0 units           150 -199 =   2 units  200 -249 =   4 units  250 -299 =   6 units  300 -349 =   8 units  350 and above =  10 units  Qty: 1 Vial, Refills: 0      Blood-Glucose Meter monitoring kit As directed  Qty: 1 Kit, Refills: 0      multivitamin (ONE A DAY) tablet Take 1 Tab by mouth daily. atorvastatin (LIPITOR) 10 mg tablet Take 10 mg by mouth daily. tamsulosin (FLOMAX) 0.4 mg capsule Take 0.4 mg by mouth daily.          STOP taking these medications       losartan (COZAAR) 50 mg tablet Comments:   Reason for Stopping:         metOLazone (ZAROXOLYN) 2.5 mg tablet Comments:   Reason for Stopping:         pravastatin (PRAVACHOL) 20 mg tablet Comments:   Reason for Stopping:         prazosin (MINIPRESS) 5 mg capsule Comments:   Reason for Stopping: simvastatin (ZOCOR) 40 mg tablet Comments:   Reason for Stopping:         hydrALAZINE (APRESOLINE) 100 mg tablet Comments:   Reason for Stopping:         hydrALAZINE (APRESOLINE) 100 mg tablet Comments:   Reason for Stopping:         furosemide (LASIX) 40 mg tablet Comments:   Reason for Stopping:         carvedilol (COREG) 12.5 mg tablet Comments:   Reason for Stopping:         Insulin Syringe-Needle U-100 (INSULIN SYRINGE) 1 mL 28 x 1/2\" syrg Comments:   Reason for Stopping:         Lancets (ONETOUCH ULTRASOFT LANCETS) misc Comments:   Reason for Stopping:         aspirin (ASPIRIN) 325 mg tablet Comments:   Reason for Stopping:                   DIET:  Diabetic Diet and Renal Diet    ACTIVITY: Activity as tolerated  Patient needs to be on Fall, aspiration, decubitus precaution. ·    PT/OT consult  ·             Speech consult  ·  Accuchecks  QAC and QHS  ·  Elizabeth Care per routine    ADDITIONAL INFORMATION: If you experience any of the following symptoms but not limited to Fever, chills, nausea, vomiting, diarrhea, change in mentation, falling, bleeding, shortness of breath, chest pain, please call your primary care physician or return to the emergency room if you cannot get hold of your doctor:     FOLLOW UP CARE:  Follow-up with 1. Physician at SNF in 1-2 days with Cbc with diff, bmp, mg.                             2. Dr. Hernan Vega, cardio in 2 week                           3. Dr. Kit Cabrera, GI in 1 month                           4. Dr. Curtis Hua, urology in 1-2 week                           5. HD on MWF shift at [address]     Pt's PCP: Luis Pagan MD.    Minutes spent on discharge: >40 minutes spent coordinating this discharge (review instructions/follow-up, prescriptions, preparing report for sign off)    Roberto Chaparro MD  8/27/2021 4:30 PM      Disclaimer: Sections of this note are dictated using utilizing voice recognition software. Minor typographical errors may be present.  If questions arise, please do not hesitate to contact me or call our department.

## 2021-08-26 NOTE — PROGRESS NOTES
Problem: Self Care Deficits Care Plan (Adult)  Goal: *Acute Goals and Plan of Care (Insert Text)  Description: Occupational Therapy Goals  Initiated 8/20/2021 within 7 day(s), re-evaluation 8/26/2021-pt making steady progress towards goals    1. Patient will perform bed mobility for ADLs with supervision/set-up. 2.  Patient will perform grooming seated EOB with supervision/set-up for 5 minutes with Fair+ balance. 3.  Patient will perform upper body dressing with supervision/set-up. 4.  Patient will perform toilet transfers with moderate assistance . 5. Patient will perform all aspects of toileting with moderate assistance . 6. Patient will participate in upper extremity therapeutic exercise/activities with supervision/set-up for 8 minutes. 7.  Patient will utilize energy conservation techniques during functional activities with verbal cues. Prior Level of Function: Pt is not a good historian, reports he lives with his spouse and was independent in ADLs and functional mobility using cane. Outcome: Progressing Towards Goal   OCCUPATIONAL THERAPY RE-EVALUATION    Patient: Vinny Joyner (9 y.o. male)  Date: 8/26/2021  Primary Diagnosis: Renal failure [N19]  Bradycardia [R00.1]  Procedure(s) (LRB):  ESOPHAGOGASTRODUODENOSCOPY (EGD) (N/A) 3 Days Post-Op   Precautions:   Fall  PLOF: Pt is not a good historian, reports he lives with his spouse and was independent in ADLs and functional mobility using cane. ASSESSMENT :  Nursing/RN cleared for pt to participate in OT re-evaluation and tx session. Patient presents sitting on edge of bed, agreeable to participate with functional mobility using RW to sit up in recliner to address simulated toilet transfer.  CGA sit to stand from bed w/ vc's for correct hand placement to puch up from edge of bed versus pulling up on RW, CGA using RW, vc's for turning RW and keeping RW in front of self for balance and fall prevention, CGA stand to sit w/ vc's for hand placement for reaching back for arm rests. Patient washed face w/ SBA seated in recliner w/ BLEs elevated, call bell within reach & pt verbalized understanding to utilize for assist e.g. functional transfers in order to prevent falls. Patient will benefit from skilled intervention to address the above impairments. Patient's rehabilitation potential is considered to be Good  Factors which may influence rehabilitation potential include:   []             None noted  []             Mental ability/status  []             Medical condition  []             Home/family situation and support systems  [x]             Safety awareness  []             Pain tolerance/management  []             Other:      PLAN :  Recommendations and Planned Interventions:   [x]               Self Care Training                  [x]      Therapeutic Activities  [x]               Functional Mobility Training   []      Cognitive Retraining  [x]               Therapeutic Exercises           [x]      Endurance Activities  [x]               Balance Training                    [x]      Neuromuscular Re-Education  []               Visual/Perceptual Training     [x]      Home Safety Training  [x]               Patient Education                   [x]      Family Training/Education  []               Other (comment):    Frequency/Duration: Patient will be followed by occupational therapy 1-2 times per day/4-7 days per week to address goals. Discharge Recommendations: Home Health with 24/7 supv and assist e.g. ADLs  Further Equipment Recommendations for Discharge: bedside commode, rolling walker, and wheelchair      SUBJECTIVE:   Patient stated Am I going home today?     OBJECTIVE DATA SUMMARY:   Hospital course since last seen and reason for reevaluation:  assessment of progress achieved toward OT goals  Past Medical History:   Diagnosis Date    Atrial fibrillation ([facility] [address]     Cerebrovascular accident ([facility] [address]     [address]- general weakness    Chronic diastolic heart failure (HCC)     Chronic kidney disease     dialysis    Diabetes ([facility] [address]     Heart failure ([facility] [address]     History of cardioversion     Hypercholesterolemia     Hypertension     Morbid obesity ([facility] [address]      Past Surgical History:   Procedure Laterality Date    HX VASCULAR ACCESS Right     right neck     Barriers to Learning/Limitations: None  Compensate with: visual, verbal, tactile, kinesthetic cues/model    Home Situation:   Home Situation  Home Environment: Apartment  # Steps to Enter: 1  One/Two Story Residence: One story  Living Alone: No  Support Systems: Child(michelle), Spouse/Significant Other/Partner  Patient Expects to be Discharged to[de-identified] Other (comment) (back to 3N Rm 360)  Current DME Used/Available at Home: Cane, straight, Walker, rolling  Tub or Shower Type: Tub/Shower combination  []  Right hand dominant   []  Left hand dominant    Cognitive/Behavioral Status:  Neurologic State: Alert  Orientation Level: Oriented X4  Cognition: Follows commands  Safety/Judgement: Fall prevention    Skin: appears intact    Edema: none noted      Vision/Perceptual:  appears intact         Coordination: BUE  Coordination: Within functional limits  Fine Motor Skills-Upper: Left Intact; Right Intact    Gross Motor Skills-Upper: Left Intact; Right Intact    Balance:  Sitting: Intact; With support  Standing: Impaired; With support  Standing - Static: Fair  Standing - Dynamic : Fair    Strength: BUE  Strength: Generally decreased, functional    Tone & Sensation: BUE  Tone: Normal  Sensation: Intact     Range of Motion: BUE  AROM: Within functional limits     Functional Mobility and Transfers for ADLs:  Bed Mobility:     Supine to Sit: Minimum assistance; Additional time  Sit to Supine: Minimum assistance; Additional time     Transfers:  Sit to Stand: Contact guard assistance  Stand to Sit: Setup     Bed to Chair: Contact guard assistance     ADL Assessment:        Oral Facial Hygiene/Grooming: Stand-by assistance    Bathing: Other (comment) (pt refused)    Upper Body Dressing: Other (comment) (pt refused)    Lower Body Dressing: Other (comment) (pt refused)    Toileting: Other (comment) (pt refused)     ADL Intervention:     Washed face with encouragement, SBA    Cognitive Retraining  Safety/Judgement: Fall prevention    Pain:  Pain level pre-treatment: 0/10   Pain level post-treatment: 0/10    Activity Tolerance:   poor  Please refer to the flowsheet for vital signs taken during this treatment. After treatment:   [x] Patient left in no apparent distress sitting up in chair  [] Patient left in no apparent distress in bed  [x] Call bell left within reach  [x] Nursing notified  [] Caregiver present  [] Bed alarm activated    COMMUNICATION/EDUCATION:   [x] Role of Occupational Therapy in the acute care setting  [x] Home safety education was provided and the patient/caregiver indicated understanding. [x] Patient/family have participated as able in goal setting and plan of care. [x] Patient/family agree to work toward stated goals and plan of care. [] Patient understands intent and goals of therapy, but is neutral about his/her participation. [] Patient is unable to participate in goal setting and plan of care.     Thank you for this referral.  Nahed Hill  Time Calculation: 15 mins

## 2021-08-26 NOTE — DIALYSIS
AXEL        ACUTE HEMODIALYSIS FLOW SHEET      HEMODIALYSIS ORDERS: Physician: ***     Dialyzer: revaclear   Duration: *** hr  BFR: ***   DFR: ***   Dialysate:  Temp 36-37*C  K+   ***    Ca+  *** Na *** Bicarb ***   Weight:  *** kg    Patient Chart []     Unable to Obtain []   Dry weight/UF Goal: ***   Access: ***  Needle Gauge: ***    Heparin*** []  Bolus      Units    [] Hourly       Units    []***None      Catheter locking solution: heparin ***   Pre BP:   ***    Pulse:     ***       Respirations: ***  Temperature:   ***   Labs: Pre        Post:       ***  [] N/A   Additional Orders(medications, blood products, hypotension management):   ***    [] N/A     [x] Axel Consent Verified     CATHETER ACCESS:*** []N/A   []Right   []Left   []IJ     []Fem   []transhepatic   [] First use X-ray verified     []Permanent                [x] Temporary   [x]No S/S infection  []Redness  []Drainage []Cultured []Swelling []Pain   [x]Medical Aseptic Prep Utilized   []Dressing Changed  [x] Biopatch  Date: ***      []Clotted   [x]Patent   Flows: [x]Good  []Poor  []Reversed   If access problem,  notified: []Yes    [x]N/A  Date:    ***       GRAFT/FISTULA ACCESS: *** []N/A     []Right     []Left     []UE     []LE   []AVG   []AVF        []Buttonhole    [x]Medical Aseptic Prep Utilized   [x]No S/S infection  []Redness  []Drainage []Cultured []Swelling []Pain    Bruit:   [x] Strong    [] Weak       Thrill :   [x] Strong    [] Weak       Needle Gauge: 15   Length: 1   If access problem,  notified: []Yes     [x]N/A  Date:        Please describe access if present and not used:***                            GENERAL ASSESSMENT:      LUNGS:  Rate *** SaO2%   ***     [] N/A    [x] Clear  [] Coarse  [] Crackles  [] Wheezing        [] Diminished     Location : []RLL   []LLL    []RUL  []RONNIE     Cough: []Productive  []Dry  [x]N/A   Respirations:  [x]Easy  []Labored     Therapy:   [x]RA  []NC *** l/min    Mask: []NRB []Venti       O2% []Ventilator  []Intubated  [] Trach  [] BiPaP     CARDIAC: [x]Regular      [] Irregular   [] Pericardial Rub  [] JVD        []  Monitored  [] Bedside  [] Remotely monitored [x] N/A  Rhythm: ***     EDEMA: [] None ***  [x]Generalized  [] Pitting [] 1    [] 2    [] 3    [] 4                 [] Facial  [] Pedal  []  UE  [] LE     SKIN:   [x] Warm ***  [] Hot     [] Cold   [x] Dry     [] Pale   [] Diaphoretic                  [] Flushed  [] Jaundiced  [] Cyanotic  [] Rash  [] Weeping     LOC:    [x] Alert   ***   [x]Oriented:    [x] Person     [x] Place  [x]Time               [] Confused  [] Lethargic  [] Medicated  [] Non-responsive     GI / ABDOMEN:***  [] Flat    [] Distended    [x] Soft    [] Firm   []  Obese                             [] Diarrhea  [x] Bowel Sounds  [] Nausea  [] Vomiting       / URINE ASSESSMENT:***[] Voiding   [x] Oliguria  [] Anuria   []  Elizabeth     [] Incontinent    []  Incontinent Brief      []  Bathroom Privileges       PAIN: [x] 0 []1  []2   []3   []4   []5   []6   []7   []8   []9   []10              Scale 0-10  Action/Follow Up:      MOBILITY:  [] Amb    [] Amb/Assist    [x] Bed    [] Wheelchair  [] Stretcher      All Vitals and Treatment Details on Attached [address]: [facility]          Room # 360/01      [] 1st Time Acute  [] Stat  [x] Routine  [] Urgent     [] Acute Room *** [x]  Bedside  [] ICU/CCU  [] ER   Isolation Precautions:   There are currently no Active Isolations      Special Considerations:         [] Blood Consent Verified [x]N/A ***     ALLERGIES:   No Known Allergies            Code Status:DNR        Hepatitis Status:    2nd RN check: ***                    Lab Results   Component Value Date/Time    Hepatitis B surface Ag <0.10 08/15/2021 09:50 PM    Hepatitis B surface Ab <3.10 (L) 08/15/2021 09:50 PM    Hepatitis B core, IgM Negative 08/24/2021 10:00 AM    Hep B Core Ab, total Negative 08/24/2021 10:15 AM    Hepatitis C virus Ab 0.28 12/07/2015 04:15 PM Current Labs:   Lab Results   Component Value Date/Time    Sodium 136 08/25/2021 02:58 AM    Potassium 4.4 08/25/2021 02:58 AM    Chloride 104 08/25/2021 02:58 AM    CO2 25 08/25/2021 02:58 AM    Anion gap 7 08/25/2021 02:58 AM    Glucose 79 08/25/2021 02:58 AM    BUN 63 (H) 08/25/2021 02:58 AM    Creatinine 7.38 (H) 08/25/2021 02:58 AM    BUN/Creatinine ratio 9 (L) 08/25/2021 02:58 AM    GFR est AA 9 (L) 08/25/2021 02:58 AM    GFR est non-AA 7 (L) 08/25/2021 02:58 AM    Calcium 8.6 08/25/2021 02:58 AM      Lab Results   Component Value Date/Time    WBC 7.9 08/26/2021 03:10 AM    Hemoglobin, POC 10.2 (L) 03/11/2016 07:26 AM    HGB 7.6 (L) 08/26/2021 03:10 AM    Hematocrit, POC 30 (L) 03/11/2016 07:26 AM    HCT 23.9 (L) 08/26/2021 03:10 AM    PLATELET 788 27/99/5974 03:10 AM    MCV 87.5 08/26/2021 03:10 AM                                                                                     DIET:   DIET ADULT ORAL NUTRITION SUPPLEMENT  DIET ADULT ORAL NUTRITION SUPPLEMENT  DIET ADULT       PRIMARY NURSE REPORT: First initial/Last name/Title      Pre Dialysis: ***    Time: ***      EDUCATION:    [x] Patient [] Other         Knowledge Basis: []None [x]Minimal [] Substantial   Barriers to learning  [x]N/A   [] Access Care     [] S&S of infection     [] Fluid Management     []K+     [x]Procedural    []Albumin     [] Medications     [] Tx Options     [] Transplant     [] Diet     [] Other   Teaching Tools:  [x] Explain  [x] Demo  [] Handouts [] Video  Patient response:  [x] Verbalized understanding  [] Teach back  [] Return demonstration [] Requires follow up   Inappropriate due to:       ***     [x]Time Out/Safety Check  [x]Extracorporeal Circuit Tested for integrity       RO/HEMODIALYSIS MACHINE SAFETY CHECKS  Before each treatment:     Machine Number:                   [facility]                                   [] Unit Machine # *** with centralized RO [] Portable Machine #1/RO serial # [id number]                                  [] Portable Machine #2/RO serial # [id number]                                  [] Portable Machine #4/RO serial # [id number]                                                     [facility]                                  [] Portable Machine #11/RO serial # [id number]                                   [] Portable Machine #12/RO serial # [id number]                                  [] Portable Machine #13/RO serial #  [id number]      Alarm Test:  Pass time ***               [x] RO/Machine Log Complete      Temp   36             Dialysate: pH  *** Conductivity: Meter   ***     HD Machine   ***                  TCD: ***  Dialyzer Lot # ***            Blood Tubing Lot # ***          Saline Lot #  ***     CHLORINE TESTING-Before each treatment and every 4 hours    Total Chlorine: [x] less than 0.1 ppm  Time: *** 4 Hr/2nd Check Time: ***   (if greater than 0.1 ppm from Primary then every 30 minutes from Secondary)     TREATMENT INITIATION  with Dialysis Precautions:   [x] All Connections Secured                 [x] Saline Line Double Clamped   [x] Venous Parameters Set                  [x] Arterial Parameters Set    [x] Prime Given 250ml                          [x]Air Foam Detector Engaged      Treatment Initiation Note:            During Treatment Notes:***              Medication Dose Volume Route Time [facility] name Title   *** *** *** *** *** CHERIE Mejia RN   *** *** *** *** *** CHERIE Mejia RN   *** *** *** *** *** CHERIE Mejia RN   ***   *** *** *** *** CHERIE Mejia RN                   Post Assessment:   Dialyzer Cleared: [] Good [x] Fair  [] Poor  Blood processed:  *** L  UF Removed  *** Ml  POst BP:   ***       Pulse: ***        Respirations: ***  Temperature: *** Lungs:***     [] Clear      [x] Course         [] Crackles    [] Wheezing         [] Diminished   Post Tx Vascular Access:   AVF/AVG: Bleeding stopped   Art 10 min. Mykel.  10 Min   N/A*** Cardiac:***   [x] Regular   [] Irregular   [] Monitor  [] N/A      Rhythm:       Catheter:   Locking solution: Heparin 1:1000   Art. ***  Mykel. ***  N/A***    Skin:   Pain:   [x] Warm  [x] Dry [] Diaphoretic    [] Flushed    [] Pale [] Cyanotic [x]0  []1  []2   []3  []4   []5   []6   []7   []8   []9   []10     Post Treatment Note:  Pt tolerated treatment well. Net *** L UF removed.      POST TREATMENT PRIMARY NURSE HANDOFF REPORT:     First initial/Last name/Title         Post Dialysis: *** Time:  ***     Abbreviations: AVG-arterial venous graft, AVF-arterial venous fistula, IJ-Internal Jugular, Subcl-Subclavian, Fem-Femoral, Tx-treatment, AP/HR-apical heart rate, DFR-dialysate flow rate, BFR-blood flow rate, AP-arterial pressure, -venous pressure, UF-ultrafiltrate, TMP-transmembrane pressure, Mykel-Venous, Art-Arterial, RO-Reverse Osmosis

## 2021-08-26 NOTE — PROGRESS NOTES
Received a call from Carmie Cogan at [facility] for [address] stating authorization for SNF placement has been approved with [facility] reference number [id number] -- for five (5) days to begin tomorrow (08/27/21) next SNF review date will be 08/31/21.  for SNF stay is Yanira Varner ([phone]) and fax number is [phone]. They will call  Ever  with the authorization information also. Informed Leilani of conversation with Carmie Cogan from [facility].

## 2021-08-26 NOTE — PROGRESS NOTES
0158-CM spoke with pt's son Georgia and daughter KIRILL. Both inform me that the pt is now agreeable to SNF placement. Family to call CM back with their SNF choice as well as confirmation that someone will be able to transport pt to and from hemodialysis. CM sent clinicals to [facility] to attempt to obtain insurance authorization. 1361- CM called Alycia Rowe to inquire on pt's chairtime in order to assist the family with tranport arrangements. Chair time is unknown at this time and was informed someone will update CM once arranged. 1000- Daughter called back. They would like for pt to go to [facility]. CM called Sylvia Garcia and left a voicemail asking for a call back. 1100- CM spoke with Sylvia Garcia from [facility]. [facility] does not have a bed available until next week for male pt. Sylvia Garcia offered pt a bed at [facility]. CM contacted pt's daughter and asked if she would agree to [facility]. Daughter is agreeable but would like for someone from the facility to reach out to her. CM also informed the daughter that pt's dialysis days have changed to MWF with a chair time of 530am. Daughter states \"not happy about that, wish it was a later time, but we'll have to do it. \" in regards to transportation arrangements. 1115- CM called Gisela back and informed her that they are agreeable to [facility]. CM will contact Gisela when [address] received from [facility]. 1334- CM called Brigette to check on the status of SNF auth. CM spoke with Rolf Vance whom states that the documents and request are currently under review. 1615- CM spoke with Dr Jacinto Bingham and informed him that [address] was received for SNF for tomorrow. Plan is for pt to get dialyzed tomorrow am and then discharge to [facility].        Tiny Cordova RN BSN  Care Manager  [phone]

## 2021-08-26 NOTE — PROGRESS NOTES
1945: Bedside shift change report given to Ken Alvarado RN (oncoming nurse) by Robin Jacques RN (offgoing nurse). Report included the following information SBAR, Kardex, Procedure Summary, Intake/Output, MAR and Cardiac Rhythm AFIB.   3403: Called report to Olla Prader-- dialysis nurse on pt. Awaiting transport. Dialysis cancelled. Bedside shift change report given to Ashlie Scruggs RN (oncoming nurse) by Ken Alvarado RN (offgoing nurse). Report included the following information SBAR, Kardex, Intake/Output, MAR and Cardiac Rhythm AFIB.

## 2021-08-26 NOTE — PROGRESS NOTES
Problem: Mobility Impaired (Adult and Pediatric)  Goal: *Acute Goals and Plan of Care (Insert Text)  Description: Physical Therapy Goals  Initiated 8/19/2021, re-evaled 8/26/21 and to be accomplished within 7 day(s)  1. Patient will move from supine to sit and sit to supine  in bed with modified independence. 2.  Patient will transfer from bed to chair and chair to bed with modified independence using the least restrictive device. 3.  Patient will perform sit to stand with modified independence. 4.  Patient will ambulate with modified independence for 200 feet with the least restrictive device. PLOF: Pt is an unreliable historian and confused on eval, stating he lives with his wife. He was able to amb short distances with a RW and his wife helped him around. Outcome: Progressing Towards Goal     PHYSICAL THERAPY RE-EVALUATION    Patient: Chasity Malcolm (72 y.o. male)  Date: 8/26/2021  Primary Diagnosis: Renal failure [N19]  Bradycardia [R00.1]  Procedure(s) (LRB):  ESOPHAGOGASTRODUODENOSCOPY (EGD) (N/A) 3 Days Post-Op   Precautions:  Fall    ASSESSMENT :  Based on the objective data described below, the patient presents with generalized weakness, decreased functional mobility tolerance, and slight unsteadiness with amb. RN cleared for mobility. Pt found semi-reclined in bed, NC out of his nose, assisted with putting back in 1.5 LNC, willing to work with PT. Sup-sit with Karen and additional time. Once sitting, pt requires more time for rest before standing. STS with CGA and RW, pt was able to amb 15 ft around room and back with widened gait. Once back sitting, he required ~3 min rest before 2nd round of 15 ft amb. Pt back sitting for exercises per flow sheet. Karen for sit-supine. Pt left with HOB elevated, call bell nearby, edu on keeping nC in his nose. Patient will benefit from skilled intervention to address the above impairments.   Patient's rehabilitation potential is considered to be Fair  Factors which may influence rehabilitation potential include:   []         None noted  [x]         Mental ability/status  [x]         Medical condition  [x]         Home/family situation and support systems  [x]         Safety awareness  []         Pain tolerance/management  []         Other:      PLAN :  Recommendations and Planned Interventions:   [x]           Bed Mobility Training             [x]    Neuromuscular Re-Education  [x]           Transfer Training                   []    Orthotic/Prosthetic Training  [x]           Gait Training                          []    Modalities  [x]           Therapeutic Exercises           []    Edema Management/Control  [x]           Therapeutic Activities            [x]    Family Training/Education  [x]           Patient Education  []           Other (comment):    Frequency/Duration: Patient will be followed by physical therapy 1-2 times per day/4-7 days per week to address goals. Discharge Recommendations: Rehab  Further Equipment Recommendations for Discharge: N/A     SUBJECTIVE:   Patient stated I will walk out the door tommorrow.     OBJECTIVE DATA SUMMARY:   Hospital course since last seen and reason for re-evaluation: pt has slowly progressed with therapy, able to amb longer distances each session. However, pt still requires assist getting in/out of bed and has slight instability with short amb.    Past Medical History:   Diagnosis Date    Atrial fibrillation ([address]     Cerebrovascular accident ([facility]     [address]- general weakness    Chronic diastolic heart failure ([address]     Chronic kidney disease     dialysis    Diabetes ([address]     Heart failure ([address]     History of cardioversion     Hypercholesterolemia     Hypertension     Morbid obesity ([facility]      Past Surgical History:   Procedure Laterality Date    HX VASCULAR ACCESS Right     right neck     Barriers to Learning/Limitations: None  Compensate with: N/A  Home Situation:   Home Situation  Home Environment: Apartment  # Steps to Enter: 1  One/Two Story Residence: One story  Living Alone: No  Support Systems: Child(michelle), Spouse/Significant Other/Partner  Patient Expects to be Discharged to[de-identified] Other (comment) (back to [address])  Current DME Used/Available at Home: Cane, straight, Walker, rolling  Tub or Shower Type: Tub/Shower combination  Critical Behavior:  Neurologic State: Alert  Orientation Level: Oriented X4  Cognition: Follows commands;Poor safety awareness  Safety/Judgement: Fall prevention  Psychosocial  Patient Behaviors: Calm; Cooperative  Family  Behaviors: Appropriate for situation  Needs Expressed: Educational;Social/Environmental  Purposeful Interaction: Yes  Pt Identified Daily Priority: Clinical issues (comment)  Caritas Process: Teaching/learning; Attend basic human needs;Create healing environment;Supportive expression  Caring Interventions: Reassure; Therapeutic modalities  Reassure: Therapeutic listening; Informing;Caring rounds  Therapeutic Modalities: Intentional therapeutic touch;Humor     Family  Behaviors: Appropriate for situation     Skin Integumentary  Skin Color: Hyperpigmentation     Strength:    Strength: Generally decreased, functional    Tone & Sensation:   Tone: Normal    Sensation: Intact    Range Of Motion:  AROM: Grossly decreased, non-functional    Functional Mobility:  Bed Mobility:     Supine to Sit: Minimum assistance; Additional time  Sit to Supine: Minimum assistance; Additional time     Transfers:  Sit to Stand: Contact guard assistance  Stand to Sit: Contact guard assistance    Balance:   Sitting: Intact; With support  Standing: Impaired; With support  Standing - Static: Fair  Standing - Dynamic : Fair    Ambulation/Gait Training:  Distance (ft): 15 Feet (ft) (x2)  Assistive Device: Walker, rolling  Ambulation - Level of Assistance: Contact guard assistance        Gait Abnormalities: Decreased step clearance        Base of Support: Center of gravity altered; Widened     Speed/Bhumi: Shuffled; Slow  Step Length: Right shortened;Left shortened    Therapeutic Exercises:   Pt performed exercises per flow sheet sitting on EOB      EXERCISE   Sets   Reps   Active Active Assist   Passive Self ROM   Comments   Ankle Pumps 1 15  [x] [] [] []    Quad Sets/Glut Sets   [] [] [] []    Hamstring Sets   [] [] [] []    Short Arc Quads   [] [] [] []    Heel Slides   [] [] [] []    Straight Leg Raises   [] [] [] []    Hip Abd/Add   [] [] [] []    Long Arc Quads 1 10 [x] [] [] []    Seated Marching 1 10 [x] [] [] []    Standing Marching   [] [] [] []       [] [] [] []       Pain:  Pain level pre-treatment: 0/10   Pain level post-treatment: 0/10   Pain Intervention(s) : Medication (see MAR); Rest, Ice, Repositioning   Response to intervention: Nurse notified, See doc flow    Activity Tolerance:   Pt tolerated mobility fair  Please refer to the flowsheet for vital signs taken during this treatment. After treatment:   []         Patient left in no apparent distress sitting up in chair  [x]         Patient left in no apparent distress in bed  [x]         Call bell left within reach  [x]         Nursing notified  []         Caregiver present  []         Bed alarm activated  []         SCDs applied    COMMUNICATION/EDUCATION:   [x]         Role of Physical Therapy in the acute care setting. [x]         Fall prevention education was provided and the patient/caregiver indicated understanding. [x]         Patient/family have participated as able in goal setting and plan of care. []         Patient/family agree to work toward stated goals and plan of care. []         Patient understands intent and goals of therapy, but is neutral about his/her participation. []         Patient is unable to participate in goal setting/plan of care: ongoing with therapy staff.  []         Other:     Thank you for this referral.  Young Cho   Time Calculation: 25 mins

## 2021-08-27 VITALS
SYSTOLIC BLOOD PRESSURE: 119 MMHG | HEART RATE: 56 BPM | HEIGHT: 73 IN | BODY MASS INDEX: 31.29 KG/M2 | TEMPERATURE: 98.2 F | RESPIRATION RATE: 18 BRPM | OXYGEN SATURATION: 94 % | WEIGHT: 236.11 LBS | DIASTOLIC BLOOD PRESSURE: 69 MMHG

## 2021-08-27 LAB
ALBUMIN SERPL-MCNC: 2.5 G/DL (ref 3.4–5)
ANION GAP SERPL CALC-SCNC: 7 MMOL/L (ref 3–18)
BUN SERPL-MCNC: 57 MG/DL (ref 7–18)
BUN/CREAT SERPL: 7 (ref 12–20)
CALCIUM SERPL-MCNC: 8.6 MG/DL (ref 8.5–10.1)
CHLORIDE SERPL-SCNC: 103 MMOL/L (ref 100–111)
CO2 SERPL-SCNC: 25 MMOL/L (ref 21–32)
CREAT SERPL-MCNC: 8.33 MG/DL (ref 0.6–1.3)
GLUCOSE BLD STRIP.AUTO-MCNC: 101 MG/DL (ref 70–110)
GLUCOSE BLD STRIP.AUTO-MCNC: 86 MG/DL (ref 70–110)
GLUCOSE SERPL-MCNC: 86 MG/DL (ref 74–99)
HCT VFR BLD AUTO: 22.9 % (ref 36–48)
HGB BLD-MCNC: 7.4 G/DL (ref 13–16)
PHOSPHATE SERPL-MCNC: 5.6 MG/DL (ref 2.5–4.9)
POTASSIUM SERPL-SCNC: 4.6 MMOL/L (ref 3.5–5.5)
SODIUM SERPL-SCNC: 135 MMOL/L (ref 136–145)

## 2021-08-27 PROCEDURE — 99239 HOSP IP/OBS DSCHRG MGMT >30: CPT | Performed by: HOSPITALIST

## 2021-08-27 PROCEDURE — 85018 HEMOGLOBIN: CPT

## 2021-08-27 PROCEDURE — 74011250636 HC RX REV CODE- 250/636: Performed by: INTERNAL MEDICINE

## 2021-08-27 PROCEDURE — 80069 RENAL FUNCTION PANEL: CPT

## 2021-08-27 PROCEDURE — 90935 HEMODIALYSIS ONE EVALUATION: CPT

## 2021-08-27 PROCEDURE — 82962 GLUCOSE BLOOD TEST: CPT

## 2021-08-27 PROCEDURE — 74011250637 HC RX REV CODE- 250/637: Performed by: HOSPITALIST

## 2021-08-27 RX ADMIN — LEVOFLOXACIN 500 MG: 500 TABLET, FILM COATED ORAL at 14:54

## 2021-08-27 RX ADMIN — HEPARIN SODIUM 4000 UNITS: 1000 INJECTION INTRAVENOUS; SUBCUTANEOUS at 13:57

## 2021-08-27 NOTE — PROGRESS NOTES
Transition of Care Plan to SNF/Rehab    SNF/Rehab Transition:  Patient has been accepted to [address] [place] [address] and meets criteria for admission. Pt's family requesting CM to set up stretcher transport and will pay out of pocket for the transport. CM set pt up for Transport with [facility]. [employer] to  around 330-4pm this afternoon. Family to make arrangements to pay out of pocket. Communication to Patient/Family:  Met with patient and called pt's daughter KIRILL and son Georgia (identified care giver) and they are agreeable to the transition plan. Communication to SNF/Rehab:  Bedside RN, has been notified of the transition plan to the facility and to call report (phone number [phone]). Discharge information has been updated on the AVS. And communicated to facility via [facility], or CC link. SNF/Rehab Transition:    PCP/Specialist: Dank Velasquez Please include all hard scripts for controlled substances, med rec and dc summary, and AVS in packet.      Reviewed and confirmed with facility representative, Nancy Sanford at [facility] they can manage the patient care needs for the following:     [place] with (X) only those applicable:    COVID Status  Patient has had the Covid vaccine No .   Patient is Covid Negative        Medication:  [x]  Medications will be available at the facility  []  IV Antibiotics   []  Controlled Substance - hard copy to be sent with patient   []  Weekly Labs    Documents:  [] Hard RX  Number sent   [] MAR  [] Kardex  [] AVS  [] Wound care note  [x]Transfer Summary/Discharge Summary    Equipment:  []  CPAP/BiPAP  []  Wound Vacuum  []  Elizabeth or Urinary Device  []  PICC/Central Line  []  Nebulizer  []  Ventilator    Treatment:  []Isolation (for MRSA, VRE, etc.)  []Surgical Drain Management  []Tracheostomy Care  []Dressing Changes  [x]Dialysis with transportation and chair time [address] Mode of tranpsort Family  []PEG Care  []Oxygen  []Daily Weights for Heart Failure [facility] [address] [facility], [address] [place] [address]  [phone]    Go every Monday, Wednesday and Friday  First session Monday 8/30/21 arrival time 5am  All other sessions will begin at 530am    Family to transport   Dietary:  []Any diet limitations  []Tube Feedings   []Total Parenteral Management (TPN)    Eligible for Medicaid Long Term Services and Supports  Yes:  [] Eligible for medical assistance or will become eligible within 180 days and LTSS completed. [] Provider/Patient and/or support system has requested screening. [x] LTSS copy provided to patient or responsible party, hard copy sent with pt.  [] LTSS unavailable at discharge will send once processed to SNF provider.  [] LTSS  unavailable at discharged mailed to patient  [] LTSS performed by outside agency  on  with tracking number   No:   [] Private pay and is not financially eligible for Medicaid within the next 180 days. [] Reside out-of-state.   [] A residents of a state owned/operated facility that is licensed  by [address] and [employer] or [facility]  [] Enrollment in [facility] hospice services  [] [address]  [] Patient /Family declines to have screening completed or provide financial information for screening          Financial Resources:  Medicaid    [] Initiated and application pending   [] Full coverage      Advanced Care Plan:  []Surrogate Decision Maker of Care  []POA  [x]Communicated Code Status/ sent DNR    Other      Tiny Cordova RN BSN  Care Manager  [phone]

## 2021-08-27 NOTE — PROGRESS NOTES
Received verbal and bedside report from [facility], report included SBAR, MAR, and Kardex. Patient was resting in bed, watching television, HOB elevated, bed in lowest position and oriented to call button. Problem: Falls - Risk of  Goal: *Absence of Falls  Description: Document Angie Mcgee Fall Risk and appropriate interventions in the flowsheet. Outcome: Progressing Towards Goal  Note: Fall Risk Interventions:  Mobility Interventions: Assess mobility with egress test    Mentation Interventions: Adequate sleep, hydration, pain control    Medication Interventions: Bed/chair exit alarm    Elimination Interventions: Bed/chair exit alarm    History of Falls Interventions: Bed/chair exit alarm    Per RN, patient was due to be discharged earlier in the day, discharge was not completed, the peripheral IV was pulled and the patient refused to have another IV inserted. 0114 - Patient refused lab draw, phlebotomist will move lab draw to 0700 for another attempt. Patient was very upset, that nurse would not allow him to sit on the side of the bed, explained to patient that was a safely issue. The patient made two attempts to get out of bed, but was unable to pull himself back up in the bed without the help of the nurses. Patient stated, \"I need you to put my feet on the floor, so I can sit on the side of the bed. \"    [id number] - Asked patient again if I get vitals and blood glucose, pt allowed me to get vitals and blood glucose.     Gave bedside report to [place], report included SBAR, MAR, and Kardex

## 2021-08-27 NOTE — PROGRESS NOTES
1023-LETTY called pt's daughter BODØ and informed her of discharge today to [address] after dialysis. LETTY informed BODØ of dialysis days and times once discharged. Family will be able to transport him. Pt walked 30ft with physical therapy yeterday. LETTY informed BODØ that pt will not qualify for stretcher transport to the facility as well. BODØ asked if CM could contact pt's son Georgia to see if he can transport pt to the facility this afternoon. LETTY discussed with Georgia over the phone. Georgia states that he will have to call CM back once he's made arrangements. 80- Pt's son Georgia called stating that he wanted CM to arrange for transport back to the facility and they will pay. LETTY called around looking for 330-4 pm availability. LETTY spoke with Vidya at [facility] transport [phone]. They can accommodate this transport time. They are requesting the family to call with payment information. LETTY emailed johana Steel their information and he will contact them.       Jayden Oro RN BSN  Care Manager  [phone]

## 2021-08-27 NOTE — PROGRESS NOTES
[facility]: [phone] ([phone])  [facility]: [phone]    Goals of care defined. Palliative team will sign off. Please consult team as needed, if appropriate. POST form on file. Plan to transfer to SNF level of care. Thank you for the Palliative Medicine consult and allowing us to participate in the care of Mr. Rodney Lazo. CODE STATUS  DNR/DNI Limited Interventions.  Feeding Tube for a defined trial period in consult with medical team.       Zack WALDENN, RN, [facility]  Palliative Medicine Inpatient RN  [address]: [phone]

## 2021-08-27 NOTE — PROGRESS NOTES
RENAL DAILY PROGRESS NOTE            69y M with PMH DM, HTN, CHF, CKD, admitted for uremia, hyperklaemia, following for renal failure   Subjective:       Complaint:  Examined during dialysis. IMPRESSION:   TONY on CKD stage 4, due to obstructive uropathy(urinary retention) s/p vines with close to 2 l of UO immediately. 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 On levaquin  GI bleed due to duodenal ulcers. S/p EGD   S/p left arm AVF(occluded) in past  S/p tunneled access placement 21  Hypertension  Hx neurogenic bladder  ACD  Secondary hyperparathyroidism  PAF   PLAN:   Tolerating HD well, okay to discharge from renal stand point. He is for [facility]- 5:30 am chair time(first day I.e - , he needs to come at 5:00 am to take care of paper work at unit). -- [address] [facility] unit  D/w Cayla Villalba   On 2021, I saw and examined patient during hemodialysis treatment. The patient was receiving hemodialysis for treatment of  renal failure. I have also reviewed vital signs, intake and output, lab results and recent events, and agreed with today's dialysis order. HD rounding    Blood pressure 112/68, pulse 65, temperature 98.5 °F (36.9 °C), temperature source Oral, resp. rate 18, height 6' 1\" (1.854 m), weight 107.1 kg (236 lb 1.8 oz), SpO2 91 %.   Temp (24hrs), Av.2 °F (36.8 °C), Min:97.8 °F (36.6 °C), Max:98.5 °F (36.9 °C)      Blood Pressure: BP: 112/68  Pulse: Pulse (Heart Rate): 65  Temp:  Temp: 98.5 °F (36.9 °C)    Artificial Kidney Dialyzer/Set Up Inspection: Revaclear   hours Duration of Treatment (hours): 3.5 hours   Heparin Bolus Heparin Bolus (units): 0 units  Blood flow rate Blood Flow Rate (ml/min): 350 ml/min   Dialysate rate     Arterial Access Pressure Arterial Access Pressure (mmHg): -150  Venous Return Pressure Venous Return Pressure (mmHg): 140  Ultrafiltration Rate Goal/Amount of Fluid to Remove (mL): 3000 mL  Fluid Removal Fluid Removed (mL): 3500  Net Fluid Removal NET Fluid Removed (mL): 3000 ml          Current Facility-Administered Medications   Medication Dose Route Frequency    pantoprazole (PROTONIX) tablet 40 mg  40 mg Oral ACB&D    levoFLOXacin (LEVAQUIN) tablet 500 mg  500 mg Oral Q48H    heparin (porcine) 1,000 unit/mL injection 5,000 Units  5,000 Units IntraCATHeter DIALYSIS PRN    albumin human 25% (BUMINATE) solution 25 g  25 g IntraVENous DIALYSIS PRN    epoetin negrita-epbx (RETACRIT) injection 8,000 Units  8,000 Units SubCUTAneous DIALYSIS TUE, THU & SAT    insulin lispro (HUMALOG) injection   SubCUTAneous Q6H    haloperidol lactate (HALDOL) injection 2 mg  2 mg IntraVENous Q6H PRN    tamsulosin (FLOMAX) capsule 0.4 mg  0.4 mg Oral DAILY    therapeutic multivitamin (THERAGRAN) tablet 1 Tablet  1 Tablet Oral DAILY    alteplase (CATHFLO) 4 mg in sterile water (preservative free) 4 mL injection  4 mg InterCATHeter DIALYSIS ONCE    atropine 1 mg/mL injection 0.5 mg  0.5 mg IntraVENous PRN    sodium chloride (NS) flush 5-40 mL  5-40 mL IntraVENous Q8H    sodium chloride (NS) flush 5-40 mL  5-40 mL IntraVENous PRN    acetaminophen (TYLENOL) tablet 650 mg  650 mg Oral Q6H PRN    Or    acetaminophen (TYLENOL) suppository 650 mg  650 mg Rectal Q6H PRN    senna (SENOKOT) tablet 8.6 mg  1 Tablet Oral DAILY PRN    docusate sodium (COLACE) capsule 100 mg  100 mg Oral DAILY    glucose chewable tablet 16 g  4 Tablet Oral PRN    glucagon (GLUCAGEN) injection 1 mg  1 mg IntraMUSCular PRN    dextrose (D50W) injection syrg 12.5-25 g  25-50 mL IntraVENous PRN       Review of Symptoms: comprehensive ROS negative except above.    Objective:     Patient Vitals for the past 24 hrs:   Temp Pulse Resp BP SpO2   08/27/21 1200  65  112/68    08/27/21 1145  (!) 58  130/78    08/27/21 1130  (!) 41  130/73    08/27/21 1115  60  129/73    08/27/21 1100  (!) 52  136/69    08/27/21 1045  (!) 58  122/78    08/27/21 1030  (!) 47  127/68    08/27/21 1023 98.5 °F (36.9 °C) (!) 47 18 138/74    08/27/21 0857 98.3 °F (36.8 °C) 100 22 133/89 91 %   08/27/21 0524 98.4 °F (36.9 °C) 60 18 136/78 100 %   08/27/21 0024 97.8 °F (36.6 °C) 61 20 (!) 159/84 100 %   08/26/21 1952 98.1 °F (36.7 °C) 67 20 131/72 96 %   08/26/21 1532 98.3 °F (36.8 °C) 62 20 (!) 125/57 100 %        Weight change:      08/25 1901 - 08/27 0700  In: 620 [P.O.:520;  I.V.:100]  Out: 80 [Urine:80]    Intake/Output Summary (Last 24 hours) at 8/27/2021 1230  Last data filed at 8/27/2021 0531  Gross per 24 hour   Intake 120 ml   Output 50 ml   Net 70 ml     Physical Exam:     HEENT sclera anicteric, supple neck, no thyromegaly  CVS: S1S2 heard,  no rub  RS: rales at bases  Abd: Soft, Non tender, Not distended, Positive bowel sounds, no organomegaly, no CVA / supra pubic tenderness  Neuro: non focal, awake, alert , CN II-XII are grossly intact  Extrm: plus 1edema, no cyanosis, clubbing   Skin: no visible  Rash  Musculoskeletal: No gross joints or bone deformities         Data Review:     LABS:   Hematology:   Recent Labs     08/26/21  1420 08/26/21  0310 08/25/21  2218 08/25/21  1407 08/25/21  0921 08/25/21  0258 08/24/21  2222 08/24/21  1431   WBC  --  7.9  --   --   --  8.5  --   --    HGB 7.8* 7.6* 7.5* 7.6* 7.1* 7.2* 7.1* 7.2*   HCT 24.0* 23.9* 23.9* 23.8* 22.4* 22.2* 22.7* 22.1*     Chemistry:   Recent Labs     08/27/21  1029 08/25/21  0258   BUN 57* 63*   CREA 8.33* 7.38*   CA 8.6 8.6   ALB 2.5*  --    K 4.6 4.4   * 136    104   CO2 25 25   PHOS 5.6*  --    GLU 86 79            Procedures/imaging: see electronic medical records for all procedures, Xrays and details which were not copied into this note but were reviewed prior to creation of Plan          Assessment & Plan:       See above      Gagandeep Dove MD  8/27/2021

## 2021-08-27 NOTE — DIALYSIS
ACUTE HEMODIALYSIS FLOW SHEET    HEMODIALYSIS ORDERS: Physician: Dr. Jessica Rowant: [place]   Duration:  3.5 hr  BFR: 350   DFR: 600   Dialysate:  Temp 36   K+   2    Ca+  2.5   Na 138   Bicarb 30   Wt Readings from Last 1 Encounters:   08/25/21 107.1 kg (236 lb 1.8 oz)   [x] Patient Chart     [] Unable to Obtain     Dry weight/UF Goal: 3000 ml               Access:  Right IJ tunneled CVC     Heparin []  Bolus    Units    [] Hourly    Units    [x] None      Catheter locking solution:  1:1000U Heparin   Pre BP:  138/74    Pulse:  47   Respirations: 18    Temperature:  98.5 oral    Tx: NSS   ml/Bolus   [x] N/A   Labs: [x]  Pre  []  Post   [] N/A   Additional Orders(medications, blood products, hypotension management): [] Yes   [x] No     [x]  [facility] Consent Verified     CATHETER ACCESS:  []N/A   [x]Right   []Left   [x]IJ     []Fem   []Chest wall   [] First use X-ray verified     [x]Tunneled    [] Non Tunneled   [x]No S/S infection  []Redness  []Drainage []Cultured []Swelling []Pain   [x]Medical Aseptic Prep Utilized   []Dressing Changed  [x] Biopatch  Date: 8/24/2021   []Clotted   [x]Patent   Flows: [x]Good  []Poor  []Reversed   If access problem,  notified: []Yes    [x]N/A          GENERAL ASSESSMENT:    LOC:    [x] Alert      [x]Oriented:    [x] Person     [x] Place  []Time               [] Confused  [] Lethargic [] Obtunded  [] Sedated   [] Agitated                        [] Restless    []  Non-responsive     CARDIAC: [x]Regular   SBrady   [] Irregular   [] Pericardial Rub  [] JVD          []  Monitored  [] Bedside  [] Remotely monitored     LUNGS:   SaO2  %   [x] Clear  [] Coarse  [] Crackles  [] Wheezing                                                [x] Diminished     Location : []RLL   []LLL    [x]RUL  [x]RONNIE   Cough: []Productive  []Dry  [x]N/A   Respirations:  [x]Easy  []Labored   Therapy:  [x]RA  []NC L/min    Mask: []NRB  [] Venti    O2%                  []Ventilator  []Intubated [] Trach  [] BiPaP   GI / ABDOMEN:                     [] Flat    [] Distended    [x] Soft    [] Firm   []  Obese                   [] Diarrhea  [x] Bowel Sounds  [] Nausea  [] Vomiting                   [] Fecal Management System   / URINE ASSESSMENT:                   [] Voiding   [x] Oliguria  [] Anuria   []  Elizabeth                  [] Incontinent  []  Incontinent Brief    SKIN:   [x] Warm  [] Hot  [] Cold   [] Cool   [x] Dry    [] Pale   [] Diaphoretic                  [] Flushed  [] Jaundiced  [] Cyanotic  [] Rash  [] Weeping   EDEMA: [] None  [x]Generalized  [] Pitting [x] 1    [] 2    [] 3    [] 4                 [] Facial  [] Pedal  []  UE  [] LE   MOBILITY:  [x] Bed    [] Stretcher   PAIN:  [x] 0 []1  []2   []3   []4   []5   []6   []7   []8   []9   []10            Scale 0-10  Action/Follow Up:        All Vitals and Treatment Details on Attached [address]: [facility]          Room # 360/01   [] 1st Time Acute      [] Stat       [x] Routine      [] Urgent     [x] Acute Room  []  Bedside  [] ICU/CCU  [] ER   Isolation Precautions:  [x] Dialysis    There are currently no Active Isolations       ALLERGIES:     No Known Allergies   Code Status:  DNR     Hepatitis Status     Lab Results   Component Value Date/Time    Hepatitis B surface Ag <0.10 08/15/2021 09:50 PM    Hepatitis B surface Ab <3.10 (L) 08/15/2021 09:50 PM    Hepatitis B core, IgM Negative 08/24/2021 10:00 AM    Hep B Core Ab, total Negative 08/24/2021 10:15 AM    Hepatitis C virus Ab 0.28 12/07/2015 04:15 PM        Current Labs:      Lab Results   Component Value Date/Time    WBC 7.9 08/26/2021 03:10 AM    Hemoglobin, POC 10.2 (L) 03/11/2016 07:26 AM    HGB 7.8 (L) 08/26/2021 02:20 PM    Hematocrit, POC 30 (L) 03/11/2016 07:26 AM    HCT 24.0 (L) 08/26/2021 02:20 PM    PLATELET 110 27/24/0249 03:10 AM    MCV 87.5 08/26/2021 03:10 AM     Lab Results   Component Value Date/Time    Sodium 136 08/25/2021 02:58 AM    Potassium 4.4 08/25/2021 02:58 AM    Chloride 104 08/25/2021 02:58 AM    CO2 25 08/25/2021 02:58 AM    Anion gap 7 08/25/2021 02:58 AM    Glucose 79 08/25/2021 02:58 AM    BUN 63 (H) 08/25/2021 02:58 AM    Creatinine 7.38 (H) 08/25/2021 02:58 AM    BUN/Creatinine ratio 9 (L) 08/25/2021 02:58 AM    GFR est AA 9 (L) 08/25/2021 02:58 AM    GFR est non-AA 7 (L) 08/25/2021 02:58 AM    Calcium 8.6 08/25/2021 02:58 AM          DIET:  DIET ADULT ORAL NUTRITION SUPPLEMENT  DIET ADULT ORAL NUTRITION SUPPLEMENT  DIET ADULT      PRIMARY NURSE REPORT:   Pre Dialysis:  Rolando Edward     Time: 09:43       EDUCATION:    [x] Patient [] Other           Knowledge Basis: []None [x]Minimal [] Substantial [] Unable to assess at this time.    Barriers to learning  [x]N/A   [] Access Care     [] S&S of infection  [] Fluid Management  [] K+   [x] Procedural    []Albumin   [] Medications   [] Tx Options   [] Transplant   [] Diet   [] Other   Teaching Tools:  [x] Explain  [] Demo  [] Handouts [] Video  Patient response: [x] Verbalized understanding  [] Teach back  [] Return demonstration   [] Requires follow up      [x]Time Out/Safety Check  [x] Extracorporeal Circuit Tested for integrity       RO/HEMODIALYSIS MACHINE SAFETY CHECKS  Before each treatment:     Machine Number:                   [facility]                                    [x] Unit Machine # 9with centralized RO                                                                            Alarm Test:  Pass time 09:41            [x] RO/Machine Log Complete    Machine Temp    36.0             Dialysate: pH  7.4    Conductivity: Meter 14.0     HD Machine  14.0      TCD: 13.8  Dialyzer Lot # [id number]     Blood Tubing Lot # [id number]    Saline Lot # [id number]     CHLORINE TESTING-Before each treatment and every 4 hours    Total Chlorine: [x] less than 0.1 ppm  Initial Time Check: 09:00       4 Hr/2nd Check Time: 13:00   (if greater than 0.1 ppm from Primary then every 30 minutes from Secondary)     TREATMENT INITIATION  with Dialysis Precautions:   [x] All Connections Secured              [x] Saline Line Double Clamped   [x] Venous Parameters Set               [x] Arterial Parameters Set    [x] Prime Given 250ml NSS              [x]Air Foam Detector Engaged      Treatment Initiation Note:  10:21  Pt arrived to HD without any complaints. Pt A &O X 2, follows commands, no distress noted, time out done with pt. During Treatment Notes:  10:29  Right IJ tunneled CVC assessed no abnormalities noted, line patent with good flow. Renal paneled drawn and placed in computer for escort . 10:30  Right IJ CVC accessed without any difficulty, HD started at this time pt tolerated well. Vascular access visible with arterial and venous line connections intact. 10;45  Vascular access visible with arterial and venous line connections intact. 11:00  Pt resting with eyes closed. Vascular access visible with arterial and venous line connections intact. 11:04  Purple top drawn from arterial line and placed in computer for escort . 11:15  Vascular access visible with arterial and venous line connections intact. 11:30  VSS, vascular access visible with arterial and venous line connections intact. 11:45  Vascular access visible with arterial and venous line connections intact. 12:00  Vascular access visible with arterial and venous line connections intact. 12:15  Vascular access visible with arterial and venous line connections intact. 12:30  VSS, vascular access visible with arterial and venous line connections intact. 12:45  Vascular access visible with arterial and venous line connections intact. 13:00  Vascular access visible with arterial and venous line connections intact. 13:15  Vascular access visible with arterial and venous line connections intact. 13:30  VSS, vascular access visible with arterial and venous line connections intact.   13:45  Vascular access visible with arterial and venous line connections intact. Medication Dose Volume Route Time Axel Nurse, Title   None          Post Assessment  Dialyzer Cleared:[] Good [x] Fair  [] Poor  Blood processed:  65.0 L  Net UF Removed:  3000 Ml  Post /65  Pulse  64 Resp  18   Temp 98.3 oral    Post Tx Vascular Access:   CVC Catheter:   Locking solution: Heparin 1:1000U  Arterial port 1.6 ml   Venous port 1.6 ml         Skin:[x] Warm  [x] Dry [] Diaphoretic             []Cool     [] Flushed  [] Pale            [] Cyanotic   Pain:  [x]0  []1 []2  []3 []4  []5  []6 []7 []8  []9  []10     Post Treatment Note:   14:10  HD completed at this time, pt tolerated well. Dressing clean, dry and intact.      POST TREATMENT PRIMARY NURSE HANDOFF REPORT:   Post Dialysis:  Dwayne Phillip                Time:  14:12       Abbreviations: AVG-arterial venous graft, AVF-arterial venous fistula, IJ-Internal Jugular, Subcl-Subclavian, Fem-Femoral, Tx-treatment, AP/HR-apical heart rate, DFR-dialysate flow rate, BFR-blood flow rate, AP-arterial pressure, -venous pressure, UF-ultrafiltrate, TMP-transmembrane pressure, Mykel-Venous, Art-Arterial, RO-Reverse Osmosis

## 2021-09-09 ENCOUNTER — PATIENT OUTREACH (OUTPATIENT)
Dept: CASE MANAGEMENT | Age: 71
End: 2021-09-09

## 2021-09-09 NOTE — PROGRESS NOTES
Post Acute Facility Update     *The information contained in this note was received during a weekly care coordination call with the post acute facility*    This patient was discharged from [facility] to [facility] ([facility])   on 8/27/21. Hospital Discharge Diagnosis per Dr. Randy Chou:    1. Acute GI bleed with blood loss anemia: EGD on 8/22/2021 shows esophagitis, antral and duodenal ulcer. 2. ESRD on HD  3. Complicated urinary tract infection with obstructive uropathy. 4. Enterobacter bacteremia possibly due to UTI.    5. Leukocytosis. Improved now  6. Aspiration pneumonia  7. 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 8. Acute metabolic encephalopathy, improved    9. A. fib with slow RVR. 10. Severe protein calorie malnutrition  11. Diabetes type 2  12. Hypertension  13. Moderate pulmonary hypertension  14. Hyperkalemia: resolved     Current Facility: [address] [place] [address] ([facility])  Anticipated Discharge Date: 9/16/21    Facility Nursing Update: no new orders, doing well  Facility Social Work Update: weight stable at 231  , plan home 9/16  Bundle Program Status: none  Upper Extremity Assistance: Independent  Lower Extremity Assistance: Contact Guard Assist - hands on patient for balance   Bed Mobility: Independent  Transfers: Stand by Assist - Presence and Cueing  Ambulation: Stand by Assist - Presence and Cueing  How far (in feet) is the patient ambulating?  100 feet  Device Used: walker  Barriers to Discharge: none, lives with wife in apartment, Dialysis   Other:

## 2021-09-15 ENCOUNTER — PATIENT OUTREACH (OUTPATIENT)
Dept: CASE MANAGEMENT | Age: 71
End: 2021-09-15

## 2021-09-15 ENCOUNTER — HOME HEALTH ADMISSION (OUTPATIENT)
Dept: HOME HEALTH SERVICES | Facility: HOME HEALTH | Age: 71
End: 2021-09-15
Payer: MEDICARE

## 2021-09-15 NOTE — PROGRESS NOTES
Care Transitions Initial Call    Call within 2 business days of discharge: No     Patient: Theodore Patrick Patient : 1950 MRN: [id number]    Last Discharge [address]       Complaint Diagnosis Description Type Department Provider    8/15/21 Shortness of Breath Hyperkalemia . .. ED to Hosp-Admission (Discharged) (ADMIT) Ti Burr MD;... This patient was discharged from [facility] to [facility] ([facility])   on 21. He was discharged home from [address] [place] [address]  on 21. Was this an external facility discharge? No     Challenges to be reviewed by the provider   Additional needs identified to be addressed with provider: no  none         Method of communication with provider : none     Advance Care Planning:   Does patient have an Advance Directive:  yes; reviewed and current     Inpatient Readmission Risk score: Unplanned Readmit Risk Score: 29    Was this a readmission? no       Patients top risk factors for readmission: medical condition-Renal failure, diabetes,  and utilization of services - waiting to hear from Home health  Interventions to address risk factors: Communication with home health agencies or other community services the patient is currently using-waiting to hear back from [facility] regarding referral    Care Transition Nurse (CTN) contacted the patient by telephone to perform post hospital discharge assessment. Verified name and  with patient as identifiers. Provided introduction to self, and explanation of the CTN role. CTN reviewed discharge instructions, medical action plan and red flags with patient who verbalized understanding. Were discharge instructions available to patient? yes. Reviewed appropriate site of care based on symptoms and resources available to patient including: PCP and Home Health.  Patient given an opportunity to ask questions and does not have any further questions or concerns at this time. The patient agrees to contact the PCP office for questions related to their healthcare. Medication reconciliation was not performed at this time. Advised obtaining a 90-day supply of all daily and as-needed medications. Referral to Pharm D needed: no     Home Health/Outpatient orders at discharge: home health care, PT and [address]: SAMANTA FOSTER [facility]  Date of initial visit: not yet    Durable Medical Equipment ordered at discharge: Cane/Walker/Crutches  [address]: unknown   Durable Medical Equipment received: not yet    Covid Risk Education    Educated patient about risk for severe COVID-19 due to risk factors according to CDC guidelines. ACM reviewed discharge instructions, medical action plan and red flag symptoms with the patient who verbalized understanding. Discussed COVID vaccination status: yes. Patient and his wife are both fully vaccinated. Patient was given an opportunity to verbalize any questions and concerns and agrees to contact ACM or health care provider for questions related to their healthcare. Discussed follow-up appointments. If no appointment was previously scheduled, appointment scheduling offered: no. Is follow up appointment scheduled within 7 days of discharge? yes. [address] follow up appointment(s):   Future Appointments   Date Time Provider Sunny Epperson   9/22/2021  2:00 PM Lisette Abernathy, RICH [facility] BS AMB     Non-BS follow up appointment(s): PCP this week. Plan for follow-up call in 5-7 days based on severity of symptoms and risk factors. Plan for next call: follow up appointment-with PCP and referrals-to Home health  CTN provided contact information for future needs. Goals Addressed                 This Visit's Progress     Attends follow-up appointments as directed.         Appointment with PCP this week per patient    Dialysis 3 x /week    Appt with Cardiology - next week

## 2021-09-16 ENCOUNTER — HOME CARE VISIT (OUTPATIENT)
Dept: HOME HEALTH SERVICES | Facility: HOME HEALTH | Age: 71
End: 2021-09-16
Payer: MEDICARE

## 2021-09-17 ENCOUNTER — HOME CARE VISIT (OUTPATIENT)
Dept: SCHEDULING | Facility: HOME HEALTH | Age: 71
End: 2021-09-17
Payer: MEDICARE

## 2021-09-17 PROCEDURE — 3331090002 HH PPS REVENUE DEBIT

## 2021-09-17 PROCEDURE — 400013 HH SOC

## 2021-09-17 PROCEDURE — 3331090001 HH PPS REVENUE CREDIT

## 2021-09-17 PROCEDURE — G0299 HHS/HOSPICE OF RN EA 15 MIN: HCPCS

## 2021-09-17 PROCEDURE — 400018 HH-NO PAY CLAIM PROCEDURE

## 2021-09-17 PROCEDURE — G0151 HHCP-SERV OF PT,EA 15 MIN: HCPCS

## 2021-09-17 NOTE — Clinical Note
Start of care with home care agency for PT evaluation was done today. SN and OT evals to follow. Pt decliend A service.   Thank you for this referral.

## 2021-09-18 ENCOUNTER — HOSPITAL ENCOUNTER (EMERGENCY)
Age: 71
Discharge: HOME | End: 2021-09-18
Attending: EMERGENCY MEDICINE
Payer: MEDICARE

## 2021-09-18 VITALS
SYSTOLIC BLOOD PRESSURE: 162 MMHG | DIASTOLIC BLOOD PRESSURE: 82 MMHG | RESPIRATION RATE: 18 BRPM | OXYGEN SATURATION: 98 % | HEART RATE: 83 BPM | TEMPERATURE: 97.9 F

## 2021-09-18 VITALS
RESPIRATION RATE: 18 BRPM | HEART RATE: 86 BPM | WEIGHT: 170 LBS | TEMPERATURE: 99.2 F | OXYGEN SATURATION: 96 % | SYSTOLIC BLOOD PRESSURE: 132 MMHG | DIASTOLIC BLOOD PRESSURE: 82 MMHG | HEIGHT: 73 IN | BODY MASS INDEX: 22.53 KG/M2

## 2021-09-18 DIAGNOSIS — D63.1 ANEMIA DUE TO CHRONIC KIDNEY DISEASE, ON CHRONIC DIALYSIS (HCC): Primary | ICD-10-CM

## 2021-09-18 DIAGNOSIS — N18.6 ANEMIA DUE TO CHRONIC KIDNEY DISEASE, ON CHRONIC DIALYSIS (HCC): Primary | ICD-10-CM

## 2021-09-18 DIAGNOSIS — Z99.2 ANEMIA DUE TO CHRONIC KIDNEY DISEASE, ON CHRONIC DIALYSIS (HCC): Primary | ICD-10-CM

## 2021-09-18 LAB
ABO + RH BLD: NORMAL
ALBUMIN SERPL-MCNC: 2.4 G/DL (ref 3.4–5)
ALBUMIN/GLOB SERPL: 0.4 {RATIO} (ref 0.8–1.7)
ALP SERPL-CCNC: 90 U/L (ref 45–117)
ALT SERPL-CCNC: 23 U/L (ref 16–61)
ANION GAP SERPL CALC-SCNC: 5 MMOL/L (ref 3–18)
APTT PPP: 34.4 SEC (ref 23–36.4)
AST SERPL-CCNC: 42 U/L (ref 10–38)
BASOPHILS # BLD: 0 K/UL (ref 0–0.1)
BASOPHILS NFR BLD: 0 % (ref 0–2)
BILIRUB SERPL-MCNC: 0.6 MG/DL (ref 0.2–1)
BLOOD BANK CMNT PATIENT-IMP: NORMAL
BLOOD GROUP ANTIBODIES SERPL: NORMAL
BUN SERPL-MCNC: 26 MG/DL (ref 7–18)
BUN/CREAT SERPL: 8 (ref 12–20)
CALCIUM SERPL-MCNC: 8.5 MG/DL (ref 8.5–10.1)
CHLORIDE SERPL-SCNC: 100 MMOL/L (ref 100–111)
CO2 SERPL-SCNC: 29 MMOL/L (ref 21–32)
CREAT SERPL-MCNC: 3.35 MG/DL (ref 0.6–1.3)
DIFFERENTIAL METHOD BLD: ABNORMAL
EOSINOPHIL # BLD: 0.2 K/UL (ref 0–0.4)
EOSINOPHIL NFR BLD: 2 % (ref 0–5)
ERYTHROCYTE [DISTWIDTH] IN BLOOD BY AUTOMATED COUNT: 16.5 % (ref 11.6–14.5)
GLOBULIN SER CALC-MCNC: 5.5 G/DL (ref 2–4)
GLUCOSE SERPL-MCNC: 80 MG/DL (ref 74–99)
HCT VFR BLD AUTO: 22.8 % (ref 36–48)
HGB BLD-MCNC: 7.3 G/DL (ref 13–16)
INR PPP: 1.1 (ref 0.8–1.2)
LYMPHOCYTES # BLD: 0.9 K/UL (ref 0.9–3.6)
LYMPHOCYTES NFR BLD: 10 % (ref 21–52)
MCH RBC QN AUTO: 26.2 PG (ref 24–34)
MCHC RBC AUTO-ENTMCNC: 32 G/DL (ref 31–37)
MCV RBC AUTO: 81.7 FL (ref 78–100)
MONOCYTES # BLD: 0.9 K/UL (ref 0.05–1.2)
MONOCYTES NFR BLD: 10 % (ref 3–10)
NEUTS SEG # BLD: 6.7 K/UL (ref 1.8–8)
NEUTS SEG NFR BLD: 77 % (ref 40–73)
PLATELET # BLD AUTO: 126 K/UL (ref 135–420)
PMV BLD AUTO: 10.5 FL (ref 9.2–11.8)
POTASSIUM SERPL-SCNC: 4.2 MMOL/L (ref 3.5–5.5)
PROT SERPL-MCNC: 7.9 G/DL (ref 6.4–8.2)
PROTHROMBIN TIME: 14.5 SEC (ref 11.5–15.2)
RBC # BLD AUTO: 2.79 M/UL (ref 4.35–5.65)
SODIUM SERPL-SCNC: 134 MMOL/L (ref 136–145)
SPECIMEN EXP DATE BLD: NORMAL
WBC # BLD AUTO: 8.6 K/UL (ref 4.6–13.2)

## 2021-09-18 PROCEDURE — 99281 EMR DPT VST MAYX REQ PHY/QHP: CPT

## 2021-09-18 PROCEDURE — 99282 EMERGENCY DEPT VISIT SF MDM: CPT

## 2021-09-18 PROCEDURE — 80053 COMPREHEN METABOLIC PANEL: CPT

## 2021-09-18 PROCEDURE — 85730 THROMBOPLASTIN TIME PARTIAL: CPT

## 2021-09-18 PROCEDURE — 85025 COMPLETE CBC W/AUTO DIFF WBC: CPT

## 2021-09-18 PROCEDURE — 3331090002 HH PPS REVENUE DEBIT

## 2021-09-18 PROCEDURE — 3331090001 HH PPS REVENUE CREDIT

## 2021-09-18 PROCEDURE — 86901 BLOOD TYPING SEROLOGIC RH(D): CPT

## 2021-09-18 PROCEDURE — 85610 PROTHROMBIN TIME: CPT

## 2021-09-18 NOTE — HOME HEALTH
PT SOC summary:  Pt is a 71 y/o male who was admitted to hospital on 8/15/21  with diagnosis of acute GI bleed with blood loss anemia. ESRD. Pt was transferred to a SNF for rehab on 8/27/21. Pt is now home and referred to home care PT to continue with rehab. PMH:  Acute GI bleed with blood loss anemia: EGD on 8/22/2021 shows esophagitis, antral and duodenal ulcer;  ESRD on HD; Complicated urinary tract infection with obstructive uropathy;  Enterobacter bacteremia possibly due to UTI;  Leukocytosis. 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 fib with slow RVR; Severe protein calorie malnutrition;. Diabetes type 2;  Hypertension;  . Moderate pulmonary hypertension; Nadyne Oscar Hyperkalemia: resolved  PLOF/living environment:  ind and ambulatory with SC or rollator walker;  retired;  lives in a first floor apartment with wife. PREC:  Hemodialysis TThS;  urinary catheter;  S:  Pt. reported falling yesterday outside the house as he was coming home from dialysis because he passed out. He said 2 men helped him to get up and get back in the house. The dialysis nurse said that he was \"left too long on dialysis machine\" and the dialysis doctor did look at him afterwards. He denies injury from this fall and denies going to ED. He reported change in meds since coming home from hospital.   Pt's goal in PT is  to get stronger because his legs are still weak. O:  urinary catheter with leg bag (His daughter changed the bag this morning;    Pain:  B legs  =  1-2  /10, pain due to swelling on B lower legs. Integumentary:  R upper chest HD port;  L antecubital hd port not being used but will be taken out soon;    ROM:  BLE joints = WNL except B ankle joints limited due to B lower leg edema. MMT:  BLE = 2+ to 3+/5.   Functional strength test:   FTSTS:  unable at this time, indicating  decreased BLE functional strength;  Bed  mobility:  sleeps on recliner;  Pt sometimes does not elevate legs when he sleeps. He cannot sleep on bed because he cannot breath when flat on his back. Pt instruction:  Keep BLE elevated and do ankle pumps  for edema control. Transfers:   sit <> stand from office chair and recliner chair  =  SBA with SC. Does sponge baths. Balance:    TUG = 28.4  seconds  SC indicating  high fall risk;  Pt instruction:  always have supervision when walking, monitor BP. Gait:  20  ft with SBA using SC exhibiting wide base, flat foot gait pattern. Uses Rollator walker for outdoor when he goes to dialysis. Endurance:  2MWT:  unable ft , indicating poor walking endurance  Patient education:   Fall risk reduction strategies = use non skid shoes, use AD, ask for supervision. Maintenance of skin integrity:  change body position every 1-2 hours. Pt's response to education:  Pt understood but may need reinforcement. A:  Pt is diagnosed with GI bleed with anemia now resolved and ESRD on hemodialysis  and presents with the following functional impairments:  decreased BLE strength, decreased gait and walking endurance and decreased transfers. Pt will benefit from PT to improve  strength , balance, endurance and  establish HEP and teach fall risk reduction strategies to promote safe and ind functional mobility in pt's environment  and return to his PLOF. P:  PT frequency 1w1, 2w3 for HEP, strengthening, gait training, endurance training, fall risk reduction strategies, transfer training. Pt also has  SN, OT, HHA orders but he declined HHA/bath assistance at this time because his wife helps him. Dr. Maycol Petersen  via [facility] was informed of [facility] today for PT eval.  Left VM with PCP Dr. Keyshawn Ledbetter regarding [facility] with home care agency today. SOC book:  [facility] representative identified as spouse Lito Johnson. Obtained authorization for treatment and viewed insurance cards for accuracy. Patient handbook was provided and reviewed with pt/CG.   Patient Laura Ling of rights and advanced directive information reviewed. Instructed on Agency 24 hour on call system and when to call 911 vs MD vs agency for changes in pt condition. Instructed in and promoted pt/CG involvement in plan of care. Emergency preparations done using information in admission booklet. Medication reconciled and updated and no issues noted. Informed CM Wilma Snider of PT frequency.

## 2021-09-18 NOTE — ED PROVIDER NOTES
EMERGENCY DEPARTMENT HISTORY AND PHYSICAL EXAM    2:37 PM      Date: 9/18/2021  Patient Name: Lindsay Espinosa    History of Presenting Illness     Chief Complaint   Patient presents with    Abnormal Lab Results         History Provided By: Patient  Location/Duration/Severity/Modifying factors   Patient is a 60-year-old male with a history of end-stage renal disease on dialysis, recurrent anemia, diabetes, hypertension, atrial fibrillation without current anticoagulation, diastolic heart failure, the presents emergency department with a complaint of fatigue and was sent by his hemodialysis doctor because of anemia and said he needed a blood transfusion. Patient said he has been feeling otherwise fine and said he is mildly fatigued however not too far from his baseline and came today with his daughter. The patient had a full run of dialysis today and was told by his doctors that he needed some blood. Patient denies any current blood in his stool and occasionally has some blood when he wipes but has not seen that recently. The patient denies any other aggravating alleviating factors. Patient notes he gets dialysis in his right arm and has a Elizabeth catheter to leg bag. PCP: Jere Rucker MD    Current Outpatient Medications   Medication Sig Dispense Refill    furosemide (LASIX) 40 mg tablet Take 40 mg by mouth daily.  pravastatin (PRAVACHOL) 20 mg tablet Take 20 mg by mouth daily.  acetaminophen (TYLENOL) 500 mg tablet Take 500 mg by mouth every six (6) hours as needed for Pain.  aspirin (ASPIRIN) 325 mg tablet Take 325 mg by mouth daily.  prazosin (MINIPRESS) 5 mg capsule Take 5 mg by mouth daily.  simvastatin (ZOCOR) 40 mg tablet Take 40 mg by mouth daily.  carvediloL (COREG) 12.5 mg tablet Take 12.5 mg by mouth two (2) times a day.  ferrous sulfate 325 mg (65 mg iron) tablet Take 1 Tablet by mouth two (2) times a day for 30 days.  (Patient taking differently: Take 1 Tablet by mouth daily.) 30 Tablet 0    pantoprazole (PROTONIX) 40 mg tablet Take 1 Tablet by mouth Before breakfast and dinner for 30 days. 60 Tablet 0    amLODIPine (NORVASC) 5 mg tablet Take 1 Tablet by mouth daily for 30 days. 30 Tablet 0    finasteride (PROSCAR) 5 mg tablet TAKE 1 TABLET BY MOUTH DAILY      vitamin E, dl,tocopheryl acet, (vitamin E acetate) 45 mg (100 unit) capsule Take 100 Units by mouth daily.  docusate sodium (COLACE) 100 mg capsule Take 100 mg by mouth daily as needed for Constipation.  cholecalciferol (VITAMIN D3) 1,000 unit tablet Take 1 Tab by mouth daily. Indications: VITAMIN D DEFICIENCY 30 Tab 0    polyethylene glycol (MIRALAX) 17 gram packet Take 1 Packet by mouth daily. (Patient taking differently: Take 1 Packet by mouth daily as needed for Constipation.) 30 Packet 0    insulin lispro (HUMALOG) 100 unit/mL injection SubCUTAneous route Before breakfast, lunch, dinner and at bedtime. For Blood Sugar (mg/dL) of:     Less than 150 =   0 units           150 -199 =   2 units  200 -249 =   4 units  250 -299 =   6 units  300 -349 =   8 units  350 and above =  10 units 1 Vial 0    Blood-Glucose Meter monitoring kit As directed 1 Kit 0    multivitamin (ONE A DAY) tablet Take 1 Tab by mouth daily.  atorvastatin (LIPITOR) 10 mg tablet Take 10 mg by mouth daily.  tamsulosin (FLOMAX) 0.4 mg capsule Take 0.4 mg by mouth daily.          Past History     Past Medical History:  Past Medical History:   Diagnosis Date    [facility] fibrillation [facility]     Cerebrovascular accident ([facility]     1991, 0- general weakness    Chronic diastolic heart failure (HCC)     Chronic kidney disease     dialysis    Diabetes ([facility]     Heart failure ([facility]     History of cardioversion     Hypercholesterolemia     Hypertension     Morbid obesity ([facility]        Past Surgical History:  Past Surgical History:   Procedure Laterality Date    HX VASCULAR ACCESS Right     right neck Family History:  Family History   Problem Relation Age of Onset    Heart Attack Mother 52    Diabetes Other     Hypertension Other        Social History:  Social History     Tobacco Use    Smoking status: Former Smoker     Years: 8.00     Quit date: 1971     Years since quittin.2    Smokeless tobacco: Never Used    Tobacco comment: last smoked in my late 19's   Substance Use Topics    Alcohol use: No     Alcohol/week: 0.0 standard drinks    Drug use: No       Allergies:  No Known Allergies      Review of Systems       Review of Systems   Constitutional: Positive for fatigue. Negative for activity change and fever. HENT: Negative for congestion and rhinorrhea. Eyes: Negative for visual disturbance. Respiratory: Negative for shortness of breath. Cardiovascular: Negative for chest pain and palpitations. Gastrointestinal: Negative for abdominal pain, diarrhea, nausea and vomiting. Genitourinary: Negative for dysuria and hematuria. Musculoskeletal: Negative for back pain. Skin: Negative for rash. Neurological: Negative for dizziness, weakness and light-headedness. All other systems reviewed and are negative. Physical Exam     Visit Vitals  BP (!) 162/82 (BP 1 Location: Left lower arm, BP Patient Position: At rest;Sitting)   Pulse 83   Temp 97.9 °F (36.6 °C)   Resp 18   SpO2 98%         Physical Exam  Vitals and nursing note reviewed. Constitutional:       General: He is not in acute distress. Appearance: He is well-developed. HENT:      Head: Normocephalic and atraumatic. Right Ear: External ear normal.      Left Ear: External ear normal.      Nose: Nose normal.   Eyes:      General: No scleral icterus. Pupils: Pupils are equal, round, and reactive to light. Comments: Conjunctival pallor   Neck:      Thyroid: No thyromegaly. Vascular: No JVD. Trachea: No tracheal deviation.    Cardiovascular:      Rate and Rhythm: Normal rate and regular rhythm. Heart sounds: Normal heart sounds. No murmur heard. No friction rub. No gallop. Pulmonary:      Effort: Pulmonary effort is normal.      Breath sounds: Normal breath sounds. Chest:      Chest wall: No tenderness. Abdominal:      General: Bowel sounds are normal. There is no distension. Palpations: Abdomen is soft. Tenderness: There is no abdominal tenderness. There is no guarding or rebound. Musculoskeletal:         General: No tenderness. Normal range of motion. Cervical back: Normal range of motion and neck supple. Comments: Right upper extremity AV access noted  , Left upper extremity with small ecchymosis and previous AV access, no other swelling noted  Bilateral lower extremity swelling   Lymphadenopathy:      Cervical: No cervical adenopathy. Skin:     General: Skin is warm and dry. Neurological:      Mental Status: He is alert and oriented to person, place, and time. Cranial Nerves: No cranial nerve deficit. Coordination: Coordination normal.      Comments: Mild global weakness, gait steady   Psychiatric:         Behavior: Behavior normal.         Thought Content: Thought content normal.         Judgment: Judgment normal.           Diagnostic Study Results     Labs -  Recent Results (from the past 12 hour(s))   CBC WITH AUTOMATED DIFF    Collection Time: 09/18/21  2:40 PM   Result Value Ref Range    WBC 8.6 4.6 - 13.2 K/uL    RBC 2.79 (L) 4.35 - 5.65 M/uL    HGB 7.3 (L) 13.0 - 16.0 g/dL    HCT 22.8 (L) 36.0 - 48.0 %    MCV 81.7 78.0 - 100.0 FL    MCH 26.2 24.0 - 34.0 PG    MCHC 32.0 31.0 - 37.0 g/dL    RDW 16.5 (H) 11.6 - 14.5 %    PLATELET 323 (L) 878 - 420 K/uL    MPV 10.5 9.2 - 11.8 FL    NEUTROPHILS 77 (H) 40 - 73 %    LYMPHOCYTES 10 (L) 21 - 52 %    MONOCYTES 10 3 - 10 %    EOSINOPHILS 2 0 - 5 %    BASOPHILS 0 0 - 2 %    ABS. NEUTROPHILS 6.7 1.8 - 8.0 K/UL    ABS. LYMPHOCYTES 0.9 0.9 - 3.6 K/UL    ABS. MONOCYTES 0.9 0.05 - 1.2 K/UL    ABS. EOSINOPHILS 0.2 0.0 - 0.4 K/UL    ABS. BASOPHILS 0.0 0.0 - 0.1 K/UL    DF AUTOMATED     METABOLIC PANEL, COMPREHENSIVE    Collection Time: 09/18/21  2:40 PM   Result Value Ref Range    Sodium 134 (L) 136 - 145 mmol/L    Potassium 4.2 3.5 - 5.5 mmol/L    Chloride 100 100 - 111 mmol/L    CO2 29 21 - 32 mmol/L    Anion gap 5 3.0 - 18 mmol/L    Glucose 80 74 - 99 mg/dL    BUN 26 (H) 7.0 - 18 MG/DL    Creatinine 3.35 (H) 0.6 - 1.3 MG/DL    BUN/Creatinine ratio 8 (L) 12 - 20      GFR est AA 22 (L) >60 ml/min/1.73m2    GFR est non-AA 18 (L) >60 ml/min/1.73m2    Calcium 8.5 8.5 - 10.1 MG/DL    Bilirubin, total 0.6 0.2 - 1.0 MG/DL    ALT (SGPT) 23 16 - 61 U/L    AST (SGOT) 42 (H) 10 - 38 U/L    Alk. phosphatase 90 45 - 117 U/L    Protein, total 7.9 6.4 - 8.2 g/dL    Albumin 2.4 (L) 3.4 - 5.0 g/dL    Globulin 5.5 (H) 2.0 - 4.0 g/dL    A-G Ratio 0.4 (L) 0.8 - 1.7     PROTHROMBIN TIME + INR    Collection Time: 09/18/21  2:40 PM   Result Value Ref Range    Prothrombin time 14.5 11.5 - 15.2 sec    INR 1.1 0.8 - 1.2     PTT    Collection Time: 09/18/21  2:40 PM   Result Value Ref Range    aPTT 34.4 23.0 - 36.4 SEC   TYPE & SCREEN    Collection Time: 09/18/21  2:45 PM   Result Value Ref Range    Crossmatch Expiration 09/21/2021,2358     ABO/Rh(D) PENDING     Antibody screen PENDING        Radiologic Studies -   No orders to display         Medical Decision Making   I am the first provider for this patient. I reviewed the vital signs, available nursing notes, past medical history, past surgical history, family history and social history. Vital Signs-Reviewed the patient's vital signs. Records Reviewed: Nursing Notes, Old Medical Records, Previous Radiology Studies and Previous Laboratory Studies (Time of Review: 2:37 PM)    ED Course: Progress Notes, Reevaluation, and Consults:      The patient's hemoglobin is 7.3 and had a long talk with Dr. Yu Lie his nephrologist and he suspects there was a a shift in volume as his hemoglobin was below 7 and the unit but now is above 7 and as baseline does not want him to be transfused is okay to be followed up closely as an outpatient. Given the patient had minimal complaints we will proceed with close of patient care. Workup and recommendations were reviewed with the patient and all questions were answered. The patient understands the plan and will proceed with close outpatient care. I have encouraged the patient to return if at all worsened or concerned. Nida Wynn DO 4:15 PM      Provider Notes (Medical Decision Making):   MDM  Number of Diagnoses or Management Options  Diagnosis management comments: Patient is a 77-year-old male with a history of end-stage renal disease on dialysis with last run today the presents with known anemia however is not clear what his hemoglobin is. We will check his hemoglobin and discussed the case with his nephrologist regarding transfusion plans if needed. The patient denies having any gross blood in his stool and complains only mild fatigue but otherwise feels near his baseline. Nida Wynn DO 2:45 PM        Procedures        Diagnosis     Clinical Impression:   1. Anemia due to chronic kidney disease, on chronic dialysis [facility])        Disposition: DC    Follow-up Information     Follow up With Specialties Details Why Contact Info    Thao Jacques MD General Practice   [address] [facility] [id number]  [place] [address], [address] [facility], [address] [place] [id number] [phone]             Patient's Medications   Start Taking    No medications on file   Continue Taking    ACETAMINOPHEN (TYLENOL) 500 MG TABLET    Take 500 mg by mouth every six (6) hours as needed for Pain. AMLODIPINE (NORVASC) 5 MG TABLET    Take 1 Tablet by mouth daily for 30 days. ASPIRIN (ASPIRIN) 325 MG TABLET    Take 325 mg by mouth daily.     ATORVASTATIN (LIPITOR) 10 MG TABLET    Take 10 mg by mouth daily. BLOOD-GLUCOSE METER MONITORING KIT    As directed    CARVEDILOL (COREG) 12.5 MG TABLET    Take 12.5 mg by mouth two (2) times a day. CHOLECALCIFEROL (VITAMIN D3) 1,000 UNIT TABLET    Take 1 Tab by mouth daily. Indications: VITAMIN D DEFICIENCY    DOCUSATE SODIUM (COLACE) 100 MG CAPSULE    Take 100 mg by mouth daily as needed for Constipation. FERROUS SULFATE 325 MG (65 MG IRON) TABLET    Take 1 Tablet by mouth two (2) times a day for 30 days. FINASTERIDE (PROSCAR) 5 MG TABLET    TAKE 1 TABLET BY MOUTH DAILY    FUROSEMIDE (LASIX) 40 MG TABLET    Take 40 mg by mouth daily. INSULIN LISPRO (HUMALOG) 100 UNIT/ML INJECTION    SubCUTAneous route Before breakfast, lunch, dinner and at bedtime. For Blood Sugar (mg/dL) of:     Less than 150 =   0 units           150 -199 =   2 units  200 -249 =   4 units  250 -299 =   6 units  300 -349 =   8 units  350 and above =  10 units    MULTIVITAMIN (ONE A DAY) TABLET    Take 1 Tab by mouth daily. PANTOPRAZOLE (PROTONIX) 40 MG TABLET    Take 1 Tablet by mouth Before breakfast and dinner for 30 days. POLYETHYLENE GLYCOL (MIRALAX) 17 GRAM PACKET    Take 1 Packet by mouth daily. PRAVASTATIN (PRAVACHOL) 20 MG TABLET    Take 20 mg by mouth daily. PRAZOSIN (MINIPRESS) 5 MG CAPSULE    Take 5 mg by mouth daily. SIMVASTATIN (ZOCOR) 40 MG TABLET    Take 40 mg by mouth daily. TAMSULOSIN (FLOMAX) 0.4 MG CAPSULE    Take 0.4 mg by mouth daily. VITAMIN E, DL,TOCOPHERYL ACET, (VITAMIN E ACETATE) 45 MG (100 UNIT) CAPSULE    Take 100 Units by mouth daily. These Medications have changed    No medications on file   Stop Taking    No medications on file     Disclaimer: Sections of this note are dictated using utilizing voice recognition software. Minor typographical errors may be present. If questions arise, please do not hesitate to contact me or call our department.

## 2021-09-19 PROCEDURE — 3331090002 HH PPS REVENUE DEBIT

## 2021-09-19 PROCEDURE — 3331090001 HH PPS REVENUE CREDIT

## 2021-09-20 ENCOUNTER — HOME CARE VISIT (OUTPATIENT)
Dept: SCHEDULING | Facility: HOME HEALTH | Age: 71
End: 2021-09-20
Payer: MEDICARE

## 2021-09-20 ENCOUNTER — HOME CARE VISIT (OUTPATIENT)
Dept: HOME HEALTH SERVICES | Facility: HOME HEALTH | Age: 71
End: 2021-09-20
Payer: MEDICARE

## 2021-09-20 VITALS
SYSTOLIC BLOOD PRESSURE: 160 MMHG | RESPIRATION RATE: 16 BRPM | OXYGEN SATURATION: 98 % | HEART RATE: 67 BPM | DIASTOLIC BLOOD PRESSURE: 85 MMHG | TEMPERATURE: 97.3 F

## 2021-09-20 VITALS
RESPIRATION RATE: 18 BRPM | TEMPERATURE: 99.2 F | OXYGEN SATURATION: 96 % | HEART RATE: 86 BPM | DIASTOLIC BLOOD PRESSURE: 82 MMHG | SYSTOLIC BLOOD PRESSURE: 132 MMHG

## 2021-09-20 PROCEDURE — G0299 HHS/HOSPICE OF RN EA 15 MIN: HCPCS

## 2021-09-20 PROCEDURE — 3331090002 HH PPS REVENUE DEBIT

## 2021-09-20 PROCEDURE — 3331090001 HH PPS REVENUE CREDIT

## 2021-09-20 NOTE — CASE COMMUNICATION
April Arnold in SN frequency, starting this week, 1w2. Patient is refusing to be seen any more than once a week. The other SN frequency orders are to stay as is. Schedulers and SN notified.

## 2021-09-20 NOTE — HOME HEALTH
Skilled reason for visit: Patient was recently hospitalized for Anemia, requiring observation by a SN for s/s of decomposition or adverse effects resulting from newly prescribed medications. Skilled observation needed to determine if new medication regimen prescribed requires modifications or other therapeutic interventions considered until pt's clinical condition or treatment has stabilized. Caregiver involvement:  Patient's caregiver is his spouse. Caregiver assists patient with bathing, dressing, walking, bathroom, meal prep and setup, medication management, grocery shopping, household chores, transportation to MD appointment and home exercise program.  Medications reconciled and all medications are available in the home this visit. The following education was provided regarding medications, medication interactions, and look alike medications:  Aspirin   -  Dose/Freq: 325 mg  Daily          Purpose: anticoagulant/blood thinner         Precautions/Side Effects/Adverse reactions:  bleeding, black, pasquale stool -  Report medication questions or  problems to [address] or MD.  Medications are effective at this time. Patient states understanding. Patient education provided this visit:  Olga Villanueva Disease Management: ESRD, DM, HTN, Elizabeth catheter teaching, constipation prevention, fall precautions, s/s of infection, deep breathing exercises. Goals/teaching progressing. Patient's goal is to regain his strength. Patient has remained free from falls, free from infection; no safety concerns at this time and is ambulating with supervision with cane. SN to complete education of patient and patient to follow up with Dr Scott Rooney. Home exercise program: PT  Continued need for the following skills: Nursing, PT   Patient and/or caregiver notified and agree to changes in the Plan of Care: YES - Patient refused to be seen anymore than once a week by SN. SN frequency changed.      The following discharge planning was discussed with the pt/caregiver:  SN to change patient's dressing to R TKR on next visit and to continue education of patient and discharge patient when teaching and goals are met. Elizabeth catheter changed today. Patient tolerated well.   #[address] [place]rena with leg bag used

## 2021-09-20 NOTE — HOME HEALTH
Skilled reason for visit: anemia     Caregiver involvement: spouse present with visit ASSISTING WITH ADL'S , MEALS, HOUSEWORK , MEDICATIONS , EXERCISE , HYGIENE , AND TRANSPORTATION FOR DOCTOR VISITS AND SHOPPING. Medications reviewed and all medications are available in the home this visit. The following education was provided regarding medications, medication interactions, and look alike medications (specify): REVIEW OF MEDICATIONS WITH PURPOSE AND EFFECTIVENESS , WITH REPORTING SIDE EFFECTS TO MD   RIGHT MED/RIGHT TIME / RIGHT DOSE. Medications  are too early to assess effectiveness. at this time. Home health supplies by type and quantity ordered/delivered this visit include: na at this time     Patient education provided this visit: medications reviewed with purpose , vines cath assesment draining to gravity into lrg bag , skin intact , medication in home , patient has walker for ambulation safety , dialysis shunt intact with dressing , Assess for signs and symptoms of infection such as: Fever>100.4, Chills, Confusion, Lethargy, Cough, increased amount or discolored mucus, Body aches, Urine appears bloody or purulent, foul odor, urinary frequency, or pain and Wound has increased pain, redness, swelling, purulent draining, or foul odor. Report findings to physician. lasix teaching [address], SIDE EFFECT TEACHING  DIZZINESS , N/V, DEHYDRATION , THIRST, HYPOKALEMIA , ITCHING , RASH , HEADACHE , STOP MEDICATION AND REPORT TO MD, patient has cath change with urology of [facility] Performed this visit: vs assesment teaching     Agency Progress toward goals: intiated     Patient's Progress towards personal goals: intiated     Home exercise program: deep breathing sets of 10 every 1 hr w.a ,    Continued need for the following skills: Nursing    Plan for next visit: teaching assesment , vs     Patient and/or caregiver notified and agrees to changes in the Plan of Care YES      The following discharge planning was discussed with the pt/caregiver: discharge when goals met

## 2021-09-21 PROCEDURE — 3331090001 HH PPS REVENUE CREDIT

## 2021-09-21 PROCEDURE — 3331090002 HH PPS REVENUE DEBIT

## 2021-09-22 ENCOUNTER — OFFICE VISIT (OUTPATIENT)
Dept: CARDIOLOGY CLINIC | Age: 71
End: 2021-09-22
Payer: MEDICARE

## 2021-09-22 VITALS
SYSTOLIC BLOOD PRESSURE: 122 MMHG | DIASTOLIC BLOOD PRESSURE: 60 MMHG | HEART RATE: 66 BPM | HEIGHT: 73 IN | WEIGHT: 237 LBS | BODY MASS INDEX: 31.41 KG/M2

## 2021-09-22 DIAGNOSIS — N18.6 ESRD ON HEMODIALYSIS (HCC): ICD-10-CM

## 2021-09-22 DIAGNOSIS — I10 ESSENTIAL HYPERTENSION: ICD-10-CM

## 2021-09-22 DIAGNOSIS — I50.32 CHRONIC DIASTOLIC HEART FAILURE (HCC): Primary | ICD-10-CM

## 2021-09-22 DIAGNOSIS — I63.9 CEREBROVASCULAR ACCIDENT (CVA), UNSPECIFIED MECHANISM (HCC): ICD-10-CM

## 2021-09-22 DIAGNOSIS — I48.0 PAROXYSMAL ATRIAL FIBRILLATION (HCC): ICD-10-CM

## 2021-09-22 DIAGNOSIS — Z09 HOSPITAL DISCHARGE FOLLOW-UP: ICD-10-CM

## 2021-09-22 DIAGNOSIS — Z99.2 ESRD ON HEMODIALYSIS (HCC): ICD-10-CM

## 2021-09-22 DIAGNOSIS — E78.00 HYPERCHOLESTEROLEMIA: ICD-10-CM

## 2021-09-22 PROCEDURE — 3331090002 HH PPS REVENUE DEBIT

## 2021-09-22 PROCEDURE — 99214 OFFICE O/P EST MOD 30 MIN: CPT | Performed by: NURSE PRACTITIONER

## 2021-09-22 PROCEDURE — 1111F DSCHRG MED/CURRENT MED MERGE: CPT | Performed by: NURSE PRACTITIONER

## 2021-09-22 PROCEDURE — 3331090001 HH PPS REVENUE CREDIT

## 2021-09-22 RX ORDER — LEVOFLOXACIN 500 MG/1
500 TABLET, FILM COATED ORAL DAILY
COMMUNITY
End: 2022-02-18

## 2021-09-22 NOTE — PATIENT INSTRUCTIONS
Follow up with GI as previously scheduled. Continue current medications. Low sodium diet         Low Sodium Diet (2,000 Milligram): Care Instructions  Overview     Limiting sodium can be an important part of managing some health problems. The most common source of sodium is salt. People get most of the salt in their diet from canned, prepared, and packaged foods. Fast food and restaurant meals also are very high in sodium. Your doctor will probably limit your sodium to less than 2,000 milligrams (mg) a day. This limit counts all the sodium in prepared and packaged foods and any salt you add to your food. Follow-up care is a key part of your treatment and safety. Be sure to make and go to all appointments, and call your doctor if you are having problems. It's also a good idea to know your test results and keep a list of the medicines you take. How can you care for yourself at home? Read food labels  · Read labels on cans and food packages. The labels tell you how much sodium is in each serving. Make sure that you look at the serving size. If you eat more than the serving size, you have eaten more sodium. · Food labels also tell you the Percent Daily Value for sodium. Choose products with low Percent Daily Values for sodium. · Be aware that sodium can come in forms other than salt, including monosodium glutamate (MSG), sodium citrate, and sodium bicarbonate (baking soda). MSG is often added to Asian food. When you eat out, you can sometimes ask for food without MSG or added salt. Buy low-sodium foods  · Buy foods that are labeled \"unsalted\" (no salt added), \"sodium-free\" (less than 5 mg of sodium per serving), or \"low-sodium\" (140 mg or less of sodium per serving). Foods labeled \"reduced-sodium\" and \"light sodium\" may still have too much sodium. Be sure to read the label to see how much sodium you are getting. · Buy fresh vegetables, or frozen vegetables without added sauces.  Buy low-sodium versions of canned vegetables, soups, and other canned goods. Prepare low-sodium meals  · Cut back on the amount of salt you use in cooking. This will help you adjust to the taste. Do not add salt after cooking. One teaspoon of salt has about 2,300 mg of sodium. · Take the salt shaker off the table. · Flavor your food with garlic, lemon juice, onion, vinegar, herbs, and spices. Do not use soy sauce, lite soy sauce, steak sauce, onion salt, garlic salt, celery salt, or ketchup on your food. · Use low-sodium salad dressings, sauces, and ketchup. Or make your own salad dressings and sauces without adding salt. · Use less salt (or none) when recipes call for it. You can often use half the salt a recipe calls for without losing flavor. Other foods such as rice, pasta, and grains do not need added salt. · Rinse canned vegetables, and cook them in fresh water. This removes some--but not all--of the salt. · Avoid water that is naturally high in sodium or that has been treated with water softeners, which add sodium. If you buy bottled water, read the label and choose a sodium-free brand. Avoid high-sodium foods  · Avoid eating:  ? Smoked, cured, salted, and canned meat, fish, and poultry. ? Ham, duarte, hot dogs, and luncheon meats. ? Regular, hard, and processed cheese and regular peanut butter. ? Crackers with salted tops, and other salted snack foods such as pretzels, chips, and salted popcorn. ? Frozen prepared meals, unless labeled low-sodium. ? Canned and dried soups, broths, and bouillon, unless labeled sodium-free or low-sodium. ? Canned vegetables, unless labeled sodium-free or low-sodium. ? Western Serena fries, pizza, tacos, and other fast foods. ? Pickles, olives, ketchup, and other condiments, especially soy sauce, unless labeled sodium-free or low-sodium. Where can you learn more?   Go to http://www.gray.com/  Enter V647 in the search box to learn more about \"Low Sodium Diet (2,000 Milligram): Care Instructions. \"  Current as of: December 17, 2020               Content Version: 13.0  © 0957-7663 [employer]. Care instructions adapted under license by [employer] (which disclaims liability or warranty for this information). If you have questions about a medical condition or this instruction, always ask your healthcare professional. [place] [address] any warranty or liability for your use of this information.

## 2021-09-22 NOTE — PROGRESS NOTES
HISTORY OF PRESENT ILLNESS  Sara Byrne is a 70 y.o. male. 9/2021  Patient presents to follow-up for recent hospitalization to [facility] from 8\15-8\27 for syncope secondary to A. fib with slow ventricular response in setting of hypokalemia. Not anticoagulated as outpatient d/t frequent falls and hx of hematuria.  -Acute on chronic diastolic heart failure with chronic LE edema. ECHO this admission normal LV Function. Mild concentric hypertrophy.  (05/2021) EF 50%. Grade 3 LV DD.   -Moderate pulmonary hypertension by ECHO this admission (08/2021) PASP  61 mmhg. During admission he was also found to have:  1. Acute GI bleed with blood loss anemia: EGD on 8/22/2021 shows esophagitis, antral and duodenal ulcer - aspirin was d/c.  2. ESRD on HD  3. Complicated urinary tract infection with obstructive uropathy. 4. Enterobacter bacteremia possibly due to UTI.    5. Leukocytosis. Improved now  6. Aspiration pneumonia  7. 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 8. Acute metabolic encephalopathy, improved    9. A. fib with slow RVR. 10. Severe protein calorie malnutrition  11. Diabetes type 2  12. Hypertension  13. Moderate pulmonary hypertension  14. Hyperkalemia: resolved             CHF  The history is provided by the patient. This is a chronic problem. The current episode started more than 1 week ago. The problem occurs daily. The problem has been gradually improving. Pertinent negatives include no chest pain and no shortness of breath. Shortness of Breath  The history is provided by the patient. This is a chronic problem. The problem occurs intermittently. The current episode started more than 1 week ago. The problem has not changed since onset. Associated symptoms include leg swelling. Pertinent negatives include no ear pain, no neck pain, no wheezing, no PND, no orthopnea, no chest pain and no rash. He has had prior hospitalizations. Associated medical issues include heart failure.  Associated medical issues do not include chronic lung disease or CAD. Hypertension  The history is provided by the patient. This is a chronic problem. The current episode started more than 1 week ago. The problem occurs daily. The problem has been gradually worsening. Pertinent negatives include no chest pain and no shortness of breath. Hospital Follow Up  Pertinent negatives include no chest pain and no shortness of breath. Review of Systems   Constitutional: Negative for chills and weight loss. HENT: Negative for ear pain and hearing loss. Eyes: Negative for blurred vision. Respiratory: Negative for shortness of breath, wheezing and stridor. Cardiovascular: Positive for leg swelling. Negative for chest pain, orthopnea and PND. Gastrointestinal: Negative for heartburn. Musculoskeletal: Negative for myalgias and neck pain. Skin: Negative for rash. Neurological: Negative for tingling, tremors, focal weakness and loss of consciousness. Psychiatric/Behavioral: Negative for depression and suicidal ideas.      Family History   Problem Relation Age of Onset    Heart Attack Mother 52    Diabetes Other     Hypertension Other        Past Medical History:   Diagnosis Date    Atrial fibrillation ([address]     Cerebrovascular accident ([address]     12, 0- general weakness    Chronic diastolic heart failure (HCC)     Chronic kidney disease     dialysis    Diabetes ([address]     Heart failure ([address]     History of cardioversion     Hypercholesterolemia     Hypertension     Morbid obesity ([address]        Past Surgical History:   Procedure Laterality Date    HX VASCULAR ACCESS Right     right neck       Social History     Tobacco Use    Smoking status: Former Smoker     Years: 8.00     Quit date: 1971     Years since quittin.2    Smokeless tobacco: Never Used    Tobacco comment: last smoked in my late 19's   Substance Use Topics    Alcohol use: No     Alcohol/week: 0.0 standard drinks       No Known Allergies    Outpatient Medications Marked as Taking for the 9/22/21 encounter (Office Visit) with Diamante Jane NP   Medication Sig Dispense Refill    levoFLOXacin (LEVAQUIN) 500 mg tablet Take 500 mg by mouth daily. Take 1 tablet by mouth every forty-eight (48) hours for 2 dose      acetaminophen (TYLENOL) 500 mg tablet Take 500 mg by mouth every six (6) hours as needed for Pain.  ferrous sulfate 325 mg (65 mg iron) tablet Take 1 Tablet by mouth two (2) times a day for 30 days. (Patient taking differently: Take 1 Tablet by mouth daily.) 30 Tablet 0    pantoprazole (PROTONIX) 40 mg tablet Take 1 Tablet by mouth Before breakfast and dinner for 30 days. 60 Tablet 0    amLODIPine (NORVASC) 5 mg tablet Take 1 Tablet by mouth daily for 30 days. 30 Tablet 0    finasteride (PROSCAR) 5 mg tablet TAKE 1 TABLET BY MOUTH DAILY      vitamin E, dl,tocopheryl acet, (vitamin E acetate) 45 mg (100 unit) capsule Take 100 Units by mouth daily.  docusate sodium (COLACE) 100 mg capsule Take 100 mg by mouth daily as needed for Constipation.  cholecalciferol (VITAMIN D3) 1,000 unit tablet Take 1 Tab by mouth daily. Indications: VITAMIN D DEFICIENCY 30 Tab 0    polyethylene glycol (MIRALAX) 17 gram packet Take 1 Packet by mouth daily. (Patient taking differently: Take 1 Packet by mouth daily as needed for Constipation.) 30 Packet 0    insulin lispro (HUMALOG) 100 unit/mL injection SubCUTAneous route Before breakfast, lunch, dinner and at bedtime. For Blood Sugar (mg/dL) of:     Less than 150 =   0 units           150 -199 =   2 units  200 -249 =   4 units  250 -299 =   6 units  300 -349 =   8 units  350 and above =  10 units 1 Vial 0    Blood-Glucose Meter monitoring kit As directed 1 Kit 0    multivitamin (ONE A DAY) tablet Take 1 Tab by mouth daily.  atorvastatin (LIPITOR) 10 mg tablet Take 10 mg by mouth daily.  tamsulosin (FLOMAX) 0.4 mg capsule Take 0.4 mg by mouth daily.           Visit Vitals  /60 (BP 1 Location: Left upper arm, BP Patient Position: Sitting, BP Cuff Size: Adult)   Pulse 66   Ht 6' 1\" (1.854 m)   Wt 107.5 kg (237 lb)   BMI 31.27 kg/m²       Physical Exam  Constitutional:       General: He is not in acute distress. Appearance: He is well-developed. He is not diaphoretic. HENT:      Head: Atraumatic. Mouth/Throat:      Pharynx: No oropharyngeal exudate. Eyes:      General: No scleral icterus. Conjunctiva/sclera: Conjunctivae normal.   Neck:      Vascular: No JVD. Cardiovascular:      Rate and Rhythm: Normal rate. Rhythm irregularly irregular. Heart sounds: No murmur heard. No gallop. Pulmonary:      Effort: Pulmonary effort is normal.      Breath sounds: Normal breath sounds. No stridor. No wheezing or rales. Abdominal:      Palpations: Abdomen is soft. Tenderness: There is no abdominal tenderness. Genitourinary:     Comments: Elizabeth cath in place with leg bag  Musculoskeletal:         General: No tenderness. Normal range of motion. Cervical back: Neck supple. Right lower leg: Edema (1+ BLE edema) present. Left lower leg: Edema (1+ BLE edema) present. Skin:     General: Skin is warm. Neurological:      Mental Status: He is alert and oriented to person, place, and time. Motor: No abnormal muscle tone. Psychiatric:         Behavior: Behavior normal.       8/17/2021  Interpretation Summary  · LV: Estimated LVEF is 55 - 60%. Visually measured ejection fraction. Normal cavity size and systolic function (ejection fraction normal). Mild concentric hypertrophy. · TV: Mild tricuspid valve regurgitation is present. · PA: Moderate pulmonary hypertension. Pulmonary arterial systolic pressure is 61 mmHg. · IVC: Severely elevated central venous pressure (15 mmHg); IVC diameter is larger than 21 mm and collapses less than 50% with respiration.       Comparison Study Information  Prior Study  There is a prior study available for comparison. Prior study date: 5/24/2021. As compared to the previous study, there are no significant changes. ASSESSMENT and PLAN    ICD-10-CM ICD-9-CM    1. Chronic diastolic heart failure (HCC)  I50.32 428.32    2. Paroxysmal atrial fibrillation (HCC)  I48.0 427.31    3. Essential hypertension  I10 401.9    4. Hypercholesterolemia  E78.00 272.0    5. Cerebrovascular accident (CVA), unspecified mechanism ([facility]  I63.9 434.91    6. ESRD on hemodialysis (HCC)  N18.6 585.6     Z99.2 V45.11     Dialysis T-Th-Sat   7. Hospital discharge follow-up  Z09 V67.59 NC DISCHARGE MEDS RECONCILED W/ CURRENT OUTPATIENT MED LIST     Follow-up and Dispositions    · Return in about 3 months (around 12/22/2021), or if symptoms worsen or fail to improve, for Follow up with Dr. Emma Mccabe. the following changes in treatment are made: will increase hydralazine to 50 mg tid for better BP control. reviewed diet, exercise and weight control  use of aspirin to prevent MI and TIA's discussed. Patient not on coumadin due hematuria,meanwhile patient to continue aspirin 325mg daily. Discussed with patient in length about the need for long term anticoagulation. All questions answered. 9/2021  Recently admitted to [facility] due to syncope secondary to A. fib with slow ventricular response in setting of hypokalemia. Discharge summary CBC BMP reviewed and discussed with patient along with echocardiogram during admission he was also found to have bleeding ulcers which were treated he was discharged without aspirin. Since discharge he reports is doing well he has bilateral lower extremity edema blood pressure is controlled multiple medications were discontinued at discharge. He is following with nephrology regarding ongoing anemia and thrombocytopenia with recent platelet count of 782. Advised patient to follow-up with GI will consider resuming aspirin if blood counts are stable.   Patient with history of A. fib and not on anticoagulation due to frequent falls as well as anemia. Blood pressure is controlled continue current medications. Volume status per hemodialysis.

## 2021-09-23 ENCOUNTER — TELEPHONE (OUTPATIENT)
Dept: VASCULAR SURGERY | Age: 71
End: 2021-09-23

## 2021-09-23 PROCEDURE — 3331090001 HH PPS REVENUE CREDIT

## 2021-09-23 PROCEDURE — 3331090002 HH PPS REVENUE DEBIT

## 2021-09-23 NOTE — TELEPHONE ENCOUNTER
Spoke with dialysis center at this time regarding fax communication; per [facility] Abrams @  [address] , patient has had permanent access site for approx five years, however it was never used as patient is new to dialysis; when the center attempted to use access site with permission of Dr. Oleg Sadler, it was patent for approx 10 - 15 minutes , and then it clotted off; access site is not patent at present; since site is not patent, patient scheduled for fistulogram per Dr. Sophie Clemente.

## 2021-09-24 ENCOUNTER — HOME CARE VISIT (OUTPATIENT)
Dept: HOME HEALTH SERVICES | Facility: HOME HEALTH | Age: 71
End: 2021-09-24
Payer: MEDICARE

## 2021-09-24 PROCEDURE — 3331090001 HH PPS REVENUE CREDIT

## 2021-09-24 PROCEDURE — 3331090002 HH PPS REVENUE DEBIT

## 2021-09-25 PROCEDURE — 3331090001 HH PPS REVENUE CREDIT

## 2021-09-25 PROCEDURE — 3331090002 HH PPS REVENUE DEBIT

## 2021-09-26 PROCEDURE — 3331090002 HH PPS REVENUE DEBIT

## 2021-09-26 PROCEDURE — 3331090001 HH PPS REVENUE CREDIT

## 2021-09-27 ENCOUNTER — HOME CARE VISIT (OUTPATIENT)
Dept: SCHEDULING | Facility: HOME HEALTH | Age: 71
End: 2021-09-27
Payer: MEDICARE

## 2021-09-27 PROCEDURE — 3331090002 HH PPS REVENUE DEBIT

## 2021-09-27 PROCEDURE — 3331090001 HH PPS REVENUE CREDIT

## 2021-09-27 PROCEDURE — G0157 HHC PT ASSISTANT EA 15: HCPCS

## 2021-09-28 VITALS
SYSTOLIC BLOOD PRESSURE: 140 MMHG | TEMPERATURE: 98 F | DIASTOLIC BLOOD PRESSURE: 80 MMHG | OXYGEN SATURATION: 97 % | HEART RATE: 80 BPM | RESPIRATION RATE: 18 BRPM

## 2021-09-28 PROCEDURE — 3331090002 HH PPS REVENUE DEBIT

## 2021-09-28 PROCEDURE — 3331090001 HH PPS REVENUE CREDIT

## 2021-09-29 PROCEDURE — 3331090001 HH PPS REVENUE CREDIT

## 2021-09-29 PROCEDURE — 3331090002 HH PPS REVENUE DEBIT

## 2021-09-30 ENCOUNTER — PATIENT OUTREACH (OUTPATIENT)
Dept: CASE MANAGEMENT | Age: 71
End: 2021-09-30

## 2021-09-30 PROCEDURE — 3331090001 HH PPS REVENUE CREDIT

## 2021-09-30 PROCEDURE — 3331090002 HH PPS REVENUE DEBIT

## 2021-09-30 NOTE — HOME HEALTH
SUBJECTIVE:  Patient he has been using the cane throughout his home. He wants to work on getting his balance better. CAREGIVER INVOLVEMENT/ASSISTANCE NEEDED FOR: Patient resides with family who assist with ADL's and transfers as needed. HOME HEALTH SUPPLIES BY TYPE AND QUANTITY ORDERED/DELIVERED THIS VISIT INCLUDE: none    OBJECTIVE: See interventions. ASSESSMENT OF PROGRESS TOWARD GOALS: Patient is progressing well towards goals. Patient transferred sit<->stand with use of UE's and SBA. Patient is improving towards increasing strength of LE's and balance to decrease fall risk. Patient ambulated throughout home with SPC and SBA due to weakness and instability with gait. CONTINUED NEED FOR THE FOLLOWING SKILLS: Continuation of PT to further improve patients functional mobility and strength of LE's. Patient needs further focus on balance in sitting and standing to decrease fall risk. Patient needs reinforcement on gait training with walker and cane. PLAN FOR NEXT VISIT: Attempt outdoor ambulation with walker and car transfer next visit. THE FOLLOWING DISCHARGE PLANNING WAS DISCUSSED WITH THE PATIENT/CAREGIVER: D/C from HHPT in 1w1, 2w3 when goals are met or max potential benefit achieved.

## 2021-10-01 ENCOUNTER — HOME CARE VISIT (OUTPATIENT)
Dept: HOME HEALTH SERVICES | Facility: HOME HEALTH | Age: 71
End: 2021-10-01
Payer: MEDICARE

## 2021-10-01 ENCOUNTER — HOME CARE VISIT (OUTPATIENT)
Dept: SCHEDULING | Facility: HOME HEALTH | Age: 71
End: 2021-10-01
Payer: MEDICARE

## 2021-10-01 PROCEDURE — 3331090002 HH PPS REVENUE DEBIT

## 2021-10-01 PROCEDURE — 3331090001 HH PPS REVENUE CREDIT

## 2021-10-02 PROCEDURE — 3331090002 HH PPS REVENUE DEBIT

## 2021-10-02 PROCEDURE — 3331090001 HH PPS REVENUE CREDIT

## 2021-10-03 PROCEDURE — 3331090002 HH PPS REVENUE DEBIT

## 2021-10-03 PROCEDURE — 3331090001 HH PPS REVENUE CREDIT

## 2021-10-04 ENCOUNTER — HOME CARE VISIT (OUTPATIENT)
Dept: HOME HEALTH SERVICES | Facility: HOME HEALTH | Age: 71
End: 2021-10-04
Payer: MEDICARE

## 2021-10-04 PROCEDURE — 3331090002 HH PPS REVENUE DEBIT

## 2021-10-04 PROCEDURE — 3331090001 HH PPS REVENUE CREDIT

## 2021-10-05 PROCEDURE — 3331090001 HH PPS REVENUE CREDIT

## 2021-10-05 PROCEDURE — 3331090002 HH PPS REVENUE DEBIT

## 2021-10-06 ENCOUNTER — HOME CARE VISIT (OUTPATIENT)
Dept: HOME HEALTH SERVICES | Facility: HOME HEALTH | Age: 71
End: 2021-10-06
Payer: MEDICARE

## 2021-10-06 PROCEDURE — 3331090002 HH PPS REVENUE DEBIT

## 2021-10-06 PROCEDURE — 3331090001 HH PPS REVENUE CREDIT

## 2021-10-07 PROCEDURE — 3331090001 HH PPS REVENUE CREDIT

## 2021-10-07 PROCEDURE — 3331090002 HH PPS REVENUE DEBIT

## 2021-10-08 ENCOUNTER — PATIENT OUTREACH (OUTPATIENT)
Dept: CASE MANAGEMENT | Age: 71
End: 2021-10-08

## 2021-10-08 ENCOUNTER — HOME CARE VISIT (OUTPATIENT)
Dept: SCHEDULING | Facility: HOME HEALTH | Age: 71
End: 2021-10-08
Payer: MEDICARE

## 2021-10-08 PROCEDURE — 3331090002 HH PPS REVENUE DEBIT

## 2021-10-08 PROCEDURE — 3331090001 HH PPS REVENUE CREDIT

## 2021-10-08 NOTE — Clinical Note
Pt is discharged from PT over the phone because he refused a PT visit. He refused to extend PT due to multiple missed visits. Please do final DC.

## 2021-10-09 PROCEDURE — 3331090001 HH PPS REVENUE CREDIT

## 2021-10-09 PROCEDURE — 3331090002 HH PPS REVENUE DEBIT

## 2021-10-09 NOTE — HOME HEALTH
PT non billable, no visit DC. Pt returned call late afternoon to inform me that he had multiple MD appointments today and he refused PT DC visit because he is tired. He refused to extend PT due to missed visits because he is able to walk with his walker apartment <> parking lot 3x/week for dialysis. He further explained that on his non-dialysis days, he has medical appointments or lab and these trips get him very tired and that is why he does not want PT anymore. He agreed and verbalized understanding that he is being discharged from home care PT. He was informed that should he decide to have PT in the future, he can ask his PCP to refer him to OPPT. Left  with PCP Dr. Jefferson White to inform of DC PT due to patient's continued refusal of PT visits. Informed team and SN to do final DC.

## 2021-10-10 PROCEDURE — 3331090002 HH PPS REVENUE DEBIT

## 2021-10-10 PROCEDURE — 3331090001 HH PPS REVENUE CREDIT

## 2021-10-11 PROCEDURE — 3331090001 HH PPS REVENUE CREDIT

## 2021-10-11 PROCEDURE — 3331090002 HH PPS REVENUE DEBIT

## 2021-10-12 PROCEDURE — 3331090002 HH PPS REVENUE DEBIT

## 2021-10-12 PROCEDURE — 3331090001 HH PPS REVENUE CREDIT

## 2021-10-12 NOTE — PROGRESS NOTES
This ACM has attempted x 3 to reach patient in Transition of Care follow up. Message left asking for return call with no response from patient. Patient has seen PCP since d/c home from [address] [place] [address] and was d/c from Hospital more than 30 days ago. Episode resolved at this time.

## 2021-10-13 PROCEDURE — 3331090002 HH PPS REVENUE DEBIT

## 2021-10-13 PROCEDURE — 3331090001 HH PPS REVENUE CREDIT

## 2021-10-14 PROCEDURE — 3331090002 HH PPS REVENUE DEBIT

## 2021-10-14 PROCEDURE — 3331090001 HH PPS REVENUE CREDIT

## 2021-10-15 PROCEDURE — 3331090001 HH PPS REVENUE CREDIT

## 2021-10-15 PROCEDURE — 3331090002 HH PPS REVENUE DEBIT

## 2021-10-16 ENCOUNTER — HOME CARE VISIT (OUTPATIENT)
Dept: SCHEDULING | Facility: HOME HEALTH | Age: 71
End: 2021-10-16
Payer: MEDICARE

## 2021-10-16 PROCEDURE — 3331090001 HH PPS REVENUE CREDIT

## 2021-10-16 PROCEDURE — 3331090002 HH PPS REVENUE DEBIT

## 2021-10-16 NOTE — HOME HEALTH
The visit was missed due to the following reason(s): patient refused visit today, reporting he has dialysis and will not be up for a visit.  notified, visit will be missed visit. SN to place order for 1w1 for reassessment next week.

## 2021-10-17 PROCEDURE — 3331090002 HH PPS REVENUE DEBIT

## 2021-10-17 PROCEDURE — 400018 HH-NO PAY CLAIM PROCEDURE

## 2021-10-17 PROCEDURE — 3331090001 HH PPS REVENUE CREDIT

## 2021-10-18 PROCEDURE — 3331090002 HH PPS REVENUE DEBIT

## 2021-10-18 PROCEDURE — 3331090001 HH PPS REVENUE CREDIT

## 2021-10-19 ENCOUNTER — HOME CARE VISIT (OUTPATIENT)
Dept: HOME HEALTH SERVICES | Facility: HOME HEALTH | Age: 71
End: 2021-10-19
Payer: MEDICARE

## 2021-10-19 PROCEDURE — 3331090002 HH PPS REVENUE DEBIT

## 2021-10-19 PROCEDURE — 3331090001 HH PPS REVENUE CREDIT

## 2021-10-20 ENCOUNTER — HOME CARE VISIT (OUTPATIENT)
Dept: SCHEDULING | Facility: HOME HEALTH | Age: 71
End: 2021-10-20
Payer: MEDICARE

## 2021-10-20 PROCEDURE — 3331090002 HH PPS REVENUE DEBIT

## 2021-10-20 PROCEDURE — 3331090001 HH PPS REVENUE CREDIT

## 2021-10-20 NOTE — HOME HEALTH
Pt stated that he was on the way to the Dr now, was not able to be seen today. Asked pt when we could set up a time and day for the vines change. Pt stated that he has an infection and is going to the urologist to have the vines removed. Pt confirmed last vines change was on Sept 20th. Informed pt that at times they will leave the vines in and give antibiotics. Pt said he would rather wait till friday to see what urology does before home health doing anything. Plan visit for 10/25 for vines assessment or discharge depending on what happens with urology.

## 2021-10-21 PROCEDURE — 3331090002 HH PPS REVENUE DEBIT

## 2021-10-21 PROCEDURE — 3331090001 HH PPS REVENUE CREDIT

## 2021-10-22 ENCOUNTER — HOME CARE VISIT (OUTPATIENT)
Dept: SCHEDULING | Facility: HOME HEALTH | Age: 71
End: 2021-10-22
Payer: MEDICARE

## 2021-10-22 VITALS
RESPIRATION RATE: 16 BRPM | SYSTOLIC BLOOD PRESSURE: 136 MMHG | DIASTOLIC BLOOD PRESSURE: 72 MMHG | TEMPERATURE: 98.3 F | HEART RATE: 73 BPM | OXYGEN SATURATION: 98 %

## 2021-10-22 PROCEDURE — 3331090001 HH PPS REVENUE CREDIT

## 2021-10-22 PROCEDURE — MED10508 SYRINGE, LUER LOCK, STERILE, 10ML

## 2021-10-22 PROCEDURE — 3331090002 HH PPS REVENUE DEBIT

## 2021-10-22 PROCEDURE — A4358 URINARY LEG OR ABDOMEN BAG: HCPCS

## 2021-10-22 PROCEDURE — A4338 INDWELLING CATHETER LATEX: HCPCS

## 2021-10-22 PROCEDURE — 400013 HH SOC

## 2021-10-22 PROCEDURE — G0299 HHS/HOSPICE OF RN EA 15 MIN: HCPCS

## 2021-10-22 PROCEDURE — A4310 INSERT TRAY W/O BAG/CATH: HCPCS

## 2021-10-22 NOTE — HOME HEALTH
Caregiver involvement: not available at the moment. Medications reconciled and all medications are available in the home this visit. The following education was provided regarding medications, medication interactions, and look a like medications: sn instructed patient how BP meds keep veins open to allow for blood flow at lower pressure and how this can lead to dizziness and falls with sudden, quick movements. Educated patient on purpose of proscar and flomax, purpose and importance. Medications  are effective at this time. Home health supplies by type and quantity ordered/delivered this visit include: 16fr vines    Patient education provided this visit: Importance on keeping vines catheter clean and free of kinks, and ensure that the bag isnt being tugged on. Disease education on diet education, diabetic foot checks, skin care, medication education. available for ALDs, meal prep, med management and transportation. Progress toward goals: Vines draining with no issues, urine clear and no penile discharge. Home exercise program: ambulate as tolerated.     Continued need for the following skills: Nursing    The following discharge planning was discussed with the pt/caregiver: DC when vines removed, informed patient we would follow up with him after urology to see if SN still needed

## 2021-10-23 PROCEDURE — 3331090001 HH PPS REVENUE CREDIT

## 2021-10-23 PROCEDURE — 3331090002 HH PPS REVENUE DEBIT

## 2021-10-24 PROCEDURE — 3331090001 HH PPS REVENUE CREDIT

## 2021-10-24 PROCEDURE — 3331090002 HH PPS REVENUE DEBIT

## 2021-10-25 PROCEDURE — 3331090001 HH PPS REVENUE CREDIT

## 2021-10-25 PROCEDURE — 3331090002 HH PPS REVENUE DEBIT

## 2021-10-26 PROCEDURE — 3331090001 HH PPS REVENUE CREDIT

## 2021-10-26 PROCEDURE — 3331090002 HH PPS REVENUE DEBIT

## 2021-10-27 PROCEDURE — 3331090001 HH PPS REVENUE CREDIT

## 2021-10-27 PROCEDURE — 3331090002 HH PPS REVENUE DEBIT

## 2021-10-28 PROCEDURE — 3331090001 HH PPS REVENUE CREDIT

## 2021-10-28 PROCEDURE — 3331090002 HH PPS REVENUE DEBIT

## 2021-10-29 PROCEDURE — 3331090001 HH PPS REVENUE CREDIT

## 2021-10-29 PROCEDURE — 3331090002 HH PPS REVENUE DEBIT

## 2021-10-30 PROCEDURE — 3331090001 HH PPS REVENUE CREDIT

## 2021-10-30 PROCEDURE — 3331090002 HH PPS REVENUE DEBIT

## 2021-10-31 PROCEDURE — 3331090001 HH PPS REVENUE CREDIT

## 2021-10-31 PROCEDURE — 3331090002 HH PPS REVENUE DEBIT

## 2021-11-01 ENCOUNTER — HOME CARE VISIT (OUTPATIENT)
Dept: SCHEDULING | Facility: HOME HEALTH | Age: 71
End: 2021-11-01
Payer: MEDICARE

## 2021-11-01 VITALS
RESPIRATION RATE: 16 BRPM | HEART RATE: 81 BPM | SYSTOLIC BLOOD PRESSURE: 124 MMHG | DIASTOLIC BLOOD PRESSURE: 62 MMHG | TEMPERATURE: 98.3 F | OXYGEN SATURATION: 96 %

## 2021-11-01 PROCEDURE — G0299 HHS/HOSPICE OF RN EA 15 MIN: HCPCS

## 2021-11-01 PROCEDURE — 3331090001 HH PPS REVENUE CREDIT

## 2021-11-01 PROCEDURE — 3331090002 HH PPS REVENUE DEBIT

## 2021-11-01 NOTE — HOME HEALTH
Skilled reason for visit: Vines assessment, med management and education. Caregiver involvement: not present at the moment. Medications reviewed and all medications are available in the home this visit. The following education was provided regarding medications, medication interactions, and look alike medications (specify): sn instructed patient how BP meds keep veins open to allow for blood flow at lower pressure and how this can lead to dizziness and falls with sudden, quick movements. .    Medications  are effective at this time. Home health supplies by type and quantity ordered/delivered this visit include: n/a    Patient education provided this visit: Importance on keeping vines catheter clean and free of kinks, and ensure that the bag isnt being tugged on. Disease education on diet education, diabetic foot checks, skin care, medication education. Patient informed me that he had noticed some blood previously, none noted during assessment. Informed patient it could have been from tugging on the catheter, make sure to drain the bag promptly and making sure it is secure throughout the day and night. Skilled Care Performed this visit: Vines assessment, medication management/education    Patient's Progress towards personal goals: Good    Home exercise program: ambulate as tolerated    Continued need for the following skills: Nursing    Plan for next visit: Pt to see urologist on the 17th, followed by vines change on or before the 21st.    Patient and/or caregiver notified and agrees to changes in the Plan of Care N/A      The following discharge planning was discussed with the pt/caregiver: DC when SN no longer needed and vines is removed.

## 2021-11-02 PROCEDURE — 3331090001 HH PPS REVENUE CREDIT

## 2021-11-02 PROCEDURE — 3331090002 HH PPS REVENUE DEBIT

## 2021-11-03 PROCEDURE — 3331090002 HH PPS REVENUE DEBIT

## 2021-11-03 PROCEDURE — 3331090001 HH PPS REVENUE CREDIT

## 2021-11-04 PROCEDURE — 3331090001 HH PPS REVENUE CREDIT

## 2021-11-04 PROCEDURE — 3331090002 HH PPS REVENUE DEBIT

## 2021-11-05 PROCEDURE — 3331090002 HH PPS REVENUE DEBIT

## 2021-11-05 PROCEDURE — 3331090001 HH PPS REVENUE CREDIT

## 2021-11-06 PROCEDURE — 3331090002 HH PPS REVENUE DEBIT

## 2021-11-06 PROCEDURE — 3331090001 HH PPS REVENUE CREDIT

## 2021-11-07 PROCEDURE — 3331090002 HH PPS REVENUE DEBIT

## 2021-11-07 PROCEDURE — 3331090001 HH PPS REVENUE CREDIT

## 2021-11-08 PROCEDURE — 3331090001 HH PPS REVENUE CREDIT

## 2021-11-08 PROCEDURE — 3331090002 HH PPS REVENUE DEBIT

## 2021-11-09 PROCEDURE — 3331090002 HH PPS REVENUE DEBIT

## 2021-11-09 PROCEDURE — 3331090001 HH PPS REVENUE CREDIT

## 2021-11-10 PROCEDURE — 3331090002 HH PPS REVENUE DEBIT

## 2021-11-10 PROCEDURE — 3331090001 HH PPS REVENUE CREDIT

## 2021-11-11 PROCEDURE — 3331090002 HH PPS REVENUE DEBIT

## 2021-11-11 PROCEDURE — 3331090001 HH PPS REVENUE CREDIT

## 2021-11-12 ENCOUNTER — HOME CARE VISIT (OUTPATIENT)
Dept: HOME HEALTH SERVICES | Facility: HOME HEALTH | Age: 71
End: 2021-11-12
Payer: MEDICARE

## 2021-11-12 PROCEDURE — 3331090001 HH PPS REVENUE CREDIT

## 2021-11-12 PROCEDURE — 3331090002 HH PPS REVENUE DEBIT

## 2021-11-13 PROCEDURE — 3331090001 HH PPS REVENUE CREDIT

## 2021-11-13 PROCEDURE — 3331090002 HH PPS REVENUE DEBIT

## 2021-11-14 PROCEDURE — 3331090002 HH PPS REVENUE DEBIT

## 2021-11-14 PROCEDURE — 3331090001 HH PPS REVENUE CREDIT

## 2021-11-15 ENCOUNTER — HOME CARE VISIT (OUTPATIENT)
Dept: HOME HEALTH SERVICES | Facility: HOME HEALTH | Age: 71
End: 2021-11-15
Payer: MEDICARE

## 2021-11-15 PROCEDURE — 3331090001 HH PPS REVENUE CREDIT

## 2021-11-15 PROCEDURE — 3331090002 HH PPS REVENUE DEBIT

## 2021-11-22 NOTE — HOME HEALTH
Missed visit this date. Pt called and no answer, left message. No return call received. Scheduling and supervisors notified of unable to reach patient for recert and cert ends today. Scheduling instructed by supervisor to schedule visit for 11/16/21.

## 2021-11-23 ENCOUNTER — HOME CARE VISIT (OUTPATIENT)
Dept: HOME HEALTH SERVICES | Facility: HOME HEALTH | Age: 71
End: 2021-11-23

## 2021-11-29 ENCOUNTER — HOME CARE VISIT (OUTPATIENT)
Dept: HOME HEALTH SERVICES | Facility: HOME HEALTH | Age: 71
End: 2021-11-29

## 2021-11-29 NOTE — HOME HEALTH
Patient to have non billable [facility] due to not being able to reach patient for recert visit, and follow up appointment not being scheduled. Patient will be readmitted once resumption of care orders obtained.

## 2021-12-01 ENCOUNTER — TRANSCRIBE ORDER (OUTPATIENT)
Dept: REGISTRATION | Age: 71
End: 2021-12-01

## 2021-12-01 ENCOUNTER — HOSPITAL ENCOUNTER (OUTPATIENT)
Dept: PREADMISSION TESTING | Age: 71
Discharge: HOME | End: 2021-12-01
Payer: MEDICARE

## 2021-12-01 DIAGNOSIS — N18.6 END STAGE RENAL DISEASE (HCC): Primary | ICD-10-CM

## 2021-12-01 DIAGNOSIS — N18.6 END STAGE RENAL DISEASE (HCC): ICD-10-CM

## 2021-12-01 LAB
ANION GAP SERPL CALC-SCNC: 6 MMOL/L (ref 3–18)
BUN SERPL-MCNC: 70 MG/DL (ref 7–18)
BUN/CREAT SERPL: 12 (ref 12–20)
CALCIUM SERPL-MCNC: 9.6 MG/DL (ref 8.5–10.1)
CHLORIDE SERPL-SCNC: 99 MMOL/L (ref 100–111)
CO2 SERPL-SCNC: 32 MMOL/L (ref 21–32)
CREAT SERPL-MCNC: 5.74 MG/DL (ref 0.6–1.3)
GLUCOSE SERPL-MCNC: 91 MG/DL (ref 74–99)
POTASSIUM SERPL-SCNC: 4.1 MMOL/L (ref 3.5–5.5)
SODIUM SERPL-SCNC: 137 MMOL/L (ref 136–145)

## 2021-12-01 PROCEDURE — 36415 COLL VENOUS BLD VENIPUNCTURE: CPT

## 2021-12-01 PROCEDURE — 80048 BASIC METABOLIC PNL TOTAL CA: CPT

## 2021-12-03 ENCOUNTER — ANESTHESIA EVENT (OUTPATIENT)
Dept: CARDIOTHORACIC SURGERY | Age: 71
End: 2021-12-03
Payer: MEDICARE

## 2021-12-06 ENCOUNTER — ANESTHESIA (OUTPATIENT)
Dept: CARDIOTHORACIC SURGERY | Age: 71
End: 2021-12-06
Payer: MEDICARE

## 2021-12-06 ENCOUNTER — HOSPITAL ENCOUNTER (OUTPATIENT)
Age: 71
Setting detail: OUTPATIENT SURGERY
Discharge: HOME | End: 2021-12-06
Attending: SURGERY | Admitting: SURGERY
Payer: MEDICARE

## 2021-12-06 ENCOUNTER — HOME CARE VISIT (OUTPATIENT)
Dept: HOME HEALTH SERVICES | Facility: HOME HEALTH | Age: 71
End: 2021-12-06

## 2021-12-06 VITALS
BODY MASS INDEX: 29.93 KG/M2 | TEMPERATURE: 97.4 F | SYSTOLIC BLOOD PRESSURE: 151 MMHG | OXYGEN SATURATION: 100 % | DIASTOLIC BLOOD PRESSURE: 66 MMHG | WEIGHT: 221 LBS | HEIGHT: 72 IN | RESPIRATION RATE: 11 BRPM | HEART RATE: 54 BPM

## 2021-12-06 DIAGNOSIS — N18.6 STAGE 5 CHRONIC KIDNEY DISEASE ON CHRONIC DIALYSIS (HCC): Primary | ICD-10-CM

## 2021-12-06 DIAGNOSIS — Z99.2 STAGE 5 CHRONIC KIDNEY DISEASE ON CHRONIC DIALYSIS (HCC): Primary | ICD-10-CM

## 2021-12-06 LAB
GLUCOSE BLD STRIP.AUTO-MCNC: 103 MG/DL (ref 70–110)
GLUCOSE BLD STRIP.AUTO-MCNC: 69 MG/DL (ref 70–110)
GLUCOSE BLD STRIP.AUTO-MCNC: 70 MG/DL (ref 70–110)
GLUCOSE BLD STRIP.AUTO-MCNC: 74 MG/DL (ref 70–110)
GLUCOSE BLD STRIP.AUTO-MCNC: 79 MG/DL (ref 70–110)
GLUCOSE BLD STRIP.AUTO-MCNC: 79 MG/DL (ref 70–110)
GLUCOSE BLD STRIP.AUTO-MCNC: 80 MG/DL (ref 70–110)
GLUCOSE BLD STRIP.AUTO-MCNC: 89 MG/DL (ref 70–110)
HBA1C MFR BLD: 4.9 % (ref 4.2–5.6)
POTASSIUM SERPL-SCNC: 5 MMOL/L (ref 3.5–5.5)

## 2021-12-06 PROCEDURE — 84132 ASSAY OF SERUM POTASSIUM: CPT

## 2021-12-06 PROCEDURE — 77030002933 HC SUT MCRYL J&J -A: Performed by: SURGERY

## 2021-12-06 PROCEDURE — 74011250637 HC RX REV CODE- 250/637: Performed by: NURSE ANESTHETIST, CERTIFIED REGISTERED

## 2021-12-06 PROCEDURE — 76010000113 HC CV SURG 1 TO 1.5 HR: Performed by: SURGERY

## 2021-12-06 PROCEDURE — 2709999900 HC NON-CHARGEABLE SUPPLY: Performed by: SURGERY

## 2021-12-06 PROCEDURE — 74011000250 HC RX REV CODE- 250: Performed by: ANESTHESIOLOGY

## 2021-12-06 PROCEDURE — 76210000016 HC OR PH I REC 1 TO 1.5 HR: Performed by: SURGERY

## 2021-12-06 PROCEDURE — 83036 HEMOGLOBIN GLYCOSYLATED A1C: CPT

## 2021-12-06 PROCEDURE — 74011000250 HC RX REV CODE- 250: Performed by: SURGERY

## 2021-12-06 PROCEDURE — 74011250636 HC RX REV CODE- 250/636: Performed by: SURGERY

## 2021-12-06 PROCEDURE — 82962 GLUCOSE BLOOD TEST: CPT

## 2021-12-06 PROCEDURE — 74011250636 HC RX REV CODE- 250/636: Performed by: ANESTHESIOLOGY

## 2021-12-06 PROCEDURE — 76210000021 HC REC RM PH II 0.5 TO 1 HR: Performed by: SURGERY

## 2021-12-06 PROCEDURE — 01844 ANES VASC SHUNT/SHUNT REVJ: CPT | Performed by: ANESTHESIOLOGY

## 2021-12-06 PROCEDURE — 77030002987 HC SUT PROL J&J -B: Performed by: SURGERY

## 2021-12-06 PROCEDURE — 76060000033 HC ANESTHESIA 1 TO 1.5 HR: Performed by: SURGERY

## 2021-12-06 PROCEDURE — 77030002986 HC SUT PROL J&J -A: Performed by: SURGERY

## 2021-12-06 PROCEDURE — 74011250636 HC RX REV CODE- 250/636: Performed by: NURSE ANESTHETIST, CERTIFIED REGISTERED

## 2021-12-06 PROCEDURE — 77030010507 HC ADH SKN DERMBND J&J -B: Performed by: SURGERY

## 2021-12-06 PROCEDURE — 99100 ANES PT EXTEME AGE<1 YR&>70: CPT | Performed by: ANESTHESIOLOGY

## 2021-12-06 RX ORDER — DEXTROSE 50 % IN WATER (D50W) INTRAVENOUS SYRINGE
25-50 AS NEEDED
Status: DISCONTINUED | OUTPATIENT
Start: 2021-12-06 | End: 2021-12-06 | Stop reason: HOSPADM

## 2021-12-06 RX ORDER — SODIUM CHLORIDE 0.9 % (FLUSH) 0.9 %
5-40 SYRINGE (ML) INJECTION AS NEEDED
Status: DISCONTINUED | OUTPATIENT
Start: 2021-12-06 | End: 2021-12-06 | Stop reason: HOSPADM

## 2021-12-06 RX ORDER — SODIUM CHLORIDE 0.9 % (FLUSH) 0.9 %
5-40 SYRINGE (ML) INJECTION EVERY 8 HOURS
Status: DISCONTINUED | OUTPATIENT
Start: 2021-12-06 | End: 2021-12-06 | Stop reason: HOSPADM

## 2021-12-06 RX ORDER — MAGNESIUM SULFATE 100 %
4 CRYSTALS MISCELLANEOUS AS NEEDED
Status: DISCONTINUED | OUTPATIENT
Start: 2021-12-06 | End: 2021-12-06 | Stop reason: HOSPADM

## 2021-12-06 RX ORDER — ONDANSETRON 2 MG/ML
INJECTION INTRAMUSCULAR; INTRAVENOUS AS NEEDED
Status: DISCONTINUED | OUTPATIENT
Start: 2021-12-06 | End: 2021-12-06 | Stop reason: HOSPADM

## 2021-12-06 RX ORDER — FENTANYL CITRATE 50 UG/ML
INJECTION, SOLUTION INTRAMUSCULAR; INTRAVENOUS AS NEEDED
Status: DISCONTINUED | OUTPATIENT
Start: 2021-12-06 | End: 2021-12-06 | Stop reason: HOSPADM

## 2021-12-06 RX ORDER — INSULIN LISPRO 100 [IU]/ML
INJECTION, SOLUTION INTRAVENOUS; SUBCUTANEOUS ONCE
Status: DISCONTINUED | OUTPATIENT
Start: 2021-12-06 | End: 2021-12-06 | Stop reason: HOSPADM

## 2021-12-06 RX ORDER — ONDANSETRON 2 MG/ML
4 INJECTION INTRAMUSCULAR; INTRAVENOUS ONCE
Status: DISCONTINUED | OUTPATIENT
Start: 2021-12-06 | End: 2021-12-06 | Stop reason: HOSPADM

## 2021-12-06 RX ORDER — LIDOCAINE HYDROCHLORIDE 10 MG/ML
INJECTION, SOLUTION EPIDURAL; INFILTRATION; INTRACAUDAL; PERINEURAL AS NEEDED
Status: DISCONTINUED | OUTPATIENT
Start: 2021-12-06 | End: 2021-12-06 | Stop reason: HOSPADM

## 2021-12-06 RX ORDER — LIDOCAINE HYDROCHLORIDE 10 MG/ML
INJECTION, SOLUTION EPIDURAL; INFILTRATION; INTRACAUDAL; PERINEURAL
Status: DISCONTINUED
Start: 2021-12-06 | End: 2021-12-06 | Stop reason: HOSPADM

## 2021-12-06 RX ORDER — HEPARIN SODIUM 200 [USP'U]/100ML
INJECTION, SOLUTION INTRAVENOUS
Status: DISCONTINUED
Start: 2021-12-06 | End: 2021-12-06 | Stop reason: HOSPADM

## 2021-12-06 RX ORDER — GLYCOPYRROLATE 0.2 MG/ML
INJECTION INTRAMUSCULAR; INTRAVENOUS AS NEEDED
Status: DISCONTINUED | OUTPATIENT
Start: 2021-12-06 | End: 2021-12-06 | Stop reason: HOSPADM

## 2021-12-06 RX ORDER — EPHEDRINE SULFATE/0.9% NACL/PF 25 MG/5 ML
SYRINGE (ML) INTRAVENOUS AS NEEDED
Status: DISCONTINUED | OUTPATIENT
Start: 2021-12-06 | End: 2021-12-06 | Stop reason: HOSPADM

## 2021-12-06 RX ORDER — HEPARIN SODIUM 1000 [USP'U]/ML
INJECTION, SOLUTION INTRAVENOUS; SUBCUTANEOUS AS NEEDED
Status: DISCONTINUED | OUTPATIENT
Start: 2021-12-06 | End: 2021-12-06 | Stop reason: HOSPADM

## 2021-12-06 RX ORDER — EPHEDRINE SULFATE/0.9% NACL/PF 25 MG/5 ML
SYRINGE (ML) INTRAVENOUS AS NEEDED
Status: DISCONTINUED | OUTPATIENT
Start: 2021-12-06 | End: 2021-12-06

## 2021-12-06 RX ORDER — FAMOTIDINE 20 MG/1
20 TABLET, FILM COATED ORAL ONCE
Status: COMPLETED | OUTPATIENT
Start: 2021-12-06 | End: 2021-12-06

## 2021-12-06 RX ORDER — DEXTROSE 50 % IN WATER (D50W) INTRAVENOUS SYRINGE
12.5
Status: COMPLETED | OUTPATIENT
Start: 2021-12-06 | End: 2021-12-06

## 2021-12-06 RX ORDER — HYDROCODONE BITARTRATE AND ACETAMINOPHEN 5; 325 MG/1; MG/1
1 TABLET ORAL
Qty: 40 TABLET | Refills: 0 | Status: SHIPPED | OUTPATIENT
Start: 2021-12-06 | End: 2021-12-13

## 2021-12-06 RX ORDER — MIDAZOLAM HYDROCHLORIDE 1 MG/ML
INJECTION, SOLUTION INTRAMUSCULAR; INTRAVENOUS AS NEEDED
Status: DISCONTINUED | OUTPATIENT
Start: 2021-12-06 | End: 2021-12-06 | Stop reason: HOSPADM

## 2021-12-06 RX ORDER — LIDOCAINE HYDROCHLORIDE 20 MG/ML
INJECTION, SOLUTION EPIDURAL; INFILTRATION; INTRACAUDAL; PERINEURAL AS NEEDED
Status: DISCONTINUED | OUTPATIENT
Start: 2021-12-06 | End: 2021-12-06 | Stop reason: HOSPADM

## 2021-12-06 RX ORDER — LIDOCAINE HYDROCHLORIDE 10 MG/ML
0.1 INJECTION, SOLUTION EPIDURAL; INFILTRATION; INTRACAUDAL; PERINEURAL AS NEEDED
Status: DISCONTINUED | OUTPATIENT
Start: 2021-12-06 | End: 2021-12-06 | Stop reason: HOSPADM

## 2021-12-06 RX ORDER — HEPARIN SODIUM 200 [USP'U]/100ML
INJECTION, SOLUTION INTRAVENOUS
Status: COMPLETED | OUTPATIENT
Start: 2021-12-06 | End: 2021-12-06

## 2021-12-06 RX ORDER — FENTANYL CITRATE 50 UG/ML
50 INJECTION, SOLUTION INTRAMUSCULAR; INTRAVENOUS AS NEEDED
Status: DISCONTINUED | OUTPATIENT
Start: 2021-12-06 | End: 2021-12-06 | Stop reason: HOSPADM

## 2021-12-06 RX ORDER — SODIUM CHLORIDE 9 MG/ML
25 INJECTION, SOLUTION INTRAVENOUS CONTINUOUS
Status: DISCONTINUED | OUTPATIENT
Start: 2021-12-06 | End: 2021-12-06 | Stop reason: HOSPADM

## 2021-12-06 RX ORDER — PROPOFOL 10 MG/ML
VIAL (ML) INTRAVENOUS
Status: DISCONTINUED | OUTPATIENT
Start: 2021-12-06 | End: 2021-12-06 | Stop reason: HOSPADM

## 2021-12-06 RX ADMIN — DEXTROSE MONOHYDRATE 12.5 G: 500 INJECTION PARENTERAL at 08:13

## 2021-12-06 RX ADMIN — SODIUM CHLORIDE 25 ML/HR: 900 INJECTION, SOLUTION INTRAVENOUS at 07:45

## 2021-12-06 RX ADMIN — Medication 5 MG: at 09:07

## 2021-12-06 RX ADMIN — GLYCOPYRROLATE 0.2 MG: 0.2 INJECTION, SOLUTION INTRAMUSCULAR; INTRAVENOUS at 08:40

## 2021-12-06 RX ADMIN — DEXTROSE MONOHYDRATE 12.5 G: 25 INJECTION, SOLUTION INTRAVENOUS at 10:51

## 2021-12-06 RX ADMIN — HEPARIN SODIUM 3000 UNITS: 1000 INJECTION, SOLUTION INTRAVENOUS; SUBCUTANEOUS at 09:04

## 2021-12-06 RX ADMIN — ONDANSETRON 4 MG: 2 INJECTION INTRAMUSCULAR; INTRAVENOUS at 09:25

## 2021-12-06 RX ADMIN — PROPOFOL 100 MCG/KG/MIN: 10 INJECTION, EMULSION INTRAVENOUS at 08:45

## 2021-12-06 RX ADMIN — MIDAZOLAM HYDROCHLORIDE 2 MG: 2 INJECTION, SOLUTION INTRAMUSCULAR; INTRAVENOUS at 08:29

## 2021-12-06 RX ADMIN — LIDOCAINE HYDROCHLORIDE 100 MG: 20 INJECTION, SOLUTION EPIDURAL; INFILTRATION; INTRACAUDAL; PERINEURAL at 08:46

## 2021-12-06 RX ADMIN — WATER 2 G: 1 INJECTION INTRAMUSCULAR; INTRAVENOUS; SUBCUTANEOUS at 08:47

## 2021-12-06 RX ADMIN — FENTANYL CITRATE 50 MCG: 50 INJECTION, SOLUTION INTRAMUSCULAR; INTRAVENOUS at 08:44

## 2021-12-06 RX ADMIN — FAMOTIDINE 20 MG: 20 TABLET ORAL at 08:02

## 2021-12-06 NOTE — ANESTHESIA POSTPROCEDURE EVALUATION
Procedure(s):  RIGHT ARM ARTERIO VENOUS FISTULA  CREATION. MAC, general - backup    Anesthesia Post Evaluation      Multimodal analgesia: multimodal analgesia used between 6 hours prior to anesthesia start to PACU discharge  Patient location during evaluation: PACU  Patient participation: complete - patient participated  Level of consciousness: awake and alert  Pain management: adequate  Airway patency: patent  Anesthetic complications: no  Cardiovascular status: acceptable  Respiratory status: acceptable  Hydration status: acceptable  Post anesthesia nausea and vomiting:  none  Final Post Anesthesia Temperature Assessment:  Normothermia (36.0-37.5 degrees C)      INITIAL Post-op Vital signs:   Vitals Value Taken Time   /76 12/06/21 0946   Temp 36.3 °C (97.4 °F) 12/06/21 0946   Pulse 68 12/06/21 0949   Resp 15 12/06/21 0949   SpO2 100 % 12/06/21 0949   Vitals shown include unvalidated device data.

## 2021-12-06 NOTE — H&P
Surgery History and Physical    Subjective:      Nils Osler is a 70 y.o.  male who presents with esrd. Currently using tunneled dialysis catheter for hemodialysis. 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 Plan is for right arm access which he is agreeable    Patient Active Problem List    Diagnosis Date Noted    Severe protein-calorie malnutrition ([address] 2021    Renal failure 08/15/2021    Bradycardia 08/15/2021    CKD (chronic kidney disease) 2015    Acute renal failure (ARF) ([address] 2015    Hyperkalemia 2015    BPH (benign prostatic hyperplasia) 2015    Bladder outlet obstruction 2015    Hypertension     Diabetes ([address]     Atrial fibrillation ([address]     Cerebrovascular accident ([address]     Hypercholesterolemia     Chronic diastolic heart failure ([address]     Morbid obesity ([address]      Past Medical History:   Diagnosis Date    Atrial fibrillation ([address]     Cerebrovascular accident ([address]     , - general weakness    Chronic diastolic heart failure (HCC)     Chronic kidney disease     dialysis    Diabetes ([address]     Heart failure ([address]     History of cardioversion     Hypercholesterolemia     Hypertension     Morbid obesity ([address]       Past Surgical History:   Procedure Laterality Date    HX VASCULAR ACCESS Right     right neck      Social History     Tobacco Use    Smoking status: Former Smoker     Years: 8.00     Quit date: 1971     Years since quittin.4    Smokeless tobacco: Never Used    Tobacco comment: last smoked in my late 19's   Substance Use Topics    Alcohol use: No     Alcohol/week: 0.0 standard drinks      Family History   Problem Relation Age of Onset    Heart Attack Mother 52    Diabetes Other     Hypertension Other       Prior to Admission medications    Medication Sig Start Date End Date Taking? Authorizing Provider   amLODIPine (NORVASC) 5 mg tablet Take 5 mg by mouth daily. Mason Lala MD   ferrous sulfate 325 mg (65 mg iron) tablet Take 325 mg by mouth two (2) times a day. Mason Lala MD   levoFLOXacin (LEVAQUIN) 500 mg tablet Take 500 mg by mouth daily. Take 1 tablet by mouth every forty-eight (48) hours for 2 dose    Provider, Historical   acetaminophen (TYLENOL) 500 mg tablet Take 500 mg by mouth every six (6) hours as needed for Pain. Provider, Historical   finasteride (PROSCAR) 5 mg tablet TAKE 1 TABLET BY MOUTH DAILY 6/16/21   Provider, Historical   vitamin E, dl,tocopheryl acet, (vitamin E acetate) 45 mg (100 unit) capsule Take 100 Units by mouth daily. Provider, Historical   docusate sodium (COLACE) 100 mg capsule Take 100 mg by mouth daily as needed for Constipation. Provider, Historical   cholecalciferol (VITAMIN D3) 1,000 unit tablet Take 1 Tab by mouth daily. Indications: VITAMIN D DEFICIENCY 12/11/15   Tenisha Yadav MD   polyethylene glycol (MIRALAX) 17 gram packet Take 1 Packet by mouth daily. Patient taking differently: Take 1 Packet by mouth daily as needed for Constipation. 12/11/15   Tenisha Yadav MD   insulin lispro (HUMALOG) 100 unit/mL injection SubCUTAneous route Before breakfast, lunch, dinner and at bedtime. For Blood Sugar (mg/dL) of:     Less than 150 =   0 units           150 -199 =   2 units  200 -249 =   4 units  250 -299 =   6 units  300 -349 =   8 units  350 and above =  10 units 11/9/15   Sheyla Hart MD   Blood-Glucose Meter monitoring kit As directed 11/9/15   Sheyla Hart MD   multivitamin (ONE A DAY) tablet Take 1 Tab by mouth daily. Gregorio Caldwell MD   atorvastatin (LIPITOR) 10 mg tablet Take 10 mg by mouth daily. Gregorio Caldwell MD   tamsulosin (FLOMAX) 0.4 mg capsule Take 0.4 mg by mouth daily. Gregorio Caldwell MD     No Known Allergies      Review of Systems:    A comprehensive review of systems was negative except for that written in the History of Present Illness.     Objective: Patient Vitals for the past 8 hrs:   BP Temp Pulse Resp SpO2 Height Weight   21 0706 (!) 162/75 98.5 °F (36.9 °C) (!) 56 18 100 % 6' (1.829 m) 100.2 kg (221 lb)       Temp (24hrs), Av.5 °F (36.9 °C), Min:98.5 °F (36.9 °C), Max:98.5 °F (36.9 °C)      Physical Exam:  LUNG: clear to auscultation bilaterally, HEART: regularly irregular rhythm, ABDOMEN: soft, non-tender. Bowel sounds normal. No masses,  no organomegaly    Labs: No results found for this or any previous visit (from the past 24 hour(s)).     Data Review:    BMP:   Lab Results   Component Value Date/Time    Glucose 91 2021 11:06 AM    Sodium 137 2021 11:06 AM    Potassium 4.1 2021 11:06 AM    Chloride 99 (L) 2021 11:06 AM    CO2 32 2021 11:06 AM    BUN 70 (H) 2021 11:06 AM    Creatinine 5.74 (H) 2021 11:06 AM    Calcium 9.6 2021 11:06 AM       Assessment:     Active Problems:    Renal failure (8/15/2021)        Plan:     Creation right arm arteriovenous fistula possible graft    Signed By: Tushar Cortez MD     2021

## 2021-12-06 NOTE — DISCHARGE INSTRUCTIONS
Patient Education        Hemodialysis Vascular Access: Care Instructions  Overview  Hemodialysis, or dialysis, is the use of a machine to remove wastes from your blood. You need it if your kidneys are not able to remove wastes on their own. A dialysis access is the place in your arm, or sometimes in your leg, where a doctor creates a blood vessel that carries a large flow of blood. Your doctor creates an access during a minor surgery. You need to take care of your access to keep it working and to prevent infection. When you have dialysis, two needles are placed in this blood vessel and are connected to the dialysis machine. Your blood flows out of one needle and into the machine to be cleaned. Then your cleaned blood flows back into your body through the other needle. Sometimes a doctor makes a short-term access through a tube, called a catheter, placed in your neck, upper chest, or groin. Follow-up care is a key part of your treatment and safety. Be sure to make and go to all appointments, and call your doctor if you are having problems. It's also a good idea to know your test results and keep a list of the medicines you take. How can you care for yourself at home? · After your doctor creates an access, keep it dry for at least 2 days. · Squeeze a soft ball or other object as instructed after the access is placed. This will help blood flow through the access and help prevent blood clots. · After you have dialysis, check to see if the access bleeds or swells. Let your doctor know if your arm bleeds or swells. · Do not lift anything heavy with the arm that has the access. · Do not bump your arm. · Don't wear tight clothing or jewelry over the access. · Don't sleep with your access arm under your body. · Have blood drawn or blood pressure taken from your other arm. · Keep the access clean and dry. · Don't put cream or lotion on or near the access. When should you call for help?    Call your doctor now or seek immediate medical care if:    · You have signs of infection, such as:  ? Increased pain, swelling, warmth, or redness around the access. ? Red streaks leading from the access. ? Pus draining from the access. ? A fever.     · You do not feel a pulse in your access. Watch closely for changes in your health, and be sure to contact your doctor if:    · You do not get better as expected. Where can you learn more? Go to http://www.gray.com/  Enter L169 in the search box to learn more about \"Hemodialysis Vascular Access: Care Instructions. \"  Current as of: December 17, 2020               Content Version: 13.0  © 5861-1322 [employer]. Care instructions adapted under license by [facility] (which disclaims liability or warranty for this information). If you have questions about a medical condition or this instruction, always ask your healthcare professional. [place] [address] any warranty or liability for your use of this information. Patient armband removed and shredded         DISCHARGE SUMMARY from Nurse    PATIENT INSTRUCTIONS:    After general anesthesia or intravenous sedation, for 24 hours or while taking prescription Narcotics:  · Limit your activities  · Do not drive and operate hazardous machinery  · Do not make important personal or business decisions  · Do  not drink alcoholic beverages  · If you have not urinated within 8 hours after discharge, please contact your surgeon on call.     Report the following to your surgeon:  · Excessive pain, swelling, redness or odor of or around the surgical area  · Temperature over 100.5  · Nausea and vomiting lasting longer than 4 hours or if unable to take medications  · Any signs of decreased circulation or nerve impairment to extremity: change in color, persistent  numbness, tingling, coldness or increase pain  · Any questions    These are general instructions for a healthy lifestyle:    No smoking/ No tobacco products/ Avoid exposure to second hand smoke  Surgeon General's Warning:  Quitting smoking now greatly reduces serious risk to your health. Obesity, smoking, and sedentary lifestyle greatly increases your risk for illness    A healthy diet, regular physical exercise & weight monitoring are important for maintaining a healthy lifestyle    You may be retaining fluid if you have a history of heart failure or if you experience any of the following symptoms:  Weight gain of 3 pounds or more overnight or 5 pounds in a week, increased swelling in our hands or feet or shortness of breath while lying flat in bed. Please call your doctor as soon as you notice any of these symptoms; do not wait until your next office visit. The discharge information has been reviewed with the patient. The patient verbalized understanding. Discharge medications reviewed with the patient and appropriate educational materials and side effects teaching were provided.   ___________________________________________________________________________________________________________________________________

## 2021-12-06 NOTE — PERIOP NOTES
Patient's accucheck 69mg/dL at 0806, when performed at the bedside with the OR team nurse present, patient's blood sugar was cover 12.5gram of Dextrose IV push at 0813, patient out of pre-op at 0819 and in [address] [place] [address] at 0827. Remaining dose of Dextrose transported with patient to heart center with the OR Team staff, for additional dosing prn. Blood Sugar recheck due at 0828, per protocol.

## 2021-12-06 NOTE — PROGRESS NOTES
conducted a pre-surgery visit with Nikia Barker, who is a 70 y.o.,male. The  provided the following Interventions:  Initiated a relationship of care and support. Offered prayer and assurance of continued prayers on patient's behalf. There is no advance directive on file for this patient but there is a POST form completed and present. Plan:  Chaplains will continue to follow and will provide pastoral care on an as needed/requested basis.  recommends bedside caregivers page  on duty if patient shows signs of acute spiritual or emotional distress.   Reiseñor 3   Board Certified [address] [place] [address]   [phone]

## 2021-12-06 NOTE — OP NOTES
Preoperative diagnosis: ESRD    Postoperative diagnosis: Same    Procedures performed:  #1  Creation of primary dialysis access, right brachiocephalic fistula. Creation right [facility] fistula    Cultures: None    Specimens: None    Drains: None    Estimated blood loss: Less than 50 mL    Assistants: None    Implants: Please see above    Complications: None    Anesthesia: Moderate conscious sedation. Indications for the procedure:  Kobe Quinones is a 70 y.o. need for permanent access, currently using tunneled dialysis catheter. Patient was given the appropriate risk and benefits of the procedure including but not limited to bleeding, infection, damage to adjacent structures, MI, stroke, death, loss of lower extremity, need for further surgery. Patient was understanding of all the risks and underwent a procedure. Operative findings:   #1  Excellent right arm brachiocephalic fistula    Procedure:  Patient was correctly identified in the precath area and taken to the Cath Lab in stable condition. Patient had pre-incision timeout prior to any incision. Patient was prepped and draped in the normal sterile fashion according to CDC guidelines aseptic technique. Antecubital fossa was localized to perform an ultrasound and correctly identified the antecubital cephalic vein. Made incision across the antecubital fossa exposing the brachial artery directly in the cephalic vein. I mobilized the cephalic vein and clipped it distally and transected allowing for transposition to the artery. I dilated with the 5 coronary dilator and flushed it. Anticoagulated the patient with heparin. Again vessel controls on the artery made arteriotomy 8 was notably very calcific but patent. Anastomose the vein directly end-to-side to the artery using 6-0 Prolene suture circular standard fashion.   Once this is complete I released the controls there is excellent flow in the artery and a wonderful thrill in this new fistula superficial straight with a wonderful thrill. There is no tension on the fistula approximated the fascia with interrupted 3-0 Monocryl. Skin was closed with 4 Monocryl and Dermabond glue. He was transferred to recovery in stable condition.   No complaints of pain in the hand

## 2021-12-06 NOTE — PERIOP NOTES
Recheck accu check 79. Orange juice given. No signs or symptoms of hypoglycemia noted. Will monitor. [address] blood sugar 80. Discharged and instructed to go home and have a meal.  Verbalized same.

## 2021-12-06 NOTE — ANESTHESIA PREPROCEDURE EVALUATION
Relevant Problems   NEUROLOGY   (+) Cerebrovascular accident ([facility] [address]      CARDIOVASCULAR   (+) Atrial fibrillation (HCC)   (+) Hypertension      RENAL FAILURE   (+) Acute renal failure (ARF) (HCC)   (+) CKD (chronic kidney disease)   (+) Renal failure      ENDOCRINE   (+) Diabetes (HCC)   (+) Morbid obesity (HCC)       Anesthetic History   No history of anesthetic complications            Review of Systems / Medical History  Patient summary reviewed and pertinent labs reviewed    Pulmonary  Within defined limits              Comments: Former smoker   Neuro/Psych       CVA      Comments: Some weakness, uses cane now, but recovered mobility.  Cardiovascular    Hypertension: well controlled        Dysrhythmias : atrial fibrillation           GI/Hepatic/Renal  Within defined limits              Endo/Other    Diabetes: type 2      Pertinent negatives: No morbid obesity   Other Findings              Physical Exam    Airway  Mallampati: II  TM Distance: 4 - 6 cm  Neck ROM: normal range of motion   Mouth opening: Normal     Cardiovascular    Rhythm: irregular  Rate: normal         Dental    Dentition: Edentulous     Pulmonary  Breath sounds clear to auscultation               Abdominal  GI exam deferred       Other Findings            Anesthetic Plan    ASA: 3  Anesthesia type: MAC and general - backup          Induction: Intravenous  Anesthetic plan and risks discussed with: Patient

## 2022-03-02 ENCOUNTER — OFFICE VISIT (OUTPATIENT)
Dept: CARDIOLOGY CLINIC | Age: 72
End: 2022-03-02
Payer: MEDICARE

## 2022-03-02 VITALS
SYSTOLIC BLOOD PRESSURE: 138 MMHG | WEIGHT: 228 LBS | OXYGEN SATURATION: 99 % | HEIGHT: 72 IN | DIASTOLIC BLOOD PRESSURE: 70 MMHG | HEART RATE: 64 BPM | BODY MASS INDEX: 30.88 KG/M2

## 2022-03-02 DIAGNOSIS — I50.32 CHRONIC DIASTOLIC HEART FAILURE (HCC): Primary | ICD-10-CM

## 2022-03-02 DIAGNOSIS — E78.00 HYPERCHOLESTEROLEMIA: ICD-10-CM

## 2022-03-02 DIAGNOSIS — N18.6 ESRD ON HEMODIALYSIS (HCC): ICD-10-CM

## 2022-03-02 DIAGNOSIS — I48.0 PAROXYSMAL ATRIAL FIBRILLATION (HCC): ICD-10-CM

## 2022-03-02 DIAGNOSIS — R00.1 BRADYCARDIA: ICD-10-CM

## 2022-03-02 DIAGNOSIS — Z99.2 ESRD ON HEMODIALYSIS (HCC): ICD-10-CM

## 2022-03-02 DIAGNOSIS — I10 ESSENTIAL HYPERTENSION: ICD-10-CM

## 2022-03-02 PROCEDURE — G9231 DOC ESRD DIA TRANS PREG: HCPCS | Performed by: NURSE PRACTITIONER

## 2022-03-02 PROCEDURE — G8536 NO DOC ELDER MAL SCRN: HCPCS | Performed by: NURSE PRACTITIONER

## 2022-03-02 PROCEDURE — 93000 ELECTROCARDIOGRAM COMPLETE: CPT | Performed by: NURSE PRACTITIONER

## 2022-03-02 PROCEDURE — G8432 DEP SCR NOT DOC, RNG: HCPCS | Performed by: NURSE PRACTITIONER

## 2022-03-02 PROCEDURE — G8417 CALC BMI ABV UP PARAM F/U: HCPCS | Performed by: NURSE PRACTITIONER

## 2022-03-02 PROCEDURE — 99214 OFFICE O/P EST MOD 30 MIN: CPT | Performed by: NURSE PRACTITIONER

## 2022-03-02 PROCEDURE — 1101F PT FALLS ASSESS-DOCD LE1/YR: CPT | Performed by: NURSE PRACTITIONER

## 2022-03-02 PROCEDURE — G8427 DOCREV CUR MEDS BY ELIG CLIN: HCPCS | Performed by: NURSE PRACTITIONER

## 2022-03-02 PROCEDURE — 3017F COLORECTAL CA SCREEN DOC REV: CPT | Performed by: NURSE PRACTITIONER

## 2022-03-02 RX ORDER — VITAMIN E 268 MG
CAPSULE ORAL DAILY
COMMUNITY

## 2022-03-02 NOTE — PROGRESS NOTES
HISTORY OF PRESENT ILLNESS  Peyton Ramos is a 70 y.o. male. 9/2021  Patient presents to follow-up for recent hospitalization to [facility] from 8\15-8\27 for syncope secondary to A. fib with slow ventricular response in setting of hypokalemia. Not anticoagulated as outpatient d/t frequent falls and hx of hematuria.  -Acute on chronic diastolic heart failure with chronic LE edema. ECHO this admission normal LV Function. Mild concentric hypertrophy.  (05/2021) EF 50%. Grade 3 LV DD.   -Moderate pulmonary hypertension by ECHO this admission (08/2021) PASP  61 mmhg. During admission he was also found to have:  1. Acute GI bleed with blood loss anemia: EGD on 8/22/2021 shows esophagitis, antral and duodenal ulcer - aspirin was d/c.  2. ESRD on HD  3. Complicated urinary tract infection with obstructive uropathy. 4. Enterobacter bacteremia possibly due to UTI.    5. Leukocytosis. Improved now  6. Aspiration pneumonia  7. 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 8. Acute metabolic encephalopathy, improved    9. A. fib with slow RVR. 10. Severe protein calorie malnutrition  11. Diabetes type 2  12. Hypertension  13. Moderate pulmonary hypertension  14. Hyperkalemia: resolved     3/2022  Patient presents with c/o heart rate decreasing during dialysis. He reports several b/p medications have been d/c due to hypotension with dialysis. He denies chest pain, shortness of breath or palpitations. He reports  Chronic BLE edema. Hospital Follow Up  Pertinent negatives include no chest pain and no shortness of breath. CHF  The history is provided by the patient. This is a chronic problem. The current episode started more than 1 week ago. The problem occurs daily. The problem has been gradually improving. Pertinent negatives include no chest pain and no shortness of breath. Shortness of Breath  The history is provided by the patient. This is a chronic problem. The problem occurs intermittently. The current episode started more than 1 week ago. The problem has not changed since onset. Associated symptoms include leg swelling. Pertinent negatives include no ear pain, no neck pain, no wheezing, no PND, no orthopnea, no chest pain and no rash. He has had prior hospitalizations. Associated medical issues include heart failure. Associated medical issues do not include chronic lung disease or CAD. Hypertension  The history is provided by the patient. This is a chronic problem. The current episode started more than 1 week ago. The problem occurs daily. The problem has been gradually worsening. Pertinent negatives include no chest pain and no shortness of breath. Review of Systems   Constitutional: Negative for chills and weight loss. HENT: Negative for ear pain and hearing loss. Eyes: Negative for blurred vision. Respiratory: Negative for shortness of breath, wheezing and stridor. Cardiovascular: Positive for leg swelling. Negative for chest pain, orthopnea and PND. Gastrointestinal: Negative for heartburn. Musculoskeletal: Negative for myalgias and neck pain. Skin: Negative for rash. Neurological: Negative for tingling, tremors, focal weakness and loss of consciousness. Psychiatric/Behavioral: Negative for depression and suicidal ideas.      Family History   Problem Relation Age of Onset    Heart Attack Mother 52    Diabetes Other     Hypertension Other        Past Medical History:   Diagnosis Date    Atrial fibrillation ([address]     Cerebrovascular accident ([address]     1991, 0- general weakness    Chronic diastolic heart failure (HCC)     Chronic kidney disease     dialysis    Diabetes ([address]     Heart failure ([address]     History of cardioversion     Hypercholesterolemia     Hypertension     Morbid obesity ([address]        Past Surgical History:   Procedure Laterality Date    HX VASCULAR ACCESS Right     right neck       Social History     Tobacco Use    Smoking status: Former Smoker Years: 8.00     Quit date: 1971     Years since quittin.6    Smokeless tobacco: Never Used    Tobacco comment: last smoked in my late 19's   Substance Use Topics    Alcohol use: No     Alcohol/week: 0.0 standard drinks       No Known Allergies         Visit Vitals  /70   Pulse 64   Ht 6' (1.829 m)   Wt 103.4 kg (228 lb)   SpO2 99%   BMI 30.92 kg/m²       Physical Exam  Constitutional:       General: He is not in acute distress. Appearance: He is well-developed. He is not diaphoretic. HENT:      Head: Atraumatic. Mouth/Throat:      Pharynx: No oropharyngeal exudate. Eyes:      General: No scleral icterus. Conjunctiva/sclera: Conjunctivae normal.   Neck:      Vascular: No JVD. Cardiovascular:      Rate and Rhythm: Normal rate. Rhythm irregularly irregular. Heart sounds: No murmur heard. No gallop. Pulmonary:      Effort: Pulmonary effort is normal.      Breath sounds: Normal breath sounds. No stridor. No wheezing or rales. Abdominal:      Palpations: Abdomen is soft. Tenderness: There is no abdominal tenderness. Genitourinary:     Comments: Elizabeth cath in place with leg bag  Musculoskeletal:         General: No tenderness. Normal range of motion. Cervical back: Neck supple. Right lower leg: Edema (1+ BLE edema) present. Left lower leg: Edema (1+ BLE edema) present. Skin:     General: Skin is warm. Neurological:      Mental Status: He is alert and oriented to person, place, and time. Motor: No abnormal muscle tone. Psychiatric:         Behavior: Behavior normal.       2021  Interpretation Summary  · LV: Estimated LVEF is 55 - 60%. Visually measured ejection fraction. Normal cavity size and systolic function (ejection fraction normal). Mild concentric hypertrophy. · TV: Mild tricuspid valve regurgitation is present. · PA: Moderate pulmonary hypertension. Pulmonary arterial systolic pressure is 61 mmHg.   · IVC: Severely elevated central venous pressure (15 mmHg); IVC diameter is larger than 21 mm and collapses less than 50% with respiration. Comparison Study Information  Prior Study  There is a prior study available for comparison. Prior study date: 5/24/2021. As compared to the previous study, there are no significant changes. ASSESSMENT and PLAN    ICD-10-CM ICD-9-CM    1. Chronic diastolic heart failure (HCC)  I50.32 428.32 AMB POC EKG ROUTINE W/ 12 LEADS, INTER & REP      CARDIAC EVENT MONITOR   2. Paroxysmal atrial fibrillation (HCC)  I48.0 427.31 CARDIAC EVENT MONITOR   3. Essential hypertension  I10 401.9    4. Hypercholesterolemia  E78.00 272.0    5. ESRD on hemodialysis (HCC)  N18.6 585.6     Z99.2 V45.11    6. Bradycardia  R00.1 427.89 CARDIAC EVENT MONITOR     Follow-up and Dispositions    · Return in about 1 month (around 4/2/2022) for Follow up with Dr. Nurys Funk. the following changes in treatment are made: will increase hydralazine to 50 mg tid for better BP control. reviewed diet, exercise and weight control  use of aspirin to prevent MI and TIA's discussed. Patient not on coumadin due hematuria,meanwhile patient to continue aspirin 325mg daily. Discussed with patient in length about the need for long term anticoagulation. All questions answered. 9/2021  Recently admitted to [facility] due to syncope secondary to A. fib with slow ventricular response in setting of hypokalemia. Discharge summary CBC BMP reviewed and discussed with patient along with echocardiogram during admission he was also found to have bleeding ulcers which were treated he was discharged without aspirin. Since discharge he reports is doing well he has bilateral lower extremity edema blood pressure is controlled multiple medications were discontinued at discharge. He is following with nephrology regarding ongoing anemia and thrombocytopenia with recent platelet count of 340.   Advised patient to follow-up with GI will consider resuming aspirin if blood counts are stable. Patient with history of A. fib and not on anticoagulation due to frequent falls as well as anemia. Blood pressure is controlled continue current medications. Volume status per hemodialysis. 3/2022  Patient seen for c/o bradycardia during dialysis. BB was d/c by nephrology. In office EKG SR with RBBB. Will obtain event monitor to evaluate rhythm and rate during dialysis. Currently not on antihypertensives due to hypotension. F/u post testing.

## 2022-03-02 NOTE — PATIENT INSTRUCTIONS
Heart-Healthy Diet: Care Instructions  Your Care Instructions     A heart-healthy diet has lots of vegetables, fruits, nuts, beans, and whole grains, and is low in salt. It limits foods that are high in saturated fat, such as meats, cheeses, and fried foods. It may be hard to change your diet, but even small changes can lower your risk of heart attack and heart disease. Follow-up care is a key part of your treatment and safety. Be sure to make and go to all appointments, and call your doctor if you are having problems. It's also a good idea to know your test results and keep a list of the medicines you take. How can you care for yourself at home? Watch your portions  · Use food labels to learn what the recommended servings are for the foods you eat. · Eat only the number of calories you need to stay at a healthy weight. If you need to lose weight, eat fewer calories than your body burns (through exercise and other physical activity). Eat more fruits and vegetables  · Eat a variety of fruit and vegetables every day. Dark green, deep orange, red, or yellow fruits and vegetables are especially good for you. Examples include spinach, carrots, peaches, and berries. · Keep carrots, celery, and other veggies handy for snacks. Buy fruit that is in season and store it where you can see it so that you will be tempted to eat it. · Cook dishes that have a lot of veggies in them, such as stir-fries and soups. Limit saturated fat  · Read food labels, and try to avoid saturated fats. They increase your risk of heart disease. · Use olive or canola oil when you cook. · Bake, broil, grill, or steam foods instead of frying them. · Choose lean meats instead of high-fat meats such as hot dogs and sausages. Cut off all visible fat when you prepare meat. · Eat fish, skinless poultry, and meat alternatives such as soy products instead of high-fat meats.  Soy products, such as tofu, may be especially good for your heart.  · Choose low-fat or fat-free milk and dairy products. Eat foods high in fiber  · Eat a variety of grain products every day. Include whole-grain foods that have lots of fiber and nutrients. Examples of whole-grain foods include oats, whole wheat bread, and brown rice. · Buy whole-grain breads and cereals, instead of white bread or pastries. Limit salt and sodium  · Limit how much salt and sodium you eat to help lower your blood pressure. · Taste food before you salt it. Add only a little salt when you think you need it. With time, your taste buds will adjust to less salt. · Eat fewer snack items, fast foods, and other high-salt, processed foods. Check food labels for the amount of sodium in packaged foods. · Choose low-sodium versions of canned goods (such as soups, vegetables, and beans). Limit sugar  · Limit drinks and foods with added sugar. These include candy, desserts, and soda pop. Limit alcohol  · Limit alcohol to no more than 2 drinks a day for men and 1 drink a day for women. Too much alcohol can cause health problems. When should you call for help? Watch closely for changes in your health, and be sure to contact your doctor if:    · You would like help planning heart-healthy meals. Where can you learn more? Go to http://www.granados.com/  Enter V137 in the search box to learn more about \"Heart-Healthy Diet: Care Instructions. \"  Current as of: December 17, 2020               Content Version: 13.0  © 5441-6537 [employer]. Care instructions adapted under license by [employer] (which disclaims liability or warranty for this information). If you have questions about a medical condition or this instruction, always ask your healthcare professional. [place] [address] any warranty or liability for your use of this information.

## 2022-03-02 NOTE — PROGRESS NOTES
1. Have you been to the ER, urgent care clinic since your last visit? Hospitalized since your last visit?     no  2. Have you seen or consulted any other health care providers outside of the [address] [place] [address] since your last visit? Include any pap smears or colon screening. No     3. Since your last visit, have you had any of the following symptoms? chest pains and swelling in legs/arms.

## 2022-03-18 PROBLEM — R00.1 BRADYCARDIA: Status: ACTIVE | Noted: 2021-08-15

## 2022-03-19 PROBLEM — E43 SEVERE PROTEIN-CALORIE MALNUTRITION (HCC): Status: ACTIVE | Noted: 2021-08-16

## 2022-03-20 PROBLEM — N19 RENAL FAILURE: Status: ACTIVE | Noted: 2021-08-15

## 2022-04-20 DIAGNOSIS — R00.1 BRADYCARDIA: ICD-10-CM

## 2022-04-20 DIAGNOSIS — I48.0 PAROXYSMAL ATRIAL FIBRILLATION (HCC): ICD-10-CM

## 2022-04-20 DIAGNOSIS — I50.32 CHRONIC DIASTOLIC HEART FAILURE (HCC): ICD-10-CM

## 2022-04-22 ENCOUNTER — TELEPHONE (OUTPATIENT)
Dept: CARDIOLOGY CLINIC | Age: 72
End: 2022-04-22

## 2022-04-22 DIAGNOSIS — R00.1 BRADYCARDIA: Primary | ICD-10-CM

## 2022-04-22 NOTE — TELEPHONE ENCOUNTER
Please schedule an appointment with Dr. Ty Arreguin next week.   Also will enter referral for EP - patient likely with Sick sinus syndrome and will possibly need PPM.  Thank you

## 2022-04-22 NOTE — TELEPHONE ENCOUNTER
Left message with appointment date and time for next week with Dr. Karen Villeda to f/u after event monitor.

## 2022-04-22 NOTE — TELEPHONE ENCOUNTER
Received call from [employer] stating pt had urgent monitor notification, had severe bradycardia with pulse at 28 underlying rhythm a-fib at 248 am this morning. Please advise.

## 2022-04-27 ENCOUNTER — OFFICE VISIT (OUTPATIENT)
Dept: CARDIOLOGY CLINIC | Age: 72
End: 2022-04-27
Payer: MEDICARE

## 2022-04-27 VITALS
BODY MASS INDEX: 30.48 KG/M2 | DIASTOLIC BLOOD PRESSURE: 74 MMHG | SYSTOLIC BLOOD PRESSURE: 162 MMHG | WEIGHT: 225 LBS | HEART RATE: 54 BPM | HEIGHT: 72 IN

## 2022-04-27 DIAGNOSIS — I63.9 CEREBROVASCULAR ACCIDENT (CVA), UNSPECIFIED MECHANISM (HCC): ICD-10-CM

## 2022-04-27 DIAGNOSIS — R00.1 BRADYCARDIA: ICD-10-CM

## 2022-04-27 DIAGNOSIS — I50.32 CHRONIC DIASTOLIC HEART FAILURE (HCC): Primary | ICD-10-CM

## 2022-04-27 DIAGNOSIS — I10 ESSENTIAL HYPERTENSION: ICD-10-CM

## 2022-04-27 DIAGNOSIS — I48.19 PERSISTENT ATRIAL FIBRILLATION (HCC): ICD-10-CM

## 2022-04-27 DIAGNOSIS — Z99.2 ESRD ON HEMODIALYSIS (HCC): ICD-10-CM

## 2022-04-27 DIAGNOSIS — N18.6 ESRD ON HEMODIALYSIS (HCC): ICD-10-CM

## 2022-04-27 PROCEDURE — 99214 OFFICE O/P EST MOD 30 MIN: CPT | Performed by: INTERNAL MEDICINE

## 2022-04-27 PROCEDURE — G8536 NO DOC ELDER MAL SCRN: HCPCS | Performed by: INTERNAL MEDICINE

## 2022-04-27 PROCEDURE — G8417 CALC BMI ABV UP PARAM F/U: HCPCS | Performed by: INTERNAL MEDICINE

## 2022-04-27 PROCEDURE — G8428 CUR MEDS NOT DOCUMENT: HCPCS | Performed by: INTERNAL MEDICINE

## 2022-04-27 PROCEDURE — 1101F PT FALLS ASSESS-DOCD LE1/YR: CPT | Performed by: INTERNAL MEDICINE

## 2022-04-27 PROCEDURE — G9231 DOC ESRD DIA TRANS PREG: HCPCS | Performed by: INTERNAL MEDICINE

## 2022-04-27 PROCEDURE — 3017F COLORECTAL CA SCREEN DOC REV: CPT | Performed by: INTERNAL MEDICINE

## 2022-04-27 PROCEDURE — G8432 DEP SCR NOT DOC, RNG: HCPCS | Performed by: INTERNAL MEDICINE

## 2022-04-27 NOTE — PROGRESS NOTES
HISTORY OF PRESENT ILLNESS  Birgit Almanza is a 70 y.o. male. 9/2021  Patient presents to follow-up for recent hospitalization to [facility] from 8\15-8\27 for syncope secondary to A. fib with slow ventricular response in setting of hypokalemia. Not anticoagulated as outpatient d/t frequent falls and hx of hematuria.  -Acute on chronic diastolic heart failure with chronic LE edema. ECHO this admission normal LV Function. Mild concentric hypertrophy.  (05/2021) EF 50%. Grade 3 LV DD.   -Moderate pulmonary hypertension by ECHO this admission (08/2021) PASP  61 mmhg. During admission he was also found to have:  1. Acute GI bleed with blood loss anemia: EGD on 8/22/2021 shows esophagitis, antral and duodenal ulcer - aspirin was d/c.  2. ESRD on HD  3. Complicated urinary tract infection with obstructive uropathy. 4. Enterobacter bacteremia possibly due to UTI.    5. Leukocytosis. Improved now  6. Aspiration pneumonia  7. 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 8. Acute metabolic encephalopathy, improved    9. A. fib with slow RVR. 10. Severe protein calorie malnutrition  11. Diabetes type 2  12. Hypertension  13. Moderate pulmonary hypertension  14. Hyperkalemia: resolved     3/2022  Patient presents with c/o heart rate decreasing during dialysis. He reports several b/p medications have been d/c due to hypotension with dialysis. He denies chest pain, shortness of breath or palpitations. He reports  Chronic BLE edema. Shortness of Breath  The history is provided by the patient. This is a chronic problem. The problem occurs intermittently. The current episode started more than 1 week ago. The problem has not changed since onset. Associated symptoms include leg swelling. Pertinent negatives include no ear pain, no neck pain, no wheezing, no PND, no orthopnea and no rash. He has had prior hospitalizations. Associated medical issues include heart failure.  Associated medical issues do not include chronic lung disease or CAD. Hypertension  The history is provided by the patient. This is a chronic problem. The current episode started more than 1 week ago. The problem occurs daily. The problem has been gradually worsening. Review of Systems   Constitutional: Negative for chills and weight loss. HENT: Negative for ear pain and hearing loss. Eyes: Negative for blurred vision. Respiratory: Negative for wheezing and stridor. Cardiovascular: Positive for leg swelling. Negative for orthopnea and PND. Gastrointestinal: Negative for heartburn. Musculoskeletal: Negative for myalgias and neck pain. Skin: Negative for rash. Neurological: Negative for tingling, tremors, focal weakness and loss of consciousness. Psychiatric/Behavioral: Negative for depression and suicidal ideas.      Family History   Problem Relation Age of Onset    Heart Attack Mother 52    Diabetes Other     Hypertension Other        Past Medical History:   Diagnosis Date    Atrial fibrillation ([facility]     Cerebrovascular accident ([facility]     12, 0- general weakness    Chronic diastolic heart failure (HCC)     Chronic kidney disease     dialysis    Diabetes ([facility]     Heart failure ([facility]     History of cardioversion     Hypercholesterolemia     Hypertension     Morbid obesity ([facility]        Past Surgical History:   Procedure Laterality Date    HX VASCULAR ACCESS Right     right neck       Social History     Tobacco Use    Smoking status: Former Smoker     Years: 8.00     Quit date: 1971     Years since quittin.8    Smokeless tobacco: Never Used    Tobacco comment: last smoked in my late 19's   Substance Use Topics    Alcohol use: No     Alcohol/week: 0.0 standard drinks       No Known Allergies         Visit Vitals  BP (!) 162/74 (BP 1 Location: Left upper arm, BP Patient Position: Sitting, BP Cuff Size: Adult)   Pulse (!) 54   Ht 6' (1.829 m)   Wt 102.1 kg (225 lb)   BMI 30.52 kg/m²       Physical Exam  Constitutional:       General: He is not in acute distress. Appearance: He is well-developed. He is not diaphoretic. HENT:      Head: Atraumatic. Mouth/Throat:      Pharynx: No oropharyngeal exudate. Eyes:      General: No scleral icterus. Conjunctiva/sclera: Conjunctivae normal.   Neck:      Vascular: No JVD. Cardiovascular:      Rate and Rhythm: Normal rate. Rhythm irregularly irregular. Heart sounds: No murmur heard. No gallop. Pulmonary:      Effort: Pulmonary effort is normal.      Breath sounds: Normal breath sounds. No stridor. No wheezing or rales. Abdominal:      Palpations: Abdomen is soft. Tenderness: There is no abdominal tenderness. Genitourinary:     Comments: Elizabeth cath in place with leg bag  Musculoskeletal:         General: No tenderness. Normal range of motion. Cervical back: Neck supple. Right lower leg: Edema (1+ BLE edema) present. Left lower leg: Edema (1+ BLE edema) present. Skin:     General: Skin is warm. Neurological:      Mental Status: He is alert and oriented to person, place, and time. Motor: No abnormal muscle tone. Psychiatric:         Behavior: Behavior normal.       8/17/2021  Interpretation Summary  · LV: Estimated LVEF is 55 - 60%. Visually measured ejection fraction. Normal cavity size and systolic function (ejection fraction normal). Mild concentric hypertrophy. · TV: Mild tricuspid valve regurgitation is present. · PA: Moderate pulmonary hypertension. Pulmonary arterial systolic pressure is 61 mmHg. · IVC: Severely elevated central venous pressure (15 mmHg); IVC diameter is larger than 21 mm and collapses less than 50% with respiration. Comparison Study Information  Prior Study  There is a prior study available for comparison. Prior study date: 5/24/2021. As compared to the previous study, there are no significant changes. ASSESSMENT and PLAN    ICD-10-CM ICD-9-CM    1.  Chronic diastolic heart failure (HCC)  I50.32 428.32    2. Essential hypertension  I10 401.9    3. ESRD on hemodialysis ([facility])  N18.6 585.6     Z99.2 V45.11    4. Bradycardia  R00.1 427.89    5. Cerebrovascular accident (CVA), unspecified mechanism ([facility]  I63.9 434.91    6. Persistent atrial fibrillation (HCC)  I48.19 427.31      Follow-up and Dispositions    · Return in about 3 months (around 7/27/2022). the following changes in treatment are made: will increase hydralazine to 50 mg tid for better BP control. reviewed diet, exercise and weight control  use of aspirin to prevent MI and TIA's discussed. Patient not on coumadin due hematuria,meanwhile patient to continue aspirin 325mg daily. Discussed with patient in length about the need for long term anticoagulation. All questions answered. 9/2021  Recently admitted to [facility] due to syncope secondary to A. fib with slow ventricular response in setting of hypokalemia. Discharge summary CBC BMP reviewed and discussed with patient along with echocardiogram during admission he was also found to have bleeding ulcers which were treated he was discharged without aspirin. Since discharge he reports is doing well he has bilateral lower extremity edema blood pressure is controlled multiple medications were discontinued at discharge. He is following with nephrology regarding ongoing anemia and thrombocytopenia with recent platelet count of 633. Advised patient to follow-up with GI will consider resuming aspirin if blood counts are stable. Patient with history of A. fib and not on anticoagulation due to frequent falls as well as anemia. Blood pressure is controlled continue current medications. Volume status per hemodialysis. 3/2022  Patient seen for c/o bradycardia during dialysis. BB was d/c by nephrology. In office EKG SR with RBBB. Will obtain event monitor to evaluate rhythm and rate during dialysis. Currently not on antihypertensives due to hypotension.   F/u post testing. Seen for follow-up. Cardiac monitor report reviewed with patient and patient's daughter at length. Has persistent atrial fibrillation with no significant pauses. Noted to have bradycardia. Patient denies symptoms of chest pain/shortness of breath, syncope or near syncope. No hypotension reported. Patient not a candidate for anticoagulation due to history of recurrent GI bleed. Recommend  Aspirin 81 mg daily. Understands the risk of stroke. Will consider left atrial appendage closure. Referral to structural cardiology.

## 2022-04-27 NOTE — PROGRESS NOTES
1. Have you been to the ER, urgent care clinic since your last visit? Hospitalized since your last visit? No    2. Have you seen or consulted any other health care providers outside of the [address] [place] [address] since your last visit? Include any pap smears or colon screening.  No

## 2022-05-02 ENCOUNTER — OFFICE VISIT (OUTPATIENT)
Dept: CARDIOTHORACIC SURGERY | Age: 72
End: 2022-05-02
Payer: MEDICARE

## 2022-05-02 VITALS
SYSTOLIC BLOOD PRESSURE: 148 MMHG | WEIGHT: 228 LBS | HEART RATE: 53 BPM | DIASTOLIC BLOOD PRESSURE: 69 MMHG | OXYGEN SATURATION: 100 % | BODY MASS INDEX: 30.88 KG/M2 | HEIGHT: 72 IN

## 2022-05-02 DIAGNOSIS — I48.19 PERSISTENT ATRIAL FIBRILLATION (HCC): ICD-10-CM

## 2022-05-02 DIAGNOSIS — Z87.11 HISTORY OF STOMACH ULCERS: Primary | ICD-10-CM

## 2022-05-02 PROCEDURE — 1101F PT FALLS ASSESS-DOCD LE1/YR: CPT | Performed by: STUDENT IN AN ORGANIZED HEALTH CARE EDUCATION/TRAINING PROGRAM

## 2022-05-02 PROCEDURE — G8536 NO DOC ELDER MAL SCRN: HCPCS | Performed by: STUDENT IN AN ORGANIZED HEALTH CARE EDUCATION/TRAINING PROGRAM

## 2022-05-02 PROCEDURE — 3017F COLORECTAL CA SCREEN DOC REV: CPT | Performed by: STUDENT IN AN ORGANIZED HEALTH CARE EDUCATION/TRAINING PROGRAM

## 2022-05-02 PROCEDURE — G8510 SCR DEP NEG, NO PLAN REQD: HCPCS | Performed by: STUDENT IN AN ORGANIZED HEALTH CARE EDUCATION/TRAINING PROGRAM

## 2022-05-02 PROCEDURE — 99215 OFFICE O/P EST HI 40 MIN: CPT | Performed by: STUDENT IN AN ORGANIZED HEALTH CARE EDUCATION/TRAINING PROGRAM

## 2022-05-02 PROCEDURE — 93000 ELECTROCARDIOGRAM COMPLETE: CPT | Performed by: STUDENT IN AN ORGANIZED HEALTH CARE EDUCATION/TRAINING PROGRAM

## 2022-05-02 PROCEDURE — G8417 CALC BMI ABV UP PARAM F/U: HCPCS | Performed by: STUDENT IN AN ORGANIZED HEALTH CARE EDUCATION/TRAINING PROGRAM

## 2022-05-02 PROCEDURE — G8427 DOCREV CUR MEDS BY ELIG CLIN: HCPCS | Performed by: STUDENT IN AN ORGANIZED HEALTH CARE EDUCATION/TRAINING PROGRAM

## 2022-05-02 PROCEDURE — G9231 DOC ESRD DIA TRANS PREG: HCPCS | Performed by: STUDENT IN AN ORGANIZED HEALTH CARE EDUCATION/TRAINING PROGRAM

## 2022-05-02 NOTE — LETTER
5/2/2022    Patient: Lucia Lew   YOB: 1950   Date of Visit: 5/2/2022     Shae Rawls MD  [id number] [place] [phone]  Via Fax: [phone]    Dear Shae Rawls MD,      Thank you for referring Mr. Lucia Lew to [address] for evaluation. My notes for this consultation are attached. If you have questions, please do not hesitate to call me. I look forward to following your patient along with you.       Sincerely,    Daryle Companion, MD

## 2022-05-02 NOTE — PROGRESS NOTES
Cassandra Medina presents today for   Chief Complaint   Patient presents with    Follow-up     evaluate for watchconnie Medina preferred language for health care discussion is english/other. Is someone accompanying this pt? no    Is the patient using any DME equipment during [address]? no    Depression Screening:  3 most recent PHQ Screens 5/2/2022   Little interest or pleasure in doing things Not at all   Feeling down, depressed, irritable, or hopeless Not at all   Total Score PHQ 2 0       Learning Assessment:  Learning Assessment 1/25/2016   PRIMARY LEARNER Patient   PRIMARY LANGUAGE ENGLISH   LEARNER PREFERENCE PRIMARY LISTENING   ANSWERED BY patient   RELATIONSHIP SELF       Abuse Screening:  No flowsheet data found. Fall Risk  Fall Risk Assessment, last 12 mths 4/27/2022   Able to walk? Yes   Fall in past 12 months? 1   Do you feel unsteady? 0   Are you worried about falling 0   Number of falls in past 12 months 1   Fall with injury? 0       Pt currently taking Anticoagulant therapy? no    Coordination of Care:  1. Have you been to the ER, urgent care clinic since your last visit? Hospitalized since your last visit? no    2. Have you seen or consulted any other health care providers outside of the [address] [place] [address] since your last visit? Include any pap smears or colon screening.  no

## 2022-05-02 NOTE — PROGRESS NOTES
[facility]   Structural Heart Program  [facility], [address] [phone]    History of Present Illness:     Chasity Malcolm is a 70y.o. year old M who has been referred by Yasmany Klein MD to [facility] for evaluation for Left Atrial Appendage Closure [facility]) with the Watchman device for management of stroke risk resulting from persistent non-valvular atrial fibrillation. He has a history of ESRD and is on hemodialysis (access: left arm  brachiocephalic fistula [Brown fistula] placed by Dr. Buddy Argueta 1/11/2016), Hypertension, HLD, chronic diastolic heart failure, neurogenic bladder with chronic indwelling vines catheter, CVA x 2 (1991, 1997), pulmonary hypertension, type 2 diabetes, and new onset bradycardia during dialysis treatments. Pt. Is not on anticoagulation at this time due to a history of recurrent anemia with need for blood transfusion with last transfusion in 8/2021, hematuria with cytoscopy and subsequent clot removal in 2015. Denies chip blood in stool but endorses intermittent blood with wiping/bearing down and was advised in the past to stop Asprin related to possible ulceration and GI symptoms. Pt. Endorses shortness of breath with exertion, bilateral weakness in lower extremities (R>L), uses a cane and wheelchair, and edema in lower extremities and notes these are chronic, unchanged symoptoms. Pt. Denies chest pain, cough, orthopnea, nausea, vomiting, abdominal pain, fever, chills, sputum production. Cardiac risk factors include family history, dyslipidemia, diabetes mellitus, obesity, hypertension.       Subjective:     Chief Complaint   Patient presents with    Follow-up     evaluate for watchman        Past Medical History:     Past Medical History:   Diagnosis Date    Atrial fibrillation ([address]     Cerebrovascular accident ([address]     [address]- general weakness    Chronic diastolic heart failure (HCC)     Chronic kidney disease     dialysis    Diabetes [facility]     Heart failure ([facility]     History of cardioversion     Hypercholesterolemia     Hypertension     Morbid obesity ([facility]        Past Surgical History:     Past Surgical History:   Procedure Laterality Date    HX VASCULAR ACCESS Right     right neck       Social History:     He  reports that he quit smoking about 50 years ago. He quit after 8.00 years of use. He has never used smokeless tobacco.  He  reports no history of alcohol use. Family History:     Family History   Problem Relation Age of Onset    Heart Attack Mother 52    Diabetes Other     Hypertension Other        Allergies and Intolerances:     No Known Allergies      Review of Systems:     As in HPI and:    Review of Systems   Constitutional: Negative for chills, diaphoresis, fever, malaise/fatigue and weight loss. HENT: Negative for nosebleeds and sore throat. Eyes: Negative for blurred vision, double vision and photophobia. Respiratory: Negative for cough, hemoptysis, sputum production, shortness of breath and wheezing. Cardiovascular: Positive for leg swelling. Negative for chest pain, palpitations and orthopnea. Gastrointestinal: Positive for blood in stool. Negative for abdominal pain, heartburn, nausea and vomiting. Genitourinary: Positive for dysuria and hematuria. Negative for flank pain. Musculoskeletal: Negative for falls and myalgias. Skin: Negative for rash. Neurological: Positive for weakness. Negative for dizziness, tingling, tremors, seizures and headaches. Endo/Heme/Allergies: Bruises/bleeds easily. Physical Examination:     General:   awake alert and oriented   Skin:   no rashes or skin lesions noted on limited exam   HEENT:  Normocephalic, atraumatic, PERRL, EOMI, no scleral icterus or pallor; no conjunctival hemmohage;  nasal and oral mucous are moist and without evidence of lesions. No thrush. Dentition good.  Neck supple, no bruits. Lymph Nodes:   no cervical, axillary or inguinal adenopathy   Lungs:   non-labored, bilaterally clear to aspiration- no crackles wheezes rales or rhonchi   Heart:  irregular rhythm, s1 and s2; no murmurs rubs or gallops, +1 edema on right leg and left leg, R. Femoral pulse +2 on palpation   Abdomen:  soft, non-distended, active bowel sounds, no hepatomegaly, no splenomegaly. Non-tender. Genitourinary:  deferred   Extremities:   no clubbing, cyanosis; no joint effusions or swelling; Limited range of motion and weakness with right upper arm and lower edtremity related to history of stroke. Full ROM of all large joints to the upper and lower extremities; decreased muscle mass on right/left leg, and pt. uses cane and wheelchair. Neurologic:  No gross focal sensory abnormalities; Speech appropriate. Psychiatric:   appropriate and interactive.        Visit Vitals  BP (!) 148/69 (BP 1 Location: Left upper arm, BP Patient Position: Sitting, BP Cuff Size: Adult)   Pulse (!) 53   Ht 6' (1.829 m)   Wt 103.4 kg (228 lb)   SpO2 100%   BMI 30.92 kg/m²     PHYSICAL EXAMINATION:  Vital signs:   BP Readings from Last 3 Encounters:   05/02/22 (!) 148/69   04/27/22 (!) 162/74   03/02/22 138/70     @ENSV(71)@  Wt Readings from Last 3 Encounters:   05/02/22 103.4 kg (228 lb)   04/27/22 102.1 kg (225 lb)   04/20/22 103.4 kg (228 lb)     Ht Readings from Last 3 Encounters:   05/02/22 6' (1.829 m)   04/27/22 6' (1.829 m)   04/20/22 6' (1.829 m)       Laboratory Data:     Lab Results   Component Value Date/Time    WBC 8.6 09/18/2021 02:40 PM    HGB 7.3 (L) 09/18/2021 02:40 PM    HCT 22.8 (L) 09/18/2021 02:40 PM    PLATELET 363 (L) 04/54/0012 02:40 PM     Lab Results   Component Value Date/Time    Sodium 137 12/01/2021 11:06 AM    Potassium 5.0 12/06/2021 07:05 AM    Chloride 99 (L) 12/01/2021 11:06 AM    CO2 32 12/01/2021 11:06 AM    Glucose 91 12/01/2021 11:06 AM    BUN 70 (H) 12/01/2021 11:06 AM    Creatinine 5.74 (H) 12/01/2021 11:06 AM     Lab Results   Component Value Date/Time    Cholesterol, total 208 (H) 03/22/2010 12:00 PM    HDL Cholesterol 55 03/22/2010 12:00 PM    LDL, calculated 138.4 (H) 03/22/2010 12:00 PM    Triglyceride 73 03/22/2010 12:00 PM     Lab Results   Component Value Date/Time    Hemoglobin A1c 4.9 12/06/2021 07:49 AM       EKG: (independently reviewed)     EKG Results     Procedure 720 Value Units Date/Time    AMB POC EKG ROUTINE W/ 12 LEADS, INTER & REP [[id number]]     Order Status: Sent         ECHO:   08/15/21    ECHO ADULT FOLLOW-UP OR LIMITED 08/17/2021 8/17/2021    Interpretation Summary  · LV: Estimated LVEF is 55 - 60%. Visually measured ejection fraction. Normal cavity size and systolic function (ejection fraction normal). Mild concentric hypertrophy. · TV: Mild tricuspid valve regurgitation is present. · PA: Moderate pulmonary hypertension. Pulmonary arterial systolic pressure is 61 mmHg. · IVC: Severely elevated central venous pressure (15 mmHg); IVC diameter is larger than 21 mm and collapses less than 50% with respiration. Signed by: Cory Brown MD on 8/17/2021 11:20 AM      Results for orders placed or performed during the hospital encounter of 08/15/21   EKG, 12 LEAD, INITIAL   Result Value Ref Range    Ventricular Rate 41 BPM    Atrial Rate 41 BPM    QRS Duration 168 ms    Q-T Interval 576 ms    QTC Calculation (Bezet) 475 ms    Calculated R Axis -51 degrees    Calculated T Axis 63 degrees    Diagnosis       Atrial fibrillation with slow ventricular response  Right bundle branch block  Left anterior fascicular block  Bifascicular block  Abnormal ECG  When compared with ECG of 28-NOV-2015 09:35,  Vent.  rate has decreased BY  21 BPM  (RBBB and left anterior fascicular block) is now present  Minimal criteria for Anterior infarct are no longer present  Confirmed by Naheed Fried MD, --- ([id number]) on 8/15/2021 12:25:57 PM        XR Results (most recent):  Results from [facility] [place]counter encounter on 08/15/21    XR CHEST PORT    Narrative  INDICATION:  See indication above. COMPARISON:  Recent prior    FINDINGS: A portable AP radiograph of the chest was obtained at 1131 hours. Unchanged right IJ approach catheter. Cardiomegaly and interval worsening of  bilateral patchy opacities, may represent worsening edema or multifocal  pneumonia. Osseous structures are stable. Impression  Cardiomegaly and interval worsening of bilateral patchy opacities,  may represent worsening edema or multifocal pneumonia. Assessment:     The patient is a candidate for WATCHMAN implantation, a left atrial appendage closure (LAAC) device used to reduce risk of thromboembolism in patients with persistent non-valvular atrial fibrillation. The patient is a clinical candidate for this procedure due to:     1.) Increased risk for stroke and systemic embolism as evidenced by [id number] score:     Atrial Fibrillation CHADSVASC2 Score Stroke Risk:     70 y.o. 72 to 76  +1   male Male     +0   CHF HX: Yes    +1   HTN HX: Yes    +1   Stroke/TIA/Thromboembolism Yes   +2   Vascular Disease HX: Yes    +1   Diabetes Mellitus Yes    +1   CHADSVASC 2 Score 7      Annual Stroke Risk 11.2% - moderate- high      2.) Deemed unable to tolerate prescribed long term oral anticoagulation medications due to: elevated bleeding risk (HAS-BLED score of greater than or equal to 3), history of anemia needing blood transfusion, hematuria, and GI bleeding related to blood in stool. Additionally, he is concerned with the negative impact daily oral anticoagulation use has on his perceived quality of life related to the cost of monthly medication and the subsequent fear it will long term impact his compliance with regimen.                 HAS-BLED Score:    HTN Hx [x] 1 Point   Renal Disease  [x] 1 Point   Liver Disease [] [id number] Hx [x] 1 Point   Prior Major Bleeding or Predisposition [x] 1 Point   Labile INR [] 1 Point   Age > 72 Years Current: 70 y.o. [x] 1 Point   Rx Usage Predisposing to Bleeding [x] 1 Point   Alcohol Use (> or = 8 Drinks/wk) [] 1 Point   Total: UCHASBLED: Score >5 Very high Risk (to rare to determine risk but are likely over 10%)       Plan:     The patient  has need for anticoagulation and is considered a high risk for stroke, but has a relative contraindication for long term anticoagulation. A 4 week trial of Eliquis 5 mg BID to determine if he is able to tolerate short term anticoagulation is needed prior to further Watchman evaluation. Prior to starting an anticoagulation trial, pt. Needs GI evaluation and approval to initiate anticoagulation, as well as from urology given patient's history. GLST referral placed today. Pt. Is in agreement with this plan and was instructed to call our office and schedule follow up appointment after GI and Urology evaluation. I have discussed at length the risks/benefits and alternatives of this procedure with regards to stroke risk versus bleeding risk. Based on the patient's medical history, stroke risk, as shown with [id number] score of 7, and bleeding risk, as shown with HAS-BLED score of 6, a shared decision has been made to pursue closure of the left atrial appendage as a safe and effective alternative to long term oral anticoagulation. Risks of the procedure include but not limited to: infection, bleeding, vessel injury, cardiac perforation at times requiring drainage or surgery, heart failure, migration of the device, emergency surgery, myocardial infarction, stroke and death. Based on both stroke and bleeding risk, a shared decision has been made to pursue closure of left atrial appendage as a safe and effective alternative to oral anticoagulant therapy for stroke prophylaxis and to reduce his/her long term risk of bleeding. The patient was provided a copy of their visit summary and a verified educational handout explaining the WATCHMAN procedure.  It is my strong belief that the [facility] device, will provide equal stroke prophylaxis compared to long term oral anticoagulation use and will ultimately enable the discontinuation of the use of [address] and eliminate the drug's side effects and impact on his quality of life. The opportunity for questions was provided and the patient had no further questions at this time. The patient demonstrated understanding by verbal read back and explanation of the procedure in their own words. Pt. Verbalized agreement with the followin.) Post device placement there is a need for about 6 months of some anticoagulation/antiplatelet combination until the device endothelializes. Ideally, we would like to maintain him on 45 days of full anticoagulation with an oral anticoagulant like Eliquis until a repeat WILMER is done post watchman placement. As long as WILMER shows no significant para-device leakage (<5 mm), we would then transition him to do dual platelet therapy with aspirin and Plavix for another 4.5 to 6 months depending on his tolerance. 2.) The next step in terms of work-up, would include a 4 week anticoagulation trial to determine his ability to tolerate short term anticoagulation medication after GI and Urology evaluation and approval of anticoagulation trial.       3.) If pt. Is suitable and tolerates anticoagulation trial, aTEE will be scheduled to define his left atrial appendage. Further Watchman procedure evaluation is dependent on results of the WILMER.     4.) Pt. Is to call the office to schedule a follow up appointment after evaluation by GLST and Urology, pt. Also instructed to sign a record release form and to have office notes shared with our office for review to discuss results and subsequent next steps in evaluation for Watchman procedure. Daughter, Jaclyn Valenzuela ([phone]), called today and updated per pt. Request.     Thank you for the opportunity to participate in the care of your patient.      Yvon Valderrama MD, [facility], [facility]  [facility]   Structural Heart Program    Total of 45 min of time spent with patient providing evaluation, management, care decisions, and ordering appropriate treatment related to cardiology needs. This care involved high complexity decision making to assess, evaluate, coordinate, and utilization of shared decision making to support patient safety and optimal health outcomes. Above mentioned total time spent on reviewing the case/medical record/data/notes/EMR/patient examination/documentation/coordinating care with nurse/consultants, exclusive of procedures with complex decision making performed and > 50% time spent in face to face evaluation.

## 2022-05-02 NOTE — LETTER
5/11/2022 10:58 PM    Patient:  Germán Beck   YOB: 1950  Date of Visit: 5/2/2022      Dc Ledesma, [address] [facility] [address]  Via Fax: [phone]     Lucretia Alexandre, [address] [place] [address] Drive [address]  [address] Lucy James, [address] [facility]  [address]  [address] [facility]  Via In Basket    Dear MD Lucretia Hsieh MD Abel Brilliant, MD,      Thank you for referring Mr. Germán Beck to [address] for evaluation and treatment. Below are the relevant portions of my assessment and plan of care. Thank you very much for your referral of Mr. Germán Beck. If you have questions, please do not hesitate to call me. I look forward to following Mr. Jin Douglas along with you and will keep you updated as to his progress.            Sincerely,      Mata Osorio MD

## 2022-05-03 ENCOUNTER — TELEPHONE (OUTPATIENT)
Dept: CARDIOLOGY CLINIC | Age: 72
End: 2022-05-03

## 2022-05-03 NOTE — TELEPHONE ENCOUNTER
Received call from [address] [place] [address] at [facility] regarding biotel notification. Event occurred 5/3/22 at 3 AM severe bradycardia at 30 bpm a-fib rhythm, auto triggered event.  Please advise

## 2022-05-09 NOTE — TELEPHONE ENCOUNTER
Left message to move appt up to opening on 5/13 with NP PRISCILA STEIN [facility][1] for post event

## 2022-05-13 ENCOUNTER — OFFICE VISIT (OUTPATIENT)
Dept: CARDIOLOGY CLINIC | Age: 72
End: 2022-05-13
Payer: MEDICARE

## 2022-05-13 VITALS
DIASTOLIC BLOOD PRESSURE: 62 MMHG | SYSTOLIC BLOOD PRESSURE: 151 MMHG | OXYGEN SATURATION: 97 % | BODY MASS INDEX: 30.5 KG/M2 | HEIGHT: 72 IN | WEIGHT: 225.2 LBS | HEART RATE: 67 BPM

## 2022-05-13 DIAGNOSIS — I50.32 CHRONIC DIASTOLIC HEART FAILURE (HCC): Primary | ICD-10-CM

## 2022-05-13 DIAGNOSIS — I10 ESSENTIAL HYPERTENSION: ICD-10-CM

## 2022-05-13 DIAGNOSIS — N18.6 ESRD ON HEMODIALYSIS (HCC): ICD-10-CM

## 2022-05-13 DIAGNOSIS — R00.1 BRADYCARDIA: ICD-10-CM

## 2022-05-13 DIAGNOSIS — I48.19 PERSISTENT ATRIAL FIBRILLATION (HCC): ICD-10-CM

## 2022-05-13 DIAGNOSIS — Z99.2 ESRD ON HEMODIALYSIS (HCC): ICD-10-CM

## 2022-05-13 PROCEDURE — G8417 CALC BMI ABV UP PARAM F/U: HCPCS | Performed by: NURSE PRACTITIONER

## 2022-05-13 PROCEDURE — G8536 NO DOC ELDER MAL SCRN: HCPCS | Performed by: NURSE PRACTITIONER

## 2022-05-13 PROCEDURE — G8427 DOCREV CUR MEDS BY ELIG CLIN: HCPCS | Performed by: NURSE PRACTITIONER

## 2022-05-13 PROCEDURE — G8432 DEP SCR NOT DOC, RNG: HCPCS | Performed by: NURSE PRACTITIONER

## 2022-05-13 PROCEDURE — 3017F COLORECTAL CA SCREEN DOC REV: CPT | Performed by: NURSE PRACTITIONER

## 2022-05-13 PROCEDURE — 99214 OFFICE O/P EST MOD 30 MIN: CPT | Performed by: NURSE PRACTITIONER

## 2022-05-13 PROCEDURE — 1101F PT FALLS ASSESS-DOCD LE1/YR: CPT | Performed by: NURSE PRACTITIONER

## 2022-05-13 PROCEDURE — G9231 DOC ESRD DIA TRANS PREG: HCPCS | Performed by: NURSE PRACTITIONER

## 2022-05-13 RX ORDER — ASPIRIN 81 MG/1
TABLET ORAL DAILY
COMMUNITY

## 2022-05-13 NOTE — PROGRESS NOTES
HISTORY OF PRESENT ILLNESS  Alvarado Wasserman is a 70 y.o. male. 9/2021  Patient presents to follow-up for recent hospitalization to [facility] from 8\15-8\27 for syncope secondary to A. fib with slow ventricular response in setting of hypokalemia. Not anticoagulated as outpatient d/t frequent falls and hx of hematuria.  -Acute on chronic diastolic heart failure with chronic LE edema. ECHO this admission normal LV Function. Mild concentric hypertrophy.  (05/2021) EF 50%. Grade 3 LV DD.   -Moderate pulmonary hypertension by ECHO this admission (08/2021) PASP  61 mmhg. During admission he was also found to have:  1. Acute GI bleed with blood loss anemia: EGD on 8/22/2021 shows esophagitis, antral and duodenal ulcer - aspirin was d/c.  2. ESRD on HD  3. Complicated urinary tract infection with obstructive uropathy. 4. Enterobacter bacteremia possibly due to UTI.    5. Leukocytosis. Improved now  6. Aspiration pneumonia  7. 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 8. Acute metabolic encephalopathy, improved    9. A. fib with slow RVR. 10. Severe protein calorie malnutrition  11. Diabetes type 2  12. Hypertension  13. Moderate pulmonary hypertension  14. Hyperkalemia: resolved     3/2022  Patient presents with c/o heart rate decreasing during dialysis. He reports several b/p medications have been d/c due to hypotension with dialysis. He denies chest pain, shortness of breath or palpitations. He reports  Chronic BLE edema. 5/2022  Patient presents to f/u for bradycardia and testing results. He denies chest pain, shortness of breath or worsening edema. He reports having dialysis stopped early due to low blood pressure yesterday. Shortness of Breath  The history is provided by the patient. This is a chronic problem. The problem occurs intermittently. The current episode started more than 1 week ago. The problem has not changed since onset. Associated symptoms include leg swelling.  Pertinent negatives include no ear pain, no neck pain, no wheezing, no PND, no orthopnea and no rash. He has had prior hospitalizations. Associated medical issues include heart failure. Associated medical issues do not include chronic lung disease or CAD. Hypertension  The history is provided by the patient. This is a chronic problem. The current episode started more than 1 week ago. The problem occurs daily. The problem has been gradually worsening. Pertinent negatives include no shortness of breath. Follow-up  The history is provided by the patient and medical records. Pertinent negatives include no shortness of breath. Review of Systems   Constitutional: Negative for chills and weight loss. HENT: Negative for ear pain and hearing loss. Eyes: Negative for blurred vision. Respiratory: Negative for shortness of breath, wheezing and stridor. Cardiovascular: Positive for leg swelling. Negative for orthopnea and PND. Gastrointestinal: Negative for heartburn. Musculoskeletal: Negative for myalgias and neck pain. Skin: Negative for rash. Neurological: Negative for tingling, tremors, focal weakness and loss of consciousness. Psychiatric/Behavioral: Negative for depression and suicidal ideas.      Family History   Problem Relation Age of Onset    Heart Attack Mother 52    Diabetes Other     Hypertension Other        Past Medical History:   Diagnosis Date    Atrial fibrillation ([address]     Cerebrovascular accident ([address]     1991, 0- general weakness    Chronic diastolic heart failure (HCC)     Chronic kidney disease     dialysis    Diabetes ([address]     Heart failure ([address]     History of cardioversion     Hypercholesterolemia     Hypertension     Morbid obesity ([address]        Past Surgical History:   Procedure Laterality Date    HX VASCULAR ACCESS Right     right neck       Social History     Tobacco Use    Smoking status: Former Smoker     Years: 8.00     Quit date: 7/5/1971     Years since quittin.8    Smokeless tobacco: Never Used    Tobacco comment: last smoked in my late 19's   Substance Use Topics    Alcohol use: No     Alcohol/week: 0.0 standard drinks       No Known Allergies         Visit Vitals  BP (!) 151/62   Pulse 67   Ht 6' (1.829 m)   Wt 102.2 kg (225 lb 3.2 oz)   SpO2 97%   BMI 30.54 kg/m²       Physical Exam  Vitals and nursing note reviewed. Constitutional:       General: He is not in acute distress. Appearance: He is well-developed. He is not diaphoretic. HENT:      Head: Atraumatic. Mouth/Throat:      Pharynx: No oropharyngeal exudate. Eyes:      General: No scleral icterus. Conjunctiva/sclera: Conjunctivae normal.   Neck:      Vascular: No JVD. Cardiovascular:      Rate and Rhythm: Normal rate. Rhythm irregularly irregular. Heart sounds: No murmur heard. No gallop. Pulmonary:      Effort: Pulmonary effort is normal.      Breath sounds: Normal breath sounds. No stridor. No wheezing or rales. Abdominal:      Palpations: Abdomen is soft. Tenderness: There is no abdominal tenderness. Genitourinary:     Comments: Elizabeth cath in place with leg bag  Musculoskeletal:         General: No tenderness. Normal range of motion. Cervical back: Neck supple. Right lower leg: Edema (1+ BLE edema) present. Left lower leg: Edema (1+ BLE edema) present. Comments: Right arm AV fistula  Right upper chest TDC   Skin:     General: Skin is warm. Neurological:      Mental Status: He is alert and oriented to person, place, and time. Motor: No abnormal muscle tone. Psychiatric:         Behavior: Behavior normal.       2021  Interpretation Summary  · LV: Estimated LVEF is 55 - 60%. Visually measured ejection fraction. Normal cavity size and systolic function (ejection fraction normal). Mild concentric hypertrophy. · TV: Mild tricuspid valve regurgitation is present. · PA: Moderate pulmonary hypertension.  Pulmonary arterial systolic pressure is 61 mmHg. · IVC: Severely elevated central venous pressure (15 mmHg); IVC diameter is larger than 21 mm and collapses less than 50% with respiration. Comparison Study Information  Prior Study  There is a prior study available for comparison. Prior study date: 5/24/2021. As compared to the previous study, there are no significant changes. ASSESSMENT and PLAN    ICD-10-CM ICD-9-CM    1. Chronic diastolic heart failure (HCC)  I50.32 428.32    2. Essential hypertension  I10 401.9    3. Bradycardia  R00.1 427.89    4. Persistent atrial fibrillation (HCC)  I48.19 427.31    5. ESRD on hemodialysis ([facility] [address] [address]  N18.6 585.6     Z99.2 V45.11      Follow-up and Dispositions    · Return in about 3 months (around 8/13/2022) for Follow up with Dr. Damir Wu. the following changes in treatment are made: will increase hydralazine to 50 mg tid for better BP control. reviewed diet, exercise and weight control  use of aspirin to prevent MI and TIA's discussed. Patient not on coumadin due hematuria,meanwhile patient to continue aspirin 325mg daily. Discussed with patient in length about the need for long term anticoagulation. All questions answered. 9/2021  Recently admitted to [facility] due to syncope secondary to A. fib with slow ventricular response in setting of hypokalemia. Discharge summary CBC BMP reviewed and discussed with patient along with echocardiogram during admission he was also found to have bleeding ulcers which were treated he was discharged without aspirin. Since discharge he reports is doing well he has bilateral lower extremity edema blood pressure is controlled multiple medications were discontinued at discharge. He is following with nephrology regarding ongoing anemia and thrombocytopenia with recent platelet count of 820. Advised patient to follow-up with GI will consider resuming aspirin if blood counts are stable.   Patient with history of A. fib and not on anticoagulation due to frequent falls as well as anemia. Blood pressure is controlled continue current medications. Volume status per hemodialysis. 3/2022  Patient seen for c/o bradycardia during dialysis. BB was d/c by nephrology. In office EKG SR with RBBB. Will obtain event monitor to evaluate rhythm and rate during dialysis. Currently not on antihypertensives due to hypotension. F/u post testing. Seen for follow-up. Cardiac monitor report reviewed with patient and patient's daughter at length. Has persistent atrial fibrillation with no significant pauses. Noted to have bradycardia. Patient denies symptoms of chest pain/shortness of breath, syncope or near syncope. No hypotension reported. Patient not a candidate for anticoagulation due to history of recurrent GI bleed. Recommend  Aspirin 81 mg daily. Understands the risk of stroke. Will consider left atrial appendage closure. Referral to structural cardiology. 5/2022  Patient seen in follow-up for bradycardia cardiac monitor reviewed and discussed with patient and his daughter. Patient with persistent atrial fibrillation with slow ventricular response noted to have bradycardia in 30s at times. Will refer to EP for permanent pacemaker evaluation. Patient has since been seen by structural cardiology with plans to proceed with watchman device. Blood pressure is stable continue current medications.

## 2022-05-13 NOTE — PROGRESS NOTES
1. Have you been to the ER, urgent care clinic since your last visit? Hospitalized since your last visit?     no    2. Have you seen or consulted any other health care providers outside of the [address] [place] [address] since your last visit? Include any pap smears or colon screening. No     3. Since your last visit, have you had any of the following symptoms? chest pains.

## 2022-05-13 NOTE — PATIENT INSTRUCTIONS
Heart-Healthy Diet: Care Instructions  Your Care Instructions     A heart-healthy diet has lots of vegetables, fruits, nuts, beans, and whole grains, and is low in salt. It limits foods that are high in saturated fat, such as meats, cheeses, and fried foods. It may be hard to change your diet, but even small changes can lower your risk of heart attack and heart disease. Follow-up care is a key part of your treatment and safety. Be sure to make and go to all appointments, and call your doctor if you are having problems. It's also a good idea to know your test results and keep a list of the medicines you take. How can you care for yourself at home? Watch your portions  · Use food labels to learn what the recommended servings are for the foods you eat. · Eat only the number of calories you need to stay at a healthy weight. If you need to lose weight, eat fewer calories than your body burns (through exercise and other physical activity). Eat more fruits and vegetables  · Eat a variety of fruit and vegetables every day. Dark green, deep orange, red, or yellow fruits and vegetables are especially good for you. Examples include spinach, carrots, peaches, and berries. · Keep carrots, celery, and other veggies handy for snacks. Buy fruit that is in season and store it where you can see it so that you will be tempted to eat it. · Cook dishes that have a lot of veggies in them, such as stir-fries and soups. Limit saturated fat  · Read food labels, and try to avoid saturated fats. They increase your risk of heart disease. · Use olive or canola oil when you cook. · Bake, broil, grill, or steam foods instead of frying them. · Choose lean meats instead of high-fat meats such as hot dogs and sausages. Cut off all visible fat when you prepare meat. · Eat fish, skinless poultry, and meat alternatives such as soy products instead of high-fat meats.  Soy products, such as tofu, may be especially good for your heart.  · Choose low-fat or fat-free milk and dairy products. Eat foods high in fiber  · Eat a variety of grain products every day. Include whole-grain foods that have lots of fiber and nutrients. Examples of whole-grain foods include oats, whole wheat bread, and brown rice. · Buy whole-grain breads and cereals, instead of white bread or pastries. Limit salt and sodium  · Limit how much salt and sodium you eat to help lower your blood pressure. · Taste food before you salt it. Add only a little salt when you think you need it. With time, your taste buds will adjust to less salt. · Eat fewer snack items, fast foods, and other high-salt, processed foods. Check food labels for the amount of sodium in packaged foods. · Choose low-sodium versions of canned goods (such as soups, vegetables, and beans). Limit sugar  · Limit drinks and foods with added sugar. These include candy, desserts, and soda pop. Limit alcohol  · Limit alcohol to no more than 2 drinks a day for men and 1 drink a day for women. Too much alcohol can cause health problems. When should you call for help? Watch closely for changes in your health, and be sure to contact your doctor if:    · You would like help planning heart-healthy meals. Where can you learn more? Go to http://www.gran[employer].com/  Enter V137 in the search box to learn more about \"Heart-Healthy Diet: Care Instructions. \"  Current as of: September 8, 2021               Content Version: 13.2  © 2006-2022 [employer]. Care instructions adapted under license by [employer] (which disclaims liability or warranty for this information). If you have questions about a medical condition or this instruction, always ask your healthcare professional. [place] [address] any warranty or liability for your use of this information.

## 2022-05-24 ENCOUNTER — TELEPHONE (OUTPATIENT)
Dept: CARDIOTHORACIC SURGERY | Age: 72
End: 2022-05-24

## 2022-05-24 NOTE — TELEPHONE ENCOUNTER
Pt. Called to follow up with status of GI referral to proceed with further evaluation of the watchman procedure. Pt. Edison Cook does not take his insurance. Pt. Stated he would contact insurance company and find a provider covered by his insurance and call our office so we could place a referral. Pt. Was in agreement with this plan and verbalized understanding.

## 2022-05-25 ENCOUNTER — OFFICE VISIT (OUTPATIENT)
Dept: CARDIOLOGY CLINIC | Age: 72
End: 2022-05-25
Payer: MEDICARE

## 2022-05-25 VITALS
BODY MASS INDEX: 30.75 KG/M2 | HEIGHT: 72 IN | DIASTOLIC BLOOD PRESSURE: 82 MMHG | OXYGEN SATURATION: 100 % | WEIGHT: 227 LBS | SYSTOLIC BLOOD PRESSURE: 146 MMHG | HEART RATE: 58 BPM

## 2022-05-25 DIAGNOSIS — Z99.2 ESRD ON HEMODIALYSIS (HCC): ICD-10-CM

## 2022-05-25 DIAGNOSIS — I63.9 CEREBROVASCULAR ACCIDENT (CVA), UNSPECIFIED MECHANISM (HCC): ICD-10-CM

## 2022-05-25 DIAGNOSIS — I48.19 PERSISTENT ATRIAL FIBRILLATION (HCC): ICD-10-CM

## 2022-05-25 DIAGNOSIS — I50.32 CHRONIC DIASTOLIC HEART FAILURE (HCC): ICD-10-CM

## 2022-05-25 DIAGNOSIS — R00.1 BRADYCARDIA: Primary | ICD-10-CM

## 2022-05-25 DIAGNOSIS — I50.32 CHRONIC DIASTOLIC CONGESTIVE HEART FAILURE (HCC): ICD-10-CM

## 2022-05-25 DIAGNOSIS — N18.6 ESRD ON HEMODIALYSIS (HCC): ICD-10-CM

## 2022-05-25 DIAGNOSIS — Z86.19 HISTORY OF SEPSIS: ICD-10-CM

## 2022-05-25 DIAGNOSIS — Z87.11 HISTORY OF GASTRIC ULCER: ICD-10-CM

## 2022-05-25 PROCEDURE — G8510 SCR DEP NEG, NO PLAN REQD: HCPCS | Performed by: INTERNAL MEDICINE

## 2022-05-25 PROCEDURE — G8427 DOCREV CUR MEDS BY ELIG CLIN: HCPCS | Performed by: INTERNAL MEDICINE

## 2022-05-25 PROCEDURE — 1123F ACP DISCUSS/DSCN MKR DOCD: CPT | Performed by: INTERNAL MEDICINE

## 2022-05-25 PROCEDURE — G8536 NO DOC ELDER MAL SCRN: HCPCS | Performed by: INTERNAL MEDICINE

## 2022-05-25 PROCEDURE — G9231 DOC ESRD DIA TRANS PREG: HCPCS | Performed by: INTERNAL MEDICINE

## 2022-05-25 PROCEDURE — 3017F COLORECTAL CA SCREEN DOC REV: CPT | Performed by: INTERNAL MEDICINE

## 2022-05-25 PROCEDURE — 99215 OFFICE O/P EST HI 40 MIN: CPT | Performed by: INTERNAL MEDICINE

## 2022-05-25 PROCEDURE — G8417 CALC BMI ABV UP PARAM F/U: HCPCS | Performed by: INTERNAL MEDICINE

## 2022-05-25 PROCEDURE — 1101F PT FALLS ASSESS-DOCD LE1/YR: CPT | Performed by: INTERNAL MEDICINE

## 2022-05-25 NOTE — LETTER
5/25/2022 8:33 AM    Lucia Lew  [id number]  1950        Insurance:  Yarely Castellon # _____________________      Proc Date: Mon 6/6                Proc Time:  9:15      Performing MD : Dr. Shayy Savage Pacemaker                                         Scheduled with:  Matteo Chahal                                               Date:5/25/2022          HP: 5/25     EKG: ______    Labs:______  CXR: _______  Orders:  5/25          Special Instructions:  _____________________________________________________  ______________________________________________________________________  ______________________________________________________________________          The materials enclosed with this facsimile transmission are private and confidential and are the property of the sender. If you are not the intended recipient, be advised that any unauthorized use, disclosure, copying, distribution, or the taking of any action in reliance on the contents of this telecopied information is strictly prohibited. If you have received this in error, please immediately notify the sender via telephone to arrange for return of the forwarded documentation.

## 2022-05-25 NOTE — PATIENT INSTRUCTIONS
[facility]          Patient  EP Instructions        Please get Lab Work and Chest X-ray completed about 7 days prior to procedure at [facility] or [facility]      1. You are scheduled to have a Leadless Pacemaker Implant on  June 6, 2022 , at 09:15 a.m. Please check in at 07:45 a.m.     2. Please go to [facility] and park in the outpatient parking lot that is located around to the back of the hospital and enter through the [facility]. Once you enter through the [facility] check in with the  there. The  will either give you directions or assist you in getting to the cath holding area. 3.  You are not to eat or drink anything after midnight the night before your procedure. 4. Please continue to take your medications with a small sip of water on the morning of the procedure with the following exceptions:  Hold (Do Not Take) Lasix the morning of the procedure     5. If you are diabetic, do not take your insulin/sugar pill the morning of the procedure. 6. We encourage families to wait in the waiting room on the first floor while the procedure is being done. The Doctor will come out and talk with you as soon as the procedure is over. 7. There is the possibility that you may spend the night in the hospital, depending on the results of the procedure. This will be determined after the procedure is done. 8.   If you or your family have any questions, please call our office Monday-Friday 9:00am         -4:30 pm , at [phone], and ask to speak to one of the nurses.      Follow up in office for pacer check/wound check in 1 week post procedure

## 2022-05-25 NOTE — PROGRESS NOTES
Melissa Dacosta presents today for   Chief Complaint   Patient presents with    Follow-up     per Agnes Ernandez preferred language for health care discussion is english/other. Is someone accompanying this pt? no    Is the patient using any DME equipment during [address]? no    Depression Screening:  3 most recent PHQ Screens 5/25/2022   Little interest or pleasure in doing things Not at all   Feeling down, depressed, irritable, or hopeless Not at all   Total Score PHQ 2 0       Learning Assessment:  Learning Assessment 1/25/2016   PRIMARY LEARNER Patient   PRIMARY LANGUAGE ENGLISH   LEARNER PREFERENCE PRIMARY LISTENING   ANSWERED BY patient   RELATIONSHIP SELF       Abuse Screening:  No flowsheet data found. Fall Risk  Fall Risk Assessment, last 12 mths 5/25/2022   Able to walk? Yes   Fall in past 12 months? 0   Do you feel unsteady? 0   Are you worried about falling 0   Number of falls in past 12 months -   Fall with injury? -       Pt currently taking Anticoagulant therapy? ASA 81mg every day     Coordination of Care:  1. Have you been to the ER, urgent care clinic since your last visit? Hospitalized since your last visit? no    2. Have you seen or consulted any other health care providers outside of the [address] [place] [address] since your last visit? Include any pap smears or colon screening.  no

## 2022-05-25 NOTE — PROGRESS NOTES
History of Present Illness:  70 YOM referred by Dr. Braydon Arce for evaluation for possible pacemaker. During dialysis he will get hypotensive and bradycardic at times. He had an event monitor showing heart rates down to 20s at times . He has chronic atrial fibrillation. He has a remote history of falls, hematuria, UTI, and ulcer August 2021. He is not currently on anticoagulation as a result. He gets some occasional dyspnea and dizziness, but no recent loss of consciousness. No chest pain. Impression:  1. Atrial fib with slow ventricular rates, down to 20 bpm, history of recent dizziness and dyspnea, worsened during dialysis, affecting his ability for fluid removal.  2. Chronic a-fib, not on anticoagulation due to falls and history of GI bleed. 3. ESRD, initiating hemodialysis October 2021. 4. Remote history of GI bleed and ulcer August 2021 by EGD. 5. History of recurrent UTIs and obstructive uropathy with sepsis. 6. Remote aspiration pneumonia. 7. Diabetes. 8. HTN. 9. Echo 2021 with normal function. Plan:  Given his chronic a-fib, as well as dialysis, with increased risk for infection and need for pacing due to heart rate down to 20s and interference with dialysis, I recommended a leadless pacemaker. Risks, benefits and alternatives discussed and all questions answered. A very small risk of perforation and dislodgement specifically were addressed with the micro device. All questions answered and I will make arrangements.       Past Medical History:   Diagnosis Date    Atrial fibrillation ([address]     C[facility]brovascular accident ([facility]     1991, 0- general weakness    Chronic diastolic heart failure (HCC)     Chronic kidney disease     dialysis    Diabetes ([address]     Heart failure (HCC)     History of cardioversion     Hypercholesterolemia     Hypertension     Morbid obesity (HCC)        Current Outpatient Medications   Medication Sig Dispense Refill    aspirin delayed-release 81 mg tablet Take  by mouth daily.  vitamin E (AQUA GEMS) 400 unit capsule Take  by mouth daily.  furosemide (LASIX) 40 mg tablet Take 40 mg by mouth.  pantoprazole (PROTONIX) 40 mg tablet Take 40 mg by mouth.  sevelamer carbonate (RENVELA) 800 mg tab tab three (3) times daily (with meals).  finasteride (PROSCAR) 5 mg tablet TAKE 1 TABLET BY MOUTH DAILY      vitamin E, dl,tocopheryl acet, (vitamin E acetate) 45 mg (100 unit) capsule Take 100 Units by mouth daily.  docusate sodium (COLACE) 100 mg capsule Take 100 mg by mouth daily as needed for Constipation.  cholecalciferol (VITAMIN D3) 1,000 unit tablet Take 1 Tab by mouth daily. Indications: VITAMIN D DEFICIENCY 30 Tab 0    Blood-Glucose Meter monitoring kit As directed 1 Kit 0    multivitamin (ONE A DAY) tablet Take 1 Tab by mouth daily.  tamsulosin (FLOMAX) 0.4 mg capsule Take 0.4 mg by mouth daily. Social History   reports that he quit smoking about 50 years ago. He quit after 8.00 years of use. He has never used smokeless tobacco.   reports no history of alcohol use. Family History  family history includes Diabetes in an other family member; Heart Attack (age of onset: 52) in his mother; Hypertension in an other family member. Review of Systems  Except as stated above include:  Constitutional: Negative for fever, chills and malaise/fatigue. HEENT: No congestion or recent URI. Gastrointestinal: No nausea, vomiting, abdominal pain, bloody stools. Pulmonary:  Negative except as stated above. Cardiac:  Negative except as stated above. Musculoskeletal: Negative except as stated above. Neurological:  No localized symptoms. Skin:  Negative except as stated above. Psych:  Negative except as stated above. Endocrine:  Negative except as stated above.     PHYSICAL EXAM  BP Readings from Last 3 Encounters:   05/25/22 (!) 142/60   05/13/22 (!) 151/62   05/02/22 (!) 148/69     Pulse Readings from Last 3 Encounters: 05/25/22 (!) 58   05/13/22 67   05/02/22 (!) 53     Wt Readings from Last 3 Encounters:   05/25/22 103 kg (227 lb)   05/13/22 102.2 kg (225 lb 3.2 oz)   05/02/22 103.4 kg (228 lb)     General:   Well developed, well groomed. Head/Neck:   No obvious jugular venous distention     No obvious carotid pulsations. No evidence of xanthelasma. Lungs:   No respiratory distress. Clear bilaterally. Heart:  Irreg. Normal S1/S2. Palpation grossly normal.    No significant murmurs, rubs or gallops. Abdomen:   Non-acute abdomen. No obvious pulsations. Extremities:   Intact peripheral pulses. No significant edema. Neurological:   Alert and oriented to person, place, time. No focal neurological deficit visually. Skin:   No obvious rash    Blood Pressure Metric:  Monitor recommended and adjustments stated if needed.

## 2022-05-26 RX ORDER — SODIUM CHLORIDE 0.9 % (FLUSH) 0.9 %
5-40 SYRINGE (ML) INJECTION AS NEEDED
Status: CANCELLED | OUTPATIENT
Start: 2022-05-26

## 2022-05-26 RX ORDER — SODIUM CHLORIDE 0.9 % (FLUSH) 0.9 %
5-40 SYRINGE (ML) INJECTION EVERY 8 HOURS
Status: CANCELLED | OUTPATIENT
Start: 2022-05-26

## 2022-06-01 ENCOUNTER — HOSPITAL ENCOUNTER (OUTPATIENT)
Dept: LAB | Age: 72
Discharge: HOME | End: 2022-06-01
Payer: MEDICARE

## 2022-06-01 ENCOUNTER — HOSPITAL ENCOUNTER (OUTPATIENT)
Dept: GENERAL RADIOLOGY | Age: 72
Discharge: HOME | End: 2022-06-01
Payer: MEDICARE

## 2022-06-01 DIAGNOSIS — R00.1 BRADYCARDIA: ICD-10-CM

## 2022-06-01 LAB
ALBUMIN SERPL-MCNC: 3.7 G/DL (ref 3.4–5)
ALBUMIN/GLOB SERPL: 0.9 {RATIO} (ref 0.8–1.7)
ALP SERPL-CCNC: 95 U/L (ref 45–117)
ALT SERPL-CCNC: 15 U/L (ref 16–61)
ANION GAP SERPL CALC-SCNC: 6 MMOL/L (ref 3–18)
AST SERPL-CCNC: 22 U/L (ref 10–38)
BASOPHILS # BLD: 0 K/UL (ref 0–0.1)
BASOPHILS NFR BLD: 1 % (ref 0–2)
BILIRUB SERPL-MCNC: 0.5 MG/DL (ref 0.2–1)
BUN SERPL-MCNC: 62 MG/DL (ref 7–18)
BUN/CREAT SERPL: 8 (ref 12–20)
CALCIUM SERPL-MCNC: 9.4 MG/DL (ref 8.5–10.1)
CHLORIDE SERPL-SCNC: 100 MMOL/L (ref 100–111)
CO2 SERPL-SCNC: 32 MMOL/L (ref 21–32)
CREAT SERPL-MCNC: 7.3 MG/DL (ref 0.6–1.3)
DIFFERENTIAL METHOD BLD: ABNORMAL
EOSINOPHIL # BLD: 0.2 K/UL (ref 0–0.4)
EOSINOPHIL NFR BLD: 5 % (ref 0–5)
ERYTHROCYTE [DISTWIDTH] IN BLOOD BY AUTOMATED COUNT: 14.5 % (ref 11.6–14.5)
GLOBULIN SER CALC-MCNC: 4.3 G/DL (ref 2–4)
GLUCOSE SERPL-MCNC: 103 MG/DL (ref 74–99)
HCT VFR BLD AUTO: 37.6 % (ref 36–48)
HGB BLD-MCNC: 12.2 G/DL (ref 13–16)
IMM GRANULOCYTES # BLD AUTO: 0 K/UL (ref 0–0.04)
IMM GRANULOCYTES NFR BLD AUTO: 0 % (ref 0–0.5)
INR PPP: 1.1 (ref 0.8–1.2)
LYMPHOCYTES # BLD: 0.9 K/UL (ref 0.9–3.6)
LYMPHOCYTES NFR BLD: 24 % (ref 21–52)
MCH RBC QN AUTO: 30 PG (ref 24–34)
MCHC RBC AUTO-ENTMCNC: 32.4 G/DL (ref 31–37)
MCV RBC AUTO: 92.4 FL (ref 78–100)
MONOCYTES # BLD: 0.4 K/UL (ref 0.05–1.2)
MONOCYTES NFR BLD: 11 % (ref 3–10)
NEUTS SEG # BLD: 2.1 K/UL (ref 1.8–8)
NEUTS SEG NFR BLD: 59 % (ref 40–73)
NRBC # BLD: 0 K/UL (ref 0–0.01)
NRBC BLD-RTO: 0 PER 100 WBC
PLATELET # BLD AUTO: 71 K/UL (ref 135–420)
PLATELET COMMENTS,PCOM: ABNORMAL
PMV BLD AUTO: 12 FL (ref 9.2–11.8)
POTASSIUM SERPL-SCNC: 4 MMOL/L (ref 3.5–5.5)
PROT SERPL-MCNC: 8 G/DL (ref 6.4–8.2)
PROTHROMBIN TIME: 14.1 SEC (ref 11.5–15.2)
RBC # BLD AUTO: 4.07 M/UL (ref 4.35–5.65)
RBC MORPH BLD: ABNORMAL
SODIUM SERPL-SCNC: 138 MMOL/L (ref 136–145)
WBC # BLD AUTO: 3.6 K/UL (ref 4.6–13.2)

## 2022-06-01 PROCEDURE — 80053 COMPREHEN METABOLIC PANEL: CPT

## 2022-06-01 PROCEDURE — 36415 COLL VENOUS BLD VENIPUNCTURE: CPT

## 2022-06-01 PROCEDURE — 85025 COMPLETE CBC W/AUTO DIFF WBC: CPT

## 2022-06-01 PROCEDURE — 71046 X-RAY EXAM CHEST 2 VIEWS: CPT

## 2022-06-01 PROCEDURE — 85610 PROTHROMBIN TIME: CPT

## 2022-06-03 NOTE — H&P
Plan leadless pacemaker. Risk for perforation as discussed in the office as well as dislodgement. Patient willing to proceed. Given 27F access, will plan to monitor overnight for bleeding, HD tomorrow, will contact Dr. Radha Ford. [phone] office, I called and notified 6/6/2022    History of Present Illness:  70 YOM referred by Dr. Ezio Hargrove for evaluation for possible pacemaker. During dialysis he will get hypotensive and bradycardic at times. He had an event monitor showing heart rates down to 20s at times . He has chronic atrial fibrillation. He has a remote history of falls, hematuria, UTI, and ulcer August 2021. He is not currently on anticoagulation as a result. He gets some occasional dyspnea and dizziness, but no recent loss of consciousness. No chest pain.     Impression:  1. Atrial fib with slow ventricular rates, down to 20 bpm, history of recent dizziness and dyspnea, worsened during dialysis, affecting his ability for fluid removal.  2. Chronic a-fib, not on anticoagulation due to falls and history of GI bleed. 3. ESRD, initiating hemodialysis October 2021, sees Dr. Radha Ford  4. Remote history of GI bleed and ulcer August 2021 by EGD. 5. History of recurrent UTIs and obstructive uropathy with sepsis. 6. Remote aspiration pneumonia. 7. Diabetes. 8. HTN. 9. Echo 2021 with normal function.     Plan:  Given his chronic a-fib, as well as dialysis, with increased risk for infection and need for pacing due to heart rate down to 20s and interference with dialysis, I recommended a leadless pacemaker. Risks, benefits and alternatives discussed and all questions answered. A very small risk of perforation and dislodgement specifically were addressed with the micro device.   All questions answered and I will make arrangements.             Past Medical History:   Diagnosis Date    Atrial fibrillation ([facility] [address]      Cerebrovascular accident [facility])       1991, 1997- general weakness    Chronic diastolic heart failure (HCC)      Chronic kidney disease       dialysis    Diabetes ([facility] [address]      Heart failure (HCC)      History of cardioversion      Hypercholesterolemia      Hypertension      Morbid obesity (HCC)                  Current Outpatient Medications   Medication Sig Dispense Refill    aspirin delayed-release 81 mg tablet Take  by mouth daily.        vitamin E (AQUA GEMS) 400 unit capsule Take  by mouth daily.        furosemide (LASIX) 40 mg tablet Take 40 mg by mouth.        pantoprazole (PROTONIX) 40 mg tablet Take 40 mg by mouth.        sevelamer carbonate (RENVELA) 800 mg tab tab three (3) times daily (with meals).        finasteride (PROSCAR) 5 mg tablet TAKE 1 TABLET BY MOUTH DAILY        vitamin E, dl,tocopheryl acet, (vitamin E acetate) 45 mg (100 unit) capsule Take 100 Units by mouth daily.        docusate sodium (COLACE) 100 mg capsule Take 100 mg by mouth daily as needed for Constipation.        cholecalciferol (VITAMIN D3) 1,000 unit tablet Take 1 Tab by mouth daily. Indications: VITAMIN D DEFICIENCY 30 Tab 0    Blood-Glucose Meter monitoring kit As directed 1 Kit 0    multivitamin (ONE A DAY) tablet Take 1 Tab by mouth daily.        tamsulosin (FLOMAX) 0.4 mg capsule Take 0.4 mg by mouth daily.             Social History   reports that he quit smoking about 50 years ago. He quit after 8.00 years of use. He has never used smokeless tobacco.   reports no history of alcohol use.     Family History  family history includes Diabetes in an other family member; Heart Attack (age of onset: 52) in his mother; Hypertension in an other family member.     Review of Systems  Except as stated above include:  Constitutional: Negative for fever, chills and malaise/fatigue. HEENT: No congestion or recent URI. Gastrointestinal: No nausea, vomiting, abdominal pain, bloody stools. Pulmonary:  Negative except as stated above. Cardiac:  Negative except as stated above.   Musculoskeletal: Negative except as stated above. Neurological:  No localized symptoms. Skin:  Negative except as stated above. Psych:  Negative except as stated above. Endocrine:  Negative except as stated above.     PHYSICAL EXAM      BP Readings from Last 3 Encounters:   05/25/22 (!) 142/60   05/13/22 (!) 151/62   05/02/22 (!) 148/69          Pulse Readings from Last 3 Encounters:   05/25/22 (!) 58   05/13/22 67   05/02/22 (!) 53          Wt Readings from Last 3 Encounters:   05/25/22 103 kg (227 lb)   05/13/22 102.2 kg (225 lb 3.2 oz)   05/02/22 103.4 kg (228 lb)      General:          Well developed, well groomed. Head/Neck:     No obvious jugular venous distention                           No obvious carotid pulsations. No evidence of xanthelasma. Lungs:             No respiratory distress. Clear bilaterally. Heart:              Irreg. Normal S1/S2. Palpation grossly normal.                          No significant murmurs, rubs or gallops. Abdomen:        Non-acute abdomen. No obvious pulsations. Extremities:     Intact peripheral pulses. No significant edema. Neurological:   Alert and oriented to person, place, time. No focal neurological deficit visually.   Skin:                No obvious rash     Blood Pressure Metric:  Monitor recommended and adjustments stated if needed.         Electronically signed by Marina Cummings MD at 05/25/22 7656

## 2022-06-06 ENCOUNTER — HOSPITAL ENCOUNTER (OUTPATIENT)
Age: 72
Setting detail: OBSERVATION
Discharge: HOME | End: 2022-06-07
Attending: INTERNAL MEDICINE | Admitting: INTERNAL MEDICINE
Payer: MEDICARE

## 2022-06-06 ENCOUNTER — ANESTHESIA (OUTPATIENT)
Dept: CARDIAC CATH/INVASIVE PROCEDURES | Age: 72
End: 2022-06-06
Payer: MEDICARE

## 2022-06-06 ENCOUNTER — ANESTHESIA EVENT (OUTPATIENT)
Dept: CARDIAC CATH/INVASIVE PROCEDURES | Age: 72
End: 2022-06-06
Payer: MEDICARE

## 2022-06-06 ENCOUNTER — APPOINTMENT (OUTPATIENT)
Dept: GENERAL RADIOLOGY | Age: 72
End: 2022-06-06
Attending: INTERNAL MEDICINE
Payer: MEDICARE

## 2022-06-06 DIAGNOSIS — R00.1 BRADYCARDIA: ICD-10-CM

## 2022-06-06 LAB
ACT BLD: 173 SECS (ref 79–138)
CA-I BLD-MCNC: 1.12 MMOL/L (ref 1.12–1.32)
CHLORIDE BLD-SCNC: 100 MMOL/L (ref 100–108)
CREAT UR-MCNC: 9.2 MG/DL (ref 0.6–1.3)
GLUCOSE BLD STRIP.AUTO-MCNC: 84 MG/DL (ref 74–106)
POTASSIUM BLD-SCNC: 4 MMOL/L (ref 3.5–5.5)
SODIUM BLD-SCNC: 138 MMOL/L (ref 136–145)

## 2022-06-06 PROCEDURE — 71045 X-RAY EXAM CHEST 1 VIEW: CPT

## 2022-06-06 PROCEDURE — 33274 TCAT INSJ/RPL PERM LDLS PM: CPT | Performed by: INTERNAL MEDICINE

## 2022-06-06 PROCEDURE — C1894 INTRO/SHEATH, NON-LASER: HCPCS | Performed by: INTERNAL MEDICINE

## 2022-06-06 PROCEDURE — 33210 INSERT ELECTRD/PM CATH SNGL: CPT | Performed by: INTERNAL MEDICINE

## 2022-06-06 PROCEDURE — 74011250637 HC RX REV CODE- 250/637: Performed by: INTERNAL MEDICINE

## 2022-06-06 PROCEDURE — 99100 ANES PT EXTEME AGE<1 YR&>70: CPT | Performed by: ANESTHESIOLOGY

## 2022-06-06 PROCEDURE — 77030018729 HC ELECTRD DEFIB PAD CARD -B: Performed by: INTERNAL MEDICINE

## 2022-06-06 PROCEDURE — 74011000636 HC RX REV CODE- 636: Performed by: INTERNAL MEDICINE

## 2022-06-06 PROCEDURE — 00532 ANES ACCESS CTR VENOUS CRCJ: CPT | Performed by: ANESTHESIOLOGY

## 2022-06-06 PROCEDURE — 80047 BASIC METABLC PNL IONIZED CA: CPT

## 2022-06-06 PROCEDURE — 85347 COAGULATION TIME ACTIVATED: CPT

## 2022-06-06 PROCEDURE — 74011250636 HC RX REV CODE- 250/636: Performed by: INTERNAL MEDICINE

## 2022-06-06 PROCEDURE — 76060000033 HC ANESTHESIA 1 TO 1.5 HR: Performed by: INTERNAL MEDICINE

## 2022-06-06 PROCEDURE — 74011250636 HC RX REV CODE- 250/636: Performed by: ANESTHESIOLOGY

## 2022-06-06 PROCEDURE — G0378 HOSPITAL OBSERVATION PER HR: HCPCS

## 2022-06-06 PROCEDURE — 77030026909 HC CATH PACE BPLR TEMP TELE -C: Performed by: INTERNAL MEDICINE

## 2022-06-06 PROCEDURE — 74011000250 HC RX REV CODE- 250: Performed by: INTERNAL MEDICINE

## 2022-06-06 PROCEDURE — 2709999900 HC NON-CHARGEABLE SUPPLY

## 2022-06-06 PROCEDURE — C1769 GUIDE WIRE: HCPCS | Performed by: INTERNAL MEDICINE

## 2022-06-06 PROCEDURE — 77030011230 HC DIL VESL COON COOK -B: Performed by: INTERNAL MEDICINE

## 2022-06-06 PROCEDURE — C1786 PMKR, SINGLE, RATE-RESP: HCPCS | Performed by: INTERNAL MEDICINE

## 2022-06-06 DEVICE — TPS MC1VR01US MICRA US SCALABLE
Type: IMPLANTABLE DEVICE | Status: FUNCTIONAL
Brand: MICRA™

## 2022-06-06 RX ORDER — DOCUSATE SODIUM 100 MG/1
100 CAPSULE, LIQUID FILLED ORAL
Status: DISCONTINUED | OUTPATIENT
Start: 2022-06-06 | End: 2022-06-07 | Stop reason: HOSPADM

## 2022-06-06 RX ORDER — SEVELAMER CARBONATE 800 MG/1
800 TABLET, FILM COATED ORAL
Status: DISCONTINUED | OUTPATIENT
Start: 2022-06-06 | End: 2022-06-07 | Stop reason: HOSPADM

## 2022-06-06 RX ORDER — OXYCODONE AND ACETAMINOPHEN 5; 325 MG/1; MG/1
1 TABLET ORAL
Status: DISCONTINUED | OUTPATIENT
Start: 2022-06-06 | End: 2022-06-07 | Stop reason: HOSPADM

## 2022-06-06 RX ORDER — LIDOCAINE HYDROCHLORIDE 10 MG/ML
INJECTION, SOLUTION EPIDURAL; INFILTRATION; INTRACAUDAL; PERINEURAL AS NEEDED
Status: DISCONTINUED | OUTPATIENT
Start: 2022-06-06 | End: 2022-06-06 | Stop reason: HOSPADM

## 2022-06-06 RX ORDER — SODIUM CHLORIDE 0.9 % (FLUSH) 0.9 %
5-40 SYRINGE (ML) INJECTION AS NEEDED
Status: DISCONTINUED | OUTPATIENT
Start: 2022-06-06 | End: 2022-06-06 | Stop reason: HOSPADM

## 2022-06-06 RX ORDER — SODIUM CHLORIDE 0.9 % (FLUSH) 0.9 %
5-40 SYRINGE (ML) INJECTION AS NEEDED
Status: DISCONTINUED | OUTPATIENT
Start: 2022-06-06 | End: 2022-06-07 | Stop reason: HOSPADM

## 2022-06-06 RX ORDER — FENTANYL CITRATE 50 UG/ML
INJECTION, SOLUTION INTRAMUSCULAR; INTRAVENOUS AS NEEDED
Status: DISCONTINUED | OUTPATIENT
Start: 2022-06-06 | End: 2022-06-06 | Stop reason: HOSPADM

## 2022-06-06 RX ORDER — SODIUM CHLORIDE 0.9 % (FLUSH) 0.9 %
5-40 SYRINGE (ML) INJECTION EVERY 8 HOURS
Status: DISCONTINUED | OUTPATIENT
Start: 2022-06-06 | End: 2022-06-07 | Stop reason: HOSPADM

## 2022-06-06 RX ORDER — HEPARIN SODIUM 1000 [USP'U]/ML
INJECTION, SOLUTION INTRAVENOUS; SUBCUTANEOUS AS NEEDED
Status: DISCONTINUED | OUTPATIENT
Start: 2022-06-06 | End: 2022-06-06 | Stop reason: HOSPADM

## 2022-06-06 RX ORDER — FUROSEMIDE 40 MG/1
40 TABLET ORAL DAILY
Status: DISCONTINUED | OUTPATIENT
Start: 2022-06-07 | End: 2022-06-07 | Stop reason: HOSPADM

## 2022-06-06 RX ORDER — FINASTERIDE 5 MG/1
5 TABLET, FILM COATED ORAL DAILY
Status: DISCONTINUED | OUTPATIENT
Start: 2022-06-07 | End: 2022-06-07 | Stop reason: HOSPADM

## 2022-06-06 RX ORDER — HEPARIN SODIUM 200 [USP'U]/100ML
INJECTION, SOLUTION INTRAVENOUS
Status: COMPLETED | OUTPATIENT
Start: 2022-06-06 | End: 2022-06-06

## 2022-06-06 RX ORDER — ASPIRIN 81 MG/1
81 TABLET ORAL DAILY
Status: DISCONTINUED | OUTPATIENT
Start: 2022-06-07 | End: 2022-06-07 | Stop reason: HOSPADM

## 2022-06-06 RX ORDER — SODIUM CHLORIDE 9 MG/ML
INJECTION, SOLUTION INTRAVENOUS
Status: DISCONTINUED | OUTPATIENT
Start: 2022-06-06 | End: 2022-06-06 | Stop reason: HOSPADM

## 2022-06-06 RX ORDER — ZOLPIDEM TARTRATE 5 MG/1
5 TABLET ORAL
Status: DISCONTINUED | OUTPATIENT
Start: 2022-06-06 | End: 2022-06-07 | Stop reason: HOSPADM

## 2022-06-06 RX ORDER — NALOXONE HYDROCHLORIDE 0.4 MG/ML
0.4 INJECTION, SOLUTION INTRAMUSCULAR; INTRAVENOUS; SUBCUTANEOUS AS NEEDED
Status: DISCONTINUED | OUTPATIENT
Start: 2022-06-06 | End: 2022-06-07 | Stop reason: HOSPADM

## 2022-06-06 RX ORDER — PROPOFOL 10 MG/ML
VIAL (ML) INTRAVENOUS
Status: DISCONTINUED | OUTPATIENT
Start: 2022-06-06 | End: 2022-06-06 | Stop reason: HOSPADM

## 2022-06-06 RX ORDER — CEFAZOLIN SODIUM 1 G/3ML
INJECTION, POWDER, FOR SOLUTION INTRAMUSCULAR; INTRAVENOUS AS NEEDED
Status: DISCONTINUED | OUTPATIENT
Start: 2022-06-06 | End: 2022-06-06 | Stop reason: HOSPADM

## 2022-06-06 RX ORDER — SODIUM CHLORIDE 0.9 % (FLUSH) 0.9 %
5-40 SYRINGE (ML) INJECTION EVERY 8 HOURS
Status: DISCONTINUED | OUTPATIENT
Start: 2022-06-06 | End: 2022-06-06 | Stop reason: HOSPADM

## 2022-06-06 RX ORDER — PANTOPRAZOLE SODIUM 40 MG/1
40 TABLET, DELAYED RELEASE ORAL
Status: DISCONTINUED | OUTPATIENT
Start: 2022-06-07 | End: 2022-06-07 | Stop reason: HOSPADM

## 2022-06-06 RX ADMIN — SODIUM CHLORIDE, PRESERVATIVE FREE 10 ML: 5 INJECTION INTRAVENOUS at 22:36

## 2022-06-06 RX ADMIN — SEVELAMER CARBONATE 800 MG: 800 TABLET, FILM COATED ORAL at 17:15

## 2022-06-06 RX ADMIN — FENTANYL CITRATE 25 MCG: 50 INJECTION, SOLUTION INTRAMUSCULAR; INTRAVENOUS at 10:39

## 2022-06-06 RX ADMIN — OXYCODONE AND ACETAMINOPHEN 1 TABLET: 325; 5 TABLET ORAL at 17:18

## 2022-06-06 RX ADMIN — FENTANYL CITRATE 25 MCG: 50 INJECTION, SOLUTION INTRAMUSCULAR; INTRAVENOUS at 11:22

## 2022-06-06 RX ADMIN — SODIUM CHLORIDE, PRESERVATIVE FREE 10 ML: 5 INJECTION INTRAVENOUS at 16:34

## 2022-06-06 RX ADMIN — PROPOFOL 70 MCG/KG/MIN: 10 INJECTION, EMULSION INTRAVENOUS at 10:39

## 2022-06-06 RX ADMIN — SODIUM CHLORIDE: 9 INJECTION, SOLUTION INTRAVENOUS at 10:31

## 2022-06-06 RX ADMIN — FENTANYL CITRATE 25 MCG: 50 INJECTION, SOLUTION INTRAMUSCULAR; INTRAVENOUS at 10:47

## 2022-06-06 RX ADMIN — CEFAZOLIN SODIUM 2 G: 1 INJECTION, POWDER, FOR SOLUTION INTRAMUSCULAR; INTRAVENOUS at 10:45

## 2022-06-06 RX ADMIN — HEPARIN SODIUM 3000 UNITS: 1000 INJECTION, SOLUTION INTRAVENOUS; SUBCUTANEOUS at 11:08

## 2022-06-06 RX ADMIN — FENTANYL CITRATE 25 MCG: 50 INJECTION, SOLUTION INTRAMUSCULAR; INTRAVENOUS at 11:10

## 2022-06-06 NOTE — ANESTHESIA POSTPROCEDURE EVALUATION
Procedure(s):  INSERT OR REPLACE TRANSCATH PPM LEADLESS. MAC    Anesthesia Post Evaluation      Multimodal analgesia: multimodal analgesia used between 6 hours prior to anesthesia start to PACU discharge  Patient location during evaluation: PACU  Patient participation: complete - patient participated  Level of consciousness: awake and alert  Pain management: adequate  Airway patency: patent  Anesthetic complications: no  Cardiovascular status: acceptable and hemodynamically stable  Respiratory status: acceptable  Hydration status: acceptable  Post anesthesia nausea and vomiting:  controlled      INITIAL Post-op Vital signs:   Vitals Value Taken Time   /78 06/06/22 1240   Temp     Pulse 58 06/06/22 1247   Resp 16 06/06/22 1157   SpO2 100 % 06/06/22 1247   Vitals shown include unvalidated device data.

## 2022-06-06 NOTE — PROGRESS NOTES
1334: TRANSFER - IN REPORT:    Verbal report received from ZA, RN (name) on Barbie Pérez  being received from CCL (unit) for routine post - op      Report consisted of patients Situation, Background, Assessment and   Recommendations(SBAR). Information from the following report(s) SBAR, Kardex, Procedure Summary, Intake/Output and Cardiac Rhythm Vpaced was reviewed with the receiving nurse. Opportunity for questions and clarification was provided. Report passed on to primary RN, Thuan Marinelli who will complete assessment upon patients arrival to unit and assume care.

## 2022-06-06 NOTE — ROUTINE PROCESS
1445 Patient admitted to room from Cath Lab. Bilateral groin site dressing dry and intact, no hematoma or bleeding noted. Patient advised on bedrest until 4:30 PM. Patient verbalized understanding. Call bell and telephone placed within reach. 1715 Bilateral groin site dry and intact. Patient advised on off bed rest and call staff if needed to go to the bathroom. Call bell within reach. 1925 Bedside and Verbal shift change report given to [employer] (oncoming nurse). Report included the following information SBAR, Kardex, Intake/Output, MAR, Recent Results and Cardiac Rhythm V-paced.

## 2022-06-06 NOTE — Clinical Note
TRANSFER - IN REPORT:     Verbal report received from: [address] [place] [address]. Report consisted of patient's Situation, Background, Assessment and   Recommendations(SBAR). Opportunity for questions and clarification was provided. Assessment completed upon patient's arrival to unit and care assumed. Patient transported with a Cardiac Cath Tech / Patient Care Tech.

## 2022-06-06 NOTE — ANESTHESIA PREPROCEDURE EVALUATION
Anesthetic History   No history of anesthetic complications            Review of Systems / Medical History  Patient summary reviewed, nursing notes reviewed and pertinent labs reviewed    Pulmonary  Within defined limits                 Neuro/Psych       CVA       Cardiovascular    Hypertension  Valvular problems/murmurs: tricuspid insufficiency      Dysrhythmias : atrial fibrillation        Comments: 08/2021 ECHO  Estimated LVEF is 55 - 60%  Mild tricuspid valve regurgitation is present. Moderate pulmonary hypertension. Pulmonary arterial systolic pressure is 61 mmHg.        GI/Hepatic/Renal     GERD    Renal disease: ESRD and dialysis       Endo/Other    Diabetes    Obesity     Other Findings            Physical Exam    Airway  Mallampati: III  TM Distance: 4 - 6 cm  Neck ROM: normal range of motion   Mouth opening: Normal     Cardiovascular    Rhythm: irregular           Dental    Dentition: Poor dentition     Pulmonary  Breath sounds clear to auscultation               Abdominal  GI exam deferred       Other Findings            Anesthetic Plan    ASA: 4  Anesthesia type: MAC            Anesthetic plan and risks discussed with: Patient

## 2022-06-06 NOTE — PROGRESS NOTES
Minor amount of blood noted form penis at urethral opening at insert of vines cath. . Some old looking blood in urine in bag. The patient states This is a normal occurrence related to ulcers. The vines is chronic for the past several months.

## 2022-06-06 NOTE — PROGRESS NOTES
Left FVS aspirated and pulled @ 1230, by JT. Sterile hemostatic DRESSING APPLIED. NO BLEEDING OR SWELLING. SAFETY INFORMATION REVIEWED WITH THE PATIENT.

## 2022-06-06 NOTE — ROUTINE PROCESS
0745: Cath holding summary  Patient escorted to cath holding from waiting area ambulatory, alert and oriented x 4, voicing no complaints. Changed into gown and placed on monitor. NPO since MN. Lab results, med rec and H&P reviewed on chart. PIV x 1 inserted without difficulty. Family to bedside. 1030: TRANSFER - OUT REPORT:    Verbal report given to Severo Rawls (name) on Joceline Mendez  being transferred to EP Lab (unit) for ordered procedure   Report consisted of patients Situation, Background, Assessment and   Recommendations(SBAR). Information from the following report(s) SBAR, Kardex, Intake/Output, MAR, Recent Results, Cardiac Rhythm Sinus [place] and Pre Procedure Checklist was reviewed with the receiving nurse. Lines:   Peripheral IV 06/06/22 Left;Posterior Forearm (Active)     Opportunity for questions and clarification was provided. Patient transported with:   Tech    1140: TRANSFER - IN REPORT:  Verbal report received from [address] [facility], [facility] (name) on Joceline Mendez  being received from EP Lab(unit) for ordered procedure    Report consisted of patients Situation, Background, Assessment and   Recommendations(SBAR). Information from the following report(s) SBAR, Kardex, Procedure Summary, Intake/Output, MAR, Recent Results and Cardiac Rhythm Paced was reviewed with the receiving nurse. Opportunity for questions and clarification was provided. Assessment completed upon patients arrival to unit and care assumed. 1200: Bedside and Verbal shift change report given to SYEDA MENDENHALL (oncoming nurse) by Juan Timmons RN (offgoing nurse). Report included the following information SBAR, Kardex, Procedure Summary, Intake/Output, MAR, Recent Results and Cardiac Rhythm Paced. Patient coming out of procedure with Anesthesia at bedside. Patient has patent 5Fr venous sheath in RFV that needs to be pulled. LFV groin site clean dry and intact post-manual pull in the procedure.

## 2022-06-06 NOTE — PROGRESS NOTES
1900: Bedside shift change report given to SYEDA Dove (oncoming nurse) by Felisa Driver (offgoing nurse). Report included the following information SBAR, Kardex, Intake/Output, MAR, Recent Results and Cardiac Rhythm V-paced. 2000: Shift assessment done. V/S obtained. Pt is A/Ox4, on room air. Pt in bed talking with family member. No c/o pain or SOB. Bilateral groin site dry and intact; no hematoma and no bleeding noted. No needs voiced. Call light within reach. 2359: Shift reassessment done. V/S obtained. No c/o SOB but pt c/o pressure on his chest when he breathes. He stated \"it might be from the procedure. \" Charge nurse notified. Pain meds given. See MAR. V/S are stable. Will continue to monitor patient. Bilateral groin site dry and intact; no bleeding and no hematoma noted. No other needs voiced. Call light within reach. 3816: Pt states \"Pain is gone now\". 0200:pt sleeping comfortably. No signs of distress. 0400: Shift reassessment done. V/S obtained. No changes from previous assessment. No c/o pain or SOB. Bilateral groin clean, dry, and intact; no bleeding and no hematoma noted. Pt sleeping comfortably. No needs or concerns voiced. Call light within reach. 0530: CHG bath done. Emptied vines; draining, and patent. 0700: Bedside shift change report given to [address] [place], [place] [address] (oncoming nurse) by [employer] COUNSELING [employer] [facility] AT [facility] (offgoing nurse). Report included the following information SBAR, Kardex, Intake/Output, MAR, Recent Results and Cardiac Rhythm V-Paced.

## 2022-06-07 VITALS
HEIGHT: 72 IN | TEMPERATURE: 97.7 F | BODY MASS INDEX: 30.34 KG/M2 | RESPIRATION RATE: 18 BRPM | SYSTOLIC BLOOD PRESSURE: 104 MMHG | DIASTOLIC BLOOD PRESSURE: 66 MMHG | WEIGHT: 224 LBS | OXYGEN SATURATION: 100 % | HEART RATE: 69 BPM

## 2022-06-07 LAB
ANION GAP SERPL CALC-SCNC: 8 MMOL/L (ref 3–18)
BASOPHILS # BLD: 0 K/UL (ref 0–0.1)
BASOPHILS NFR BLD: 1 % (ref 0–2)
BUN SERPL-MCNC: 80 MG/DL (ref 7–18)
BUN/CREAT SERPL: 9 (ref 12–20)
CALCIUM SERPL-MCNC: 8.7 MG/DL (ref 8.5–10.1)
CHLORIDE SERPL-SCNC: 101 MMOL/L (ref 100–111)
CO2 SERPL-SCNC: 27 MMOL/L (ref 21–32)
CREAT SERPL-MCNC: 9.06 MG/DL (ref 0.6–1.3)
DIFFERENTIAL METHOD BLD: ABNORMAL
EOSINOPHIL # BLD: 0.1 K/UL (ref 0–0.4)
EOSINOPHIL NFR BLD: 3 % (ref 0–5)
ERYTHROCYTE [DISTWIDTH] IN BLOOD BY AUTOMATED COUNT: 14.3 % (ref 11.6–14.5)
GLUCOSE SERPL-MCNC: 96 MG/DL (ref 74–99)
HBV SURFACE AB SER QL IA: NEGATIVE
HBV SURFACE AB SERPL IA-ACNC: <3.1 MIU/ML
HBV SURFACE AG SER QL: <0.1 INDEX
HBV SURFACE AG SER QL: NEGATIVE
HCT VFR BLD AUTO: 31 % (ref 36–48)
HEP BS AB COMMENT,HBSAC: ABNORMAL
HGB BLD-MCNC: 10.3 G/DL (ref 13–16)
IMM GRANULOCYTES # BLD AUTO: 0 K/UL (ref 0–0.04)
IMM GRANULOCYTES NFR BLD AUTO: 1 % (ref 0–0.5)
LYMPHOCYTES # BLD: 0.4 K/UL (ref 0.9–3.6)
LYMPHOCYTES NFR BLD: 10 % (ref 21–52)
MAGNESIUM SERPL-MCNC: 2.3 MG/DL (ref 1.6–2.6)
MCH RBC QN AUTO: 30.1 PG (ref 24–34)
MCHC RBC AUTO-ENTMCNC: 33.2 G/DL (ref 31–37)
MCV RBC AUTO: 90.6 FL (ref 78–100)
MONOCYTES # BLD: 0.4 K/UL (ref 0.05–1.2)
MONOCYTES NFR BLD: 9 % (ref 3–10)
NEUTS SEG # BLD: 3.3 K/UL (ref 1.8–8)
NEUTS SEG NFR BLD: 78 % (ref 40–73)
NRBC # BLD: 0 K/UL (ref 0–0.01)
NRBC BLD-RTO: 0 PER 100 WBC
PHOSPHATE SERPL-MCNC: 5.4 MG/DL (ref 2.5–4.9)
PLATELET # BLD AUTO: 63 K/UL (ref 135–420)
PMV BLD AUTO: 12.1 FL (ref 9.2–11.8)
POTASSIUM SERPL-SCNC: 4.8 MMOL/L (ref 3.5–5.5)
RBC # BLD AUTO: 3.42 M/UL (ref 4.35–5.65)
SODIUM SERPL-SCNC: 136 MMOL/L (ref 136–145)
WBC # BLD AUTO: 4.3 K/UL (ref 4.6–13.2)

## 2022-06-07 PROCEDURE — G0378 HOSPITAL OBSERVATION PER HR: HCPCS

## 2022-06-07 PROCEDURE — 90935 HEMODIALYSIS ONE EVALUATION: CPT

## 2022-06-07 PROCEDURE — 74011000250 HC RX REV CODE- 250: Performed by: INTERNAL MEDICINE

## 2022-06-07 PROCEDURE — 86706 HEP B SURFACE ANTIBODY: CPT

## 2022-06-07 PROCEDURE — 85025 COMPLETE CBC W/AUTO DIFF WBC: CPT

## 2022-06-07 PROCEDURE — 87340 HEPATITIS B SURFACE AG IA: CPT

## 2022-06-07 PROCEDURE — 84100 ASSAY OF PHOSPHORUS: CPT

## 2022-06-07 PROCEDURE — 36415 COLL VENOUS BLD VENIPUNCTURE: CPT

## 2022-06-07 PROCEDURE — 80048 BASIC METABOLIC PNL TOTAL CA: CPT

## 2022-06-07 PROCEDURE — 74011250637 HC RX REV CODE- 250/637: Performed by: INTERNAL MEDICINE

## 2022-06-07 PROCEDURE — 99217 PR OBSERVATION CARE DISCHARGE MANAGEMENT: CPT | Performed by: PHYSICIAN ASSISTANT

## 2022-06-07 PROCEDURE — 83735 ASSAY OF MAGNESIUM: CPT

## 2022-06-07 RX ADMIN — OXYCODONE AND ACETAMINOPHEN 1 TABLET: 325; 5 TABLET ORAL at 00:02

## 2022-06-07 RX ADMIN — SODIUM CHLORIDE, PRESERVATIVE FREE 10 ML: 5 INJECTION INTRAVENOUS at 05:06

## 2022-06-07 RX ADMIN — OXYCODONE AND ACETAMINOPHEN 1 TABLET: 325; 5 TABLET ORAL at 13:20

## 2022-06-07 RX ADMIN — SODIUM CHLORIDE, PRESERVATIVE FREE 10 ML: 5 INJECTION INTRAVENOUS at 13:11

## 2022-06-07 RX ADMIN — FUROSEMIDE 40 MG: 40 TABLET ORAL at 13:10

## 2022-06-07 RX ADMIN — PANTOPRAZOLE SODIUM 40 MG: 40 TABLET, DELAYED RELEASE ORAL at 13:10

## 2022-06-07 RX ADMIN — SEVELAMER CARBONATE 800 MG: 800 TABLET, FILM COATED ORAL at 13:10

## 2022-06-07 RX ADMIN — ASPIRIN 81 MG: 81 TABLET, COATED ORAL at 13:10

## 2022-06-07 NOTE — PROGRESS NOTES
HEMODIALYSIS ROUNDING NOTE      Patient: Rao Styles               Sex: male          DOA: 6/6/2022  7:31 AM        YOB: 1950      Age:  70 y.o.        LOS:  LOS: 0 days     Subjective:     Rao Styles is a 70 y.o.  who presents with Bradycardia [R00.1]  Sinus bradycardia [R00.1]. The patient is dialyzing utilizing the following method:Intermittent Hemodialysis    Chief Complains: Patient was seen on dialysis, denies nausea / vomiting / headache / dizziness / SOB / chest pain.   - Reviewed last 24 hrs events     Current Facility-Administered Medications   Medication Dose Route Frequency    aspirin delayed-release tablet 81 mg  81 mg Oral DAILY    finasteride (PROSCAR) tablet 5 mg  5 mg Oral DAILY    furosemide (LASIX) tablet 40 mg  40 mg Oral DAILY    pantoprazole (PROTONIX) tablet 40 mg  40 mg Oral ACB    sevelamer carbonate (RENVELA) tab 800 mg  800 mg Oral TID WITH MEALS    oxyCODONE-acetaminophen (PERCOCET) 5-325 mg per tablet 1 Tablet  1 Tablet Oral Q4H PRN    sodium chloride (NS) flush 5-40 mL  5-40 mL IntraVENous Q8H    sodium chloride (NS) flush 5-40 mL  5-40 mL IntraVENous PRN    naloxone (NARCAN) injection 0.4 mg  0.4 mg IntraVENous PRN    docusate sodium (COLACE) capsule 100 mg  100 mg Oral BID PRN    zolpidem (AMBIEN) tablet 5 mg  5 mg Oral QHS PRN       Objective:     Visit Vitals  BP (!) 149/62   Pulse 71   Temp 98.5 °F (36.9 °C) (Oral)   Resp 16   Ht 6' (1.829 m)   Wt 101.6 kg (224 lb)   SpO2 99%   BMI 30.38 kg/m²       Intake/Output Summary (Last 24 hours) at 6/7/2022 1000  Last data filed at 6/7/2022 0530  Gross per 24 hour   Intake 950 ml   Output 1050 ml   Net -100 ml       Physical Examination:    GEN: AAO X 3, NAD  RS: Chest is bilateral equal, no wheezing / rales / crackles  CVS: S1-S2 heard,   Abdomen: Soft, Non tender, Not distended, Positive bowel sounds  Extremities: No edema, no cyanosis, skin is warm on touch  CNS: Awake & follows commands,   HEENT: Head is atraumatic, PERRLA, conjunctiva pink & non icteric. No JVD or carotid bruit      Data Review:      Labs:     Hematology:   Recent Labs     06/07/22  0438   WBC 4.3*   HGB 10.3*   HCT 31.0*     Chemistry:   Recent Labs     06/07/22  0438   BUN 80*   CREA 9.06*   CA 8.7   K 4.8         CO2 27   PHOS 5.4*   GLU 96        Images:     XR (Most Recent). CXR reviewed by me and compared with previous CXR Results from Hospital Encounter encounter on 06/06/22    XR CHEST PORT    Narrative  EXAM: Chest Radiograph    INDICATION:  s/p device implant    TECHNIQUE: AP view of the chest    COMPARISON: 6/1/2022, 8/22/2021, 8/16/2021. FINDINGS: No pneumothorax identified. Interstitial thickening is noted likely  related to hypoexpansion. No effusions identified. The cardiomediastinal  silhouette is enlarged. The pulmonary vasculature is unremarkable. Degenerative changes of the spine. New loop recorder in the anterior soft  tissues. Impression  1. New loop recorder without evidence of complication. CT (Most Recent) Results from Hospital Encounter encounter on 08/15/21    CT ABD PELV WO CONT    Narrative  CT ABDOMEN AND PELVIS WITHOUT CONTRAST    COMPARISON: 2015    INDICATIONS: Pyuria, uremia, bacteremia, sepsis. TECHNIQUE: Volumetric data acquisition was performed of the abdomen and pelvis  on a multislice scanner and reconstructed in axial coronal and sagittal planes    CT ABDOMEN FINDINGS:    Lung Bases: There are groundglass infiltrates throughout the included portions  of the right middle and lower lobes. These are dependent as patient would only  tolerate right side down decubitus position. There is multichamber cardiac  enlargement. There is a minimal pericardial effusion. .    Liver/Gallbladder/Biliary: Normal.    Spleen: Normal.    Adrenal Glands: Normal.    Kidneys: Small exophytic hypodensity on the right probably represents a small  cyst. There is no obstruction or calculus. Pancreas: Normal.    Stomach, Small Bowel, and Colon: There is mild increased gas in nonobstructed  small bowel loops. The appendix is not confidently identified. .    Lymph Nodes: Normal in size and number. The abdominal aorta is unremarkable. The IVC is unremarkable. Peritoneal Spaces: No free fluid or free air is present. Abdominal wall: No hernia or mass is evident. There is extensive edema of the  soft tissues. Bladder: The bladder is collapsed about an indwelling Vines catheter. The  prostate is enlarged measuring approximately 7 x 8 cm transaxially. Osseous Structures Of Abdomen And Pelvis: No acute or aggressive abnormalities  evident. Impression  The chamber cardiac enlargement. Right lower and middle lobe infiltrate; these are dependent lobes. Differential  includes primarily pulmonary edema, aspiration, pneumonia. There is extensive soft tissue edema    See above      . All CT scans at this facility are performed using dose optimization technique as  appropriate to the performed exam, to include automated exposure control,  adjustment of the mA and/or kV according to patient's size (Including  appropriate matching for site-specific examinations), or use of iterative  reconstruction technique. Plan / Recommendation:         End Stage Renal Disease:  Plan HD today    At 10:00 AM on 6/7/2022, I saw and examined patient during hemodialysis treatment. The patient was receiving hemodialysis for treatment of end stage renal disease. I have also reviewed vital signs, intake and output, lab results and recent events, and agreed with today's dialysis order.     Access: No issue    Anemia:  mircera  Chronic vines cath,followed by urology    Queen David MD  Nephrology  6/7/2022

## 2022-06-07 NOTE — PROGRESS NOTES
INTERVENTION:  HEMODYNAMIC STABILIZATION  MAINTAIN BP WNL WHILE ON HD. INTERVENTION:  FLUID MANAGEMENT  WILL ATTEMPT 2500 ML TOTAL FLUID REMOVAL AS TOLERATED. INTERVENTION:  METABOLIC/ELECTROLYTE MANAGEMENT  2.0 POTASSIUM 2.5 CALCIUM DIALYSATE USED WITH HD TODAY. INTERVENTION:  HEMODIALYSIS ACCESS SITE MANAGEMENT  RIGHT UPPER ARM AVF ACCESSED WITH 15G NEEDLE USING ASEPTIC TECHNIQUE. GOAL:  SIGNS AND SYMPTOMS OF LISTED POTENTIAL PROBLEMS WILL BE ABSENT OR MANAGEABLE. OUTCOME:  PROGRESSING. HD PLANNED FOR 4 HOURS TODAY.

## 2022-06-07 NOTE — DISCHARGE SUMMARY
Cardiology Discharge Summary      Discharge Summary     Patient: Donnell Holloway MRN: [id number]  SSN: [id number]    YOB: 1950  Age: 70 y.o.   Sex: male       Admit Date: 6/6/2022    Discharge Date: 6/7/2022      Admission Diagnoses: Bradycardia [R00.1]  Sinus bradycardia [R00.1]    Discharge Diagnoses:   Problem List as of 6/7/2022 Date Reviewed: 5/25/2022          Codes Class Noted - Resolved    Sinus bradycardia ICD-10-CM: R00.1  ICD-9-CM: 427.89  6/6/2022 - Present        Severe protein-calorie malnutrition ([facility] (Chronic) ICD-10-CM: E43  ICD-9-CM: 262  8/16/2021 - Present        Renal failure ICD-10-CM: N19  ICD-9-CM: 586  8/15/2021 - Present        Bradycardia ICD-10-CM: R00.1  ICD-9-CM: 427.89  8/15/2021 - Present        CKD (chronic kidney disease) ICD-10-CM: N18.9  ICD-9-CM: 585.9  12/30/2015 - Present        Acute renal failure (ARF) ([facility] ICD-10-CM: N17.9  ICD-9-CM: 584.9  11/28/2015 - Present        Hyperkalemia ICD-10-CM: E87.5  ICD-9-CM: 276.7  11/28/2015 - Present        BPH (benign prostatic hyperplasia) (Chronic) ICD-10-CM: N40.0  ICD-9-CM: 600.00  11/4/2015 - Present        Bladder outlet obstruction ICD-10-CM: N32.0  ICD-9-CM: 596.0  11/4/2015 - Present        Hypertension ICD-10-CM: I10  ICD-9-CM: 401.9  Unknown - Present        Diabetes ([facility] ICD-10-CM: E11.9  ICD-9-CM: 250.00  Unknown - Present        Atrial fibrillation ([facility] ICD-10-CM: I48.91  ICD-9-CM: 427.31  Unknown - Present        Cerebrovascular accident ([facility] ICD-10-CM: I63.9  ICD-9-CM: 434.91  Unknown - Present    Overview Signed 10/23/2013 10:55 AM by Jolene Holstein M     1991, 1997             Hypercholesterolemia ICD-10-CM: E78.00  ICD-9-CM: 272.0  Unknown - Present        Chronic diastolic heart failure ([facility] ICD-10-CM: I50.32  ICD-9-CM: 428.32  Unknown - Present        Morbid obesity ([facility] ICD-10-CM: E66.01  ICD-9-CM: 278.01  Unknown - Present        RESOLVED: ARF (acute renal failure) ([facility] ICD-10-CM: N17.9  ICD-9-CM: 584.9  11/4/2015 - 8/3/2021               Discharge Condition: Stable    Hospital Course: Francisca Gomez is a 70 y.o. male who presented to the hospital for PPM implantation given afib with slow ventricular response with hypotension and bradycardia during HD, HR down to 20's at times according to event monitor. He is now s/p leadless PPM implantation. He is undergoing HD prior to discharge. Consults: Nephrology    Significant Diagnostic Studies: radiology: CXR: no PTX    Disposition: home    Discharge Medications:      My Medications      CONTINUE taking these medications      Instructions Each Dose to Equal Morning Noon Evening Bedtime   aspirin delayed-release 81 mg tablet    Your last dose was: Your next dose is: Take  by mouth daily. docusate sodium 100 mg capsule  Commonly known as: COLACE    Your last dose was: Your next dose is: Take 100 mg by mouth daily as needed for Constipation. 100 mg                 finasteride 5 mg tablet  Commonly known as: PROSCAR    Your last dose was: Your next dose is:         TAKE 1 TABLET BY MOUTH DAILY                  furosemide 40 mg tablet  Commonly known as: LASIX    Your last dose was: Your next dose is: Take 40 mg by mouth. 40 mg                 pantoprazole 40 mg tablet  Commonly known as: PROTONIX    Your last dose was: Your next dose is: Take 40 mg by mouth. 40 mg                 sevelamer carbonate 800 mg Tab tab  Commonly known as: RENVELA    Your last dose was: Your next dose is:         three (3) times daily (with meals). ASK your doctor about these medications      Instructions Each Dose to Equal Morning Noon Evening Bedtime   Blood-Glucose Meter monitoring kit    Your last dose was:      Your next dose is:         As directed                  cholecalciferol (1000 Units /25 mcg) tablet  Commonly known as: VITAMIN D3    Your last dose was: Your next dose is: Take 1 Tab by mouth daily. Indications: VITAMIN D DEFICIENCY   1,000 Units                 Flomax 0.4 mg capsule  Generic drug: tamsulosin    Your last dose was: Your next dose is: Take 0.4 mg by mouth daily. 0.4 mg                 multivitamin tablet  Commonly known as: ONE A DAY    Your last dose was: Your next dose is: Take 1 Tab by mouth daily. 1 Tablet                 vitamin E 400 unit capsule  Commonly known as: AQUA GEMS    Your last dose was: Your next dose is: Take  by mouth daily. vitamin E acetate 45 mg (100 unit) capsule    Your last dose was: Your next dose is: Take 100 Units by mouth daily. 100 Units                        Activity: No heavy lifting or prolonged standing for 3 days. Diet: Renal Diet  Wound Care: As directed    Follow-up Appointments   Procedures    FOLLOW UP VISIT Appointment in: One Week Follow-up with Dr. Ramiro Mejias office in one week. Follow-up with Dr. Ramiro Mejias office in one week. Standing Status:   Standing     Number of Occurrences:   1     Order Specific Question:   Appointment in     Answer:    One Week       Signed By: Sylvia Daniel PA-C     June 7, 2022

## 2022-06-07 NOTE — PROGRESS NOTES
Received pre HD report from  . Pt in bed, A+O x4, no s/s of distress noted. Accessed AVF Right upper arm w/15G needle per protocol. Tx initiated at [facility]. AVF flowing with ease. For hemodynamic stability UF goal 2500 ml. Offered assistance with repositioning every 2 hours. Vascular access visible at all times throughout entire duration of treatment and line connections remain intact throughout entire duration of treatment. @0900 pt b/p dropped UF goal adjusted pt   @0915 pt b/p dropped again UF turned off Bolus 100cc NS.  @0922 pt b/p improved to 116/76, UF turned on Goal set to 2L total.      Tx completed at 1225, tolerated well 2L removed. De-accessed per protocol. Clot time 5 minutes for arterial, and 5 minutes for venous. Unit nurse given report.                      ACUTE HEMODIALYSIS FLOW SHEET    HEMODIALYSIS ORDERS: Physician: Dr. Gertrude Romo: [place]   Duration: 4 hr   BFR: 350   DFR: 800   Dialysate:  Temp 36-37*C   K+  2    Ca+ 2.5   Na 138   Bicarb 35   Wt Readings from Last 1 Encounters:   06/06/22 101.6 kg (224 lb)    Patient Chart [x]   Unable to Obtain []  Dry weight/UF Goal: 2500 ml    Heparin []  Bolus    Units    [] Hourly    Units    [x]None       Pre BP: 112/73  Pulse: 66  Respirations: 16 Temp: 98.5  [x] Oral  [] Ax  [] Esoph   Labs: []  Pre  []  Post:   [x] N/A   Additional Orders (medications, blood products, hypotension management): [] Yes   [x] No     [x]  [facility] Consent Verified     CATHETER ACCESS:  [x]N/A   []Right   []Left   []IJ   []Fem  []Chest wall  []TransHepatic   [] First use X-ray verified     []Tunnel    [] Non Tunneled   []No S/S infection  []Redness  []Drainage []Cultured []Swelling []Pain   []Medical Aseptic Prep Utilized   []Dressing Changed  [] Biopatch  Date:    []Clotted   []Patent   Flows: []Good  []Poor  []Reversed   If access problem,  notified: []Yes    []N/A        GRAFT/FISTULA ACCESS:   []N/A     [x]Right     []Left     [x]UE     []LE []AVG   [x]AVF       [x]Medical Aseptic Prep Utilized   [x]No S/S infection  []Redness  []Drainage [] Cultured  [] Swelling  [] Pain  Bruit:   [x] Strong    [] Weak       Thrill :   [x] Strong    [] Weak     Needle Gauge: 15   Length: 1 inch   If access problem,  notified: []Yes     [x]N/A          GENERAL ASSESSMENT:    LUNGS:  Resp Rate 18   [] Clear  [] Coarse  [] Crackles  [] Wheezing  [] Diminished                                                           [] RLL   [] LLL  [] RUL   [] RONNIE            Respirations:  [x]Easy  []Labored  []N/A  Cough:  []Productive  []Dry  []N/A               Therapy:  [x]RA   [] Ventilated   [] Intubated   [] Trach            O2 Device:  [] NC   [] NRB  [] Trach Mask  [] BiPaP  Flow:   l/min                                                    CARDIAC: [x] Regular      [] Irregular   [] Rhythm:          [] Monitored   [] Bedside   [] Remotely monitored       EDEMA: [x] None   []Generalized  [] Pitting [] 1+   [] 2 +   [] 3+    [] 4+        SKIN:   [] Hot     [] Cold    [x] Warm   [x] Dry    [] Diaphoretic                 [] Flushed  [] Jaundiced  [] Cyanotic  [] Pale      LOC:    [x] Alert      [x]Oriented:    [x] Person     [x] Place   [x]Time               [] Confused  [] Lethargic  [] Medicated  [] Non-responsive  [] Non-Verbal     GI / ABDOMEN:                     [] Flat    [] Distended    [] Soft    [] Firm   []  Obese                   [] Diarrhea   [] FMS [] Bowel Sounds  [] Nausea  [] Vomiting                   [] NGT  [] OGT  [] PEG  [] Tube Feedings @     mL/hr     / URINE ASSESSMENT:                   [] Voiding    [] Oliguria  [] Anuria                     []  Elizabeth   [] Incontinent  []  Incontinent Brief   []  PureWick     PAIN:  [x] 0 []1  []2   []3   []4   []5   []6   []7   []8   []9   []10                MOBILITY:  [x] Bed    [] Stretcher      All Vitals and Treatment Details on Attached [address]: [facility]          Room # 2302/01    [] Routine [x] 1st Time Acute/Chronic   [] Urgent      [] Stat            [x] Acute Room   []  Bedside    [] ICU/CCU     [] ER     Isolation Precautions:  [x] Dialysis    There are currently no Active Isolations     ALLERGIES:     No Known Allergies     Code Status:  Full Code     Hepatitis Status      Lab Results   Component Value Date/Time    Hepatitis B surface Ag <0.10 06/07/2022 04:38 AM    Hepatitis B surface Ab <3.10 (L) 06/07/2022 04:38 AM    Hepatitis B core, IgM Negative 08/24/2021 10:00 AM    Hep B Core Ab, total Negative 08/24/2021 10:15 AM    Hepatitis C virus Ab 0.28 12/07/2015 04:15 PM        Current Labs:      Lab Results   Component Value Date/Time    WBC 4.3 (L) 06/07/2022 04:38 AM    Hemoglobin, POC 10.2 (L) 03/11/2016 07:26 AM    HGB 10.3 (L) 06/07/2022 04:38 AM    Hematocrit, POC 30 (L) 03/11/2016 07:26 AM    HCT 31.0 (L) 06/07/2022 04:38 AM    PLATELET 63 (L) 10/18/6836 04:38 AM    MCV 90.6 06/07/2022 04:38 AM     Lab Results   Component Value Date/Time    Sodium 136 06/07/2022 04:38 AM    Potassium 4.8 06/07/2022 04:38 AM    Chloride 101 06/07/2022 04:38 AM    CO2 27 06/07/2022 04:38 AM    Anion gap 8 06/07/2022 04:38 AM    Glucose 96 06/07/2022 04:38 AM    BUN 80 (H) 06/07/2022 04:38 AM    Creatinine 9.06 (H) 06/07/2022 04:38 AM    BUN/Creatinine ratio 9 (L) 06/07/2022 04:38 AM    GFR est AA 7 (L) 06/07/2022 04:38 AM    GFR est non-AA 6 (L) 06/07/2022 04:38 AM    Calcium 8.7 06/07/2022 04:38 AM          DIET:  DIET ADULT     PRIMARY NURSE REPORT:   Pre Dialysis: Vesta Hernandez RN    Time: 46      EDUCATION:    [x] Patient           Knowledge Basis: []None []Minimal [x] Substantial [] Unknown  Barriers to learning  [x]None  [] Intubated/Trached/Ventilated  [] Sedated/Paralyzed   [] Access Care     [] S&S of infection  [] Fluid Management  [] K+   [x] Procedural    [] Medications   [] Tx Options   [] Transplant   [] Diet      Teaching Tools:  [x] Explain  [] Demo  [] Handouts [] Video  Patient response: [x] Verbalized understanding   [] Requires follow up        [x] Time Out/Safety Check    [x] Extracorporeal Circuit Tested for integrity       RO/HEMODIALYSIS MACHINE SAFETY CHECKS - Before each treatment:        [address] [place] [address]                                     [x] Unit Machine # 5 with centralized RO                                  [] Portable Machine #1/RO serial # [id number]                                  [] Portable Machine #2/RO serial # [id number]                                  [] Portable Machine #4/RO serial # [id number]                                  [] Portable Machine #10/RO serial # [id number]                                                                                                       Alarm Test:  Pass time 0801            [x] RO/Machine Log Complete    Machine Temp    36-37*C             Dialysate: pH  7.4    Conductivity: Meter 14.0    HD Machine  13.6     TCD: 13.8  Dialyzer Lot # [id number]     Blood Tubing Lot # [id number]     Saline Lot # [id number]     CHLORINE TESTING-Before each treatment and every 4 hours    Total Chlorine: [] less than 0.1 ppm  Initial Time Check: 4538       4 Hr/2nd Check Time: 2220   (if greater than 0.1 ppm from Primary then every 30 minutes from Secondary)     TREATMENT INITIATION - with Dialysis Precautions:   [x] All Connections Secured              [x] Saline Line Double Clamped   [x] Venous Parameters Set               [x] Arterial Parameters Set    [x] Prime Given 250ml NSS              [x]Air Foam Detector Engaged        Treatment Initiation Note:  See above note    During Treatment Notes:  0825  Face & Vascular access visible with art and abner line connections intact. Pt tolerating dialysis. 0830  Face & Vascular access visible with art and abner line connections intact. Pt tolerating dialysis. 0845  Face & Vascular access visible with art and abner line connections intact. Pt tolerating dialysis.   0900  Face & Vascular access visible with art and abner line connections intact. Pt tolerating dialysis. 0915  Face & Vascular access visible with art and abner line connections intact. Pt tolerating dialysis. 0930  Face & Vascular access visible with art and abner line connections intact. Pt tolerating dialysis. 0945  Face & Vascular access visible with art and abner line connections intact. Pt tolerating dialysis. 1000  Face & Vascular access visible with art and abner line connections intact. Pt tolerating dialysis. 1015  Face & Vascular access visible with art and abner line connections intact. Pt tolerating dialysis. 1030  Face & Vascular access visible with art and abner line connections intact. Pt tolerating dialysis. 1045  Face & Vascular access visible with art and abner line connections intact. Pt tolerating dialysis. 1100  Face & Vascular access visible with art and abner line connections intact. Pt tolerating dialysis. 1115  Face & Vascular access visible with art and abner line connections intact. Pt tolerating dialysis. 1226  Dialysis treatment complete.        Medication    Dose    Volume Route      Time       Axel Roach      HD  Brandin Reina RN      HD         HD        Post Assessment  Dialyzer Cleared:   [] Good  [x] Fair  [] Poor  Blood processed:  78.6 L  UF Removed:  1000 Ml    Post BP: 142/79  Pulse: 72  Respirations: 16   Temp: 98.2  [x] Oral  [] Ax  [] Esophageal   Lungs: [] Clear                [x] No change from initial assessment   Post Tx Vascular Access: [] N/A  AVF/AVG: Bleeding stopped with  Arterial Pressure for 5 min   Venous Pressure for 5 min      Cardiac:  [x] Regular   [] Irregular   Rhythm:  [] Monitored   [] Not Monitored    CVC Catheter: [x] N/A  Locking solution: Heparin 1:1000 U  Arterial port   ml   Venous port   ml   Edema:  [x] None  [] Generalized                     Skin:[] Warm  [x] Dry [x] Diaphoretic               [] Flushed  [] Pale [] Cyanotic Pain:  [x]0  []1-2  []3-4  []5-6   []7-8  []9-10         Post Treatment Note:   see above note     POST TREATMENT PRIMARY NURSE HANDOFF REPORT:   Post Dialysis: Kia Bang RN        Time:  200       Abbreviations: AVG-arterial venous graft, AVF-arterial venous fistula, IJ-Internal Jugular, Subcl-Subclavian, Fem-Femoral, Tx-treatment, AP/HR-apical heart rate, VSS- Vital Signs Stable, CVC- Central Venous Catheter, DFR-dialysate flow rate, BFR-blood flow rate, AP-arterial pressure, -venous pressure, UF-ultrafiltrate, TMP-transmembrane pressure, Mykel-Venous, Art-Arterial, RO-Reverse Osmosis

## 2022-06-07 NOTE — PROGRESS NOTES
Patient discharge to home. Patient vitals remain stable and patient is afebrile. Discharge instructions and prescriptions reviewed with patient. Patient verbalizes understanding. Time allotted for questions. PIV  And arm band removed. Elizabeth switched to leg bag. Discharge instructions given to the patient. Patient transported to [facility] via wheelchair. 3-point gait

## 2022-06-07 NOTE — DISCHARGE INSTRUCTIONS
Patient Education        Leadless Pacemaker Placement: What to Expect at . Torri 47 pacemaker is a small, battery-powered device. It sends mild, painless electrical signals to your heart. This keeps it beating normally. Your doctor used a catheter to place the pacemaker inside your heart. Your groin may have a bruise and feel sore for a few days. You can do light activities around the house. But don't do anything strenuous for several days. You'll need to take steps to safely use electric devices. Some of these devices can stop your pacemaker from working right for a short time. Check with your doctor about what to avoid and what to keep a short distance away from your pacemaker. For example, you will need to stay away from things with strong magnetic and electrical fields. An example is an electronic body fat scale. You can use a cell phone and other wireless devices, but keep them at least 6 inches away from your chest. Many household and office electronics don't affect a pacemaker. These include kitchen appliances and computers. This care sheet gives you a general idea about how long it will take for you to recover. But each person recovers at a different pace. Follow the steps below to get better as quickly as possible. How can you care for yourself at home? Activity    · If the doctor gave you a sedative:  ? For 24 hours, don't do anything that requires attention to detail, such as going to work, making important decisions, or signing any legal documents. It takes time for the medicine's effects to completely wear off.  ? For your safety, do not drive or operate any machinery that could be dangerous. Wait until the medicine wears off and you can think clearly and react easily.     · Do not do strenuous exercise and do not lift, pull, or push anything heavy until your doctor says it is okay. This may be for several days.  You can walk around the house and do light activity, such as cooking.     · Try not to walk up stairs for the first couple of days. Diet    · If you had dye injected, drink plenty of fluids to help your body flush out the dye. If you have kidney, heart, or liver disease and have to limit fluids, talk with your doctor before you increase the amount of fluids you drink. Medicines    · Your doctor will tell you if and when you can restart your medicines. You will also be given instructions about taking any new medicines.     · If you take aspirin or some other blood thinner, ask your doctor if and when to start taking this medicine again. Make sure that you understand exactly what your doctor wants you to do.     · Call your doctor if you think you are having a problem with your medicine. Care of the catheter site    · For 1 or 2 days, keep a bandage over the spot where the catheter was inserted. The bandage probably will fall off in this time.     · Put ice or a cold pack on the area for 10 to 20 minutes at a time to help with soreness or swelling. Put a thin cloth between the ice and your skin.     · You may shower 24 to 48 hours after the procedure, if your doctor okays it. Pat the incision dry.     · Do not soak the catheter site until it is healed. Don't take a bath for 1 week, or until your doctor tells you it is okay.     · Watch for bleeding from the site. A small amount of blood (up to the size of a quarter) on the bandage can be normal.     · If you are bleeding, lie down and press on the area for 15 minutes to try to make it stop. If the bleeding does not stop, call your doctor or seek immediate medical care. Other instructions    · Keep a medical ID card with you at all times that says you have a pacemaker. The card should include the  and model information.     · Wear medical alert jewelry stating that you have a pacemaker.  You can buy this at most [employer].     · Check your pulse as directed by your doctor.     · Have your pacemaker checked as often as your doctor recommends. In some cases, this may be done over the phone or online. Your doctor will give you instructions about how to do this. Follow-up care is a key part of your treatment and safety. Be sure to make and go to all appointments, and call your doctor if you are having problems. It's also a good idea to know your test results and keep a list of the medicines you take. When should you call for help? Call 911  anytime you think you may need emergency care. For example, call if:    · You passed out (lost consciousness).     · You have trouble breathing. Call your doctor now or seek immediate medical care if:    · You are bleeding from the area where the catheter was put in your blood vessel.     · You have a fast-growing, painful lump at the catheter site.     · You have symptoms of infection, such as:  ? Increased pain, swelling, warmth, or redness. ? Red streaks leading from the area. ? Pus draining from the area. ? A fever.     · Your leg looks blue or feels cold, numb, or tingly.     · You are dizzy or lightheaded, or you feel like you may faint. Watch closely for changes in your health, and be sure to contact your doctor if you have problems. Current as of: January 10, 2022               Content Version: 13.2  © 2006-2022 [employer]. Care instructions adapted under license by [employer] (which disclaims liability or warranty for this information). If you have questions about a medical condition or this instruction, always ask your healthcare professional. [place] [address] any warranty or liability for your use of this information.

## 2022-06-07 NOTE — PROGRESS NOTES
Assumed care of patient from [facility] (off going nurse). Patient alert and oriented. No apparent distress noted. Patient offers no concerns and can verbalize needs. Patient awake and watching television. Assessment to follow. Call bell within reach. Bed in lowest, locked position.

## 2022-06-22 ENCOUNTER — CLINICAL SUPPORT (OUTPATIENT)
Dept: CARDIOLOGY CLINIC | Age: 72
End: 2022-06-22

## 2022-06-22 DIAGNOSIS — Z95.0 CARDIAC PACEMAKER IN SITU: ICD-10-CM

## 2022-06-22 DIAGNOSIS — R00.1 SINUS BRADYCARDIA: Primary | ICD-10-CM

## 2022-06-27 NOTE — PROGRESS NOTES
I have personally seen and evaluated the device findings. Interrogation reviewed and I agree with assessment.     Brayden Shrestha

## 2022-07-11 ENCOUNTER — TELEPHONE (OUTPATIENT)
Dept: CARDIOTHORACIC SURGERY | Age: 72
End: 2022-07-11

## 2022-07-11 NOTE — TELEPHONE ENCOUNTER
Called pt. To discuss interest in moving forward with Watchman procedure. Pt. Corinne Kobs he is still recovering from PPM procedure on 6/6/22. Pt. Stated he is interested in pursing 8/29/22  Watchman procedure date with Dr. Tom Minor but wants to wait until after his apt. On 8/15/22 With Dr. Moreno Horowitz prior to making a final decision. Pt. Agreeable to discussed plan and had no questions at this time. Pt. Instructed to call our office in the interim with any questions or concerns.

## 2022-08-15 ENCOUNTER — OFFICE VISIT (OUTPATIENT)
Dept: CARDIOLOGY CLINIC | Age: 72
End: 2022-08-15
Payer: MEDICARE

## 2022-08-15 VITALS
BODY MASS INDEX: 31.15 KG/M2 | SYSTOLIC BLOOD PRESSURE: 132 MMHG | DIASTOLIC BLOOD PRESSURE: 62 MMHG | HEIGHT: 72 IN | WEIGHT: 230 LBS | OXYGEN SATURATION: 96 % | HEART RATE: 81 BPM

## 2022-08-15 DIAGNOSIS — Z95.0 CARDIAC PACEMAKER IN SITU: ICD-10-CM

## 2022-08-15 DIAGNOSIS — I50.32 CHRONIC DIASTOLIC HEART FAILURE (HCC): ICD-10-CM

## 2022-08-15 DIAGNOSIS — N18.6 ESRD ON HEMODIALYSIS (HCC): ICD-10-CM

## 2022-08-15 DIAGNOSIS — Z99.2 ESRD ON HEMODIALYSIS (HCC): ICD-10-CM

## 2022-08-15 DIAGNOSIS — I48.19 PERSISTENT ATRIAL FIBRILLATION (HCC): Primary | ICD-10-CM

## 2022-08-15 DIAGNOSIS — I10 ESSENTIAL HYPERTENSION: ICD-10-CM

## 2022-08-15 PROCEDURE — 1101F PT FALLS ASSESS-DOCD LE1/YR: CPT | Performed by: INTERNAL MEDICINE

## 2022-08-15 PROCEDURE — 3017F COLORECTAL CA SCREEN DOC REV: CPT | Performed by: INTERNAL MEDICINE

## 2022-08-15 PROCEDURE — G9231 DOC ESRD DIA TRANS PREG: HCPCS | Performed by: INTERNAL MEDICINE

## 2022-08-15 PROCEDURE — 99214 OFFICE O/P EST MOD 30 MIN: CPT | Performed by: INTERNAL MEDICINE

## 2022-08-15 PROCEDURE — 1123F ACP DISCUSS/DSCN MKR DOCD: CPT | Performed by: INTERNAL MEDICINE

## 2022-08-15 PROCEDURE — G8432 DEP SCR NOT DOC, RNG: HCPCS | Performed by: INTERNAL MEDICINE

## 2022-08-15 PROCEDURE — G8417 CALC BMI ABV UP PARAM F/U: HCPCS | Performed by: INTERNAL MEDICINE

## 2022-08-15 PROCEDURE — G8427 DOCREV CUR MEDS BY ELIG CLIN: HCPCS | Performed by: INTERNAL MEDICINE

## 2022-08-15 PROCEDURE — G8536 NO DOC ELDER MAL SCRN: HCPCS | Performed by: INTERNAL MEDICINE

## 2022-08-15 NOTE — PROGRESS NOTES
HISTORY OF PRESENT ILLNESS  Jg Méndez is a 67 y.o. male. 9/2021  Patient presents to follow-up for recent hospitalization to [facility] from 8\15-8\27 for syncope secondary to A. fib with slow ventricular response in setting of hypokalemia. Not anticoagulated as outpatient d/t frequent falls and hx of hematuria.  -Acute on chronic diastolic heart failure with chronic LE edema. ECHO this admission normal LV Function. Mild concentric hypertrophy.  (05/2021) EF 50%. Grade 3 LV DD.   -Moderate pulmonary hypertension by ECHO this admission (08/2021) PASP  61 mmhg. During admission he was also found to have:  1. Acute GI bleed with blood loss anemia: EGD on 8/22/2021 shows esophagitis, antral and duodenal ulcer - aspirin was d/c.  2. ESRD on HD  3. Complicated urinary tract infection with obstructive uropathy. 4. Enterobacter bacteremia possibly due to UTI. 5. Leukocytosis. Improved now  6. Aspiration pneumonia  7. 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 8. Acute metabolic encephalopathy, improved    9. A. fib with slow RVR. 10. Severe protein calorie malnutrition  11. Diabetes type 2  12. Hypertension  13. Moderate pulmonary hypertension  14. Hyperkalemia: resolved     3/2022  Patient presents with c/o heart rate decreasing during dialysis. He reports several b/p medications have been d/c due to hypotension with dialysis. He denies chest pain, shortness of breath or palpitations. He reports  Chronic BLE edema. Shortness of Breath  The history is provided by the Patient. This is a chronic problem. The problem occurs intermittently. The current episode started more than 1 week ago. The problem has not changed since onset. Associated symptoms include leg swelling. Pertinent negatives include no ear pain, no neck pain, no wheezing, no PND, no orthopnea and no rash. He has had Prior hospitalizations. Associated medical issues include heart failure.  Associated medical issues do not include chronic lung disease or CAD. Hypertension  The history is provided by the Patient. This is a chronic problem. The current episode started more than 1 week ago. The problem occurs daily. The problem has been gradually worsening. Associated symptoms include shortness of breath. Follow-up  Associated symptoms include shortness of breath. Review of Systems   Constitutional:  Negative for chills and weight loss. HENT:  Negative for ear pain and hearing loss. Eyes:  Negative for blurred vision. Respiratory:  Positive for shortness of breath. Negative for wheezing and stridor. Cardiovascular:  Positive for leg swelling. Negative for orthopnea and PND. Gastrointestinal:  Negative for heartburn. Musculoskeletal:  Negative for myalgias and neck pain. Skin:  Negative for rash. Neurological:  Negative for tingling, tremors, focal weakness and loss of consciousness. Psychiatric/Behavioral:  Negative for depression and suicidal ideas.     Family History   Problem Relation Age of Onset    Heart Attack Mother 52    Diabetes Other     Hypertension Other        Past Medical History:   Diagnosis Date    Atrial fibrillation ([address]     Cerebrovascular accident ([address]     , - general weakness    Chronic diastolic heart failure ([address]     Chronic kidney disease     dialysis    Diabetes ([address]     Heart failure ([address]     History of cardioversion     Hypercholesterolemia     Hypertension     Morbid obesity ([address]        Past Surgical History:   Procedure Laterality Date    HX VASCULAR ACCESS Right     right neck       Social History     Tobacco Use    Smoking status: Former     Years: 8.00     Types: Cigarettes     Quit date: 1971     Years since quittin.1    Smokeless tobacco: Never    Tobacco comments:     last smoked in my late 19's   Substance Use Topics    Alcohol use: No     Alcohol/week: 0.0 standard drinks       No Known Allergies         Visit Vitals  /62   Pulse 81   Ht 6' (1.829 m) Wt 104.3 kg (230 lb)   SpO2 96%   BMI 31.19 kg/m²       Physical Exam  Constitutional:       General: He is not in acute distress. Appearance: He is well-developed. He is not diaphoretic. HENT:      Head: Atraumatic. Mouth/Throat:      Pharynx: No oropharyngeal exudate. Eyes:      General: No scleral icterus. Conjunctiva/sclera: Conjunctivae normal.   Neck:      Vascular: No JVD. Cardiovascular:      Rate and Rhythm: Normal rate. Rhythm irregularly irregular. Heart sounds: No murmur heard. No gallop. Pulmonary:      Effort: Pulmonary effort is normal.      Breath sounds: Normal breath sounds. No stridor. No wheezing or rales. Abdominal:      Palpations: Abdomen is soft. Tenderness: There is no abdominal tenderness. Genitourinary:     Comments: Elizabeth cath in place with leg bag  Musculoskeletal:         General: No tenderness. Normal range of motion. Cervical back: Neck supple. Right lower leg: Edema (1+ BLE edema) present. Left lower leg: Edema (1+ BLE edema) present. Skin:     General: Skin is warm. Neurological:      Mental Status: He is alert and oriented to person, place, and time. Motor: No abnormal muscle tone. Psychiatric:         Behavior: Behavior normal.     8/17/2021  Interpretation Summary  LV: Estimated LVEF is 55 - 60%. Visually measured ejection fraction. Normal cavity size and systolic function (ejection fraction normal). Mild concentric hypertrophy. TV: Mild tricuspid valve regurgitation is present. PA: Moderate pulmonary hypertension. Pulmonary arterial systolic pressure is 61 mmHg. IVC: Severely elevated central venous pressure (15 mmHg); IVC diameter is larger than 21 mm and collapses less than 50% with respiration. Comparison Study Information  Prior Study  There is a prior study available for comparison. Prior study date: 5/24/2021. As compared to the previous study, there are no significant changes.        ASSESSMENT and PLAN    ICD-10-CM ICD-9-CM    1. Persistent atrial fibrillation (HCC)  I48.19 427.31       2. Cardiac pacemaker in situ  Z95.0 V45.01       3. ESRD on hemodialysis (HCC)  N18.6 585.6     Z99.2 V45.11       4. Chronic diastolic heart failure (HCC)  I50.32 428.32       5. Essential hypertension  I10 401.9             the following changes in treatment are made: will increase hydralazine to 50 mg tid for better BP control. reviewed diet, exercise and weight control  use of aspirin to prevent MI and TIA's discussed. Patient not on coumadin due hematuria,meanwhile patient to continue aspirin 325mg daily. Discussed with patient in length about the need for long term anticoagulation. All questions answered. 9/2021  Recently admitted to [facility] due to syncope secondary to A. fib with slow ventricular response in setting of hypokalemia. Discharge summary CBC BMP reviewed and discussed with patient along with echocardiogram during admission he was also found to have bleeding ulcers which were treated he was discharged without aspirin. Since discharge he reports is doing well he has bilateral lower extremity edema blood pressure is controlled multiple medications were discontinued at discharge. He is following with nephrology regarding ongoing anemia and thrombocytopenia with recent platelet count of 963. Advised patient to follow-up with GI will consider resuming aspirin if blood counts are stable. Patient with history of A. fib and not on anticoagulation due to frequent falls as well as anemia. Blood pressure is controlled continue current medications. Volume status per hemodialysis. 3/2022  Patient seen for c/o bradycardia during dialysis. BB was d/c by nephrology. In office EKG SR with RBBB. Will obtain event monitor to evaluate rhythm and rate during dialysis. Currently not on antihypertensives due to hypotension. F/u post testing. Seen for follow-up.   Cardiac monitor report reviewed with patient and patient's daughter at length. Has persistent atrial fibrillation with no significant pauses. Noted to have bradycardia. Patient denies symptoms of chest pain/shortness of breath, syncope or near syncope. No hypotension reported. Patient not a candidate for anticoagulation due to history of recurrent GI bleed. Recommend  Aspirin 81 mg daily. Understands the risk of stroke. Will consider left atrial appendage closure. Referral to structural cardiology. 5/2022  EP evaluation    Plan:  Given his chronic a-fib, as well as dialysis, with increased risk for infection and need for pacing due to heart rate down to 20s and interference with dialysis, I recommended a leadless pacemaker. Risks, benefits and alternatives discussed and all questions answered. A very small risk of perforation and dislodgement specifically were addressed with the micro device. All questions answered and I will make arrangements. 8/2022  Cardiac status stable now with s/p leadless pacemaker. No further bradycardia arrhythmia. At present wants to hold off on watchman device.   If he does go through that he will need at least a few months of anticoagulation

## 2022-08-15 NOTE — PROGRESS NOTES
1. Have you been to the ER, urgent care clinic since your last visit? Hospitalized since your last visit? Yes When: 6/2022 Where: [facility] Reason for visit: pacermaker inplant    2. Have you seen or consulted any other health care providers outside of the [address] [place] [address] since your last visit? Include any pap smears or colon screening.       No     3

## 2023-02-01 PROCEDURE — 93296 REM INTERROG EVL PM/IDS: CPT | Performed by: INTERNAL MEDICINE

## 2023-02-01 PROCEDURE — 93294 REM INTERROG EVL PM/LDLS PM: CPT | Performed by: INTERNAL MEDICINE

## 2023-02-21 ENCOUNTER — PROCEDURE VISIT (OUTPATIENT)
Age: 73
End: 2023-02-21
Payer: MEDICARE

## 2023-02-21 DIAGNOSIS — Z95.0 PRESENCE OF CARDIAC PACEMAKER: Primary | ICD-10-CM

## 2023-02-23 NOTE — RESULT ENCOUNTER NOTE
VVI pacemaker:    Normal battery life  Normal lead impedance  Normal capture threshold for ventricular lead  Acceptable programmed parameters  No significant tachyarrhythmias seen   95.1% V pacing    Normal pacer function    Recommendation: Continue follow-up of pacemaker every 3 months

## 2023-02-27 ENCOUNTER — OFFICE VISIT (OUTPATIENT)
Age: 73
End: 2023-02-27
Payer: MEDICARE

## 2023-02-27 VITALS
BODY MASS INDEX: 32.07 KG/M2 | DIASTOLIC BLOOD PRESSURE: 72 MMHG | HEIGHT: 73 IN | HEART RATE: 61 BPM | OXYGEN SATURATION: 97 % | WEIGHT: 242 LBS | SYSTOLIC BLOOD PRESSURE: 137 MMHG

## 2023-02-27 DIAGNOSIS — I10 ESSENTIAL (PRIMARY) HYPERTENSION: ICD-10-CM

## 2023-02-27 DIAGNOSIS — I48.19 OTHER PERSISTENT ATRIAL FIBRILLATION (HCC): Primary | ICD-10-CM

## 2023-02-27 DIAGNOSIS — Z95.0 PRESENCE OF CARDIAC PACEMAKER: ICD-10-CM

## 2023-02-27 DIAGNOSIS — I50.32 CHRONIC DIASTOLIC (CONGESTIVE) HEART FAILURE (HCC): ICD-10-CM

## 2023-02-27 PROCEDURE — 3078F DIAST BP <80 MM HG: CPT | Performed by: INTERNAL MEDICINE

## 2023-02-27 PROCEDURE — 99214 OFFICE O/P EST MOD 30 MIN: CPT | Performed by: INTERNAL MEDICINE

## 2023-02-27 PROCEDURE — G8417 CALC BMI ABV UP PARAM F/U: HCPCS | Performed by: INTERNAL MEDICINE

## 2023-02-27 PROCEDURE — G8427 DOCREV CUR MEDS BY ELIG CLIN: HCPCS | Performed by: INTERNAL MEDICINE

## 2023-02-27 PROCEDURE — 3017F COLORECTAL CA SCREEN DOC REV: CPT | Performed by: INTERNAL MEDICINE

## 2023-02-27 PROCEDURE — 4004F PT TOBACCO SCREEN RCVD TLK: CPT | Performed by: INTERNAL MEDICINE

## 2023-02-27 PROCEDURE — 1123F ACP DISCUSS/DSCN MKR DOCD: CPT | Performed by: INTERNAL MEDICINE

## 2023-02-27 PROCEDURE — 3075F SYST BP GE 130 - 139MM HG: CPT | Performed by: INTERNAL MEDICINE

## 2023-02-27 PROCEDURE — G8484 FLU IMMUNIZE NO ADMIN: HCPCS | Performed by: INTERNAL MEDICINE

## 2023-02-27 RX ORDER — AMLODIPINE BESYLATE 5 MG/1
5 TABLET ORAL DAILY
COMMUNITY

## 2023-02-27 RX ORDER — FUROSEMIDE 40 MG/1
40 TABLET ORAL 2 TIMES DAILY
COMMUNITY

## 2023-02-27 RX ORDER — PANTOPRAZOLE SODIUM 40 MG/1
40 TABLET, DELAYED RELEASE ORAL DAILY
COMMUNITY

## 2023-02-27 RX ORDER — ATORVASTATIN CALCIUM 10 MG/1
10 TABLET, FILM COATED ORAL DAILY
COMMUNITY

## 2023-02-27 NOTE — PROGRESS NOTES
1. Have you been to the ER, urgent care clinic since your last visit? Hospitalized since your last visit? No    2. Have you seen or consulted any other health care providers outside of the [address] [place] [address] since your last visit? Include any pap smears or colon screening. Yes Where: pcp      3. Do you need any refills today?    no

## 2023-02-27 NOTE — PROGRESS NOTES
HISTORY OF PRESENT ILLNESS  Teresa Scruggs is a 67 y.o. male. 9/2021  Patient presents to follow-up for recent hospitalization to [facility] from 8\15-8\27 for syncope secondary to A. fib with slow ventricular response in setting of hypokalemia. Not anticoagulated as outpatient d/t frequent falls and hx of hematuria.  -Acute on chronic diastolic heart failure with chronic LE edema. ECHO this admission normal LV Function. Mild concentric hypertrophy.  (05/2021) EF 50%. Grade 3 LV DD.   -Moderate pulmonary hypertension by ECHO this admission (08/2021) PASP  61 mmhg. During admission he was also found to have:  1. Acute GI bleed with blood loss anemia: EGD on 8/22/2021 shows esophagitis, antral and duodenal ulcer - aspirin was d/c.  2. ESRD on HD  3. Complicated urinary tract infection with obstructive uropathy. 4. Enterobacter bacteremia possibly due to UTI. 5. Leukocytosis. Improved now  6. Aspiration pneumonia  7. Obstructive uropathy due to BPH on ruffin. 8. Acute metabolic encephalopathy, improved    9. A. fib with slow RVR. 10. Severe protein calorie malnutrition  11. Diabetes type 2  12. Hypertension  13. Moderate pulmonary hypertension  14. Hyperkalemia: resolved     3/2022  Patient presents with c/o heart rate decreasing during dialysis. He reports several b/p medications have been d/c due to hypotension with dialysis. He denies chest pain, shortness of breath or palpitations. He reports  Chronic BLE edema. Shortness of Breath  The history is provided by the Patient. This is a chronic problem. The problem occurs intermittently. The current episode started more than 1 week ago. The problem has not changed since onset. Associated symptoms include leg swelling. Pertinent negatives include no ear pain, no neck pain, no wheezing, no PND, no orthopnea and no rash. He has had Prior hospitalizations. Associated medical issues include heart failure.  Associated medical issues do not include chronic lung disease or CAD. Hypertension  The history is provided by the Patient. This is a chronic problem. The current episode started more than 1 week ago. The problem occurs daily. The problem has been gradually worsening. Associated symptoms include shortness of breath. Follow-up  Associated symptoms include shortness of breath. Review of Systems   Constitutional:  Negative for chills and weight loss. HENT:  Negative for ear pain and hearing loss. Eyes:  Negative for blurred vision. Respiratory:  Positive for shortness of breath. Negative for wheezing and stridor. Cardiovascular:  Positive for leg swelling. Negative for orthopnea and PND. Gastrointestinal:  Negative for heartburn. Musculoskeletal:  Negative for myalgias and neck pain. Skin:  Negative for rash. Neurological:  Negative for tingling, tremors, focal weakness and loss of consciousness. Psychiatric/Behavioral:  Negative for depression and suicidal ideas. Family History   Problem Relation Age of Onset    Hypertension Other     Heart Attack Mother 52    Diabetes Other        Past Medical History:   Diagnosis Date    Atrial fibrillation ([address]     Cerebrovascular accident ([address]     , - general weakness    Chronic diastolic heart failure ([address]     Chronic kidney disease     dialysis    Diabetes ([address]     Heart failure ([address]     History of cardioversion     Hypercholesterolemia     Hypertension     Morbid obesity ([address]        Past Surgical History:   Procedure Laterality Date    VASCULAR SURGERY Right     right neck       Social History     Tobacco Use    Smoking status: Former     Types: Cigarettes     Quit date: 1971     Years since quittin.6    Smokeless tobacco: Never   Substance Use Topics    Alcohol use: No     Alcohol/week: 0.0 standard drinks       No Known Allergies    Prior to Admission medications    Medication Sig Start Date End Date Taking?  Authorizing Provider   pantoprazole (PROTONIX) 40 MG tablet Take 40 mg by mouth daily   Yes Historical Provider, MD   furosemide (LASIX) 40 MG tablet Take 40 mg by mouth in the morning and 40 mg in the evening. Yes Historical Provider, MD   amLODIPine (NORVASC) 5 MG tablet Take 5 mg by mouth daily   Yes Historical Provider, MD   Multiple Vitamin (MULTIVITAMIN ADULT PO) Take by mouth   Yes Historical Provider, MD   atorvastatin (LIPITOR) 10 MG tablet Take 10 mg by mouth daily   Yes Historical Provider, MD   aspirin 81 MG EC tablet Take by mouth daily   Yes Ar Automatic Reconciliation   vitamin D (CHOLECALCIFEROL) 25 MCG (1000 UT) TABS tablet Take 1,000 Units by mouth daily 12/11/15  Yes Ar Automatic Reconciliation   docusate (COLACE, DULCOLAX) 100 MG CAPS Take 100 mg by mouth 2 times daily   Yes Ar Automatic Reconciliation   finasteride (PROSCAR) 5 MG tablet TAKE 1 TABLET BY MOUTH DAILY 6/16/21  Yes Ar Automatic Reconciliation   sevelamer (RENVELA) 800 MG tablet 3 times daily (with meals) 1/20/22  Yes Ar Automatic Reconciliation   tamsulosin (FLOMAX) 0.4 MG capsule Take 0.4 mg by mouth daily   Yes Ar Automatic Reconciliation   vitamin E 100 units capsule Take 100 Units by mouth daily  Patient not taking: Reported on 2/27/2023    Ar Automatic Reconciliation         /72   Pulse 61   Ht 6' 1\" (1.854 m)   Wt 242 lb (109.8 kg)   SpO2 97%   BMI 31.93 kg/m²   Physical Exam  Constitutional:       General: He is not in acute distress. Appearance: He is well-developed. He is not diaphoretic. HENT:      Head: Atraumatic. Mouth/Throat:      Pharynx: No oropharyngeal exudate. Eyes:      General: No scleral icterus. Conjunctiva/sclera: Conjunctivae normal.   Neck:      Vascular: No JVD. Cardiovascular:      Rate and Rhythm: Normal rate. Rhythm irregularly irregular. Heart sounds: No murmur heard. No gallop. Pulmonary:      Effort: Pulmonary effort is normal.      Breath sounds: Normal breath sounds. No stridor. No wheezing or rales. Abdominal:      Palpations: Abdomen is soft. Tenderness: There is no abdominal tenderness. Genitourinary:     Comments: Ivy cath in place with leg bag  Musculoskeletal:         General: No tenderness. Normal range of motion. Cervical back: Neck supple. Right lower leg: Edema (1+ BLE edema) present. Left lower leg: Edema (1+ BLE edema) present. Skin:     General: Skin is warm. Neurological:      Mental Status: He is alert and oriented to person, place, and time. Motor: No abnormal muscle tone. Psychiatric:         Behavior: Behavior normal.     8/17/2021  Interpretation Summary  LV: Estimated LVEF is 55 - 60%. Visually measured ejection fraction. Normal cavity size and systolic function (ejection fraction normal). Mild concentric hypertrophy. TV: Mild tricuspid valve regurgitation is present. PA: Moderate pulmonary hypertension. Pulmonary arterial systolic pressure is 61 mmHg. IVC: Severely elevated central venous pressure (15 mmHg); IVC diameter is larger than 21 mm and collapses less than 50% with respiration. Comparison Study Information  Prior Study  There is a prior study available for comparison. Prior study date: 5/24/2021. As compared to the previous study, there are no significant changes. No flowsheet data found. Assessment        Diagnosis Orders   1. Other persistent atrial fibrillation ([address]      Stable continue treatment monitor      2. Presence of cardiac pacemaker      Normal function monitor      3. Chronic diastolic (congestive) heart failure (HCC)      Stable compensated continue treatment      4. Essential (primary) hypertension      Stable continue current treatment          There are no discontinued medications. Follow-up and Dispositions    Return in about 6 months (around 8/27/2023). the following changes in treatment are made: will increase hydralazine to 50 mg tid for better BP control.   reviewed diet, exercise and weight control  use of aspirin to prevent MI and TIA's discussed. Patient not on coumadin due hematuria,meanwhile patient to continue aspirin 325mg daily. Discussed with patient in length about the need for long term anticoagulation. All questions answered. 9/2021  Recently admitted to [facility] due to syncope secondary to A. fib with slow ventricular response in setting of hypokalemia. Discharge summary CBC BMP reviewed and discussed with patient along with echocardiogram during admission he was also found to have bleeding ulcers which were treated he was discharged without aspirin. Since discharge he reports is doing well he has bilateral lower extremity edema blood pressure is controlled multiple medications were discontinued at discharge. He is following with nephrology regarding ongoing anemia and thrombocytopenia with recent platelet count of 255. Advised patient to follow-up with GI will consider resuming aspirin if blood counts are stable. Patient with history of A. fib and not on anticoagulation due to frequent falls as well as anemia. Blood pressure is controlled continue current medications. Volume status per hemodialysis. 3/2022  Patient seen for c/o bradycardia during dialysis. BB was d/c by nephrology. In office EKG SR with RBBB. Will obtain event monitor to evaluate rhythm and rate during dialysis. Currently not on antihypertensives due to hypotension. F/u post testing. Seen for follow-up. Cardiac monitor report reviewed with patient and patient's daughter at length. Has persistent atrial fibrillation with no significant pauses. Noted to have bradycardia. Patient denies symptoms of chest pain/shortness of breath, syncope or near syncope. No hypotension reported. Patient not a candidate for anticoagulation due to history of recurrent GI bleed. Recommend  Aspirin 81 mg daily. Understands the risk of stroke. Will consider left atrial appendage closure.   Referral to structural cardiology. 5/2022  EP evaluation    Plan:  Given his chronic a-fib, as well as dialysis, with increased risk for infection and need for pacing due to heart rate down to 20s and interference with dialysis, I recommended a leadless pacemaker. Risks, benefits and alternatives discussed and all questions answered. A very small risk of perforation and dislodgement specifically were addressed with the micro device. All questions answered and I will make arrangements. 8/2022  Cardiac status stable now with s/p leadless pacemaker. No further bradycardia arrhythmia. At present wants to hold off on watchman device. If he does go through that he will need at least a few months of anticoagulation  2/2023  Cardiac status remains stable continue current medical management monitor. Discussed option of left atrial appendage closure. Would require anticoagulation which he does not try at present.

## 2023-05-30 ENCOUNTER — PROCEDURE VISIT (OUTPATIENT)
Age: 73
End: 2023-05-30
Payer: MEDICARE

## 2023-05-30 DIAGNOSIS — R00.1 SINUS BRADYCARDIA: ICD-10-CM

## 2023-05-30 DIAGNOSIS — I48.91 ATRIAL FIBRILLATION (HCC): ICD-10-CM

## 2023-05-30 DIAGNOSIS — Z95.0 PRESENCE OF CARDIAC PACEMAKER: Primary | ICD-10-CM

## 2023-05-30 DIAGNOSIS — I50.32 CHRONIC DIASTOLIC HEART FAILURE (HCC): ICD-10-CM

## 2023-06-01 PROCEDURE — 93296 REM INTERROG EVL PM/IDS: CPT | Performed by: INTERNAL MEDICINE

## 2023-06-01 PROCEDURE — 93294 REM INTERROG EVL PM/LDLS PM: CPT | Performed by: INTERNAL MEDICINE

## 2023-08-29 ENCOUNTER — PROCEDURE VISIT (OUTPATIENT)
Age: 73
End: 2023-08-29
Payer: MEDICARE

## 2023-08-29 DIAGNOSIS — I50.32 CHRONIC DIASTOLIC HEART FAILURE (HCC): Primary | ICD-10-CM

## 2023-08-29 DIAGNOSIS — Z95.0 PRESENCE OF CARDIAC PACEMAKER: ICD-10-CM

## 2023-08-29 DIAGNOSIS — R00.1 SINUS BRADYCARDIA: ICD-10-CM

## 2023-08-29 DIAGNOSIS — I48.91 ATRIAL FIBRILLATION (HCC): ICD-10-CM

## 2023-08-30 ENCOUNTER — OFFICE VISIT (OUTPATIENT)
Age: 73
End: 2023-08-30
Payer: MEDICARE

## 2023-08-30 VITALS
BODY MASS INDEX: 32.47 KG/M2 | SYSTOLIC BLOOD PRESSURE: 153 MMHG | OXYGEN SATURATION: 95 % | HEART RATE: 95 BPM | HEIGHT: 73 IN | WEIGHT: 245 LBS | DIASTOLIC BLOOD PRESSURE: 78 MMHG

## 2023-08-30 DIAGNOSIS — N18.6 END STAGE RENAL DISEASE (HCC): ICD-10-CM

## 2023-08-30 DIAGNOSIS — Z95.0 PRESENCE OF CARDIAC PACEMAKER: ICD-10-CM

## 2023-08-30 DIAGNOSIS — I10 ESSENTIAL (PRIMARY) HYPERTENSION: ICD-10-CM

## 2023-08-30 DIAGNOSIS — I50.32 CHRONIC DIASTOLIC HEART FAILURE (HCC): Primary | ICD-10-CM

## 2023-08-30 DIAGNOSIS — I48.19 OTHER PERSISTENT ATRIAL FIBRILLATION (HCC): ICD-10-CM

## 2023-08-30 PROCEDURE — 3078F DIAST BP <80 MM HG: CPT | Performed by: INTERNAL MEDICINE

## 2023-08-30 PROCEDURE — G8417 CALC BMI ABV UP PARAM F/U: HCPCS | Performed by: INTERNAL MEDICINE

## 2023-08-30 PROCEDURE — 99214 OFFICE O/P EST MOD 30 MIN: CPT | Performed by: INTERNAL MEDICINE

## 2023-08-30 PROCEDURE — G8428 CUR MEDS NOT DOCUMENT: HCPCS | Performed by: INTERNAL MEDICINE

## 2023-08-30 PROCEDURE — 1123F ACP DISCUSS/DSCN MKR DOCD: CPT | Performed by: INTERNAL MEDICINE

## 2023-08-30 PROCEDURE — 3077F SYST BP >= 140 MM HG: CPT | Performed by: INTERNAL MEDICINE

## 2023-08-30 PROCEDURE — 1036F TOBACCO NON-USER: CPT | Performed by: INTERNAL MEDICINE

## 2023-08-30 PROCEDURE — 3017F COLORECTAL CA SCREEN DOC REV: CPT | Performed by: INTERNAL MEDICINE

## 2023-09-01 PROCEDURE — 93294 REM INTERROG EVL PM/LDLS PM: CPT | Performed by: INTERNAL MEDICINE

## 2023-09-01 PROCEDURE — 93296 REM INTERROG EVL PM/IDS: CPT | Performed by: INTERNAL MEDICINE

## 2023-09-26 NOTE — RESULT ENCOUNTER NOTE
VVI pacemaker:    Normal battery life  Normal lead impedance  Normal capture threshold for ventricular lead  Acceptable programmed parameters  Tachyarrhythmia: Underlying permanent A-fib with V pacing  96.1% V pacing    Normal pacer function    Recommendation: Continue follow-up of pacemaker every 3 months

## 2023-09-27 NOTE — RESULT ENCOUNTER NOTE
VVI pacemaker:    Normal battery life  Normal lead impedance  Normal capture threshold for ventricular lead  Acceptable programmed parameters  Tachyarrhythmia: None seen  3.9% V pacing    Normal pacer function    Recommendation: Continue follow-up of pacemaker every 3 months

## 2023-09-28 NOTE — RESULT ENCOUNTER NOTE
VVI pacemaker:    Normal battery life  Normal lead impedance  Normal capture threshold for ventricular lead  Acceptable programmed parameters  Tachyarrhythmia: Known to have permanent underlying atrial fibrillation  3.9% V pacing    Normal pacer function    Recommendation: Continue follow-up of pacemaker every 3 months. I think I saw this yesterday also. I need to know why this came again with the warning so I can pay attention to that part.

## 2023-11-02 NOTE — RESULT ENCOUNTER NOTE
VVI pacemaker:    Normal battery life  Normal lead impedance  Normal capture threshold for ventricular lead  Acceptable programmed parameters  Tachyarrhythmia: Underlying permanent atrial fibrillation which is already known  96.1% V pacing    Normal pacer function    Recommendation: Continue follow-up of pacemaker every 3 months. Not on anticoagulation due to history of GI bleed but takes aspirin.

## 2023-11-09 ENCOUNTER — TELEPHONE (OUTPATIENT)
Age: 73
End: 2023-11-09

## 2023-11-09 NOTE — TELEPHONE ENCOUNTER
This RN contacted the patient at telephone number listed on file. Name and  verified with patient as identifiers. Noted an increase in pacemaker submissions from Pt and inquired if symptoms were present (ex: palpitations, fast/slow heartbeat, fatigue, dizziness, etc.)--Pt denied and advised he thought he should just send transmissions occasionally. Provided education to Pt regarding sending pacemaker transmissions:  Routine transmissions are sent for review  Emergent transmissions are sent for review  Pt should send manual transmissions if symptomatic    Pt expressed understanding and advised there were no outstanding questions or concerns.

## 2023-11-10 NOTE — RESULT ENCOUNTER NOTE
VVI pacemaker:    Normal battery life  Normal lead impedance  Normal capture threshold for ventricular lead  Acceptable programmed parameters  Tachyarrhythmia: None detected  96.1% V pacing    Normal pacer function    Recommendation: Continue follow-up of pacemaker every 3 months

## 2023-11-20 ENCOUNTER — TELEPHONE (OUTPATIENT)
Age: 73
End: 2023-11-20

## 2023-11-20 NOTE — RESULT ENCOUNTER NOTE
VVI pacemaker:    Normal battery life  Normal lead impedance  Normal capture threshold for ventricular lead  Acceptable programmed parameters  Tachyarrhythmia: None significant  96.1% V pacing    Normal pacer function    Recommendation: Continue follow-up of pacemaker every 3 months

## 2023-11-20 NOTE — TELEPHONE ENCOUNTER
This RN contacted the patient at telephone number listed on file. Name and  verified with patient as identifiers. Pt denied symptoms (ex: palpitations, fast/slow heartbeat, fatigue, dizziness, recently swelling) and advised he is feeling good. This RN reinforced prior education with Pt:  Pt should send manual transmissions if symptomatic    Pt pleasantly acknowledged teaching and advised there were no additional questions or concerns.

## 2024-01-17 NOTE — RESULT ENCOUNTER NOTE
VVI pacemaker:    Normal battery life  Normal lead impedance  Normal capture threshold for ventricular lead   Acceptable programmed parameters  Tachyarrhythmia: None recorded  95.9% V pacing    Normal pacer function    Recommendation: Continue follow-up of pacemaker every 3 months

## 2024-02-12 ENCOUNTER — OFFICE VISIT (OUTPATIENT)
Age: 74
End: 2024-02-12
Payer: MEDICARE

## 2024-02-12 VITALS
DIASTOLIC BLOOD PRESSURE: 74 MMHG | SYSTOLIC BLOOD PRESSURE: 136 MMHG | HEART RATE: 91 BPM | BODY MASS INDEX: 34.67 KG/M2 | WEIGHT: 256 LBS | HEIGHT: 72 IN | OXYGEN SATURATION: 97 %

## 2024-02-12 DIAGNOSIS — N18.6 END STAGE RENAL DISEASE (HCC): ICD-10-CM

## 2024-02-12 DIAGNOSIS — I50.32 CHRONIC DIASTOLIC HEART FAILURE (HCC): Primary | ICD-10-CM

## 2024-02-12 DIAGNOSIS — I48.19 OTHER PERSISTENT ATRIAL FIBRILLATION (HCC): ICD-10-CM

## 2024-02-12 DIAGNOSIS — Z95.0 PRESENCE OF CARDIAC PACEMAKER: ICD-10-CM

## 2024-02-12 DIAGNOSIS — I10 ESSENTIAL (PRIMARY) HYPERTENSION: ICD-10-CM

## 2024-02-12 PROCEDURE — 3017F COLORECTAL CA SCREEN DOC REV: CPT | Performed by: INTERNAL MEDICINE

## 2024-02-12 PROCEDURE — 99214 OFFICE O/P EST MOD 30 MIN: CPT | Performed by: INTERNAL MEDICINE

## 2024-02-12 PROCEDURE — 1036F TOBACCO NON-USER: CPT | Performed by: INTERNAL MEDICINE

## 2024-02-12 PROCEDURE — 1123F ACP DISCUSS/DSCN MKR DOCD: CPT | Performed by: INTERNAL MEDICINE

## 2024-02-12 PROCEDURE — G8417 CALC BMI ABV UP PARAM F/U: HCPCS | Performed by: INTERNAL MEDICINE

## 2024-02-12 PROCEDURE — G8484 FLU IMMUNIZE NO ADMIN: HCPCS | Performed by: INTERNAL MEDICINE

## 2024-02-12 PROCEDURE — 3078F DIAST BP <80 MM HG: CPT | Performed by: INTERNAL MEDICINE

## 2024-02-12 PROCEDURE — G8428 CUR MEDS NOT DOCUMENT: HCPCS | Performed by: INTERNAL MEDICINE

## 2024-02-12 PROCEDURE — 3075F SYST BP GE 130 - 139MM HG: CPT | Performed by: INTERNAL MEDICINE

## 2024-02-12 NOTE — PROGRESS NOTES
1. Have you been to the ER, urgent care clinic since your last visit?  Hospitalized since your last visit?No    2. Have you seen or consulted any other health care providers outside of the [facility] since your last visit?  Include any pap smears or colon screening. No

## 2024-02-12 NOTE — PROGRESS NOTES
HISTORY OF PRESENT ILLNESS  Dale Summers is a 72 y.o. male.  9/2021  Patient presents to follow-up for recent hospitalization to [facility] from 8\15-8\27 for syncope secondary to A. fib with slow ventricular response in setting of hypokalemia.  Not anticoagulated as outpatient d/t frequent falls and hx of hematuria.  -Acute on chronic diastolic heart failure with chronic LE edema. ECHO this admission normal LV Function. Mild concentric hypertrophy.  (05/2021) EF 50%. Grade 3 LV DD.   -Moderate pulmonary hypertension by ECHO this admission (08/2021) PASP  61 mmhg. During admission he was also found to have:  1. Acute GI bleed with blood loss anemia: EGD on 8/22/2021 shows esophagitis, antral and duodenal ulcer - aspirin was d/c.  2. ESRD on HD  3. Complicated urinary tract infection with obstructive uropathy.  4. Enterobacter bacteremia possibly due to UTI.    5. Leukocytosis.  Improved now  6. Aspiration pneumonia  7. Obstructive uropathy due to BPH on ruffin.  8. Acute metabolic encephalopathy, improved    9. A. fib with slow RVR.  10. Severe protein calorie malnutrition  11. Diabetes type 2  12. Hypertension  13. Moderate pulmonary hypertension  14. Hyperkalemia: resolved     3/2022  Patient presents with c/o heart rate decreasing during dialysis.  He reports several b/p medications have been d/c due to hypotension with dialysis.  He denies chest pain, shortness of breath or palpitations.  He reports  Chronic BLE edema.    Shortness of Breath  The history is provided by the Patient. This is a chronic problem. The problem occurs intermittently.The current episode started more than 1 week ago. The problem has not changed since onset.Associated symptoms include leg swelling. Pertinent negatives include no ear pain, no neck pain, no wheezing, no PND, no orthopnea and no rash. He has had Prior hospitalizations. Associated medical issues include heart failure. Associated medical issues do not include

## 2024-04-17 ENCOUNTER — NURSE ONLY (OUTPATIENT)
Age: 74
End: 2024-04-17
Payer: MEDICARE

## 2024-04-17 DIAGNOSIS — I48.91 ATRIAL FIBRILLATION (HCC): ICD-10-CM

## 2024-04-17 DIAGNOSIS — Z95.0 PRESENCE OF CARDIAC PACEMAKER: Primary | ICD-10-CM

## 2024-04-17 PROCEDURE — 93294 REM INTERROG EVL PM/LDLS PM: CPT | Performed by: INTERNAL MEDICINE

## 2024-04-17 PROCEDURE — 93296 REM INTERROG EVL PM/IDS: CPT | Performed by: INTERNAL MEDICINE

## 2024-06-17 ENCOUNTER — NURSE ONLY (OUTPATIENT)
Age: 74
End: 2024-06-17

## 2024-06-17 DIAGNOSIS — I48.91 ATRIAL FIBRILLATION (HCC): ICD-10-CM

## 2024-06-17 DIAGNOSIS — Z95.0 PRESENCE OF CARDIAC PACEMAKER: Primary | ICD-10-CM

## 2024-07-05 ENCOUNTER — NURSE ONLY (OUTPATIENT)
Age: 74
End: 2024-07-05

## 2024-07-05 DIAGNOSIS — I48.91 ATRIAL FIBRILLATION (HCC): ICD-10-CM

## 2024-07-05 DIAGNOSIS — Z95.0 PRESENCE OF CARDIAC PACEMAKER: Primary | ICD-10-CM

## 2024-08-13 NOTE — PROGRESS NOTES
HISTORY OF PRESENT ILLNESS  Dale Summers is a 74 y.o. male.    PMH Atrial fibrillation, history of CVA, Coronary risk equivalent CKD, Coronary risk equivalent DM, Congestive heart failure, HTN, HLD, Morbid Obesity, history of PM  ----  CARDIAC STUDIES  ----  [facility] 5/2022  Patient monitored for 22d 1h 0m  155 events were transmitted, 49 patient triggered 106 auto triggered  Atrial fibrillation occurred 597 times with HR range of 26 to 152 bpm, total AF burden 94 percent  Pauses greater than 2 seconds occurred 33 times with longest pause 2.4 seconds,  VT occurred 1 time with fastest run 152bpm  2623 PACs with PAC burden less than 1 percent  17,974 PVCs with PVC burden 2%  ----  2d echo 8/17/2021  · LV: Estimated LVEF is 55 - 60%. Visually measured ejection fraction. Normal cavity size and systolic function (ejection fraction normal). Mild concentric hypertrophy.  · TV: Mild tricuspid valve regurgitation is present.  · PA: Moderate pulmonary hypertension. Pulmonary arterial systolic pressure is 61 mmHg.  · IVC: Severely elevated central venous pressure (15 mmHg); IVC diameter is larger than 21 mm and collapses less than 50% with respiration.  ----  Vascular duplex lower extremity venous bl 8/16/2021  No evidence of deep vein thrombosis in the right lower extremity.   ----  2d echo 5/24/2021  Summary    1. The left ventricle is upper normal in size with moderately increased wall   thickness.    2. The left ventricular systolic function is mildly decreased, LVEF is   visually estimated at 50%.     3. There is decreased contractile function involving the apical segment(s).     4. There is grade III diastolic dysfunction (severely elevated filling   pressure).    5. The left atrium is moderately dilated in size.     6. No significant valvular abnormalities.     7. Estimated pulmonary arterial systolic pressure is 63 mmHg.   ----    Have you had Fatigue?  No  2.   Have you had have you had Chest Pain? No  3.   Have you

## 2024-08-14 ENCOUNTER — OFFICE VISIT (OUTPATIENT)
Age: 74
End: 2024-08-14

## 2024-08-14 VITALS
SYSTOLIC BLOOD PRESSURE: 121 MMHG | BODY MASS INDEX: 35.89 KG/M2 | WEIGHT: 265 LBS | DIASTOLIC BLOOD PRESSURE: 71 MMHG | HEIGHT: 72 IN | OXYGEN SATURATION: 97 % | HEART RATE: 81 BPM

## 2024-08-14 DIAGNOSIS — Z95.0 HISTORY OF PACEMAKER: ICD-10-CM

## 2024-08-14 DIAGNOSIS — I50.32 CHRONIC HEART FAILURE WITH PRESERVED EJECTION FRACTION (HCC): ICD-10-CM

## 2024-08-14 DIAGNOSIS — I10 HYPERTENSION, UNSPECIFIED TYPE: ICD-10-CM

## 2024-08-14 DIAGNOSIS — Z86.73 HISTORY OF CVA (CEREBROVASCULAR ACCIDENT): ICD-10-CM

## 2024-08-14 DIAGNOSIS — E78.49 OTHER HYPERLIPIDEMIA: ICD-10-CM

## 2024-08-14 DIAGNOSIS — I48.91 ATRIAL FIBRILLATION, UNSPECIFIED TYPE (HCC): Primary | ICD-10-CM

## 2024-08-14 NOTE — PROGRESS NOTES
Have you had Fatigue?  No    2.   Have you had have you had Chest Pain? No    3.   Have you had Dyspnea (SOB) ? No   4.   Have you had Orthopnea? No     5.   Have you had PND? No     6.   Have you had leg swelling? No   7.    Have you had any weight gain? No     8. Have you had any palpitations? No      9. Have you had any syncope? No   10. Do you have any wounds on legs?no

## 2024-10-08 ENCOUNTER — NURSE ONLY (OUTPATIENT)
Age: 74
End: 2024-10-08

## 2024-10-08 DIAGNOSIS — Z95.0 PRESENCE OF CARDIAC PACEMAKER: Primary | ICD-10-CM

## 2024-10-08 DIAGNOSIS — I48.91 ATRIAL FIBRILLATION, UNSPECIFIED TYPE (HCC): ICD-10-CM

## 2024-12-25 PROCEDURE — 93296 REM INTERROG EVL PM/IDS: CPT | Performed by: INTERNAL MEDICINE

## 2024-12-25 PROCEDURE — 93294 REM INTERROG EVL PM/LDLS PM: CPT | Performed by: INTERNAL MEDICINE

## 2025-01-13 ENCOUNTER — NURSE ONLY (OUTPATIENT)
Age: 75
End: 2025-01-13
Payer: MEDICARE

## 2025-01-13 DIAGNOSIS — Z95.0 PRESENCE OF CARDIAC PACEMAKER: Primary | ICD-10-CM

## 2025-01-13 DIAGNOSIS — I48.91 ATRIAL FIBRILLATION, UNSPECIFIED TYPE (HCC): ICD-10-CM

## 2025-02-14 ENCOUNTER — OFFICE VISIT (OUTPATIENT)
Age: 75
End: 2025-02-14
Payer: MEDICARE

## 2025-02-14 VITALS
WEIGHT: 269.9 LBS | HEIGHT: 72 IN | SYSTOLIC BLOOD PRESSURE: 126 MMHG | DIASTOLIC BLOOD PRESSURE: 81 MMHG | HEART RATE: 68 BPM | BODY MASS INDEX: 36.56 KG/M2 | OXYGEN SATURATION: 98 %

## 2025-02-14 DIAGNOSIS — I10 HYPERTENSION, UNSPECIFIED TYPE: ICD-10-CM

## 2025-02-14 DIAGNOSIS — Z86.73 HISTORY OF CVA (CEREBROVASCULAR ACCIDENT): ICD-10-CM

## 2025-02-14 DIAGNOSIS — E78.49 OTHER HYPERLIPIDEMIA: ICD-10-CM

## 2025-02-14 DIAGNOSIS — R07.2 PRECORDIAL PAIN: ICD-10-CM

## 2025-02-14 DIAGNOSIS — I50.32 CHRONIC HEART FAILURE WITH PRESERVED EJECTION FRACTION (HCC): ICD-10-CM

## 2025-02-14 DIAGNOSIS — I48.91 ATRIAL FIBRILLATION, UNSPECIFIED TYPE (HCC): Primary | ICD-10-CM

## 2025-02-14 PROCEDURE — 3079F DIAST BP 80-89 MM HG: CPT | Performed by: INTERNAL MEDICINE

## 2025-02-14 PROCEDURE — 99214 OFFICE O/P EST MOD 30 MIN: CPT | Performed by: INTERNAL MEDICINE

## 2025-02-14 PROCEDURE — 3074F SYST BP LT 130 MM HG: CPT | Performed by: INTERNAL MEDICINE

## 2025-02-14 PROCEDURE — 1123F ACP DISCUSS/DSCN MKR DOCD: CPT | Performed by: INTERNAL MEDICINE

## 2025-02-14 RX ORDER — FINASTERIDE 5 MG/1
5 TABLET, FILM COATED ORAL DAILY
COMMUNITY

## 2025-02-14 RX ORDER — MIDODRINE HYDROCHLORIDE 10 MG/1
10 TABLET ORAL 3 TIMES DAILY
COMMUNITY

## 2025-02-14 RX ORDER — FUROSEMIDE 40 MG/1
40 TABLET ORAL DAILY
COMMUNITY

## 2025-02-14 ASSESSMENT — ENCOUNTER SYMPTOMS
SHORTNESS OF BREATH: 0
COLOR CHANGE: 0
BLOOD IN STOOL: 0
CHEST TIGHTNESS: 0
NAUSEA: 0
COUGH: 0
WHEEZING: 0
ABDOMINAL PAIN: 0
APNEA: 0
CONSTIPATION: 0
DIARRHEA: 0

## 2025-02-14 NOTE — PROGRESS NOTES
Have you had Fatigue?  No    2.   Have you had chest Pain? Yes, Interment with position changes \"sitting to standing\" left chest last about 10 seconds , non radiating , relieve with rest.    3.   Have you had Dyspnea (SOB) ? No     4.   Have you had Orthopnea? No     5.   Have you had PND? No     6.   Have you had leg swelling? No     7.   Have you had any weight gain? No     8.   Have you had any palpitations? No     9.   Have you had any syncope? No     10. Do you have any wounds on legs? No  
valvular abnormalities.     7. Estimated pulmonary arterial systolic pressure is 63 mmHg.   ----    Have you had Fatigue?  No     2.   Have you had chest Pain? Small pain about 10 seconds goes away, states that not all the time but sometimes when goes to lay down then has a pain, denies that comes with exertion or stress can be spontaneous, states that if moves too fast can have a pain.       3.   Have you had Dyspnea (SOB) ? No      4.   Have you had Orthopnea? No      5.   Have you had PND? No      6.   Have you had leg swelling? No      7.   Have you had any weight gain? No      8.   Have you had any palpitations? No      9.   Have you had any syncope? No      10. Do you have any wounds on legs? No    Reviewed with the patient and is as such.      Family History   Problem Relation Age of Onset    Hypertension Other     Heart Attack Mother 49    Diabetes Other        Past Medical History:   Diagnosis Date    Atrial fibrillation (HCC)     Cerebrovascular accident (HCC)     , - general weakness    Chronic diastolic heart failure (HCC)     Chronic kidney disease     dialysis    Diabetes (HCC)     Heart failure (HCC)     History of cardioversion     Hypercholesterolemia     Hypertension     Morbid obesity        Past Surgical History:   Procedure Laterality Date    VASCULAR SURGERY Right     right neck       Social History     Tobacco Use    Smoking status: Former     Current packs/day: 0.00     Types: Cigarettes     Quit date: 1971     Years since quittin.6    Smokeless tobacco: Never   Substance Use Topics    Alcohol use: No     Alcohol/week: 0.0 standard drinks of alcohol       No Known Allergies    Prior to Admission medications    Medication Sig Start Date End Date Taking? Authorizing Provider   pantoprazole (PROTONIX) 40 MG tablet Take 1 tablet by mouth daily    Fernando Allen MD   atorvastatin (LIPITOR) 10 MG tablet Take 1 tablet by mouth daily    Fernando Allen MD   aspirin 81 MG

## 2025-02-14 NOTE — PATIENT INSTRUCTIONS
Learning About the Mediterranean Diet  What is the Mediterranean diet?     The Mediterranean diet is a style of eating rather than a diet plan. It features foods eaten in [place], [place], [place], and other countries along the [place] Sea. It emphasizes eating foods like fish, fruits, vegetables, beans, high-fiber breads and whole grains, nuts, and olive oil. This style of eating includes limited red meat, cheese, and sweets.  Why choose the Mediterranean diet?  A Mediterranean-style diet may improve heart health. It contains more fat than other heart-healthy diets. But the fats are mainly from nuts, unsaturated oils (such as fish oils and olive oil), and certain nut or seed oils (such as canola, soybean, or flaxseed oil). These fats may help protect the heart and blood vessels.  How can you get started on the Mediterranean diet?  Here are some things you can do to switch to a more Mediterranean way of eating.  What to eat  Eat a variety of fruits and vegetables each day, such as grapes, blueberries, tomatoes, broccoli, peppers, figs, olives, spinach, eggplant, beans, lentils, and chickpeas.  Eat a variety of whole-grain foods each day, such as oats, brown rice, and whole wheat bread, pasta, and couscous.  Eat fish at least 2 times a week. Try tuna, salmon, mackerel, lake trout, herring, or sardines.  Eat moderate amounts of low-fat dairy products, such as milk, cheese, or yogurt.  Eat moderate amounts of poultry and eggs.  Choose healthy (unsaturated) fats, such as nuts, olive oil, and certain nut or seed oils like canola, soybean, and flaxseed.  Limit unhealthy (saturated) fats, such as butter, palm oil, and coconut oil. And limit fats found in animal products, such as meat and dairy products made with whole milk. Try to eat red meat only a few times a month in very small amounts.  Limit sweets and desserts to only a few times a week. This includes sugar-sweetened drinks like soda.  The 
[id number]

## 2025-05-15 PROCEDURE — 93296 REM INTERROG EVL PM/IDS: CPT | Performed by: INTERNAL MEDICINE

## 2025-05-15 PROCEDURE — 93294 REM INTERROG EVL PM/LDLS PM: CPT | Performed by: INTERNAL MEDICINE

## 2025-06-12 ENCOUNTER — CLINICAL SUPPORT (OUTPATIENT)
Age: 75
End: 2025-06-12
Payer: MEDICARE

## 2025-06-12 DIAGNOSIS — Z95.0 PRESENCE OF CARDIAC PACEMAKER: Primary | ICD-10-CM

## 2025-06-12 DIAGNOSIS — I48.91 ATRIAL FIBRILLATION (HCC): ICD-10-CM

## 2025-06-13 ENCOUNTER — RESULTS FOLLOW-UP (OUTPATIENT)
Age: 75
End: 2025-06-13

## 2025-07-09 ENCOUNTER — OFFICE VISIT (OUTPATIENT)
Age: 75
End: 2025-07-09
Payer: MEDICARE

## 2025-07-09 ENCOUNTER — CLINICAL SUPPORT (OUTPATIENT)
Age: 75
End: 2025-07-09
Payer: MEDICARE

## 2025-07-09 ENCOUNTER — TELEPHONE (OUTPATIENT)
Facility: HOSPITAL | Age: 75
End: 2025-07-09

## 2025-07-09 VITALS
HEIGHT: 72 IN | OXYGEN SATURATION: 97 % | SYSTOLIC BLOOD PRESSURE: 100 MMHG | WEIGHT: 278 LBS | HEART RATE: 79 BPM | BODY MASS INDEX: 37.65 KG/M2 | DIASTOLIC BLOOD PRESSURE: 64 MMHG

## 2025-07-09 DIAGNOSIS — I48.91 ATRIAL FIBRILLATION, UNSPECIFIED TYPE (HCC): Primary | ICD-10-CM

## 2025-07-09 DIAGNOSIS — I10 HYPERTENSION, UNSPECIFIED TYPE: ICD-10-CM

## 2025-07-09 DIAGNOSIS — I50.32 CHRONIC HEART FAILURE WITH PRESERVED EJECTION FRACTION (HCC): ICD-10-CM

## 2025-07-09 DIAGNOSIS — I48.91 ATRIAL FIBRILLATION, UNSPECIFIED TYPE (HCC): ICD-10-CM

## 2025-07-09 DIAGNOSIS — Z95.0 PRESENCE OF CARDIAC PACEMAKER: ICD-10-CM

## 2025-07-09 DIAGNOSIS — Z95.0 PACEMAKER: ICD-10-CM

## 2025-07-09 DIAGNOSIS — I48.91 ATRIAL FIBRILLATION (HCC): ICD-10-CM

## 2025-07-09 DIAGNOSIS — Z86.73 HISTORY OF CVA (CEREBROVASCULAR ACCIDENT): ICD-10-CM

## 2025-07-09 DIAGNOSIS — I50.32 CHRONIC DIASTOLIC HEART FAILURE (HCC): ICD-10-CM

## 2025-07-09 DIAGNOSIS — R00.1 SINUS BRADYCARDIA: ICD-10-CM

## 2025-07-09 DIAGNOSIS — Z95.0 PACEMAKER: Primary | ICD-10-CM

## 2025-07-09 PROCEDURE — 93279 PRGRMG DEV EVAL PM/LDLS PM: CPT | Performed by: INTERNAL MEDICINE

## 2025-07-09 PROCEDURE — 99204 OFFICE O/P NEW MOD 45 MIN: CPT | Performed by: INTERNAL MEDICINE

## 2025-07-09 PROCEDURE — 1123F ACP DISCUSS/DSCN MKR DOCD: CPT | Performed by: INTERNAL MEDICINE

## 2025-07-09 PROCEDURE — 3074F SYST BP LT 130 MM HG: CPT | Performed by: INTERNAL MEDICINE

## 2025-07-09 PROCEDURE — 3078F DIAST BP <80 MM HG: CPT | Performed by: INTERNAL MEDICINE

## 2025-07-09 NOTE — TELEPHONE ENCOUNTER
Called and left detailed voicemail for the pt regarding getting scheduled for the watchman procedure. Requested a call back.   
assistance  5 = Severe disability; bedridden, incontinent and requiring        constant nursing care and attention 0        [x]  1        []     2        []     3        []     4        []        5        []         Procedure: Erin    Date: 8/11/2025   Time: to be determined   Provider: Dr. Haile   Arrival Time: to be determined   [facility]: [address] [place] [address] Conference  Date: 8/4/2025  Time: 1:00pm  Arrival Time: 12:15pm  Labs to be completed: yes

## 2025-07-09 NOTE — PROGRESS NOTES
HPI:  75-year-old male preferred by Dr. Villa for consideration for WATCHMAN. He bleeds easily despite even a baby aspirin. On the days of his dialysis, they actually have to hold it. He has some mild chronic dyspnea without any chest pain, syncope, PND, orthopnea or edema.     Impression:   1. Permanent atrial fibrillation, on aspirin only and unable to tolerate higher doses of blood thinners long-term.   2. Leadless [employer] pacemaker June 2022 with normal function.  3. End-stage renal disease, on hemodialysis for about 5 years, followed by Dr. Guadalupe.  4. Remote stroke x 2.   5. History of chronic hypotension, on midodrine.   6. Chronic diastolic heart failure.  7. Hypertension.     Plan: His [id number] score is 6 and he is a high bleeding risk being on dialysis and multiple comorbidities. Risks, benefits and alternatives to WATCHMAN device discussed. I will  go ahead and make arrangements. All questions answered.         Watchman Data     Atrial Fibrillation CHADSVASC2 Score Stroke Risk:     75 y.o. > 75 - 2    male Male - 0   CHF HX: Yes - 1   HTN HX: Yes - 1   Stroke/TIA/Thromboembolism Yes _2   Vascular Disease HX: No - 0   Diabetes Mellitus No - 0   CHADSVASC 2 Score 6      Annual Stroke Risk 9.7%- moderate-high              HAS-BLED Score                            Risk Factors      Points   Hypertension     Yes=1   Renal disease     Yes=1   Liver disease      No=0   Stroke history   Yes=1   Prior major bleeding or predisposition to bleeding     Yes=1   Labile  INR     No=0   Age >65   Yes=1   Medication usage predisposing to bleeding   Aspirin, clopidogrel, NSAIDs   No=0   Alcohol use   >7 drinks/week   No=0     HAS-BLED Score:    5:  Risk was 9.1% in one validation study and 12.50 bleeds per 100 patient-years in another validation study.      The patient and I had a formal shared decision-making discussion on long-term OAC options including the  benefits and harms of long-term oral anticoagulant use,

## 2025-07-15 DIAGNOSIS — I48.21 PERMANENT ATRIAL FIBRILLATION (HCC): Primary | ICD-10-CM

## 2025-07-15 DIAGNOSIS — I10 PRIMARY HYPERTENSION: ICD-10-CM

## 2025-07-15 DIAGNOSIS — I48.21 PERMANENT ATRIAL FIBRILLATION (HCC): ICD-10-CM

## 2025-07-28 ENCOUNTER — OFFICE VISIT (OUTPATIENT)
Dept: CARDIOTHORACIC SURGERY | Age: 75
End: 2025-07-28
Payer: MEDICARE

## 2025-07-28 VITALS
DIASTOLIC BLOOD PRESSURE: 75 MMHG | HEART RATE: 71 BPM | BODY MASS INDEX: 36.84 KG/M2 | OXYGEN SATURATION: 96 % | HEIGHT: 72 IN | WEIGHT: 272 LBS | SYSTOLIC BLOOD PRESSURE: 130 MMHG

## 2025-07-28 DIAGNOSIS — Z95.0 HISTORY OF PLACEMENT OF LEADLESS CARDIAC PACEMAKER: ICD-10-CM

## 2025-07-28 DIAGNOSIS — D64.9 CHRONIC ANEMIA: ICD-10-CM

## 2025-07-28 DIAGNOSIS — I48.11 LONGSTANDING PERSISTENT ATRIAL FIBRILLATION (HCC): ICD-10-CM

## 2025-07-28 DIAGNOSIS — R31.9 HEMATURIA, UNSPECIFIED TYPE: ICD-10-CM

## 2025-07-28 DIAGNOSIS — Z87.19 HISTORY OF LOWER GI BLEEDING: ICD-10-CM

## 2025-07-28 DIAGNOSIS — E78.00 HYPERCHOLESTEROLEMIA: ICD-10-CM

## 2025-07-28 DIAGNOSIS — Z01.810 PRE-OPERATIVE CARDIOVASCULAR EXAMINATION: ICD-10-CM

## 2025-07-28 DIAGNOSIS — Z01.810 PRE-OPERATIVE CARDIOVASCULAR EXAMINATION: Primary | ICD-10-CM

## 2025-07-28 PROCEDURE — 3078F DIAST BP <80 MM HG: CPT

## 2025-07-28 PROCEDURE — 99215 OFFICE O/P EST HI 40 MIN: CPT

## 2025-07-28 PROCEDURE — 3075F SYST BP GE 130 - 139MM HG: CPT

## 2025-07-28 PROCEDURE — 1123F ACP DISCUSS/DSCN MKR DOCD: CPT

## 2025-07-28 ASSESSMENT — PATIENT HEALTH QUESTIONNAIRE - PHQ9
SUM OF ALL RESPONSES TO PHQ QUESTIONS 1-9: 0
2. FEELING DOWN, DEPRESSED OR HOPELESS: NOT AT ALL
SUM OF ALL RESPONSES TO PHQ QUESTIONS 1-9: 0
1. LITTLE INTEREST OR PLEASURE IN DOING THINGS: NOT AT ALL

## 2025-07-28 NOTE — PROGRESS NOTES
Structural Cardiology  Watchman Evaluation      History of Present Illness:     Dale Summers is a 75 y.o. year old male here today for evaluation for left atrial appendage occlusion procedure with Watchman device at request of Jason Haile MD , he is accompanied by his son today.  He has a history of 2 previous strokes, paroxysmal atrial fibrillation, and is unable to tolerate long-term anticoagulation due to history of GI bleeding as well as recurrent hematuria.  He has intermittent chest pain and shortness of breath with exertion, no symptoms at rest.  He has orthopnea and sleeps in a recliner at night, he has been doing this for years. He continues to have occasional blood in his stool that is self resolving and intermittent hematuria, which is also self resolving. No fever, recent illness, cough, syncope, pre-syncope, PND, orthopnea, palpitations, or significant edema.      Impression:     Longstanding persistent atrial fibrillation, rate controlled, unable to tolerate long-term anticoagulation due to history of hematuria and GI bleeding, [id number] score of 6  Elevated bleeding risk, history of hematuria and GI bleeding, HAS-BLED score of 6  Chronic anemia   Presence of leadless pacemaker, implanted on 6/7/22 due to sick sinus syndrome and pauses, last device check July 2025 with normal function   HFpEF, chronic, compensated, last EF 55 to 60% on August 17, 2021, NYHA class II symptom presentation   ESRD, initiating hemodialysis October 2021, dialysis on Tuesdays Thursdays and Saturdays  Remote history of GI bleed and ulcer August 2021 by EGD  History of recurrent UTIs and obstructive uropathy with sepsis  Remote aspiration pneumonia  DM2  HTN, controlled  Instability with risk for falls, uses cane, chronic deconditioning  Chest pain   Shortness of breath with exertion    Primary Cardiologist: Dr. Yosvany Villa     Plan:     Excellent watchman candidate. Watchman procedure discussed as alternative to long

## 2025-07-31 ENCOUNTER — RESULTS FOLLOW-UP (OUTPATIENT)
Dept: CARDIOLOGY | Facility: HOSPITAL | Age: 75
End: 2025-07-31

## 2025-07-31 DIAGNOSIS — E78.00 HYPERCHOLESTEROLEMIA: Primary | ICD-10-CM

## 2025-08-01 ENCOUNTER — HOSPITAL ENCOUNTER (OUTPATIENT)
Facility: HOSPITAL | Age: 75
Discharge: HOME | End: 2025-08-01
Payer: MEDICARE

## 2025-08-01 ENCOUNTER — HOSPITAL ENCOUNTER (OUTPATIENT)
Facility: HOSPITAL | Age: 75
End: 2025-08-01
Payer: MEDICARE

## 2025-08-01 ENCOUNTER — HOSPITAL ENCOUNTER (OUTPATIENT)
Facility: HOSPITAL | Age: 75
Setting detail: SPECIMEN
Discharge: HOME | End: 2025-08-04

## 2025-08-01 VITALS
WEIGHT: 272 LBS | HEIGHT: 72 IN | SYSTOLIC BLOOD PRESSURE: 140 MMHG | HEART RATE: 62 BPM | BODY MASS INDEX: 36.84 KG/M2 | DIASTOLIC BLOOD PRESSURE: 66 MMHG

## 2025-08-01 DIAGNOSIS — Z01.810 PRE-OPERATIVE CARDIOVASCULAR EXAMINATION: ICD-10-CM

## 2025-08-01 LAB
ECHO BSA: 2.5 M2
LABCORP SPECIMEN COLLECTION: NORMAL
NUC STRESS EJECTION FRACTION: 53 %
STRESS BASELINE DIAS BP: 60 MMHG
STRESS BASELINE HR: 62 BPM
STRESS BASELINE SYS BP: 130 MMHG
STRESS ESTIMATED WORKLOAD: 1 METS
STRESS EXERCISE DUR MIN: 4 MIN
STRESS EXERCISE DUR SEC: 0 SEC
STRESS PEAK DIAS BP: 66 MMHG
STRESS PEAK SYS BP: 140 MMHG
STRESS PERCENT HR ACHIEVED: 45 %
STRESS POST PEAK HR: 65 BPM
STRESS RATE PRESSURE PRODUCT: 9100 BPM*MMHG
STRESS TARGET HR: 145 BPM
TID: 1.01

## 2025-08-01 PROCEDURE — 93016 CV STRESS TEST SUPVJ ONLY: CPT | Performed by: INTERNAL MEDICINE

## 2025-08-01 PROCEDURE — 3430000000 HC RX DIAGNOSTIC RADIOPHARMACEUTICAL

## 2025-08-01 PROCEDURE — 93017 CV STRESS TEST TRACING ONLY: CPT

## 2025-08-01 PROCEDURE — 99001 SPECIMEN HANDLING PT-LAB: CPT

## 2025-08-01 PROCEDURE — 78452 HT MUSCLE IMAGE SPECT MULT: CPT | Performed by: INTERNAL MEDICINE

## 2025-08-01 PROCEDURE — 93018 CV STRESS TEST I&R ONLY: CPT | Performed by: INTERNAL MEDICINE

## 2025-08-01 PROCEDURE — 6360000002 HC RX W HCPCS

## 2025-08-01 PROCEDURE — 78452 HT MUSCLE IMAGE SPECT MULT: CPT

## 2025-08-01 PROCEDURE — A9502 TC99M TETROFOSMIN: HCPCS

## 2025-08-01 RX ORDER — REGADENOSON 0.08 MG/ML
0.4 INJECTION, SOLUTION INTRAVENOUS
Status: COMPLETED | OUTPATIENT
Start: 2025-08-01 | End: 2025-08-01

## 2025-08-01 RX ADMIN — REGADENOSON 0.4 MG: 0.08 INJECTION, SOLUTION INTRAVENOUS at 10:01

## 2025-08-01 RX ADMIN — TETROFOSMIN 33 MILLICURIE: 1.38 INJECTION, POWDER, LYOPHILIZED, FOR SOLUTION INTRAVENOUS at 10:01

## 2025-08-01 RX ADMIN — TETROFOSMIN 11 MILLICURIE: 1.38 INJECTION, POWDER, LYOPHILIZED, FOR SOLUTION INTRAVENOUS at 07:40

## 2025-08-02 LAB
ALBUMIN SERPL-MCNC: 4.4 G/DL (ref 3.8–4.8)
ALP SERPL-CCNC: 122 IU/L (ref 44–121)
ALT SERPL-CCNC: 11 IU/L (ref 0–44)
AST SERPL-CCNC: 17 IU/L (ref 0–40)
BILIRUB SERPL-MCNC: 0.4 MG/DL (ref 0–1.2)
BUN SERPL-MCNC: 44 MG/DL (ref 8–27)
BUN/CREAT SERPL: 5 (ref 10–24)
CALCIUM SERPL-MCNC: 9.2 MG/DL (ref 8.6–10.2)
CHLORIDE SERPL-SCNC: 93 MMOL/L (ref 96–106)
CHOLEST SERPL-MCNC: 194 MG/DL (ref 100–199)
CO2 SERPL-SCNC: 25 MMOL/L (ref 20–29)
CREAT SERPL-MCNC: 8.18 MG/DL (ref 0.76–1.27)
EGFRCR SERPLBLD CKD-EPI 2021: 6 ML/MIN/1.73
ERYTHROCYTE [DISTWIDTH] IN BLOOD BY AUTOMATED COUNT: 13.5 % (ref 11.6–15.4)
GLOBULIN SER CALC-MCNC: 3 G/DL (ref 1.5–4.5)
GLUCOSE SERPL-MCNC: 82 MG/DL (ref 70–99)
HCT VFR BLD AUTO: 40.1 % (ref 37.5–51)
HDLC SERPL-MCNC: 58 MG/DL
HGB BLD-MCNC: 12.6 G/DL (ref 13–17.7)
LDLC SERPL CALC-MCNC: 121 MG/DL (ref 0–99)
MCH RBC QN AUTO: 30.2 PG (ref 26.6–33)
MCHC RBC AUTO-ENTMCNC: 31.4 G/DL (ref 31.5–35.7)
MCV RBC AUTO: 96 FL (ref 79–97)
PLATELET # BLD AUTO: 107 X10E3/UL (ref 150–450)
POTASSIUM SERPL-SCNC: 5 MMOL/L (ref 3.5–5.2)
PROT SERPL-MCNC: 7.4 G/DL (ref 6–8.5)
RBC # BLD AUTO: 4.17 X10E6/UL (ref 4.14–5.8)
SODIUM SERPL-SCNC: 139 MMOL/L (ref 134–144)
TRIGL SERPL-MCNC: 82 MG/DL (ref 0–149)
VLDLC SERPL CALC-MCNC: 15 MG/DL (ref 5–40)
WBC # BLD AUTO: 5.5 X10E3/UL (ref 3.4–10.8)

## 2025-08-04 ENCOUNTER — TELEPHONE (OUTPATIENT)
Dept: CARDIOTHORACIC SURGERY | Age: 75
End: 2025-08-04

## 2025-08-05 ENCOUNTER — TELEPHONE (OUTPATIENT)
Facility: HOSPITAL | Age: 75
End: 2025-08-05

## 2025-08-05 RX ORDER — ATORVASTATIN CALCIUM 20 MG/1
20 TABLET, FILM COATED ORAL DAILY
Qty: 30 TABLET | Refills: 2 | Status: SHIPPED | OUTPATIENT
Start: 2025-08-05

## 2025-08-08 ENCOUNTER — ANESTHESIA EVENT (OUTPATIENT)
Facility: HOSPITAL | Age: 75
DRG: 273 | End: 2025-08-08
Payer: MEDICARE

## 2025-08-11 ENCOUNTER — HOSPITAL ENCOUNTER (INPATIENT)
Facility: HOSPITAL | Age: 75
LOS: 1 days | Discharge: HOME | DRG: 273 | End: 2025-08-11
Attending: INTERNAL MEDICINE | Admitting: INTERNAL MEDICINE
Payer: MEDICARE

## 2025-08-11 ENCOUNTER — ANESTHESIA (OUTPATIENT)
Facility: HOSPITAL | Age: 75
DRG: 273 | End: 2025-08-11
Payer: MEDICARE

## 2025-08-11 VITALS
HEIGHT: 72 IN | OXYGEN SATURATION: 93 % | RESPIRATION RATE: 17 BRPM | TEMPERATURE: 98.2 F | SYSTOLIC BLOOD PRESSURE: 121 MMHG | HEART RATE: 62 BPM | BODY MASS INDEX: 36.84 KG/M2 | DIASTOLIC BLOOD PRESSURE: 65 MMHG | WEIGHT: 272 LBS

## 2025-08-11 DIAGNOSIS — Z95.818 PRESENCE OF WATCHMAN LEFT ATRIAL APPENDAGE CLOSURE DEVICE: Primary | ICD-10-CM

## 2025-08-11 DIAGNOSIS — I48.0 PAROXYSMAL ATRIAL FIBRILLATION (HCC): ICD-10-CM

## 2025-08-11 LAB
ACT BLD: 256 SECS (ref 79–138)
ANION GAP SERPL CALC-SCNC: 18 MMOL/L (ref 3–18)
BUN SERPL-MCNC: 56 MG/DL (ref 6–23)
BUN/CREAT SERPL: 5 (ref 12–20)
CALCIUM SERPL-MCNC: 9.4 MG/DL (ref 8.5–10.1)
CHLORIDE SERPL-SCNC: 98 MMOL/L (ref 98–107)
CO2 SERPL-SCNC: 25 MMOL/L (ref 21–32)
CREAT SERPL-MCNC: 11.1 MG/DL (ref 0.6–1.3)
ECHO BSA: 2.5 M2
GLUCOSE SERPL-MCNC: 88 MG/DL (ref 74–108)
POTASSIUM SERPL-SCNC: 4.4 MMOL/L (ref 3.5–5.5)
SODIUM SERPL-SCNC: 140 MMOL/L (ref 136–145)

## 2025-08-11 PROCEDURE — 85347 COAGULATION TIME ACTIVATED: CPT | Performed by: INTERNAL MEDICINE

## 2025-08-11 PROCEDURE — B24BZZ4 ULTRASONOGRAPHY OF HEART WITH AORTA, TRANSESOPHAGEAL: ICD-10-PCS | Performed by: INTERNAL MEDICINE

## 2025-08-11 PROCEDURE — 99238 HOSP IP/OBS DSCHRG MGMT 30/<: CPT | Performed by: INTERNAL MEDICINE

## 2025-08-11 PROCEDURE — 80048 BASIC METABOLIC PNL TOTAL CA: CPT

## 2025-08-11 PROCEDURE — 6360000002 HC RX W HCPCS: Performed by: INTERNAL MEDICINE

## 2025-08-11 PROCEDURE — C1889 IMPLANT/INSERT DEVICE, NOC: HCPCS | Performed by: INTERNAL MEDICINE

## 2025-08-11 PROCEDURE — 2500000003 HC RX 250 WO HCPCS: Performed by: NURSE ANESTHETIST, CERTIFIED REGISTERED

## 2025-08-11 PROCEDURE — 6360000002 HC RX W HCPCS: Performed by: NURSE ANESTHETIST, CERTIFIED REGISTERED

## 2025-08-11 PROCEDURE — 1100000000 HC RM PRIVATE

## 2025-08-11 PROCEDURE — C1713 ANCHOR/SCREW BN/BN,TIS/BN: HCPCS | Performed by: INTERNAL MEDICINE

## 2025-08-11 PROCEDURE — C1894 INTRO/SHEATH, NON-LASER: HCPCS | Performed by: INTERNAL MEDICINE

## 2025-08-11 PROCEDURE — 7100000000 HC PACU RECOVERY - FIRST 15 MIN: Performed by: INTERNAL MEDICINE

## 2025-08-11 PROCEDURE — 3700000001 HC ADD 15 MINUTES (ANESTHESIA): Performed by: INTERNAL MEDICINE

## 2025-08-11 PROCEDURE — 3700000000 HC ANESTHESIA ATTENDED CARE: Performed by: INTERNAL MEDICINE

## 2025-08-11 PROCEDURE — 2580000003 HC RX 258: Performed by: NURSE ANESTHETIST, CERTIFIED REGISTERED

## 2025-08-11 PROCEDURE — 7100000010 HC PHASE II RECOVERY - FIRST 15 MIN: Performed by: INTERNAL MEDICINE

## 2025-08-11 PROCEDURE — 33340 PERQ CLSR TCAT L ATR APNDGE: CPT | Performed by: INTERNAL MEDICINE

## 2025-08-11 PROCEDURE — 85347 COAGULATION TIME ACTIVATED: CPT

## 2025-08-11 PROCEDURE — 2709999900 HC NON-CHARGEABLE SUPPLY: Performed by: INTERNAL MEDICINE

## 2025-08-11 PROCEDURE — C1760 CLOSURE DEV, VASC: HCPCS | Performed by: INTERNAL MEDICINE

## 2025-08-11 PROCEDURE — 7100000001 HC PACU RECOVERY - ADDTL 15 MIN: Performed by: INTERNAL MEDICINE

## 2025-08-11 PROCEDURE — 7100000011 HC PHASE II RECOVERY - ADDTL 15 MIN: Performed by: INTERNAL MEDICINE

## 2025-08-11 PROCEDURE — 2720000010 HC SURG SUPPLY STERILE: Performed by: INTERNAL MEDICINE

## 2025-08-11 PROCEDURE — 5A1D70Z PERFORMANCE OF URINARY FILTRATION, INTERMITTENT, LESS THAN 6 HOURS PER DAY: ICD-10-PCS | Performed by: INTERNAL MEDICINE

## 2025-08-11 PROCEDURE — 02L73DK OCCLUSION OF LEFT ATRIAL APPENDAGE WITH INTRALUMINAL DEVICE, PERCUTANEOUS APPROACH: ICD-10-PCS | Performed by: INTERNAL MEDICINE

## 2025-08-11 DEVICE — IMPLANTABLE DEVICE: Type: IMPLANTABLE DEVICE | Status: FUNCTIONAL

## 2025-08-11 RX ORDER — HEPARIN SODIUM 1000 [USP'U]/ML
INJECTION, SOLUTION INTRAVENOUS; SUBCUTANEOUS
Status: DISCONTINUED | OUTPATIENT
Start: 2025-08-11 | End: 2025-08-11 | Stop reason: SDUPTHER

## 2025-08-11 RX ORDER — SODIUM CHLORIDE 9 MG/ML
INJECTION, SOLUTION INTRAVENOUS CONTINUOUS
Status: DISCONTINUED | OUTPATIENT
Start: 2025-08-11 | End: 2025-08-11 | Stop reason: HOSPADM

## 2025-08-11 RX ORDER — SODIUM CHLORIDE 9 MG/ML
INJECTION, SOLUTION INTRAVENOUS PRN
Status: DISCONTINUED | OUTPATIENT
Start: 2025-08-11 | End: 2025-08-11 | Stop reason: HOSPADM

## 2025-08-11 RX ORDER — PROPOFOL 10 MG/ML
INJECTION, EMULSION INTRAVENOUS
Status: DISCONTINUED | OUTPATIENT
Start: 2025-08-11 | End: 2025-08-11 | Stop reason: SDUPTHER

## 2025-08-11 RX ORDER — SODIUM CHLORIDE 0.9 % (FLUSH) 0.9 %
5-40 SYRINGE (ML) INJECTION PRN
Status: DISCONTINUED | OUTPATIENT
Start: 2025-08-11 | End: 2025-08-11 | Stop reason: HOSPADM

## 2025-08-11 RX ORDER — LIDOCAINE HYDROCHLORIDE 10 MG/ML
INJECTION, SOLUTION INFILTRATION; PERINEURAL PRN
Status: DISCONTINUED | OUTPATIENT
Start: 2025-08-11 | End: 2025-08-11 | Stop reason: HOSPADM

## 2025-08-11 RX ORDER — ROCURONIUM BROMIDE 10 MG/ML
INJECTION, SOLUTION INTRAVENOUS
Status: DISCONTINUED | OUTPATIENT
Start: 2025-08-11 | End: 2025-08-11 | Stop reason: SDUPTHER

## 2025-08-11 RX ORDER — SODIUM CHLORIDE 0.9 % (FLUSH) 0.9 %
5-40 SYRINGE (ML) INJECTION EVERY 12 HOURS SCHEDULED
Status: DISCONTINUED | OUTPATIENT
Start: 2025-08-11 | End: 2025-08-11 | Stop reason: HOSPADM

## 2025-08-11 RX ORDER — DEXTROSE MONOHYDRATE 100 MG/ML
INJECTION, SOLUTION INTRAVENOUS CONTINUOUS PRN
Status: DISCONTINUED | OUTPATIENT
Start: 2025-08-11 | End: 2025-08-11 | Stop reason: HOSPADM

## 2025-08-11 RX ORDER — ONDANSETRON 4 MG/1
4 TABLET, ORALLY DISINTEGRATING ORAL EVERY 8 HOURS PRN
Status: DISCONTINUED | OUTPATIENT
Start: 2025-08-11 | End: 2025-08-11 | Stop reason: HOSPADM

## 2025-08-11 RX ORDER — ACETAMINOPHEN 325 MG/1
650 TABLET ORAL EVERY 4 HOURS PRN
Status: DISCONTINUED | OUTPATIENT
Start: 2025-08-11 | End: 2025-08-11 | Stop reason: HOSPADM

## 2025-08-11 RX ORDER — INSULIN LISPRO 100 [IU]/ML
0-12 INJECTION, SOLUTION INTRAVENOUS; SUBCUTANEOUS ONCE
Status: DISCONTINUED | OUTPATIENT
Start: 2025-08-11 | End: 2025-08-11 | Stop reason: HOSPADM

## 2025-08-11 RX ORDER — DEXAMETHASONE SODIUM PHOSPHATE 4 MG/ML
INJECTION, SOLUTION INTRA-ARTICULAR; INTRALESIONAL; INTRAMUSCULAR; INTRAVENOUS; SOFT TISSUE
Status: DISCONTINUED | OUTPATIENT
Start: 2025-08-11 | End: 2025-08-11 | Stop reason: SDUPTHER

## 2025-08-11 RX ORDER — FAMOTIDINE 20 MG/1
20 TABLET, FILM COATED ORAL ONCE
Status: DISCONTINUED | OUTPATIENT
Start: 2025-08-11 | End: 2025-08-11 | Stop reason: HOSPADM

## 2025-08-11 RX ORDER — ONDANSETRON 2 MG/ML
INJECTION INTRAMUSCULAR; INTRAVENOUS
Status: DISCONTINUED | OUTPATIENT
Start: 2025-08-11 | End: 2025-08-11 | Stop reason: SDUPTHER

## 2025-08-11 RX ORDER — CEFAZOLIN SODIUM 1 G/3ML
INJECTION, POWDER, FOR SOLUTION INTRAMUSCULAR; INTRAVENOUS
Status: DISCONTINUED | OUTPATIENT
Start: 2025-08-11 | End: 2025-08-11 | Stop reason: SDUPTHER

## 2025-08-11 RX ORDER — ONDANSETRON 2 MG/ML
4 INJECTION INTRAMUSCULAR; INTRAVENOUS EVERY 6 HOURS PRN
Status: DISCONTINUED | OUTPATIENT
Start: 2025-08-11 | End: 2025-08-11 | Stop reason: HOSPADM

## 2025-08-11 RX ORDER — CLOPIDOGREL BISULFATE 75 MG/1
75 TABLET ORAL DAILY
Qty: 30 TABLET | Refills: 5 | Status: SHIPPED | OUTPATIENT
Start: 2025-08-11

## 2025-08-11 RX ORDER — OXYCODONE AND ACETAMINOPHEN 5; 325 MG/1; MG/1
1 TABLET ORAL EVERY 6 HOURS PRN
Qty: 12 TABLET | Refills: 0 | Status: SHIPPED | OUTPATIENT
Start: 2025-08-11 | End: 2025-08-14

## 2025-08-11 RX ORDER — HEPARIN SODIUM 200 [USP'U]/100ML
INJECTION, SOLUTION INTRAVENOUS CONTINUOUS PRN
Status: COMPLETED | OUTPATIENT
Start: 2025-08-11 | End: 2025-08-11

## 2025-08-11 RX ORDER — OXYCODONE AND ACETAMINOPHEN 5; 325 MG/1; MG/1
1 TABLET ORAL EVERY 4 HOURS PRN
Refills: 0 | Status: DISCONTINUED | OUTPATIENT
Start: 2025-08-11 | End: 2025-08-11 | Stop reason: HOSPADM

## 2025-08-11 RX ADMIN — SODIUM CHLORIDE: 9 INJECTION, SOLUTION INTRAVENOUS at 10:25

## 2025-08-11 RX ADMIN — DEXAMETHASONE SODIUM PHOSPHATE 4 MG: 4 INJECTION, SOLUTION INTRAMUSCULAR; INTRAVENOUS at 10:44

## 2025-08-11 RX ADMIN — ONDANSETRON 4 MG: 2 INJECTION, SOLUTION INTRAMUSCULAR; INTRAVENOUS at 10:48

## 2025-08-11 RX ADMIN — CEFAZOLIN 3 G: 330 INJECTION, POWDER, FOR SOLUTION INTRAMUSCULAR; INTRAVENOUS at 10:29

## 2025-08-11 RX ADMIN — SUGAMMADEX 200 MG: 100 INJECTION, SOLUTION INTRAVENOUS at 11:22

## 2025-08-11 RX ADMIN — PROPOFOL 170 MG: 10 INJECTION, EMULSION INTRAVENOUS at 10:30

## 2025-08-11 RX ADMIN — HEPARIN SODIUM 14000 UNITS: 1000 INJECTION, SOLUTION INTRAVENOUS; SUBCUTANEOUS at 10:43

## 2025-08-11 RX ADMIN — ROCURONIUM BROMIDE 50 MG: 10 INJECTION, SOLUTION INTRAVENOUS at 10:30

## 2025-08-11 ASSESSMENT — ENCOUNTER SYMPTOMS: SHORTNESS OF BREATH: 1

## 2025-08-15 ENCOUNTER — TELEPHONE (OUTPATIENT)
Facility: HOSPITAL | Age: 75
End: 2025-08-15

## 2025-08-26 ENCOUNTER — HOSPITAL ENCOUNTER (EMERGENCY)
Facility: HOSPITAL | Age: 75
Discharge: HOME | End: 2025-08-26
Attending: EMERGENCY MEDICINE
Payer: MEDICARE

## 2025-08-26 VITALS
HEART RATE: 61 BPM | RESPIRATION RATE: 16 BRPM | DIASTOLIC BLOOD PRESSURE: 75 MMHG | TEMPERATURE: 98.2 F | OXYGEN SATURATION: 97 % | SYSTOLIC BLOOD PRESSURE: 118 MMHG

## 2025-08-26 DIAGNOSIS — T82.9XXA COMPLICATION OF ARTERIOVENOUS DIALYSIS FISTULA, INITIAL ENCOUNTER: Primary | ICD-10-CM

## 2025-08-26 PROCEDURE — 99283 EMERGENCY DEPT VISIT LOW MDM: CPT

## 2025-08-27 ENCOUNTER — OFFICE VISIT (OUTPATIENT)
Age: 75
End: 2025-08-27
Payer: MEDICARE

## 2025-08-27 ENCOUNTER — CLINICAL SUPPORT (OUTPATIENT)
Age: 75
End: 2025-08-27
Payer: MEDICARE

## 2025-08-27 VITALS
HEART RATE: 83 BPM | WEIGHT: 275.2 LBS | HEIGHT: 72 IN | SYSTOLIC BLOOD PRESSURE: 114 MMHG | DIASTOLIC BLOOD PRESSURE: 72 MMHG | BODY MASS INDEX: 37.27 KG/M2 | OXYGEN SATURATION: 94 %

## 2025-08-27 DIAGNOSIS — I48.0 PAROXYSMAL ATRIAL FIBRILLATION (HCC): Primary | ICD-10-CM

## 2025-08-27 DIAGNOSIS — I48.91 ATRIAL FIBRILLATION, UNSPECIFIED TYPE (HCC): ICD-10-CM

## 2025-08-27 DIAGNOSIS — I77.810 DILATED AORTIC ROOT: ICD-10-CM

## 2025-08-27 DIAGNOSIS — I48.0 PAROXYSMAL ATRIAL FIBRILLATION (HCC): ICD-10-CM

## 2025-08-27 DIAGNOSIS — Z95.0 PACEMAKER: Primary | ICD-10-CM

## 2025-08-27 DIAGNOSIS — I48.19 OTHER PERSISTENT ATRIAL FIBRILLATION (HCC): ICD-10-CM

## 2025-08-27 DIAGNOSIS — Z95.0 HISTORY OF PLACEMENT OF LEADLESS CARDIAC PACEMAKER: ICD-10-CM

## 2025-08-27 DIAGNOSIS — Z95.0 PRESENCE OF CARDIAC PACEMAKER: ICD-10-CM

## 2025-08-27 DIAGNOSIS — Z95.818 PRESENCE OF WATCHMAN LEFT ATRIAL APPENDAGE CLOSURE DEVICE: ICD-10-CM

## 2025-08-27 DIAGNOSIS — I48.11 LONGSTANDING PERSISTENT ATRIAL FIBRILLATION (HCC): ICD-10-CM

## 2025-08-27 DIAGNOSIS — R00.1 SINUS BRADYCARDIA: ICD-10-CM

## 2025-08-27 DIAGNOSIS — I48.91 ATRIAL FIBRILLATION (HCC): ICD-10-CM

## 2025-08-27 DIAGNOSIS — Z95.0 HISTORY OF PACEMAKER: ICD-10-CM

## 2025-08-27 PROCEDURE — 3078F DIAST BP <80 MM HG: CPT

## 2025-08-27 PROCEDURE — 1123F ACP DISCUSS/DSCN MKR DOCD: CPT

## 2025-08-27 PROCEDURE — 93279 PRGRMG DEV EVAL PM/LDLS PM: CPT | Performed by: INTERNAL MEDICINE

## 2025-08-27 PROCEDURE — 99213 OFFICE O/P EST LOW 20 MIN: CPT

## 2025-08-27 PROCEDURE — 3074F SYST BP LT 130 MM HG: CPT

## (undated) DEVICE — CATHETER DIAG 6FR L110CM PIG CRV SZ 6 SIDE H DBL BRAID WIRE

## (undated) DEVICE — DECANTER BAG 9": Brand: MEDLINE INDUSTRIES, INC.

## (undated) DEVICE — MEDI-VAC NON-CONDUCTIVE SUCTION TUBING: Brand: CARDINAL HEALTH

## (undated) DEVICE — REM POLYHESIVE ADULT PATIENT RETURN ELECTRODE: Brand: VALLEYLAB

## (undated) DEVICE — SOLUTION IRRIG 1000ML H2O STRL BLT

## (undated) DEVICE — BASIN EMESIS 500CC ROSE 250/CS 60/PLT: Brand: MEDEGEN MEDICAL PRODUCTS, LLC

## (undated) DEVICE — SET FLD ADMIN 3 W STPCOCK FIX FEM L BOR 1IN

## (undated) DEVICE — LIMB HOLDER, WRIST/ANKLE: Brand: DEROYAL

## (undated) DEVICE — TRANSDUCER PRESSURE MONITOR SET W/ VENT TRUWAVE DISP

## (undated) DEVICE — MANIFOLD KIT ANGIO 60121686] ANGIODYNAMICS INC]

## (undated) DEVICE — AIRLIFE™ NASAL OXYGEN CANNULA CURVED, FLARED TIP WITH 14 FOOT (4.3 M) CRUSH-RESISTANT TUBING, OVER-THE-EAR STYLE: Brand: AIRLIFE™

## (undated) DEVICE — COVER LT HNDL UNIV PUR FLX DISP 2 PER PK

## (undated) DEVICE — SUTURE PROL SZ 5-0 L36IN NONABSORBABLE BLU L13MM C-1 3/8 8720H

## (undated) DEVICE — SYRINGE ANGIO 10ML CONTROLED PALM PLUNG FIX M CONN RNG GRP

## (undated) DEVICE — TUBING PRSS MON L12IN PVC RIG NONEXPANDING M TO FEM CONN

## (undated) DEVICE — FCPS RAD JAW 4LC 240CM W/NDL -- BX/20 RADIAL JAW 4

## (undated) DEVICE — GAUZE,SPONGE,4"X4",16PLY,STRL,LF,10/TRAY: Brand: MEDLINE

## (undated) DEVICE — SUT ETHLN 3-0 18IN PC5 BLK --

## (undated) DEVICE — DRAPE SURG W25XL50IN E OPN CIR BND BG

## (undated) DEVICE — Device

## (undated) DEVICE — SYRINGE MED 25GA 3ML L5/8IN SUBQ PLAS W/ DETACH NDL SFTY

## (undated) DEVICE — COVER US PRB W15XL120CM W/ GEL RUBBERBAND TAPE STRP FLD GEN

## (undated) DEVICE — SUTURE MCRYL SZ 4 0 L18IN ABSRB VLT PS 1 L24MM 3 8 CIR REV Y682H

## (undated) DEVICE — SUTURE PROL SZ 7-0 L30IN NONABSORBABLE BLU L9.3MM BV-1 1/2 8703H

## (undated) DEVICE — SUTURE MCRYL SZ 3-0 L27IN ABSRB UD L26MM SH 1/2 CIR Y416H

## (undated) DEVICE — PACK PROCEDURE SURG VASC CATH 161 MMC LF

## (undated) DEVICE — SUTURE MCRYL SZ 4-0 L18IN ABSRB UD L19MM PS-2 3/8 CIR PRIM Y496G

## (undated) DEVICE — PREP SKN CHLRAPRP APL 26ML STR --

## (undated) DEVICE — INTRODUCER SHTH 8FR CANN L11CM DIL TIP 35MM BLU TUNGSTEN

## (undated) DEVICE — INTENDED FOR TISSUE SEPARATION, AND OTHER PROCEDURES THAT REQUIRE A SHARP SURGICAL BLADE TO PUNCTURE OR CUT.: Brand: BARD-PARKER ® STAINLESS STEEL BLADES

## (undated) DEVICE — ADHESIVE SKN CLSR HI VISC 2-O --

## (undated) DEVICE — FLUFF AND POLYMER UNDERPAD,EXTRA HEAVY: Brand: WINGS

## (undated) DEVICE — SYRINGE MED 10ML SLIP TIP BLNT FILL AND LUERLOCK DISP

## (undated) DEVICE — SPONGE GZ 16 PLY WVN COT 4INX4IN STERIL

## (undated) DEVICE — DEVICE VES SEAL DIA 6-12 FR FEM VEIN FASTEST HEMOSTAS STRL

## (undated) DEVICE — BITE BLOCK ENDOSCP UNIV AD 6 TO 9.4 MM

## (undated) DEVICE — PROBE VASC 8MHZ WTRPRF

## (undated) DEVICE — DRESSING FOAM DISK DIA1IN H 7MM HYDRPHLC CHG IMPREG IN SL

## (undated) DEVICE — KENDALL RADIOLUCENT FOAM MONITORING ELECTRODE RECTANGULAR SHAPE: Brand: KENDALL

## (undated) DEVICE — GLOVE SURG SZ 7.5 L11.73IN FNGR THK9.8MIL STRW LTX POLYMER

## (undated) DEVICE — SUTURE PROL SZ 2-0 L36IN NONABSORBABLE BLU V-7 L26MM 1/2 8977H

## (undated) DEVICE — GLOVE SURG SZ 7 L11.33IN FNGR THK9.8MIL STRW LTX POLYMER

## (undated) DEVICE — SUT PROL 6-0 30IN C1 DA BLU --

## (undated) DEVICE — FLEX ADVANTAGE 3000CC: Brand: FLEX ADVANTAGE

## (undated) DEVICE — CATHETER HAD L36CM INSRT L19CM PALINDROME

## (undated) DEVICE — KIT CLN UP BON SECOURS MARYV

## (undated) DEVICE — NEEDLE HYPO 25GA L1.5IN BLU POLYPR HUB S STL REG BVL STR

## (undated) DEVICE — INTRO SHTH CATH 23F 55.7CM --

## (undated) DEVICE — ENDOSCOPY PUMP TUBING/ CAP SET: Brand: ERBE

## (undated) DEVICE — CATH PACE BPLR TEMP 5FRX105CM --

## (undated) DEVICE — SYR 50ML SLIP TIP NSAF LF STRL --

## (undated) DEVICE — MEDI-VAC SUCTION HIGH CAPACITY: Brand: CARDINAL HEALTH

## (undated) DEVICE — GUIDEWIRE VASC L180CM DIA0.035IN TIP L7CM PTFE S STL STR

## (undated) DEVICE — CATHETER SUCT TR FL TIP 14FR W/ O CTRL

## (undated) DEVICE — GUIDEWIRE VASC L180CM DIA0.035IN L7CM DIA3MM J TIP PTFE S

## (undated) DEVICE — SYRINGE MED 50ML LUERLOCK TIP

## (undated) DEVICE — DILATOR COONS TAPER: Brand: COONS

## (undated) DEVICE — INTRODUCER SHTH 5FR CANN L11CM DIL TIP 25MM GRY TUNGSTEN

## (undated) DEVICE — MEDI-TRACE CADENCE ADULT, DEFIBRILLATION ELECTRODE -RTS  (10 PR/PK) - PHYSIO-CONTROL: Brand: MEDI-TRACE CADENCE

## (undated) DEVICE — STERILE POLYISOPRENE POWDER-FREE SURGICAL GLOVES: Brand: PROTEXIS

## (undated) DEVICE — SUTURE MCRYL SZ 2-0 L36IN ABSRB UD L36MM CT-1 1/2 CIR Y945H

## (undated) DEVICE — STOPCOCK IV 4 W 360DEG TAP ROT WHT TAB